# Patient Record
Sex: MALE | Race: WHITE | NOT HISPANIC OR LATINO | Employment: OTHER | URBAN - METROPOLITAN AREA
[De-identification: names, ages, dates, MRNs, and addresses within clinical notes are randomized per-mention and may not be internally consistent; named-entity substitution may affect disease eponyms.]

---

## 2017-03-14 ENCOUNTER — OFFICE VISIT (OUTPATIENT)
Dept: SLEEP MEDICINE | Facility: CLINIC | Age: 57
End: 2017-03-14
Payer: COMMERCIAL

## 2017-03-14 VITALS
WEIGHT: 207 LBS | HEIGHT: 69 IN | SYSTOLIC BLOOD PRESSURE: 139 MMHG | OXYGEN SATURATION: 96 % | HEART RATE: 83 BPM | BODY MASS INDEX: 30.66 KG/M2 | DIASTOLIC BLOOD PRESSURE: 67 MMHG

## 2017-03-14 DIAGNOSIS — G47.33 OSA (OBSTRUCTIVE SLEEP APNEA): Primary | ICD-10-CM

## 2017-03-14 PROCEDURE — 99214 OFFICE O/P EST MOD 30 MIN: CPT | Performed by: OTOLARYNGOLOGY

## 2017-03-14 NOTE — PATIENT INSTRUCTIONS
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
## 2017-03-14 NOTE — MR AVS SNAPSHOT
After Visit Summary   3/14/2017    Miguel A Vaughn    MRN: [id number]           Patient Information     Date Of Birth          1960        Visit Information        Provider Department      3/14/2017 3:30 PM Tomas Whitman MD [facility]        Today's Diagnoses     BENJAMIN (obstructive sleep apnea)    -  1      Care Instructions      Your BMI is Body mass index is 30.57 kg/(m^2).  Weight management is a personal decision.  If you are interested in exploring weight loss strategies, the following discussion covers the approaches that may be successful. Body mass index (BMI) is one way to tell whether you are at a healthy weight, overweight, or obese. It measures your weight in relation to your height.  A BMI of 18.5 to 24.9 is in the healthy range. A person with a BMI of 25 to 29.9 is considered overweight, and someone with a BMI of 30 or greater is considered obese. More than two-thirds of American adults are considered overweight or obese.  Being overweight or obese increases the risk for further weight gain. Excess weight may lead to heart disease and diabetes.  Creating and following plans for healthy eating and physical activity may help you improve your health.  Weight control is part of healthy lifestyle and includes exercise, emotional health, and healthy eating habits. Careful eating habits lifelong are the mainstay of weight control. Though there are significant health benefits from weight loss, long-term weight loss with diet alone may be very difficult to achieve- studies show long-term success with dietary management in less than 10% of people. Attaining a healthy weight may be especially difficult to achieve in those with severe obesity. In some cases, medications, devices and surgical management might be considered.  What can you do?  If you are overweight or obese and are interested in methods for weight loss, you should discuss this with your provider.      Consider reducing daily calorie intake by 500 calories.     Keep a food journal.     Avoiding skipping meals, consider cutting portions instead.    Diet combined with exercise helps maintain muscle while optimizing fat loss. Strength training is particularly important for building and maintaining muscle mass. Exercise helps reduce stress, increase energy, and improves fitness. Increasing exercise without diet control, however, may not burn enough calories to loose weight.       Start walking three days a week 10-20 minutes at a time    Work towards walking thirty minutes five days a week     Eventually, increase the speed of your walking for 1-2 minutes at time    In addition, we recommend that you review healthy lifestyles and methods for weight loss available through the National Institutes of Health patient information sites:  http://win.niddk.nih.gov/publications/index.htm    And look into health and wellness programs that may be available through your health insurance provider, employer, local community center, or robb club.    Weight management plan: Patient was referred to their PCP to discuss a diet and exercise plan.            Follow-ups after your visit        Your next 10 appointments already scheduled     Mar 20, 2017 10:00 AM CDT   PAP SETUP REPLACEMENT with  SLEEP CENTER DME   [facility] ([address] [address] [phone]            Apr 25, 2017  3:30 PM CDT   Return Sleep Patient with Tomas Whitman MD   [address] [address] [phone]              Who to contact     If you have questions or need follow up information about today's clinic visit or your schedule please contact [facility] directly at [phone].  Normal or non-critical lab and imaging results will be communicated to you by Robbie  "letter or phone within 4 business days after the clinic has received the results. If you do not hear from us within 7 days, please contact the clinic through [employer] or phone. If you have a critical or abnormal lab result, we will notify you by phone as soon as possible.  Submit refill requests through [employer] or call your pharmacy and they will forward the refill request to us. Please allow 3 business days for your refill to be completed.          Additional Information About Your Visit        [employer] Information     [employer] lets you send messages to your doctor, view your test results, renew your prescriptions, schedule appointments and more. To sign up, go to [email or web address] . Click on \"Log in\" on the left side of the screen, which will take you to the Welcome page. Then click on \"Sign up Now\" on the right side of the page.     You will be asked to enter the access code listed below, as well as some personal information. Please follow the directions to create your username and password.     Your access code is: [email or web address]  Expires: 2017  4:16 PM     Your access code will  in 90 days. If you need help or a new code, please call your [place] clinic or [phone].        Care EveryWhere ID     This is your Care EveryWhere ID. This could be used by other organizations to access your [place] medical records  [id number]        Your Vitals Were     Pulse Height Pulse Oximetry BMI (Body Mass Index)          83 1.753 m (5' 9\") 96% 30.57 kg/m2         Blood Pressure from Last 3 Encounters:   17 139/67   16 117/59   16 128/58    Weight from Last 3 Encounters:   17 93.9 kg (207 lb)   16 95.3 kg (210 lb)   08/10/16 97.5 kg (215 lb)              We Performed the Following     Comprehensive DME          Today's Medication Changes          These changes are accurate as of: 3/14/17  4:16 PM.  If you have any questions, ask your nurse or doctor.               Stop taking " these medicines if you haven't already. Please contact your care team if you have questions.     HYDROcodone-acetaminophen 5-325 MG per tablet   Commonly known as:  NORCO   Stopped by:  Tomas Whitman MD           oxyCODONE 5 MG IR tablet   Commonly known as:  ROXICODONE   Stopped by:  Tomas Whitman MD                    Primary Care Provider Office Phone # Fax #    Juanpablo Peter Abarca [phone]       [facility] [address] [place] [address]        Thank you!     Thank you for choosing [facility]  for your care. Our goal is always to provide you with excellent care. Hearing back from our patients is one way we can continue to improve our services. Please take a few minutes to complete the written survey that you may receive in the mail after your visit with us. Thank you!             Your Updated Medication List - Protect others around you: Learn how to safely use, store and throw away your medicines at www.disposemymeds.org.          This list is accurate as of: 3/14/17  4:16 PM.  Always use your most recent med list.                   Brand Name Dispense Instructions for use    fluticasone 50 MCG/ACT spray    FLONASE    16 g    INHALE 1-2 SPRAYS IN EACH NOSTRIL ONCE DAILY       hydrocortisone 2.5 % cream    ANUSOL-HC    30 g    Place rectally 2 times daily       rOPINIRole 0.25 MG tablet    REQUIP    60 tablet    Take 1-2 tablets (0.25-0.5 mg) by mouth nightly as needed

## 2017-03-14 NOTE — NURSING NOTE
"Chief Complaint   Patient presents with     Sleep Problem     cpap f/u       Initial /67  Pulse 83  Ht 1.753 m (5' 9\")  Wt 93.9 kg (207 lb)  SpO2 96%  BMI 30.57 kg/m2 Estimated body mass index is 30.57 kg/(m^2) as calculated from the following:    Height as of this encounter: 1.753 m (5' 9\").    Weight as of this encounter: 93.9 kg (207 lb).  Medication Reconciliation: complete    "

## 2017-03-20 ENCOUNTER — DOCUMENTATION ONLY (OUTPATIENT)
Dept: SLEEP MEDICINE | Facility: CLINIC | Age: 57
End: 2017-03-20

## 2017-03-20 NOTE — PROGRESS NOTES
Patient was offered choice of vendor and chose [facility].  Patient Miguel A Vaughn was set up at [facility] on March 20, 2017. Patient received a Resmed AirSense 10 Auto. Pressures were set at 10-15 cm H2O.   Patient s ramp is 5 cm H2O for Off and FLEX/EPR is EPR.  Patient received a Resmed Mask name: Airfit F20  Full Face mask Size Medium, heated tubing and heated humidifier.  Patient is not enrolled in the STM Program and does not need to meet compliance. Patient has a follow up on 04/25/17 with Dr. Whitman.    Rosa Maria Thompson

## 2017-04-03 DIAGNOSIS — G47.33 OSA (OBSTRUCTIVE SLEEP APNEA): Primary | ICD-10-CM

## 2017-04-03 NOTE — PROGRESS NOTES
PT CALLS WITH C/O AIR HUNGER WITH HIS NEW AUTOCPAP UNIT.  CURRENTLY HE IS SET 10-15 CMH20, I REVIEWED HIS DOWNLOAD FROM THE NEW AND OLD CPAP, HIS PREVIOUS CPAP WAS SET AT [id number].  HE FELT HE WOULD LIKE TO STAY ON AUTOPAP BUT HAVE A HIGHER STARTING PRESSURE.  HIS RESMED AIRSENSE AUTO CPAP WAS SET AT 13-18 CMH20.   I HAVE ATTACHED THE NEW RX CLARIFICATION.  LOTTIE SMITH  [facility]  [phone]

## 2017-04-25 ENCOUNTER — DOCUMENTATION ONLY (OUTPATIENT)
Dept: SLEEP MEDICINE | Facility: CLINIC | Age: 57
End: 2017-04-25

## 2017-04-25 ENCOUNTER — OFFICE VISIT (OUTPATIENT)
Dept: SLEEP MEDICINE | Facility: CLINIC | Age: 57
End: 2017-04-25
Payer: COMMERCIAL

## 2017-04-25 VITALS
DIASTOLIC BLOOD PRESSURE: 73 MMHG | SYSTOLIC BLOOD PRESSURE: 150 MMHG | WEIGHT: 215.6 LBS | HEART RATE: 87 BPM | HEIGHT: 69 IN | BODY MASS INDEX: 31.93 KG/M2 | OXYGEN SATURATION: 96 %

## 2017-04-25 DIAGNOSIS — G47.33 OSA (OBSTRUCTIVE SLEEP APNEA): Primary | ICD-10-CM

## 2017-04-25 PROCEDURE — 99213 OFFICE O/P EST LOW 20 MIN: CPT | Performed by: OTOLARYNGOLOGY

## 2017-04-25 NOTE — NURSING NOTE
"Chief Complaint   Patient presents with     Sleep Problem     Cpap follow up       Initial /73 (BP Location: Right arm, Patient Position: Chair, Cuff Size: Adult Regular)  Pulse 87  Ht 1.753 m (5' 9\")  Wt 97.8 kg (215 lb 9.6 oz)  SpO2 96%  BMI 31.84 kg/m2 Estimated body mass index is 31.84 kg/(m^2) as calculated from the following:    Height as of this encounter: 1.753 m (5' 9\").    Weight as of this encounter: 97.8 kg (215 lb 9.6 oz).  Medication Reconciliation: complete     [place]: 3    "

## 2017-04-25 NOTE — PROGRESS NOTES
PATIENT WAS STRUGGLING WITH HIS NEW MASK, [facility] DID A 30 DAY MASK EXCHANGE -AB
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
## 2017-04-25 NOTE — MR AVS SNAPSHOT
"              After Visit Summary   2017    Miguel A Vaughn    MRN: [id number]           Patient Information     Date Of Birth          1960        Visit Information        Provider Department      2017 3:30 PM Tomas Whitman MD [facility]        Today's Diagnoses     BENJAMIN (obstructive sleep apnea)    -  1       Follow-ups after your visit        Who to contact     If you have questions or need follow up information about today's clinic visit or your schedule please contact [facility] directly at [phone].  Normal or non-critical lab and imaging results will be communicated to you by [email or web address], letter or phone within 4 business days after the clinic has received the results. If you do not hear from us within 7 days, please contact the clinic through [employer] or phone. If you have a critical or abnormal lab result, we will notify you by phone as soon as possible.  Submit refill requests through [employer] or call your pharmacy and they will forward the refill request to us. Please allow 3 business days for your refill to be completed.          Additional Information About Your Visit        MyChart Information     [employer] lets you send messages to your doctor, view your test results, renew your prescriptions, schedule appointments and more. To sign up, go to [email or web address] . Click on \"Log in\" on the left side of the screen, which will take you to the Welcome page. Then click on \"Sign up Now\" on the right side of the page.     You will be asked to enter the access code listed below, as well as some personal information. Please follow the directions to create your username and password.     Your access code is: [email or web address]  Expires: 2017  4:16 PM     Your access code will  in 90 days. If you need help or a new code, please call your [place] clinic or [phone].        Care EveryWhere ID     This is your Care EveryWhere ID. This could be " "used by other organizations to access your [place] medical records  [id number]        Your Vitals Were     Pulse Height Pulse Oximetry BMI (Body Mass Index)          87 1.753 m (5' 9\") 96% 31.84 kg/m2         Blood Pressure from Last 3 Encounters:   04/25/17 150/73   03/14/17 139/67   08/03/16 117/59    Weight from Last 3 Encounters:   04/25/17 97.8 kg (215 lb 9.6 oz)   03/14/17 93.9 kg (207 lb)   08/18/16 95.3 kg (210 lb)              Today, you had the following     No orders found for display       Primary Care Provider Office Phone # Fax #    Juanpablo Abarca [phone]       [facility] [address] [place] [address]        Thank you!     Thank you for choosing [facility]  for your care. Our goal is always to provide you with excellent care. Hearing back from our patients is one way we can continue to improve our services. Please take a few minutes to complete the written survey that you may receive in the mail after your visit with us. Thank you!             Your Updated Medication List - Protect others around you: Learn how to safely use, store and throw away your medicines at www.disposemymeds.org.          This list is accurate as of: 4/25/17  4:10 PM.  Always use your most recent med list.                   Brand Name Dispense Instructions for use    fluticasone 50 MCG/ACT spray    FLONASE    16 g    INHALE 1-2 SPRAYS IN EACH NOSTRIL ONCE DAILY       hydrocortisone 2.5 % cream    ANUSOL-HC    30 g    Place rectally 2 times daily       order for DME      Equipment ordered: RESMED Auto PAP Mask type: Full face  Settings: 10-15 CM H2O       rOPINIRole 0.25 MG tablet    REQUIP    60 tablet    Take 1-2 tablets (0.25-0.5 mg) by mouth nightly as needed         "

## 2017-05-18 DIAGNOSIS — J31.0 CHRONIC RHINITIS: ICD-10-CM

## 2017-05-18 DIAGNOSIS — G25.81 RESTLESS LEGS SYNDROME (RLS): ICD-10-CM

## 2017-05-18 RX ORDER — FLUTICASONE PROPIONATE 50 MCG
SPRAY, SUSPENSION (ML) NASAL
Qty: 16 G | Refills: 11 | Status: CANCELLED | OUTPATIENT
Start: 2017-05-18

## 2017-05-18 NOTE — TELEPHONE ENCOUNTER
fluticasone (FLONASE) 50 MCG/ACT spray      Last Written Prescription Date: 12/16/16  Last Fill Quantity: 16g,  # refills: 11   Last Office Visit with [facility], [facility] or  Health prescribing provider: 8/2/16                                                 rOPINIRole (REQUIP) 0.25 MG tablet     Last Written Prescription Date: 12/6/16  Last Fill Quantity: 60, # refills: 5  Last Office Visit with [facility], [facility] or  Health prescribing provider: 8/2/16        BP Readings from Last 3 Encounters:   04/25/17 150/73   03/14/17 139/67   08/03/16 117/59

## 2017-05-19 RX ORDER — FLUTICASONE PROPIONATE 50 MCG
SPRAY, SUSPENSION (ML) NASAL
Qty: 16 G | Refills: 5 | Status: SHIPPED | OUTPATIENT
Start: 2017-05-19 | End: 2018-08-17

## 2017-05-19 NOTE — TELEPHONE ENCOUNTER
Flonase:  Prescription approved per [facility] Refill Protocol - Mail Order requesting. Please cancel remaining refills from [facility].   Requip:  Routing refill request to provider for review/approval because:  Labs out of range:  BP    Domonique Pendleton, JAMES, BSN

## 2017-05-22 DIAGNOSIS — G25.81 RESTLESS LEGS SYNDROME (RLS): ICD-10-CM

## 2017-05-22 RX ORDER — ROPINIROLE 0.25 MG/1
.25-.5 TABLET, FILM COATED ORAL
Qty: 60 TABLET | Refills: 5 | Status: SHIPPED | OUTPATIENT
Start: 2017-05-22 | End: 2017-11-07

## 2017-05-22 NOTE — TELEPHONE ENCOUNTER
90 day supply request    Requip 0.25 mg     Last Written Prescription Date: 5/22/2017  Last Fill Quantity: 60, # refills: 5  Last Office Visit with [facility], [facility] or [facility] prescribing provider: 8/5/2016        BP Readings from Last 3 Encounters:   04/25/17 150/73   03/14/17 139/67   08/03/16 117/59

## 2017-05-24 RX ORDER — ROPINIROLE 0.25 MG/1
.25-.5 TABLET, FILM COATED ORAL
Qty: 60 TABLET | Refills: 5 | OUTPATIENT
Start: 2017-05-24

## 2017-05-24 NOTE — TELEPHONE ENCOUNTER
Rx was sent 5/22/2017 for 60 tabs and 5 refills.   Pharmacy notified via E-prescribe refusal  Kendra Sow RN

## 2017-11-07 DIAGNOSIS — G25.81 RESTLESS LEGS SYNDROME (RLS): ICD-10-CM

## 2017-11-09 RX ORDER — ROPINIROLE 0.25 MG/1
.25-.5 TABLET, FILM COATED ORAL
Qty: 60 TABLET | Refills: 0 | Status: SHIPPED | OUTPATIENT
Start: 2017-11-09 | End: 2018-08-17

## 2017-11-09 NOTE — TELEPHONE ENCOUNTER
Routing refill request to provider for review/approval because:  rx written by a provider who no longer works at [facility].    Robyn Orellana RN

## 2017-12-07 DIAGNOSIS — G25.81 RESTLESS LEGS SYNDROME (RLS): ICD-10-CM

## 2017-12-07 RX ORDER — ROPINIROLE 0.25 MG/1
TABLET, FILM COATED ORAL
Qty: 60 TABLET | Refills: 0 | OUTPATIENT
Start: 2017-12-07

## 2017-12-07 NOTE — TELEPHONE ENCOUNTER
Requested Prescriptions   Pending Prescriptions Disp Refills     rOPINIRole (REQUIP) 0.25 MG tablet [Pharmacy Med Name: ROPINIROLE TAB 0.25MG] 60 tablet 0     Sig: TAKE 1 TO 2 TABLETS        (=0.25-0.5MG) NIGHTLY AS   NEEDED    Antiparkinson's Agents Protocol Failed    12/7/2017  4:12 AM       Failed - Blood pressure under 140/90    BP Readings from Last 3 Encounters:   04/25/17 150/73   03/14/17 139/67   08/03/16 117/59                Failed - Recent or future visit with authorizing provider's specialty    Patient had office visit in the last year or has a visit in the next 30 days with authorizing provider.  See chart review.              Passed - Patient is age 18 or older        rOPINIRole (REQUIP) 0.25 MG tablet  Routing refill request to provider for review/approval because:  Ángela given x1 and patient did not follow up, please advise  Patient needs to be seen because:  Due for OV  Patient needs to be seen because it has been more than 1 year since last office visit.    Astrid Rodgers, RN, BSN

## 2018-02-13 NOTE — TELEPHONE ENCOUNTER
LM with patient's wife- she will have him call back once he is up to schedule an appointment either in ER or [place].  Carsisa Lam CMA ([facility])

## 2018-07-09 DIAGNOSIS — G47.33 OSA (OBSTRUCTIVE SLEEP APNEA): Primary | ICD-10-CM
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
## 2018-08-17 ENCOUNTER — OFFICE VISIT (OUTPATIENT)
Dept: FAMILY MEDICINE | Facility: OTHER | Age: 58
End: 2018-08-17
Payer: COMMERCIAL

## 2018-08-17 ENCOUNTER — TELEPHONE (OUTPATIENT)
Dept: FAMILY MEDICINE | Facility: OTHER | Age: 58
End: 2018-08-17

## 2018-08-17 VITALS
TEMPERATURE: 97.8 F | RESPIRATION RATE: 16 BRPM | OXYGEN SATURATION: 96 % | WEIGHT: 204.1 LBS | DIASTOLIC BLOOD PRESSURE: 72 MMHG | HEART RATE: 76 BPM | HEIGHT: 69 IN | BODY MASS INDEX: 30.23 KG/M2 | SYSTOLIC BLOOD PRESSURE: 138 MMHG

## 2018-08-17 DIAGNOSIS — Z11.59 NEED FOR HEPATITIS C SCREENING TEST: ICD-10-CM

## 2018-08-17 DIAGNOSIS — E78.5 HYPERLIPIDEMIA LDL GOAL <130: ICD-10-CM

## 2018-08-17 DIAGNOSIS — J30.1 SEASONAL ALLERGIC RHINITIS DUE TO POLLEN, UNSPECIFIED CHRONICITY: ICD-10-CM

## 2018-08-17 DIAGNOSIS — Z11.4 SCREENING FOR HIV (HUMAN IMMUNODEFICIENCY VIRUS): ICD-10-CM

## 2018-08-17 DIAGNOSIS — R73.9 HYPERGLYCEMIA: Primary | ICD-10-CM

## 2018-08-17 DIAGNOSIS — E11.9 TYPE 2 DIABETES MELLITUS WITHOUT COMPLICATION, WITHOUT LONG-TERM CURRENT USE OF INSULIN (H): ICD-10-CM

## 2018-08-17 DIAGNOSIS — E78.5 HYPERLIPIDEMIA LDL GOAL <100: ICD-10-CM

## 2018-08-17 DIAGNOSIS — E11.9 TYPE 2 DIABETES MELLITUS WITHOUT COMPLICATION, WITHOUT LONG-TERM CURRENT USE OF INSULIN (H): Primary | ICD-10-CM

## 2018-08-17 DIAGNOSIS — G25.81 RESTLESS LEGS SYNDROME (RLS): ICD-10-CM

## 2018-08-17 LAB
ALBUMIN SERPL-MCNC: 3.7 G/DL (ref 3.4–5)
ALP SERPL-CCNC: 115 U/L (ref 40–150)
ALT SERPL W P-5'-P-CCNC: 32 U/L (ref 0–70)
ANION GAP SERPL CALCULATED.3IONS-SCNC: 8 MMOL/L (ref 3–14)
AST SERPL W P-5'-P-CCNC: 16 U/L (ref 0–45)
BILIRUB SERPL-MCNC: 0.5 MG/DL (ref 0.2–1.3)
BUN SERPL-MCNC: 11 MG/DL (ref 7–30)
CALCIUM SERPL-MCNC: 8.6 MG/DL (ref 8.5–10.1)
CHLORIDE SERPL-SCNC: 103 MMOL/L (ref 94–109)
CO2 SERPL-SCNC: 26 MMOL/L (ref 20–32)
CREAT SERPL-MCNC: 0.77 MG/DL (ref 0.66–1.25)
GFR SERPL CREATININE-BSD FRML MDRD: >90 ML/MIN/1.7M2
GLUCOSE BLD-MCNC: 212 MG/DL (ref 70–99)
GLUCOSE SERPL-MCNC: 218 MG/DL (ref 70–99)
HBA1C MFR BLD: 12.4 % (ref 0–5.6)
LDLC SERPL DIRECT ASSAY-MCNC: 154 MG/DL
POTASSIUM SERPL-SCNC: 3.6 MMOL/L (ref 3.4–5.3)
PROT SERPL-MCNC: 7.4 G/DL (ref 6.8–8.8)
SODIUM SERPL-SCNC: 137 MMOL/L (ref 133–144)
TSH SERPL DL<=0.005 MIU/L-ACNC: 2.25 MU/L (ref 0.4–4)

## 2018-08-17 PROCEDURE — 83721 ASSAY OF BLOOD LIPOPROTEIN: CPT | Performed by: PHYSICIAN ASSISTANT

## 2018-08-17 PROCEDURE — 83036 HEMOGLOBIN GLYCOSYLATED A1C: CPT | Performed by: PHYSICIAN ASSISTANT

## 2018-08-17 PROCEDURE — 82947 ASSAY GLUCOSE BLOOD QUANT: CPT | Performed by: PHYSICIAN ASSISTANT

## 2018-08-17 PROCEDURE — 84443 ASSAY THYROID STIM HORMONE: CPT | Performed by: PHYSICIAN ASSISTANT

## 2018-08-17 PROCEDURE — 99213 OFFICE O/P EST LOW 20 MIN: CPT | Performed by: PHYSICIAN ASSISTANT

## 2018-08-17 PROCEDURE — 80053 COMPREHEN METABOLIC PANEL: CPT | Performed by: PHYSICIAN ASSISTANT

## 2018-08-17 PROCEDURE — 36415 COLL VENOUS BLD VENIPUNCTURE: CPT | Performed by: PHYSICIAN ASSISTANT

## 2018-08-17 RX ORDER — FLUTICASONE PROPIONATE 50 MCG
SPRAY, SUSPENSION (ML) NASAL
Qty: 16 G | Refills: 5 | Status: SHIPPED | OUTPATIENT
Start: 2018-08-17 | End: 2018-11-19

## 2018-08-17 RX ORDER — ROPINIROLE 0.25 MG/1
.25-.5 TABLET, FILM COATED ORAL
Qty: 60 TABLET | Refills: 0 | Status: SHIPPED | OUTPATIENT
Start: 2018-08-17 | End: 2018-08-17

## 2018-08-17 RX ORDER — GLYBURIDE-METFORMIN HYDROCHLORIDE 1.25; 25 MG/1; MG/1
1 TABLET ORAL 2 TIMES DAILY WITH MEALS
Qty: 180 TABLET | Refills: 3 | Status: SHIPPED | OUTPATIENT
Start: 2018-08-17 | End: 2018-11-19

## 2018-08-17 RX ORDER — ROPINIROLE 0.25 MG/1
.25-.5 TABLET, FILM COATED ORAL
Qty: 180 TABLET | Refills: 1 | Status: SHIPPED | OUTPATIENT
Start: 2018-08-17 | End: 2018-11-19

## 2018-08-17 RX ORDER — FLUTICASONE PROPIONATE 50 MCG
SPRAY, SUSPENSION (ML) NASAL
Qty: 16 G | Refills: 5 | Status: SHIPPED | OUTPATIENT
Start: 2018-08-17 | End: 2018-08-17

## 2018-08-17 ASSESSMENT — PAIN SCALES - GENERAL: PAINLEVEL: NO PAIN (0)

## 2018-08-17 NOTE — MR AVS SNAPSHOT
After Visit Summary   8/17/2018    Miguel A Vaughn    MRN: [id number]           Patient Information     Date Of Birth          1960        Visit Information        Provider Department      8/17/2018 7:40 AM Ferny Huerta PA-C [facility]        Today's Diagnoses     Hyperglycemia    -  1    Need for hepatitis C screening test        Screening for HIV (human immunodeficiency virus)        Restless legs syndrome (RLS)        Seasonal allergic rhinitis due to pollen, unspecified chronicity        Hyperlipidemia LDL goal <100        Type 2 diabetes mellitus without complication, without long-term current use of insulin (H)           Follow-ups after your visit        Additional Services     DIABETES EDUCATOR REFERRAL       DIABETES SELF MANAGEMENT TRAINING (DSMT)      Your provider has referred you to Diabetes Education: [facility]: Diabetes Education - [facility] [phone]   [email or web address]     If an urgent visit is needed or A1C is above 12, Care Team to call the Diabetes  Education Team at [phone] or send an In Basket message to the Diabetes Education Pool (P DIAB ED-PATIENT CARE).    A  will call you to make your appointment. If it has been more than 3 business days since your referral was placed, please call the above phone number to schedule.    Type of training and number of hours: New Diagnosis: Initial group DSMT - 10 hours.      Diabetes Type: Type 2 - On Oral Medication   Medicare covers: 10 hours of initial DSMT in 12 month period from the time of first visit, plus 2 hours of follow-up DSMT annually, and additional hours as requested for insulin training.         Diabetes Co-Morbidities: dyslipidemia, hypertension and obesity               A1C Goal:  <8.0       A1C is: Lab Results       Component                Value               Date                       A1C                      12.4                 08/17/2018              MEDICAL NUTRITION THERAPY (MNT) for Diabetes    Medical Nutrition Therapy with a Registered Dietitian can be provided in coordination with Diabetes Self-Management Training to assist in achieving optimal diabetes management.    MNT Type and Hours: New diagnosis: Initial MNT - 3 hours                       Medicare will cover: 3 hours initial MNT in 12 month period after first visit, plus 2 hours of follow-up MNT annually        Diabetes Education Topics: Comprehensive Knowledge Assessment and Instruction    Special Educational Needs Requiring Individual DSMT: None      Please be aware that coverage of these services is subject to the terms and limitations of your health insurance plan.  Call member services at your health plan to determine Diabetes Self-Management Training (Codes  and ) and Medical Nutrition Therapy (Codes 77089 and 33294) benefits and ask which blood glucose monitor brands are covered by your plan.  Please bring the following with you to your appointment:    (1)  List of current medications   (2)  List of Blood Glucose Monitor brands that are covered by your insurance plan  (3)  Blood Glucose Monitor and log book  (4)   Food records for the 3 days prior to your visit    The Certified Diabetes Educator may make diabetes medication adjustments per the CDE Protocol and Collaborative Practice Agreement.                  Follow-up notes from your care team     Return in about 3 months (around 11/17/2018).      Who to contact     If you have questions or need follow up information about today's clinic visit or your schedule please contact [facility] directly at [phone].  Normal or non-critical lab and imaging results will be communicated to you by MyChart, letter or phone within 4 business days after the clinic has received the results. If you do not hear from us within 7 days, please contact the clinic through MyChart or phone. If you have a  "critical or abnormal lab result, we will notify you by phone as soon as possible.  Submit refill requests through [employer] or call your pharmacy and they will forward the refill request to us. Please allow 3 business days for your refill to be completed.          Additional Information About Your Visit        Care EveryWhere ID     This is your Care EveryWhere ID. This could be used by other organizations to access your [place] medical records  [id number]        Your Vitals Were     Pulse Temperature Respirations Height Pulse Oximetry BMI (Body Mass Index)    76 97.8  F (36.6  C) (Temporal) 16 5' 9\" (1.753 m) 96% 30.14 kg/m2       Blood Pressure from Last 3 Encounters:   08/17/18 138/72   04/25/17 150/73   03/14/17 139/67    Weight from Last 3 Encounters:   08/17/18 204 lb 1.6 oz (92.6 kg)   04/25/17 215 lb 9.6 oz (97.8 kg)   03/14/17 207 lb (93.9 kg)              We Performed the Following     Comprehensive metabolic panel (BMP + Alb, Alk Phos, ALT, AST, Total. Bili, TP)     DIABETES EDUCATOR REFERRAL     Glucose, whole blood     Hemoglobin A1c     LDL cholesterol direct     TSH with free T4 reflex          Today's Medication Changes          These changes are accurate as of 8/17/18  8:19 AM.  If you have any questions, ask your nurse or doctor.               Start taking these medicines.        Dose/Directions    fluticasone 50 MCG/ACT spray   Commonly known as:  FLONASE   Used for:  Seasonal allergic rhinitis due to pollen, unspecified chronicity   Started by:  Ferny Huerta PA-C        INHALE 1-2 SPRAYS IN EACH NOSTRIL ONCE DAILY   Quantity:  16 g   Refills:  5       glyBURIDE-metFORMIN 1. MG per tablet   Commonly known as:  GLUCOVANCE   Used for:  Type 2 diabetes mellitus without complication, without long-term current use of insulin (H)   Started by:  Ferny Huerta PA-C        Dose:  1 tablet   Take 1 tablet by mouth 2 times daily (with meals)   Quantity:  180 tablet   Refills:  3       rOPINIRole " 0.25 MG tablet   Commonly known as:  REQUIP   Used for:  Restless legs syndrome (RLS)   Started by:  Ferny Huerta PA-C        Dose:  0.25-0.5 mg   Take 1-2 tablets (0.25-0.5 mg) by mouth nightly as needed   Quantity:  180 tablet   Refills:  1            Where to get your medicines      These medications were sent to [facility] - [place], [place] - [address] AT [address] [address]     Phone:  [phone]     fluticasone 50 MCG/ACT spray    rOPINIRole 0.25 MG tablet         These medications were sent to [facility] [place] [facility] [address] [facility]  [address] [facility] [address]     Phone:  [phone]     glyBURIDE-metFORMIN 1. MG per tablet                Primary Care Provider Office Phone # Fax #    Juanpablo Abarca [phone] [address] [place] [address]        Equal Access to Services     [facility]: Hadii aad ku hadasho Soomaali, waaxda luqadaha, qaybta kaalmada adejulisa, lucila bynum . So [facility] [phone].    ATENCIÓN: Si habla español, tiene a bullard disposición servicios gratGallup Indian Medical Centeros de asistencia lingüística. [facility] [phone].    We comply with applicable federal civil rights laws and [place] laws. We do not discriminate on the basis of race, color, national origin, age, disability, sex, sexual orientation, or gender identity.            Thank you!     Thank you for choosing [facility]  for your care. Our goal is always to provide you with excellent care. Hearing back from our patients is one way we can continue to improve our services. Please take a few minutes to complete the written survey that you may receive in the mail after your visit with us. Thank you!             Your Updated Medication List - Protect others around you: Learn how to safely use, store and throw away your medicines at  www.disposemymeds.org.          This list is accurate as of 8/17/18  8:19 AM.  Always use your most recent med list.                   Brand Name Dispense Instructions for use Diagnosis    fluticasone 50 MCG/ACT spray    FLONASE    16 g    INHALE 1-2 SPRAYS IN EACH NOSTRIL ONCE DAILY    Seasonal allergic rhinitis due to pollen, unspecified chronicity       glyBURIDE-metFORMIN 1. MG per tablet    GLUCOVANCE    180 tablet    Take 1 tablet by mouth 2 times daily (with meals)    Type 2 diabetes mellitus without complication, without long-term current use of insulin (H)       hydrocortisone 2.5 % cream    ANUSOL-HC    30 g    Place rectally 2 times daily    Rectal pain       order for DME      Equipment ordered: RESMED Auto PAP Mask type: Full face  Settings: 10-15 CM H2O        rOPINIRole 0.25 MG tablet    REQUIP    180 tablet    Take 1-2 tablets (0.25-0.5 mg) by mouth nightly as needed    Restless legs syndrome (RLS)

## 2018-08-17 NOTE — NURSING NOTE
"Chief Complaint   Patient presents with     Lab concerns     Panel Management     PHQ2, Honoring Choices, HIV, Hep C, Lipid       Initial /72  Pulse 76  Temp 97.8  F (36.6  C) (Temporal)  Resp 16  Ht 5' 9\" (1.753 m)  Wt 204 lb 1.6 oz (92.6 kg)  SpO2 96%  BMI 30.14 kg/m2 Estimated body mass index is 30.14 kg/(m^2) as calculated from the following:    Height as of this encounter: 5' 9\" (1.753 m).    Weight as of this encounter: 204 lb 1.6 oz (92.6 kg).  Medication Reconciliation: complete    Alba Nicolas MA  "

## 2018-08-17 NOTE — TELEPHONE ENCOUNTER
Left message for pt to return our call    Notes Recorded by Ferny Huerta PA-C on 8/17/2018 at 4:39 PM  Miguel A:  Along with the development of diabetes we note that your LDL cholesterol is elevated.  This needs to be less than 100 in the presence of diabetes.  If you were to talk to her cardiologist they would most likely give you a goal of closer to 70.  I would advise that you begin Lipitor 20 mg each night and follow-up with cholesterol as well as diabetes check in 3 months.    Staff:  Please contact the patient discussed these recommendations and find out if he is willing to commence with treatment.  Feel free to place the order for the medication and follow-up labs (hemoglobin A1c and LDL) for the next 3 months.  Electronically signed:    Ferny Huerta PA-C

## 2018-08-17 NOTE — LETTER
[facility]  [address] [facility] [address]  [phone]        August 17, 2018  In regards to:  Miguel A Vaughn  [address]  [place] [place] [facility] [address]          Fabien Argueta./  As part of our office visit today in clinic here in [place] we have discovered that Mr. Yost has diabetes.  We have started him on a combination medication of metformin and glyburide at a relatively low dose twice a day to start control of diabetes.  We have referred him to a diabetic educator who will arrange for further follow-up.  He will need to follow-up with me in 3 months to assure compliance with medication regimen and control over his diabetes.  It may take some time for us to get his diabetes under control as it is quite significant at this point time.  Please review the attached labs that we josafat today that I send for your records and information.    Feel free to contact me with any questions you may have.  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    Sincerely,        ESTELLA Carlson PA-C

## 2018-08-20 RX ORDER — ATORVASTATIN CALCIUM 20 MG/1
20 TABLET, FILM COATED ORAL DAILY
Qty: 90 TABLET | Refills: 1 | Status: SHIPPED | OUTPATIENT
Start: 2018-08-20 | End: 2018-11-19

## 2018-08-20 NOTE — TELEPHONE ENCOUNTER
Spoke to patient who is willing to start medication. Will pend for provider signature.  Mandi Zamora CMA ([facility])

## 2018-09-21 ENCOUNTER — ALLIED HEALTH/NURSE VISIT (OUTPATIENT)
Dept: EDUCATION SERVICES | Facility: OTHER | Age: 58
End: 2018-09-21
Payer: COMMERCIAL

## 2018-09-21 VITALS — BODY MASS INDEX: 30.63 KG/M2 | WEIGHT: 207.4 LBS

## 2018-09-21 DIAGNOSIS — E11.9 TYPE 2 DIABETES MELLITUS WITHOUT COMPLICATION, WITHOUT LONG-TERM CURRENT USE OF INSULIN (H): Primary | ICD-10-CM

## 2018-09-21 PROCEDURE — 99207 ZZC DROP WITH A PROCEDURE: CPT

## 2018-09-21 PROCEDURE — G0108 DIAB MANAGE TRN  PER INDIV: HCPCS

## 2018-09-21 NOTE — PROGRESS NOTES
"Diabetes Self-Management Education & Support    Diabetes Education Self Management & Training    SUBJECTIVE/OBJECTIVE:  Presents for: Initial Assessment for new diagnosis  Accompanied by: Spouse  Diabetes education in the past 24 mo: No  Focus of Visit: Diabetes Pathophysiology, Healthy Coping, Monitoring, Taking Medication, New diagnosis.  Diabetes type: Type 2  Date of diagnosis: 08/17/18  Disease course: Stable  How confident are you filling out medical forms by yourself:: Not Assessed  Diabetes management related comments/concerns: I do not know how I got this.  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  Transportation concerns: No  Other concerns:: None  Cultural Influences/Ethnic Background:  American     Per pt:  I do not eat that much.  I cannot give up my Java Mocha.  Reports that he cannot attend group diabetes class due to his work hours.    Diabetes Symptoms & Complications  Polyuria: Yes  Weight loss: Yes  Patient reports that he feels well.     Patient Problem List and Family Medical History reviewed for relevant medical history, current medical status, and diabetes risk factors.    Vitals:  Wt 94.1 kg (207 lb 6.4 oz)  BMI 30.63 kg/m2  Estimated body mass index is 30.63 kg/(m^2) as calculated from the following:    Height as of 8/17/18: 1.753 m (5' 9\").    Weight as of this encounter: 94.1 kg (207 lb 6.4 oz).   Last 3 BP:   BP Readings from Last 3 Encounters:   08/17/18 138/72   04/25/17 150/73   03/14/17 139/67       History   Smoking Status     Current Every Day Smoker     Packs/day: 1.00     Years: 25.00     Types: Cigarettes     Last attempt to quit: 10/1/2007   Smokeless Tobacco     Never Used       Labs:  Lab Results   Component Value Date    A1C 12.4 08/17/2018     Lab Results   Component Value Date     08/17/2018     Lab Results   Component Value Date     08/17/2018     11/17/2014     HDL Cholesterol   Date Value Ref Range Status   11/17/2014 44 >40 mg/dL " Final   ]  GFR Estimate   Date Value Ref Range Status   08/17/2018 >90 >60 mL/min/1.7m2 Final     Comment:     Non  GFR Calc     GFR Estimate If Black   Date Value Ref Range Status   08/17/2018 >90 >60 mL/min/1.7m2 Final     Comment:      GFR Calc     Lab Results   Component Value Date    CR 0.77 08/17/2018     No results found for: MICROALBUMIN    Healthy Eating  Healthy Eating Assessed Today: Yes  Cultural/Scientology diet restrictions?: No  Patient on a regular basis: Has a high intake of carbohydrates (Pt works nights and eats more snacks than meals.  Likes/eats sweets.)  Meal planning: None  Meals include:  Lunch, Dinner, Snacks  Breakfast: Usually does not eat in the morning before he goes to bed.  Lunch: 5 pm- tuna noodle hot dish and 2 hot dogs with buns and glass of milk or chili and rolls or chix dumpling soup  Snacks: 11 pm- break time- carrots, pretzels, Little Yolanda's, drinks water or Gatorade G2.    2 am- break- Java Mocha and Little Yolanda oatmeal cookie(s)  Other:  5 am- eats an oatmeal cookie, or chips or Milky Way candy bar  Beverages: Water, Coffee Java Mocha, OJ/ damian Juice 2 large glasses, Soda with Gayathri, Sports drinks Gatorade G2  Has patient met with a dietitian in the past?: No    Being Active  Being Active Assessed Today: No    Monitoring  Monitoring Assessed Today: Yes  Did patient bring glucose meter to appointment? : No  Blood Glucose Meter:  (Instructed today - Contour Next)    Fasting blood glucose at 2 pm was 180 mg/dl.    Taking Medications  Diabetes Medication(s)     Antidiabetic Combinations Sig    glyBURIDE-metFORMIN (GLUCOVANCE) 1. MG per tablet Take 1 tablet by mouth 2 times daily (with meals)     Patient not taking:  Reported on 9/21/2018          Taking Medication Assessed Today: Yes (Pt has not started to take his Glucovance yet.)  Current Treatments: None    Problem Solving     Reducing Risks  Diabetes Risks: Age over 45 years, Family  History  CAD Risks:  (not assessed)    Healthy Coping  Emotional response to diabetes: Anxiety, Ready to learn,   Informal Support system:: Spouse  Stage of change: PREPARATION (Decided to change - considering how)  Patient Activation Measure Survey Score:  DEISY Score (Last Two) 7/6/2011   DEISY Raw Score 39   Activation Score 56.4   DEISY Level 3       ASSESSMENT:  Patient here with his spouse.  He is very anxious. He and his spouse have many questions which were answered.  He brought in food records for review.  Patient eats high CHO snacks often. Pt states that he is not willing to do insulin.  Willing to take oral medications for diabetes.  Patient works from 7 pm to 7 am.  Sleeps during the day.    Goals        General    Healthy Eating (pt-stated)     Notes - Note created  9/21/2018  1:45 PM by Anusha Price RN    Goal Statement: I will modify my food choices at work.  I will eat and oatmeal cookie and a G2 at 11 pm  And a 2 am eat a sandwich and drink a Mocha.    Measure of Success: I will keep records of the food choices that I make.    Strengths:  Motivated to lower blood glucose levels  Ideas to overcome barriers:  Carry food to work with him.    Date to Achieve By:  1 month              Patient's most recent   Lab Results   Component Value Date    A1C 12.4 08/17/2018    is not meeting goal of <7.0    INTERVENTION:   Diabetes knowledge and skills assessment:     Patient is knowledgeable in diabetes management concepts related to: none    Patient needs further education on the following diabetes management concepts: Healthy Eating, Being Active, Monitoring, Taking Medication, Problem Solving, Reducing Risks and Healthy Coping    Based on learning assessment above, most appropriate setting for further diabetes education would be: Group class or Individual setting.    Education provided today on:  AADE Self-Care Behaviors:  Healthy Eating: carbohydrate counting, consistency in amount, composition, and timing  of food intake, portion control, plate planning method and label reading.  Healthy Choices recommended. Healthy eating recommended.   Monitoring: purpose, proper technique, log and interpret results, individual blood glucose targets, frequency of monitoring and proper sharps disposal  Taking Medication: action of prescribed medication, side effects of prescribed medications and when to take medications  Problem Solving: high blood glucose - causes, signs/symptoms, treatment and prevention, low blood glucose - causes, signs/symptoms, treatment and prevention and carrying a carbohydrate source at all times    Opportunities for ongoing education and support in diabetes-self management were discussed.    Pt verbalized understanding of concepts discussed and recommendations provided today.       Education Materials Provided:  [place] Understanding Diabetes Booklet, Safe Disposal Options for Needles & Syringes, BG Log Sheet, My Plate Planner and sharps disposal. BG and food records. Additional sample box of glucose test strips.    PLAN:  See Patient Instructions for co-developed, patient-stated behavior change goals.  AVS printed and provided to patient today. See Follow-Up section for recommended follow-up.  Patient to call if questions.    Anusha Price RN  BSN CDE    Time Spent: 120 minutes  Encounter Type: Individual    Any diabetes medication dose changes were made via the CDE Protocol and Collaborative Practice Agreement with the patient's referring provider. A copy of this encounter was shared with the provider.

## 2018-09-21 NOTE — PATIENT INSTRUCTIONS
Recommend to eat 3 meals per day and eat 4-5 carb choices per meal and 1-2 carb choices for a snack.  Recommend to test your blood glucose 2 times per day and record values and food choices on the record sheet.  Bring meter and log sheet with you to all appointments.  At 5 am eat  4 carb choices.  Before you go to bed eat a snack of 1-2 carb choices.  Carry sugar at all times.  Call if concerns.

## 2018-09-21 NOTE — MR AVS SNAPSHOT
After Visit Summary   9/21/2018    Miguel A Vaughn    MRN: [id number]           Patient Information     Date Of Birth          1960        Visit Information        Provider Department      9/21/2018 12:30 PM ER DIABETIC ED RESOURCE [facility]        Today's Diagnoses     Type 2 diabetes mellitus without complication, without long-term current use of insulin (H)    -  1      Care Instructions    Recommend to eat 3 meals per day and eat 4-5 carb choices per meal and 1-2 carb choices for a snack.  Recommend to test your blood glucose 2 times per day and record values and food choices on the record sheet.  Bring meter and log sheet with you to all appointments.  At 5 am eat  4 carb choices.  Before you go to bed eat a snack of 1-2 carb choices.  Carry sugar at all times.  Call if concerns.          Follow-ups after your visit        Follow-up notes from your care team     Return in about 3 weeks (around 10/12/2018).      Your next 10 appointments already scheduled     Oct 12, 2018 10:30 AM CDT   Diabetes Education with ER DIABETIC ED RESOURCE   [facility] ([facility])    [address] [place] [id number] [phone]            Nov 19, 2018  9:00 AM CST   Office Visit with Ferny Huerta PA-C   [facility]    [address] [facility] [address] [phone]           Bring a current list of meds and any records pertaining to this visit. For Physicals, please bring immunization records and any forms needing to be filled out. Please arrive 10 minutes early to complete paperwork.              Who to contact     If you have questions or need follow up information about today's clinic visit or your schedule please contact [facility] directly at [phone].  Normal or non-critical lab and imaging results will be communicated to you by Robbie  letter or phone within 4 business days after the clinic has received the results. If you do not hear from us within 7 days, please contact the clinic through [facility] [employer] or phone. If you have a critical or abnormal lab result, we will notify you by phone as soon as possible.  Submit refill requests through [facility] or call your pharmacy and they will forward the refill request to us. Please allow 3 business days for your refill to be completed.          Additional Information About Your Visit        Care EveryWhere ID     This is your Care EveryWhere ID. This could be used by other organizations to access your [facility] medical records  [id number]        Your Vitals Were     BMI (Body Mass Index)                   30.63 kg/m2            Blood Pressure from Last 3 Encounters:   08/17/18 138/72   04/25/17 150/73   03/14/17 139/67    Weight from Last 3 Encounters:   09/21/18 94.1 kg (207 lb 6.4 oz)   08/17/18 92.6 kg (204 lb 1.6 oz)   04/25/17 97.8 kg (215 lb 9.6 oz)              We Performed the Following     DIABETES EDUCATION - Individual  []          Today's Medication Changes          These changes are accurate as of 9/21/18 11:59 PM.  If you have any questions, ask your nurse or doctor.               Start taking these medicines.        Dose/Directions    blood glucose monitoring lancets   Used for:  Type 2 diabetes mellitus without complication, without long-term current use of insulin (H)        Use to test blood sugar 2 times daily or as directed.   Quantity:  100 each   Refills:  2       blood glucose monitoring test strip   Commonly known as:  CONTOUR NEXT TEST   Used for:  Type 2 diabetes mellitus without complication, without long-term current use of insulin (H)        Use to test blood sugar 2 times daily or as directed.   Quantity:  200 each   Refills:  3            Where to get your medicines      [facility] [place] [place] - [address] AT  Portal to Registered [facility] Sites  [facility] [address]     Phone:  [phone]     blood glucose monitoring lancets    blood glucose monitoring test strip                Primary Care Provider Office Phone # Fax #    Juanpablo Peter Abarca [phone] [address] [place] [id number]        Goals        General    Healthy Eating (pt-stated)     Notes - Note created  9/21/2018  1:45 PM by Anusha Price RN    Goal Statement: I will modify my food choices at work.  I will eat and oatmeal cookie and a G2 at 11 pm  And a 2 am eat a sandwich and drink a Mocha.    Measure of Success: I will keep records of the food choices that I make.    Strengths:  Motivated to lower blood glucose levels  Ideas to overcome barriers:  Carry food to work with him.    Date to Achieve By:  1 month          Equal Access to Services     TOMMY SPENCE AH: Hadii licha weldon hadasho Soomaali, waaxda luqadaha, qaybta kaalmada roberthyada, lucila bynum . So [facility] [phone].    ATENCIÓN: Si habla español, tiene a bullard disposición servicios gratuitos de asistencia lingüística. Llame al [phone].    We comply with applicable federal civil rights laws and [place] laws. We do not discriminate on the basis of race, color, national origin, age, disability, sex, sexual orientation, or gender identity.            Thank you!     Thank you for choosing [facility]  for your care. Our goal is always to provide you with excellent care. Hearing back from our patients is one way we can continue to improve our services. Please take a few minutes to complete the written survey that you may receive in the mail after your visit with us. Thank you!             Your Updated Medication List - Protect others around you: Learn how to safely use, store and throw away your medicines at www.disposemymeds.org.          This list is accurate as of 9/21/18 11:59 PM.  Always use  your most recent med list.                   Brand Name Dispense Instructions for use Diagnosis    atorvastatin 20 MG tablet    LIPITOR    90 tablet    Take 1 tablet (20 mg) by mouth daily    Type 2 diabetes mellitus without complication, without long-term current use of insulin (H), Hyperlipidemia LDL goal <130       blood glucose monitoring lancets     100 each    Use to test blood sugar 2 times daily or as directed.    Type 2 diabetes mellitus without complication, without long-term current use of insulin (H)       blood glucose monitoring test strip    CONTOUR NEXT TEST    200 each    Use to test blood sugar 2 times daily or as directed.    Type 2 diabetes mellitus without complication, without long-term current use of insulin (H)       fluticasone 50 MCG/ACT spray    FLONASE    16 g    INHALE 1-2 SPRAYS IN EACH NOSTRIL ONCE DAILY    Seasonal allergic rhinitis due to pollen, unspecified chronicity       glyBURIDE-metFORMIN 1. MG per tablet    GLUCOVANCE    180 tablet    Take 1 tablet by mouth 2 times daily (with meals)    Type 2 diabetes mellitus without complication, without long-term current use of insulin (H)       hydrocortisone 2.5 % cream    ANUSOL-HC    30 g    Place rectally 2 times daily    Rectal pain       order for DME      Equipment ordered: RESMED Auto PAP Mask type: Full face  Settings: 10-15 CM H2O        rOPINIRole 0.25 MG tablet    REQUIP    180 tablet    Take 1-2 tablets (0.25-0.5 mg) by mouth nightly as needed    Restless legs syndrome (RLS)

## 2018-09-27 ENCOUNTER — PATIENT OUTREACH (OUTPATIENT)
Dept: EDUCATION SERVICES | Facility: OTHER | Age: 58
End: 2018-09-27

## 2018-09-27 ENCOUNTER — PATIENT OUTREACH (OUTPATIENT)
Dept: EDUCATION SERVICES | Facility: CLINIC | Age: 58
End: 2018-09-27

## 2018-09-27 DIAGNOSIS — E11.9 TYPE 2 DIABETES MELLITUS WITHOUT COMPLICATION, WITHOUT LONG-TERM CURRENT USE OF INSULIN (H): Primary | ICD-10-CM

## 2018-09-27 NOTE — PROGRESS NOTES
Patient changing to Glucocard meter and strips for cost savings.   Cancelled Rx for [facility] Contour supplies.      Yane Garcia RD, LD, CDE

## 2018-09-27 NOTE — PROGRESS NOTES
Per educator:  9:20 am-  Call out to patient to follow up with re above message.  There was no answer.  I left a message to call the triage line again to leave information re what time he would be able to be reached.    Anusha Price RN  BSN CDE
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
## 2018-11-19 ENCOUNTER — TELEPHONE (OUTPATIENT)
Dept: FAMILY MEDICINE | Facility: OTHER | Age: 58
End: 2018-11-19

## 2018-11-19 ENCOUNTER — OFFICE VISIT (OUTPATIENT)
Dept: FAMILY MEDICINE | Facility: OTHER | Age: 58
End: 2018-11-19
Payer: COMMERCIAL

## 2018-11-19 VITALS
BODY MASS INDEX: 31.12 KG/M2 | SYSTOLIC BLOOD PRESSURE: 130 MMHG | TEMPERATURE: 97.5 F | RESPIRATION RATE: 18 BRPM | HEART RATE: 80 BPM | WEIGHT: 210.1 LBS | DIASTOLIC BLOOD PRESSURE: 58 MMHG | HEIGHT: 69 IN

## 2018-11-19 DIAGNOSIS — G56.21 LESION OF RIGHT ULNAR NERVE: ICD-10-CM

## 2018-11-19 DIAGNOSIS — G56.31 LESION OF RADIAL NERVE, RIGHT: Primary | ICD-10-CM

## 2018-11-19 DIAGNOSIS — G25.81 RESTLESS LEGS SYNDROME (RLS): ICD-10-CM

## 2018-11-19 DIAGNOSIS — E11.9 TYPE 2 DIABETES MELLITUS WITHOUT COMPLICATION, WITHOUT LONG-TERM CURRENT USE OF INSULIN (H): ICD-10-CM

## 2018-11-19 DIAGNOSIS — J30.1 SEASONAL ALLERGIC RHINITIS DUE TO POLLEN: ICD-10-CM

## 2018-11-19 DIAGNOSIS — E78.5 HYPERLIPIDEMIA LDL GOAL <130: ICD-10-CM

## 2018-11-19 LAB
CREAT UR-MCNC: 168 MG/DL
HBA1C MFR BLD: 9.3 % (ref 0–5.6)
LDLC SERPL DIRECT ASSAY-MCNC: 115 MG/DL
MICROALBUMIN UR-MCNC: 11 MG/L
MICROALBUMIN/CREAT UR: 6.55 MG/G CR (ref 0–17)

## 2018-11-19 PROCEDURE — 99207 C FOOT EXAM  NO CHARGE: CPT | Performed by: PHYSICIAN ASSISTANT

## 2018-11-19 PROCEDURE — 83721 ASSAY OF BLOOD LIPOPROTEIN: CPT | Performed by: PHYSICIAN ASSISTANT

## 2018-11-19 PROCEDURE — 82043 UR ALBUMIN QUANTITATIVE: CPT | Performed by: PHYSICIAN ASSISTANT

## 2018-11-19 PROCEDURE — 99214 OFFICE O/P EST MOD 30 MIN: CPT | Performed by: PHYSICIAN ASSISTANT

## 2018-11-19 PROCEDURE — 83036 HEMOGLOBIN GLYCOSYLATED A1C: CPT | Performed by: PHYSICIAN ASSISTANT

## 2018-11-19 PROCEDURE — 36415 COLL VENOUS BLD VENIPUNCTURE: CPT | Performed by: PHYSICIAN ASSISTANT

## 2018-11-19 RX ORDER — GLYBURIDE-METFORMIN HYDROCHLORIDE 2.5; 5 MG/1; MG/1
1 TABLET ORAL 2 TIMES DAILY WITH MEALS
Qty: 180 TABLET | Refills: 1 | Status: SHIPPED | OUTPATIENT
Start: 2018-11-19 | End: 2019-03-29

## 2018-11-19 RX ORDER — FLUTICASONE PROPIONATE 50 MCG
SPRAY, SUSPENSION (ML) NASAL
Qty: 16 G | Refills: 5 | Status: SHIPPED | OUTPATIENT
Start: 2018-11-19 | End: 2024-05-21

## 2018-11-19 RX ORDER — GLYBURIDE-METFORMIN HYDROCHLORIDE 1.25; 25 MG/1; MG/1
1 TABLET ORAL 2 TIMES DAILY WITH MEALS
Qty: 180 TABLET | Refills: 1 | Status: SHIPPED | OUTPATIENT
Start: 2018-11-19 | End: 2018-11-19 | Stop reason: ALTCHOICE

## 2018-11-19 RX ORDER — ROPINIROLE 0.25 MG/1
.25-.5 TABLET, FILM COATED ORAL
Qty: 180 TABLET | Refills: 1 | Status: SHIPPED | OUTPATIENT
Start: 2018-11-19 | End: 2021-12-07

## 2018-11-19 RX ORDER — ATORVASTATIN CALCIUM 20 MG/1
20 TABLET, FILM COATED ORAL DAILY
Qty: 90 TABLET | Refills: 1 | Status: SHIPPED | OUTPATIENT
Start: 2018-11-19 | End: 2021-12-07

## 2018-11-19 ASSESSMENT — PAIN SCALES - GENERAL: PAINLEVEL: NO PAIN (0)

## 2018-11-19 NOTE — MR AVS SNAPSHOT
"              After Visit Summary   11/19/2018    Miguel A Vaughn    MRN: [id number]           Patient Information     Date Of Birth          1960        Visit Information        Provider Department      11/19/2018 9:00 AM Ferny Huerta PA-C [facility]        Today's Diagnoses     Lesion of radial nerve, right    -  1    Type 2 diabetes mellitus without complication, without long-term current use of insulin (H)        Hyperlipidemia LDL goal <130        Restless legs syndrome (RLS)        Lesion of right ulnar nerve        Seasonal allergic rhinitis due to pollen           Follow-ups after your visit        Follow-up notes from your care team     Return in about 3 months (around 2/19/2019) for Medication Recheck, recheck of current condition.      Future tests that were ordered for you today     Open Future Orders        Priority Expected Expires Ordered    Hemoglobin A1c Routine  2/19/2019 11/19/2018            Who to contact     If you have questions or need follow up information about today's clinic visit or your schedule please contact [facility] directly at [phone].  Normal or non-critical lab and imaging results will be communicated to you by MyChart, letter or phone within 4 business days after the clinic has received the results. If you do not hear from us within 7 days, please contact the clinic through [employer] or phone. If you have a critical or abnormal lab result, we will notify you by phone as soon as possible.  Submit refill requests through [employer] or call your pharmacy and they will forward the refill request to us. Please allow 3 business days for your refill to be completed.          Additional Information About Your Visit        MyChart Information     [employer] lets you send messages to your doctor, view your test results, renew your prescriptions, schedule appointments and more. To sign up, go to [email or web address] . Click on \"Log in\" on the left " "side of the screen, which will take you to the Welcome page. Then click on \"Sign up Now\" on the right side of the page.     You will be asked to enter the access code listed below, as well as some personal information. Please follow the directions to create your username and password.     Your access code is: [email or web address]  Expires: 2019  9:18 AM     Your access code will  in 90 days. If you need help or a new code, please call your [facility] clinic or [phone].        Care EveryWhere ID     This is your Care EveryWhere ID. This could be used by other organizations to access your [facility] medical records  [id number]        Your Vitals Were     Pulse Temperature Respirations Height BMI (Body Mass Index)       80 97.5  F (36.4  C) (Temporal) 18 5' 9\" (1.753 m) 31.03 kg/m2        Blood Pressure from Last 3 Encounters:   18 130/58   18 138/72   17 150/73    Weight from Last 3 Encounters:   18 210 lb 1.6 oz (95.3 kg)   18 207 lb 6.4 oz (94.1 kg)   18 204 lb 1.6 oz (92.6 kg)              We Performed the Following     Albumin Random Urine Quantitative with Creat Ratio     Hemoglobin A1c     LDL cholesterol direct          Today's Medication Changes          These changes are accurate as of 18  9:19 AM.  If you have any questions, ask your nurse or doctor.               Start taking these medicines.        Dose/Directions    glyBURIDE-metFORMIN 2.5-500 MG per tablet   Commonly known as:  GLUCOVANCE   Used for:  Type 2 diabetes mellitus without complication, without long-term current use of insulin (H)   Replaces:  glyBURIDE-metFORMIN 1. MG per tablet   Started by:  Ferny Huerta PA-C        Dose:  1 tablet   Take 1 tablet by mouth 2 times daily (with meals)   Quantity:  180 tablet   Refills:  1         These medicines have changed or have updated prescriptions.        Dose/Directions    rOPINIRole 0.25 MG tablet   Commonly known as:  REQUIP   This may have " changed:  reasons to take this   Used for:  Restless legs syndrome (RLS)   Changed by:  Ferny Huerta PA-C        Dose:  0.25-0.5 mg   Take 1-2 tablets (0.25-0.5 mg) by mouth nightly as needed (restless legs)   Quantity:  180 tablet   Refills:  1         Stop taking these medicines if you haven't already. Please contact your care team if you have questions.     glyBURIDE-metFORMIN 1. MG per tablet   Commonly known as:  GLUCOVANCE   Replaced by:  glyBURIDE-metFORMIN 2.5-500 MG per tablet   Stopped by:  Ferny Huerta PA-C                Where to get your medicines      These medications were sent to [facility] - [place], [place] - [address] [place] [id number] [facility], [facility] [address]     Phone:  [phone]     atorvastatin 20 MG tablet    fluticasone 50 MCG/ACT spray    glyBURIDE-metFORMIN 2.5-500 MG per tablet    rOPINIRole 0.25 MG tablet                Primary Care Provider Office Phone # Fax #    Juanpablo Peter Abarca [phone] [address] [place] [address]        Goals        General    Healthy Eating (pt-stated)     Notes - Note created  9/21/2018  1:45 PM by Anusha Price, RN    Goal Statement: I will modify my food choices at work.  I will eat and oatmeal cookie and a G2 at 11 pm  And a 2 am eat a sandwich and drink a Mocha.    Measure of Success: I will keep records of the food choices that I make.    Strengths:  Motivated to lower blood glucose levels  Ideas to overcome barriers:  Carry food to work with him.    Date to Achieve By:  1 month          Equal Access to Services     TOMMY SPENCE AH: Hadhilda John, luisitoda myranda, qaybta juallucila tate. So [facility] [phone].    ATENCIÓN: Si habla español, tiene a bullard disposición servicios gratuitos de asistencia lingüística. Llame al [phone].    We comply with applicable federal civil rights  laws and [place] laws. We do not discriminate on the basis of race, color, national origin, age, disability, sex, sexual orientation, or gender identity.            Thank you!     Thank you for choosing [facility]  for your care. Our goal is always to provide you with excellent care. Hearing back from our patients is one way we can continue to improve our services. Please take a few minutes to complete the written survey that you may receive in the mail after your visit with us. Thank you!             Your Updated Medication List - Protect others around you: Learn how to safely use, store and throw away your medicines at www.disposemymeds.org.          This list is accurate as of 11/19/18  9:19 AM.  Always use your most recent med list.                   Brand Name Dispense Instructions for use Diagnosis    atorvastatin 20 MG tablet    LIPITOR    90 tablet    Take 1 tablet (20 mg) by mouth daily    Type 2 diabetes mellitus without complication, without long-term current use of insulin (H), Hyperlipidemia LDL goal <130       fluticasone 50 MCG/ACT spray    FLONASE    16 g    INHALE 1-2 SPRAYS IN EACH NOSTRIL ONCE DAILY    Seasonal allergic rhinitis due to pollen       glyBURIDE-metFORMIN 2.5-500 MG per tablet    GLUCOVANCE    180 tablet    Take 1 tablet by mouth 2 times daily (with meals)    Type 2 diabetes mellitus without complication, without long-term current use of insulin (H)       hydrocortisone 2.5 % cream    ANUSOL-HC    30 g    Place rectally 2 times daily    Rectal pain       order for DME      Equipment ordered: RESMED Auto PAP Mask type: Full face  Settings: 10-15 CM H2O        rOPINIRole 0.25 MG tablet    REQUIP    180 tablet    Take 1-2 tablets (0.25-0.5 mg) by mouth nightly as needed (restless legs)    Restless legs syndrome (RLS)

## 2018-11-19 NOTE — TELEPHONE ENCOUNTER
Per provider letter written.  Mandi Zamora CMA ([facility])      Notes Recorded by Ferny Huerta PA-C on 11/19/2018 at 4:30 PM  We have made adjustments to his diabetic medications hopefully we will get even better control in another 3 months.  I would advise that he get his cholesterol rechecked in 3 months as well.  In any case he needs to make a follow-up appointment with his primary care provider of choice in 3 months for recheck of labs.  Please send a letter with this advice.  Electronically signed:    Ferny Huerta PA-C

## 2018-11-19 NOTE — LETTER
November 19, 2018      Miguel A Vaughn  [address]  [place] [address]        Dear ,    We are writing to inform you of your test results.    We have made adjustments to yor diabetic medications hopefully we will get even better control in another 3 months.  I would advise that you get your cholesterol rechecked in 3 months as well.  In any case you need to make a follow-up appointment with his primary care provider of choice in 3 months for recheck of labs.      Resulted Orders   Hemoglobin A1c   Result Value Ref Range    Hemoglobin A1C 9.3 (H) 0 - 5.6 %      Comment:      Normal <5.7% Prediabetes 5.7-6.4%  Diabetes 6.5% or higher - adopted from ADA   consensus guidelines.     LDL cholesterol direct   Result Value Ref Range    LDL Cholesterol Direct 115 (H) <100 mg/dL      Comment:      Above desirable:  100-129 mg/dl  Borderline High:  130-159 mg/dL  High:             160-189 mg/dL  Very high:       >189 mg/dl     Albumin Random Urine Quantitative with Creat Ratio   Result Value Ref Range    Creatinine Urine 168 mg/dL    Albumin Urine mg/L 11 mg/L    Albumin Urine mg/g Cr 6.55 0 - 17 mg/g Cr       If you have any questions or concerns, please call the clinic at the number listed above.       Sincerely,        Ferny Gomez PA-C

## 2018-11-21 ENCOUNTER — TELEPHONE (OUTPATIENT)
Dept: FAMILY MEDICINE | Facility: OTHER | Age: 58
End: 2018-11-21

## 2018-12-14 ENCOUNTER — TELEPHONE (OUTPATIENT)
Dept: FAMILY MEDICINE | Facility: OTHER | Age: 58
End: 2018-12-14

## 2018-12-14 NOTE — TELEPHONE ENCOUNTER
Reason for Call:  Form, our goal is to have forms completed with 72 hours, however, some forms may require a visit or additional information.    Type of letter, form or note:  hunting    Who is the form from?: DNR (if other please explain)    Where did the form come from: Patient or family brought in       What clinic location was the form placed at?: [facility] - [phone]    Where the form was placed: 's Box    What number is listed as a contact on the form?: [phone]       Additional comments: please complete and mail in with envelope provided    Call taken on 12/14/2018 at 4:03 PM by Bonita Adrian

## 2018-12-17 NOTE — TELEPHONE ENCOUNTER
In my MA box (overhead file over my desk) for completion and scan of the document into the medical record.  Electronically signed:    Ferny Gomez PA-C

## 2018-12-17 NOTE — TELEPHONE ENCOUNTER
Forms have been completed, signed, faxed/mailed, and sent to scanning.  Mandi Zamora CMA ([facility])

## 2019-02-18 ENCOUNTER — TELEPHONE (OUTPATIENT)
Dept: FAMILY MEDICINE | Facility: OTHER | Age: 59
End: 2019-02-18

## 2019-02-18 NOTE — LETTER
[facility]  [address] [place] [address]  Phone: [phone]        February 20, 2019      Miguel A Vaughn                                                                                                                   [address]  [facility] [address]            Dear Mr. Vaughn,    We are concerned about your health care. We are sending you this letter to remind you that you are due for a diabetic follow up as well as an annual preventative visit. Please call [phone] to schedule an appointment or to let us know that you are no longer a [facility] patient. Please disregard this letter if you have already made these appointments.    Thank you,      Your [facility] Team

## 2019-02-18 NOTE — TELEPHONE ENCOUNTER
Panel Management Review      Patient has the following on his problem list:     Diabetes    ASA: Passed    Last A1C  Lab Results   Component Value Date    A1C 9.3 11/19/2018    A1C 12.4 08/17/2018     A1C tested: Passed    Last LDL:    Lab Results   Component Value Date    CHOL 214 11/17/2014     Lab Results   Component Value Date    HDL 44 11/17/2014     Lab Results   Component Value Date     11/19/2018     11/17/2014     Lab Results   Component Value Date    TRIG 148 11/17/2014     Lab Results   Component Value Date    CHOLHDLRATIO 4.9 11/17/2014     No results found for: NHDL    Is the patient on a Statin? YES             Is the patient on Aspirin? YES    Medications     HMG CoA Reductase Inhibitors    atorvastatin (LIPITOR) 20 MG tablet    Salicylates    aspirin 81 MG EC tablet          Last three blood pressure readings:  BP Readings from Last 3 Encounters:   11/19/18 130/58   08/17/18 138/72   04/25/17 150/73       Date of last diabetes office visit: 11/19/18     Tobacco History:     History   Smoking Status     Current Every Day Smoker     Packs/day: 1.00     Years: 25.00     Types: Cigarettes     Last attempt to quit: 10/1/2007   Smokeless Tobacco     Never Used           Composite cancer screening  Chart review shows that this patient is due/due soon for the following None  Summary:    Patient is due/failing the following:   Diabetic OV    Action needed:   Patient needs office visit for Diabetic follow up..    Type of outreach:    Phone, left message for patient to call back.     Questions for provider review:    None                                                                                                                                    Mandi Zamora CMA ([facility])       Chart routed to Care Team .

## 2019-02-19 NOTE — TELEPHONE ENCOUNTER
Left message for patient to return call to clinic. When call is returned please inform patient he is due for a diabetic follow up in clinic. Please assist in scheduling.     Ross Pizarro,

## 2019-02-25 ENCOUNTER — TELEPHONE (OUTPATIENT)
Dept: FAMILY MEDICINE | Facility: OTHER | Age: 59
End: 2019-02-25

## 2019-02-25 NOTE — TELEPHONE ENCOUNTER
Panel Management Review      Patient has the following on his problem list:     Diabetes    ASA: PassedPassed    Last A1C  Lab Results   Component Value Date    A1C 9.3 11/19/2018    A1C 12.4 08/17/2018     A1C tested: Passed    Last LDL:    Lab Results   Component Value Date    CHOL 214 11/17/2014     Lab Results   Component Value Date    HDL 44 11/17/2014     Lab Results   Component Value Date     11/19/2018     11/17/2014     Lab Results   Component Value Date    TRIG 148 11/17/2014     Lab Results   Component Value Date    CHOLHDLRATIO 4.9 11/17/2014     No results found for: NHDL    Is the patient on a Statin? YES             Is the patient on Aspirin? YES    Medications     HMG CoA Reductase Inhibitors     atorvastatin (LIPITOR) 20 MG tablet       Salicylates     aspirin 81 MG EC tablet             Last three blood pressure readings:  BP Readings from Last 3 Encounters:   11/19/18 130/58   08/17/18 138/72   04/25/17 150/73       Date of last diabetes office visit: 11/19/18     Tobacco History:     History   Smoking Status     Current Every Day Smoker     Packs/day: 1.00     Years: 25.00     Types: Cigarettes     Last attempt to quit: 10/1/2007   Smokeless Tobacco     Never Used           Composite cancer screening  Chart review shows that this patient is due/due soon for the following NoneNone  Summary:    Patient is due/failing the following:   Diabetic follow up    Action needed:   Patient needs office visit for Diabetic follow up/Preventative care.    Type of outreach:    Phone, left message for patient to call back.     Questions for provider review:    None                                                                                                                                    Mandi Zamora CMA (BRIAN)       Chart routed to Care Team .

## 2019-02-25 NOTE — LETTER
[facility]  [address] [place] [address]  Phone: [phone]  February 26, 2019      Miguel A Vaughn  [address]  [place] [address]      Dear Miguel A,    We care about your health and have reviewed your health plan including your medical conditions, medications, and lab results.  Based on this review, it is recommended that you follow up regarding the following health topic(s):  -Cholesterol  -Diabetes  -Wellness (Physical) Visit     We recommend you take the following action(s):  -schedule a FOLLOWUP APPOINTMENT.  -schedule a WELLNESS (Physical) APPOINTMENT.  We will perform the following labs: A1c, Lipids (fasting cholesterol - nothing to eat except water and/or meds for 8-10 hours), CMP(complete metabolic panel), Microablumin and TSH (thyroid test).     Please call us at the [facility] - [phone] (or use Therative) to address the above recommendations.     Thank you for trusting [facility] and we appreciate the opportunity to serve you.  We look forward to supporting your healthcare needs in the future.    Healthy Regards,    Your Health Care Team  [facility]

## 2019-02-26 NOTE — TELEPHONE ENCOUNTER
2nd attempt for patient to return call, letter sent.   If call is returned, please assist with scheduling a diabetic follow up/preventivie care visit.  Sara Martinez MA

## 2019-03-13 ENCOUNTER — APPOINTMENT (OUTPATIENT)
Dept: MRI IMAGING | Facility: CLINIC | Age: 59
End: 2019-03-13
Attending: EMERGENCY MEDICINE
Payer: COMMERCIAL

## 2019-03-13 ENCOUNTER — HOSPITAL ENCOUNTER (EMERGENCY)
Facility: CLINIC | Age: 59
Discharge: HOME | End: 2019-03-13
Attending: EMERGENCY MEDICINE | Admitting: EMERGENCY MEDICINE
Payer: COMMERCIAL

## 2019-03-13 ENCOUNTER — APPOINTMENT (OUTPATIENT)
Dept: CT IMAGING | Facility: CLINIC | Age: 59
End: 2019-03-13
Attending: EMERGENCY MEDICINE
Payer: COMMERCIAL

## 2019-03-13 ENCOUNTER — TELEPHONE (OUTPATIENT)
Dept: FAMILY MEDICINE | Facility: OTHER | Age: 59
End: 2019-03-13

## 2019-03-13 ENCOUNTER — APPOINTMENT (OUTPATIENT)
Dept: GENERAL RADIOLOGY | Facility: CLINIC | Age: 59
End: 2019-03-13
Attending: EMERGENCY MEDICINE
Payer: COMMERCIAL

## 2019-03-13 VITALS
OXYGEN SATURATION: 95 % | TEMPERATURE: 97.4 F | RESPIRATION RATE: 5 BRPM | WEIGHT: 213 LBS | DIASTOLIC BLOOD PRESSURE: 99 MMHG | SYSTOLIC BLOOD PRESSURE: 143 MMHG | HEART RATE: 81 BPM | BODY MASS INDEX: 31.45 KG/M2

## 2019-03-13 DIAGNOSIS — M50.10 CERVICAL DISC PROLAPSE WITH RADICULOPATHY: ICD-10-CM

## 2019-03-13 LAB
ALBUMIN SERPL-MCNC: 4 G/DL (ref 3.4–5)
ALP SERPL-CCNC: 143 U/L (ref 40–150)
ALT SERPL W P-5'-P-CCNC: 37 U/L (ref 0–70)
ANION GAP SERPL CALCULATED.3IONS-SCNC: 7 MMOL/L (ref 3–14)
AST SERPL W P-5'-P-CCNC: 19 U/L (ref 0–45)
BASOPHILS # BLD AUTO: 0.1 10E9/L (ref 0–0.2)
BASOPHILS NFR BLD AUTO: 0.6 %
BILIRUB SERPL-MCNC: 0.5 MG/DL (ref 0.2–1.3)
BUN SERPL-MCNC: 10 MG/DL (ref 7–30)
CALCIUM SERPL-MCNC: 9 MG/DL (ref 8.5–10.1)
CHLORIDE SERPL-SCNC: 100 MMOL/L (ref 94–109)
CO2 SERPL-SCNC: 28 MMOL/L (ref 20–32)
CREAT SERPL-MCNC: 0.86 MG/DL (ref 0.66–1.25)
DIFFERENTIAL METHOD BLD: NORMAL
EOSINOPHIL NFR BLD AUTO: 10.9 %
ERYTHROCYTE [DISTWIDTH] IN BLOOD BY AUTOMATED COUNT: 12.2 % (ref 10–15)
GFR SERPL CREATININE-BSD FRML MDRD: >90 ML/MIN/{1.73_M2}
GLUCOSE SERPL-MCNC: 237 MG/DL (ref 70–99)
HCT VFR BLD AUTO: 49.8 % (ref 40–53)
HGB BLD-MCNC: 16.8 G/DL (ref 13.3–17.7)
IMM GRANULOCYTES # BLD: 0 10E9/L (ref 0–0.4)
IMM GRANULOCYTES NFR BLD: 0.5 %
LYMPHOCYTES # BLD AUTO: 1.8 10E9/L (ref 0.8–5.3)
LYMPHOCYTES NFR BLD AUTO: 23.2 %
MCH RBC QN AUTO: 31.4 PG (ref 26.5–33)
MCHC RBC AUTO-ENTMCNC: 33.7 G/DL (ref 31.5–36.5)
MCV RBC AUTO: 93 FL (ref 78–100)
MONOCYTES # BLD AUTO: 1 10E9/L (ref 0–1.3)
MONOCYTES NFR BLD AUTO: 12.8 %
NEUTROPHILS # BLD AUTO: 4.1 10E9/L (ref 1.6–8.3)
NEUTROPHILS NFR BLD AUTO: 52 %
NRBC # BLD AUTO: 0 10*3/UL
NRBC BLD AUTO-RTO: 0 /100
PLATELET # BLD AUTO: 201 10E9/L (ref 150–450)
POTASSIUM SERPL-SCNC: 3.7 MMOL/L (ref 3.4–5.3)
PROT SERPL-MCNC: 8.2 G/DL (ref 6.8–8.8)
RBC # BLD AUTO: 5.35 10E12/L (ref 4.4–5.9)
SODIUM SERPL-SCNC: 135 MMOL/L (ref 133–144)
TROPONIN I SERPL-MCNC: <0.015 UG/L (ref 0–0.04)
WBC # BLD AUTO: 7.8 10E9/L (ref 4–11)

## 2019-03-13 PROCEDURE — 70450 CT HEAD/BRAIN W/O DYE: CPT

## 2019-03-13 PROCEDURE — 85025 COMPLETE CBC W/AUTO DIFF WBC: CPT | Performed by: EMERGENCY MEDICINE

## 2019-03-13 PROCEDURE — 99285 EMERGENCY DEPT VISIT HI MDM: CPT | Mod: 25 | Performed by: EMERGENCY MEDICINE

## 2019-03-13 PROCEDURE — 25000125 ZZHC RX 250: Performed by: RADIOLOGY

## 2019-03-13 PROCEDURE — 25500064 ZZH RX 255 OP 636: Performed by: RADIOLOGY

## 2019-03-13 PROCEDURE — 84484 ASSAY OF TROPONIN QUANT: CPT | Performed by: EMERGENCY MEDICINE

## 2019-03-13 PROCEDURE — 93010 ELECTROCARDIOGRAM REPORT: CPT | Mod: Z6 | Performed by: EMERGENCY MEDICINE

## 2019-03-13 PROCEDURE — 70544 MR ANGIOGRAPHY HEAD W/O DYE: CPT

## 2019-03-13 PROCEDURE — 25000128 H RX IP 250 OP 636: Performed by: EMERGENCY MEDICINE

## 2019-03-13 PROCEDURE — 93005 ELECTROCARDIOGRAM TRACING: CPT | Performed by: EMERGENCY MEDICINE

## 2019-03-13 PROCEDURE — 80053 COMPREHEN METABOLIC PANEL: CPT | Performed by: EMERGENCY MEDICINE

## 2019-03-13 PROCEDURE — 70549 MR ANGIOGRAPH NECK W/O&W/DYE: CPT

## 2019-03-13 PROCEDURE — 72141 MRI NECK SPINE W/O DYE: CPT

## 2019-03-13 PROCEDURE — 96375 TX/PRO/DX INJ NEW DRUG ADDON: CPT | Performed by: EMERGENCY MEDICINE

## 2019-03-13 PROCEDURE — A9585 GADOBUTROL INJECTION: HCPCS | Performed by: RADIOLOGY

## 2019-03-13 PROCEDURE — 96374 THER/PROPH/DIAG INJ IV PUSH: CPT | Mod: 59 | Performed by: EMERGENCY MEDICINE

## 2019-03-13 PROCEDURE — 71046 X-RAY EXAM CHEST 2 VIEWS: CPT | Mod: TC

## 2019-03-13 PROCEDURE — 70553 MRI BRAIN STEM W/O & W/DYE: CPT

## 2019-03-13 RX ORDER — METHYLPREDNISOLONE 4 MG
TABLET, DOSE PACK ORAL
Qty: 21 TABLET | Refills: 0 | Status: SHIPPED | OUTPATIENT
Start: 2019-03-13 | End: 2019-03-28

## 2019-03-13 RX ORDER — GADOBUTROL 604.72 MG/ML
10 INJECTION INTRAVENOUS ONCE
Status: COMPLETED | OUTPATIENT
Start: 2019-03-13 | End: 2019-03-13

## 2019-03-13 RX ORDER — DEXAMETHASONE SODIUM PHOSPHATE 10 MG/ML
10 INJECTION, SOLUTION INTRAMUSCULAR; INTRAVENOUS ONCE
Status: COMPLETED | OUTPATIENT
Start: 2019-03-13 | End: 2019-03-13

## 2019-03-13 RX ORDER — HYDROMORPHONE HYDROCHLORIDE 1 MG/ML
0.5 INJECTION, SOLUTION INTRAMUSCULAR; INTRAVENOUS; SUBCUTANEOUS
Status: DISCONTINUED | OUTPATIENT
Start: 2019-03-13 | End: 2019-03-13 | Stop reason: HOSPADM

## 2019-03-13 RX ORDER — KETOROLAC TROMETHAMINE 30 MG/ML
30 INJECTION, SOLUTION INTRAMUSCULAR; INTRAVENOUS ONCE
Status: COMPLETED | OUTPATIENT
Start: 2019-03-13 | End: 2019-03-13

## 2019-03-13 RX ORDER — HYDROCODONE BITARTRATE AND ACETAMINOPHEN 5; 325 MG/1; MG/1
1 TABLET ORAL EVERY 6 HOURS PRN
Qty: 18 TABLET | Refills: 0 | Status: ON HOLD | OUTPATIENT
Start: 2019-03-13 | End: 2019-04-02

## 2019-03-13 RX ORDER — LORAZEPAM 2 MG/ML
1 INJECTION INTRAMUSCULAR ONCE
Status: COMPLETED | OUTPATIENT
Start: 2019-03-13 | End: 2019-03-13

## 2019-03-13 RX ADMIN — HYDROMORPHONE HYDROCHLORIDE 0.5 MG: 1 INJECTION, SOLUTION INTRAMUSCULAR; INTRAVENOUS; SUBCUTANEOUS at 13:15

## 2019-03-13 RX ADMIN — GADOBUTROL 10 ML: 604.72 INJECTION INTRAVENOUS at 11:49

## 2019-03-13 RX ADMIN — KETOROLAC TROMETHAMINE 30 MG: 30 INJECTION, SOLUTION INTRAMUSCULAR at 09:06

## 2019-03-13 RX ADMIN — SODIUM CHLORIDE 50 ML: 9 INJECTION, SOLUTION INTRAVENOUS at 11:50

## 2019-03-13 RX ADMIN — LORAZEPAM 1 MG: 2 INJECTION INTRAMUSCULAR; INTRAVENOUS at 09:06

## 2019-03-13 RX ADMIN — DEXAMETHASONE SODIUM PHOSPHATE 10 MG: 10 INJECTION, SOLUTION INTRAMUSCULAR; INTRAVENOUS at 13:15
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
## 2019-03-13 NOTE — LETTER
March 13, 2019      To Whom It May Concern:      Miguel A GUILLE Gilliamsumannd was seen in our Emergency Department today, 03/13/19.  He will be out of work through 3/16/2019.  Further work restrictions per primary provider.    Sincerely,        Kita Parrish MD

## 2019-03-13 NOTE — ED NOTES
Pt back from MRI. Pt still complains of L arm pain and restless legs. Placed on continuous VS and cardiac monitor.

## 2019-03-13 NOTE — TELEPHONE ENCOUNTER
Miguel A Vaughn is a 58 year old male who calls with confusion.    NURSING ASSESSMENT:  Description:  I spoke with the pt who states he works overnights. Tingling Left arm from neck down. Was really confused last night. States he could not walk right. Sweating all night. Went to nurse at work and was told go to ED. Waited to call us wanted to save $100 and be seen in clinic  Onset/duration:  Last night    Allergies:   Allergies   Allergen Reactions     Contrast Dye Nausea     8-3-2016  Patient was given IV contrast without premedication and had no adverse reaction.  Patient does not ever remember having any issue with IV contrast.  He is unsure of who documented a contrast allergy in his chart. No reaction today following administration of 100mL, Optiray 370.  Angeli Parson RTRCT     No Known Drug Allergies      Tape [Adhesive Tape]      RECOMMENDED DISPOSITION:  To ED, another person to drive  Will comply with recommendation: Yes  If further questions/concerns or if symptoms do not improve, worsen or new symptoms develop, call your PCP or [facility] as soon as possible.      Guideline used:  Telephone Triage Protocols for Nurses, Fifth Edition, Elba Pena RN

## 2019-03-13 NOTE — DISCHARGE INSTRUCTIONS
Rest, ice or heat to the painful area.    Ibuprofen or Aleve for pain and inflammation.    Vicodin for severe pain.  This can cause drowsiness or dizziness so no driving while on this medication.  Also, it can cause constipation so take stool softeners if needed.    Medrol Dosepak to decrease inflammation.  First dose tomorrow.  Take with food or milk.    Call today for follow-up with spine specialist.  You can see somebody at [facility] spine, [facility] or your preferred provider.    See Ferny Koch for further work restrictions or refills.    I hope that this gets resolved quickly!!

## 2019-03-13 NOTE — TELEPHONE ENCOUNTER
Reason for call:  Patient reporting a symptom    Symptom or request: left arm pins and needles feeling starts from the neck down, confusion     Duration (how long have symptoms been present): 1 day     Have you been treated for this before? No    Additional comments: pt states works overnights and at work during the night had an onset of neck down to the arms feeling like pins and needles , symptoms still present. Pt also had some confusion     Phone Number patient can be reached at:  Cell number on file:    Telephone Information:   Mobile [phone]       Best Time:  ANY    Can we leave a detailed message on this number:  YES    Call taken on 3/13/2019 at 7:56 AM by Herminia Fajardo

## 2019-03-13 NOTE — ED AVS SNAPSHOT
[facility] Emergency Department  [address] [place] [address]  Phone:  [phone]  Fax:  [phone]                                    Miguel A Vaughn   MRN: [id number]    Department:  [facility] Emergency Department   Date of Visit:  3/13/2019           After Visit Summary Signature Page    I have received my discharge instructions, and my questions have been answered. I have discussed any challenges I see with this plan with the nurse or doctor.    ..........................................................................................................................................  Patient/Patient Representative Signature      ..........................................................................................................................................  Patient Representative Print Name and Relationship to Patient    ..................................................               ................................................  Date                                   Time    ..........................................................................................................................................  Reviewed by Signature/Title    ...................................................              ..............................................  Date                                               Time          22EPIC Rev 08/18

## 2019-03-14 ENCOUNTER — TELEPHONE (OUTPATIENT)
Dept: FAMILY MEDICINE | Facility: OTHER | Age: 59
End: 2019-03-14

## 2019-03-14 NOTE — TELEPHONE ENCOUNTER
Patient will need to be seen in clinic for completion of what appears to be FMLA paperwork.  Please help him make an appointment.  Electronically signed:    Ferny Gomez PA-C

## 2019-03-14 NOTE — TELEPHONE ENCOUNTER
Reason for Call:  Form, our goal is to have forms completed with 72 hours, however, some forms may require a visit or additional information.    Type of letter, form or note:  DIO    Who is the form from?: [employer] (if other please explain)    Where did the form come from: form was faxed in    What clinic location was the form placed at?: [facility] - [phone]    Where the form was placed: 's Box    What number is listed as a contact on the form?: [phone]       Additional comments: n/a    Call taken on 3/14/2019 at 11:04 AM by Tonya Lundberg
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
## 2019-03-15 ENCOUNTER — OFFICE VISIT (OUTPATIENT)
Dept: FAMILY MEDICINE | Facility: OTHER | Age: 59
End: 2019-03-15
Payer: COMMERCIAL

## 2019-03-15 VITALS
HEIGHT: 69 IN | WEIGHT: 209.8 LBS | HEART RATE: 70 BPM | BODY MASS INDEX: 31.07 KG/M2 | DIASTOLIC BLOOD PRESSURE: 64 MMHG | RESPIRATION RATE: 16 BRPM | TEMPERATURE: 97.6 F | SYSTOLIC BLOOD PRESSURE: 126 MMHG | OXYGEN SATURATION: 96 %

## 2019-03-15 DIAGNOSIS — M54.2 CERVICALGIA: Primary | ICD-10-CM

## 2019-03-15 DIAGNOSIS — Z11.4 SCREENING FOR HIV (HUMAN IMMUNODEFICIENCY VIRUS): ICD-10-CM

## 2019-03-15 DIAGNOSIS — F17.200 TOBACCO USE DISORDER: ICD-10-CM

## 2019-03-15 DIAGNOSIS — M54.12 CERVICAL RADICULOPATHY: ICD-10-CM

## 2019-03-15 DIAGNOSIS — Z11.59 NEED FOR HEPATITIS C SCREENING TEST: ICD-10-CM

## 2019-03-15 DIAGNOSIS — Z23 NEED FOR PROPHYLACTIC VACCINATION AND INOCULATION AGAINST INFLUENZA: ICD-10-CM

## 2019-03-15 DIAGNOSIS — M50.30 DDD (DEGENERATIVE DISC DISEASE), CERVICAL: ICD-10-CM

## 2019-03-15 DIAGNOSIS — M48.02 CERVICAL STENOSIS OF SPINAL CANAL: ICD-10-CM

## 2019-03-15 PROCEDURE — 99214 OFFICE O/P EST MOD 30 MIN: CPT | Performed by: PHYSICIAN ASSISTANT

## 2019-03-15 ASSESSMENT — PAIN SCALES - GENERAL: PAINLEVEL: MODERATE PAIN (5)

## 2019-03-15 ASSESSMENT — MIFFLIN-ST. JEOR: SCORE: 1757.03

## 2019-03-28 ENCOUNTER — OFFICE VISIT (OUTPATIENT)
Dept: NEUROSURGERY | Facility: CLINIC | Age: 59
End: 2019-03-28
Payer: COMMERCIAL

## 2019-03-28 VITALS
HEIGHT: 69 IN | TEMPERATURE: 97.5 F | SYSTOLIC BLOOD PRESSURE: 110 MMHG | BODY MASS INDEX: 30.51 KG/M2 | WEIGHT: 206 LBS | DIASTOLIC BLOOD PRESSURE: 60 MMHG

## 2019-03-28 DIAGNOSIS — M47.12 CERVICAL SPONDYLOSIS WITH MYELOPATHY: Primary | ICD-10-CM

## 2019-03-28 PROCEDURE — 99204 OFFICE O/P NEW MOD 45 MIN: CPT | Performed by: NEUROLOGICAL SURGERY

## 2019-03-28 ASSESSMENT — MIFFLIN-ST. JEOR: SCORE: 1739.79

## 2019-03-28 NOTE — PROGRESS NOTES
After visit writer called and spoke to patient and provider phone number to call and set up CT; [phone] . Pt stated he will call right away to get appointment set.     Lilliana Jarquin RN on 3/28/2019 at 11:43 AM

## 2019-03-28 NOTE — LETTER
3/28/2019         RE: Miguel A Vaughn  [address]  [facility] [address]        Dear Colleague,    Thank you for referring your patient, Miguel A Vaughn, to the [facility]. Please see a copy of my visit note below.    After visit writer called and spoke to patient and provider phone number to call and set up CT; [phone] . Pt stated he will call right away to get appointment set.     Lilliana Jarquin RN on 3/28/2019 at 11:43 AM      I was asked by Dr. Koch to see this patient in consultation    58M w/ hx C4-6 ACDF, severe progressive cervical myelopathy, C3-4 severe stenosis.  3 months of progressive neck pain, worse with extension, and marked weakness, numbness, and paresthesias to the bilateral arms and occasionally legs.  Marked loss of fine motor control making work more difficult.  Chiropractic care without improvement.  Imaging with C4-6 prior fusion, C3-4 severe stenosis.       Past Medical History:   Diagnosis Date     Benign neoplasm of brain (H)      Cervicalgia      Depressive disorder, not elsewhere classified 7/16/2008     Deviated nasal septum      Family history of colonic polyps      Headache(784.0)      Hemorrhoids, internal      Hyperlipidemia LDL goal <130 7/12/2011     Hypersomnia with sleep apnea, unspecified      BENJAMIN (obstructive sleep apnea) 10/12/2011     Other encephalopathy      Sprain of right ankle 7/7/2011     Unspecified disorder of adrenal glands      Past Surgical History:   Procedure Laterality Date     C WINTER W/O FACETEC FORAMOT/[facility] 1/2 VRT SEG, CERVICAL  1989     C OPEN RX ANKLE DISLOCATN+FIXATN  1982     COLONOSCOPY  05/12/08    Internal hemorrhoids.  Otherwise normal.     COLONOSCOPY  4/17/2013    Procedure: COLONOSCOPY;  colonoscopy;  Surgeon: Kody Reynolds MD;  Location: [facility]     EXAM UNDER ANESTHESIA RECTUM N/A 8/3/2016    Procedure: EXAM UNDER ANESTHESIA RECTUM;  Surgeon: Sami Zamora MD;  Location:  OR      FLEX  SIGMOIDOSCOPY W/WO BRAD SPEC BY BRUSH/WASH  06/10/08    bx intra-anal lesions & electrocoagulation of perianal lesions.     HC REPAIR OF NASAL SEPTUM  2005    Revision septoplasty, submucosal resection of the inferior turbinates.     INCISION AND DRAINAGE PERINEAL, COMBINED N/A 8/3/2016    Procedure: COMBINED INCISION AND DRAINAGE PERINEAL;  Surgeon: Sami Zamora MD;  Location: [facility]     SURGICAL HISTORY OF -   2006    Left occiput C1, C1-C2 facet injection.  -[facility]     Social History     Socioeconomic History     Marital status:      Spouse name: Not on file     Number of children: Not on file     Years of education: Not on file     Highest education level: Not on file   Occupational History     Not on file   Social Needs     Financial resource strain: Not on file     Food insecurity:     Worry: Not on file     Inability: Not on file     Transportation needs:     Medical: Not on file     Non-medical: Not on file   Tobacco Use     Smoking status: Current Every Day Smoker     Packs/day: 1.00     Years: 25.00     Pack years: 25.00     Types: Cigarettes     Last attempt to quit: 10/1/2007     Years since quittin.4     Smokeless tobacco: Never Used   Substance and Sexual Activity     Alcohol use: Yes     Alcohol/week: 0.0 oz     Comment: weekends     Drug use: No     Sexual activity: Yes     Partners: Female   Lifestyle     Physical activity:     Days per week: Not on file     Minutes per session: Not on file     Stress: Not on file   Relationships     Social connections:     Talks on phone: Not on file     Gets together: Not on file     Attends Judaism service: Not on file     Active member of club or organization: Not on file     Attends meetings of clubs or organizations: Not on file     Relationship status: Not on file     Intimate partner violence:     Fear of current or ex partner: Not on file     Emotionally abused: Not on file     Physically abused: Not on file     Forced  "sexual activity: Not on file   Other Topics Concern      Service No     Blood Transfusions No     Caffeine Concern No     Occupational Exposure No     Hobby Hazards No     Sleep Concern Yes     Comment: C-Pap      Stress Concern No     Weight Concern Yes     Special Diet No     Back Care No     Exercise No     Bike Helmet Not Asked     Comment: NA     Seat Belt Yes     Self-Exams No     Parent/sibling w/ CABG, MI or angioplasty before 65F 55M? Yes   Social History Narrative     Not on file     Family History   Problem Relation Age of Onset     Cancer Father         skin cancer     Cancer Sister         skin, ovarian     Breast Cancer Sister      Cancer Brother         skin     Diabetes No family hx of         ROS: 10 point ROS neg other than the symptoms noted above in the HPI.    Physical Exam  /60   Temp 97.5  F (36.4  C) (Temporal)   Ht 1.753 m (5' 9\")   Wt 93.4 kg (206 lb)   BMI 30.42 kg/m     HEENT:  Normocephalic, atraumatic.  PERRLA.  EOM s intact.  Visual fields full to gross exam  Neck:  Supple, non-tender, without lymphadenopathy.  Heart:  No peripheral edema  Lungs:  No SOB  Abdomen:  Non-distended.   Skin:  Warm and dry.  Extremities:  No edema, cyanosis or clubbing.  Psychiatric:  No apparent distress  Musculoskeletal:  Normal bulk and tone    NEUROLOGICAL EXAMINATION:     Mental status:  Alert and Oriented x 3, speech is fluent.  Cranial nerves:  II-XII intact.   Motor:    Shoulder Abduction:  Right:  5/5   Left:  5/5  Biceps:                      Right:  5/5   Left:  5/5  Triceps:                     Right:  5/5   Left:  5/5  Wrist Extensors:       Right:  5/5   Left:  5/5  Wrist Flexors:           Right:  5/5   Left:  5/5  interosseus :            Right:  4/5   Left:  4/5  Hip Flexion:                Right: 5/5  Left:  5/5  Quadriceps:             Right:  5/5  Left:  5/5  Hamstrings:             Right:  5/5  Left:  5/5  Gastroc Soleus:        Right:  5/5  Left:  5/5  Tib/Ant:        "               Right:  5/5  Left:  5/5  EHL:                     Right:  5/5  Left:  5/5  Sensation:  Intact  Reflexes:  Negative Babinski.  Negative Clonus.  Bilateral positive Navarro's.  Bilateral hyper-reflexia  Coordination:  Smooth finger to nose testing.   Negative pronator drift.  Smooth tandem walking.    A/P:  58M w/ hx C4-6 ACDF, severe progressive cervical myelopathy, C3-4 severe stenosis    I had a discussion with the patient, reviewing the history, symptoms, and imaging  Will plan for C3-4 ACDF  Risks and benefits discussed         Again, thank you for allowing me to participate in the care of your patient.        Sincerely,        Memo Wooten MD

## 2019-03-28 NOTE — PATIENT INSTRUCTIONS
Need CT pre-op     Surgery scheduled at [facility] for C3-4ACDF (anterior cervical discectomy and fusion) and C4-5 hardware removal       Pre-Operative:  -Surgical risks: blood clots in the leg or lung, problems urinating, nerve damage, drainage from the incision, infection, stiffness.  - Pre-operative physical with primary care physician within 30 days of surgical date.   -Stop all solid foods and liquids 8 hours before surgery.    -Shower procedure: Please shower with antibacterial soap the night before surgery and the morning of surgery. Refer to information sheet in folder.   - Discontinue Aspirin, NSAIDs (Advil, Ibuprofen, Naproxen, Nuprin, Diclofenac, Meloxicam, Aleve, Celebrex) x 7 days prior to surgical date. After surgery, do not begin taking these medications until given clearance. May cause bleeding and interfere with bone healing.  - Discontinue Plavix x 7-10 days prior to surgical date, stay off Plavix 3-4 days post operatively. Discontinue Xarelto 5-7 days prior to surgery. Discontinue coumadin/warfarin 5-7 days prior to surgery. INR needs to be <1.4  - May take Tylenol for pain.  Smoking Cessation: You are advised to quit smoking immediately through recovery to help with healing and reduce risk of complications.      Post-Operative:  -1 night hospitalization.   - Post operative pain may require pain medications and muscle relaxants. You will receive medications upon discharge.  -Do NOT drive while taking narcotic pain medication.  -Post operative incision care- Watch for signs of infection: redness, swelling, warmth, drainage, and fever of 101 degrees or higher. Notify clinic [phone].  -Keep incision clean and dry. You may shower. No submerging incision in water such as pools, hot tubs, baths for at least 8 weeks or until incision is healed.   - Post operative activity limitations for 6 weeks after surgery: no lifting > 10 pounds, limited bending, twisting, or overhead reaching.  You will be re-evaluated at your follow up appointments.   -If a brace is required per Dr. Wooten, [facility] will fit you for the brace in the hospital.  -If you are currently employed, you will need to be off work for recovery and healing. Please fax any FMLA/short term disability paperwork to [phone]. You may call our clinic when you'd like to return to work and we can provide a work letter.   - Follow up appointments: 6 week post op, 3 months post op, 6 months post op, 1 year post op. You will need to an xray before each appointment. Please call to schedule these appointments at [phone].  -Surgery folder provided to patient.    Tracey FANG, RN ([facility] Nurse)  [facility]  [address] [address]  T:  [phone]  F:  [phone]

## 2019-03-28 NOTE — NURSING NOTE
"Miguel A Vaughn is a 58 year old male who presents for:  Chief Complaint   Patient presents with     Consult     Neck pain, numbness and tingling into bilateral arms         Initial Vitals:  /60   Temp 97.5  F (36.4  C) (Temporal)   Ht 5' 9\" (1.753 m)   Wt 206 lb (93.4 kg)   BMI 30.42 kg/m   Estimated body mass index is 30.42 kg/m  as calculated from the following:    Height as of this encounter: 5' 9\" (1.753 m).    Weight as of this encounter: 206 lb (93.4 kg).. Body surface area is 2.13 meters squared. BP completed using cuff size: regular  Data Unavailable        Nursing Comments:         Tavares Desai CMA    "

## 2019-03-28 NOTE — NURSING NOTE
Patient Education    Education included but not limited to:  - Surgical risks: blood clots, urinating difficulties, nerve damage, infection.  - Pre-operative physical with primary care physician within 30 days of surgical date.   - Pre-operative clearance from other pertaining specialties.   - Discontinue NSAIDS x 7 days prior to surgical date.   NSAIDs (Advil, Motrin, Ibuprofen, Nuprin, Diclofenac, Meloxicam, Aleve, Celebrex, Aspirin, etc.) until 6 weeks after surgery if you had a fusion. May cause bleeding and interfere with bone healing.    -May try Tylenol for pain.  - Smoking cessation: if you smoke, we advise you to stop immediately, through recovery to improve healing. Smoking Cessation handout provided.  -Discussed being off work after surgery, short term disability, FMLA, etc.   -Forms to be completed    -Pre-op timeline: NPO, shower, medications    -Hospital stay: Checking in, surgery, recovery room, hospital room.    - Post operative pain management: narcotics, muscle relaxants, ice, etc.   -No driving while taking narcotics     -Post operative incision care:   Keep your incision clean and dry.   Okay to shower. No submerging in water until incision healed.   Watch for signs of infection and notify clinic if drainage or fever develops.   - Post operative activity limitations recommended until follow up appointment: no lifting > 10 pounds; limited bending, twisting, overhead reaching.  -If a brace is required per Dr. Wooten, Orth[facility] will fit you for the brace in the hospital.  - Follow up appointments: 6 week post op, 3 months post op, 6 months post op, 1 year post op. You will need to an xray before each appointment. Please call to schedule follow up appointment at [phone].   - Education book was also given to the patient for further review.   -patient was provided a work release note as well.  -Onofre paperwork faxed to [facility] staff.     Patient verbalized understanding of above  instructions. All questions were answered to the best of my ability and the patient's satisfaction. Patient advised to call with any additional questions or concerns.    Tracey Castañeda RN ([facility] Nurse)  [facility]  [place] [address]  [place] [address]  T:  [phone]  F:  [phone]

## 2019-03-28 NOTE — LETTER
March 28, 2019      Miguel A Vaughn  [address] [facility] [address]  [facility] [address]        To Whom It May Concern:    Miguel A Vaughn  was seen on 3/28/19.  Please excuse him through after surgery recover due to spine issues/pain. Surgery will be scheduled with in a few days.        Sincerely,              Memo Wooten MD

## 2019-03-28 NOTE — PROGRESS NOTES
I was asked by Dr. Koch to see this patient in consultation    58M w/ hx C4-6 ACDF, severe progressive cervical myelopathy, C3-4 severe stenosis.  3 months of progressive neck pain, worse with extension, and marked weakness, numbness, and paresthesias to the bilateral arms and occasionally legs.  Marked loss of fine motor control making work more difficult.  Chiropractic care without improvement.  Imaging with C4-6 prior fusion, C3-4 severe stenosis.       Past Medical History:   Diagnosis Date     Benign neoplasm of brain (H)      Cervicalgia      Depressive disorder, not elsewhere classified 7/16/2008     Deviated nasal septum      Family history of colonic polyps      Headache(784.0)      Hemorrhoids, internal      Hyperlipidemia LDL goal <130 7/12/2011     Hypersomnia with sleep apnea, unspecified      BENJAMIN (obstructive sleep apnea) 10/12/2011     Other encephalopathy      Sprain of right ankle 7/7/2011     Unspecified disorder of adrenal glands      Past Surgical History:   Procedure Laterality Date     C WINTER W/O FACETEC FORAMOT/[facility] 1/2 VRT SEG, CERVICAL  1989     C OPEN RX ANKLE DISLOCATN+FIXATN  1982     COLONOSCOPY  05/12/08    Internal hemorrhoids.  Otherwise normal.     COLONOSCOPY  4/17/2013    Procedure: COLONOSCOPY;  colonoscopy;  Surgeon: Kody Reynolds MD;  Location: [facility]     EXAM UNDER ANESTHESIA RECTUM N/A 8/3/2016    Procedure: EXAM UNDER ANESTHESIA RECTUM;  Surgeon: Sami Zamora MD;  Location: [facility]     HC FLEX SIGMOIDOSCOPY W/WO BRAD SPEC BY BRUSH/WASH  06/10/08    bx intra-anal lesions & electrocoagulation of perianal lesions.     HC REPAIR OF NASAL SEPTUM  12/16/2005    Revision septoplasty, submucosal resection of the inferior turbinates.     INCISION AND DRAINAGE PERINEAL, COMBINED N/A 8/3/2016    Procedure: COMBINED INCISION AND DRAINAGE PERINEAL;  Surgeon: Sami Zamora MD;  Location: [facility]     SURGICAL HISTORY OF -   08/30/2006    Left occiput C1, C1-C2 facet  injection.  [facility]     Social History     Socioeconomic History     Marital status:      Spouse name: Not on file     Number of children: Not on file     Years of education: Not on file     Highest education level: Not on file   Occupational History     Not on file   Social Needs     Financial resource strain: Not on file     Food insecurity:     Worry: Not on file     Inability: Not on file     Transportation needs:     Medical: Not on file     Non-medical: Not on file   Tobacco Use     Smoking status: Current Every Day Smoker     Packs/day: 1.00     Years: 25.00     Pack years: 25.00     Types: Cigarettes     Last attempt to quit: 10/1/2007     Years since quittin.4     Smokeless tobacco: Never Used   Substance and Sexual Activity     Alcohol use: Yes     Alcohol/week: 0.0 oz     Comment: weekends     Drug use: No     Sexual activity: Yes     Partners: Female   Lifestyle     Physical activity:     Days per week: Not on file     Minutes per session: Not on file     Stress: Not on file   Relationships     Social connections:     Talks on phone: Not on file     Gets together: Not on file     Attends Tenriism service: Not on file     Active member of club or organization: Not on file     Attends meetings of clubs or organizations: Not on file     Relationship status: Not on file     Intimate partner violence:     Fear of current or ex partner: Not on file     Emotionally abused: Not on file     Physically abused: Not on file     Forced sexual activity: Not on file   Other Topics Concern      Service No     Blood Transfusions No     Caffeine Concern No     Occupational Exposure No     Hobby Hazards No     Sleep Concern Yes     Comment: C-Pap      Stress Concern No     Weight Concern Yes     Special Diet No     Back Care No     Exercise No     Bike Helmet Not Asked     Comment: NA     Seat Belt Yes     Self-Exams No     Parent/sibling w/ CABG, MI or angioplasty before 65F 55M? Yes   Social History  "Narrative     Not on file     Family History   Problem Relation Age of Onset     Cancer Father         skin cancer     Cancer Sister         skin, ovarian     Breast Cancer Sister      Cancer Brother         skin     Diabetes No family hx of         ROS: 10 point ROS neg other than the symptoms noted above in the HPI.    Physical Exam  /60   Temp 97.5  F (36.4  C) (Temporal)   Ht 1.753 m (5' 9\")   Wt 93.4 kg (206 lb)   BMI 30.42 kg/m    HEENT:  Normocephalic, atraumatic.  PERRLA.  EOM s intact.  Visual fields full to gross exam  Neck:  Supple, non-tender, without lymphadenopathy.  Heart:  No peripheral edema  Lungs:  No SOB  Abdomen:  Non-distended.   Skin:  Warm and dry.  Extremities:  No edema, cyanosis or clubbing.  Psychiatric:  No apparent distress  Musculoskeletal:  Normal bulk and tone    NEUROLOGICAL EXAMINATION:     Mental status:  Alert and Oriented x 3, speech is fluent.  Cranial nerves:  II-XII intact.   Motor:    Shoulder Abduction:  Right:  5/5   Left:  5/5  Biceps:                      Right:  5/5   Left:  5/5  Triceps:                     Right:  5/5   Left:  5/5  Wrist Extensors:       Right:  5/5   Left:  5/5  Wrist Flexors:           Right:  5/5   Left:  5/5  interosseus :            Right:  4/5   Left:  4/5  Hip Flexion:                Right: 5/5  Left:  5/5  Quadriceps:             Right:  5/5  Left:  5/5  Hamstrings:             Right:  5/5  Left:  5/5  Gastroc Soleus:        Right:  5/5  Left:  5/5  Tib/Ant:                      Right:  5/5  Left:  5/5  EHL:                     Right:  5/5  Left:  5/5  Sensation:  Intact  Reflexes:  Negative Babinski.  Negative Clonus.  Bilateral positive Navarro's.  Bilateral hyper-reflexia  Coordination:  Smooth finger to nose testing.   Negative pronator drift.  Smooth tandem walking.    A/P:  58M w/ hx C4-6 ACDF, severe progressive cervical myelopathy, C3-4 severe stenosis    I had a discussion with the patient, reviewing the history, symptoms, " and imaging  Will plan for C3-4 ACDF  Risks and benefits discussed
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
## 2019-03-29 ENCOUNTER — DOCUMENTATION ONLY (OUTPATIENT)
Dept: NEUROSURGERY | Facility: OTHER | Age: 59
End: 2019-03-29

## 2019-03-29 ENCOUNTER — HOSPITAL ENCOUNTER (OUTPATIENT)
Dept: CT IMAGING | Facility: CLINIC | Age: 59
Discharge: HOME | End: 2019-03-29
Attending: NEUROLOGICAL SURGERY | Admitting: NEUROLOGICAL SURGERY
Payer: COMMERCIAL

## 2019-03-29 ENCOUNTER — OFFICE VISIT (OUTPATIENT)
Dept: FAMILY MEDICINE | Facility: OTHER | Age: 59
End: 2019-03-29
Payer: COMMERCIAL

## 2019-03-29 VITALS
DIASTOLIC BLOOD PRESSURE: 80 MMHG | BODY MASS INDEX: 30.66 KG/M2 | OXYGEN SATURATION: 98 % | HEART RATE: 79 BPM | TEMPERATURE: 97.2 F | SYSTOLIC BLOOD PRESSURE: 128 MMHG | WEIGHT: 207 LBS | RESPIRATION RATE: 14 BRPM | HEIGHT: 69 IN

## 2019-03-29 DIAGNOSIS — Z11.59 NEED FOR HEPATITIS C SCREENING TEST: ICD-10-CM

## 2019-03-29 DIAGNOSIS — Z23 NEED FOR PROPHYLACTIC VACCINATION AND INOCULATION AGAINST INFLUENZA: ICD-10-CM

## 2019-03-29 DIAGNOSIS — Z11.4 SCREENING FOR HIV (HUMAN IMMUNODEFICIENCY VIRUS): ICD-10-CM

## 2019-03-29 DIAGNOSIS — E11.9 TYPE 2 DIABETES MELLITUS WITHOUT COMPLICATION, WITHOUT LONG-TERM CURRENT USE OF INSULIN (H): ICD-10-CM

## 2019-03-29 DIAGNOSIS — M47.12 CERVICAL SPONDYLOSIS WITH MYELOPATHY: ICD-10-CM

## 2019-03-29 DIAGNOSIS — F17.200 TOBACCO USE DISORDER: ICD-10-CM

## 2019-03-29 DIAGNOSIS — Z01.818 PREOP GENERAL PHYSICAL EXAM: Primary | ICD-10-CM

## 2019-03-29 LAB
ALBUMIN SERPL-MCNC: 3.4 G/DL (ref 3.4–5)
ALP SERPL-CCNC: 117 U/L (ref 40–150)
ALT SERPL W P-5'-P-CCNC: 28 U/L (ref 0–70)
ANION GAP SERPL CALCULATED.3IONS-SCNC: 7 MMOL/L (ref 3–14)
AST SERPL W P-5'-P-CCNC: 10 U/L (ref 0–45)
BILIRUB SERPL-MCNC: 0.4 MG/DL (ref 0.2–1.3)
BUN SERPL-MCNC: 16 MG/DL (ref 7–30)
CALCIUM SERPL-MCNC: 8.9 MG/DL (ref 8.5–10.1)
CHLORIDE SERPL-SCNC: 105 MMOL/L (ref 94–109)
CO2 SERPL-SCNC: 26 MMOL/L (ref 20–32)
CREAT SERPL-MCNC: 0.76 MG/DL (ref 0.66–1.25)
ERYTHROCYTE [DISTWIDTH] IN BLOOD BY AUTOMATED COUNT: 12.9 % (ref 10–15)
GFR SERPL CREATININE-BSD FRML MDRD: >90 ML/MIN/{1.73_M2}
GLUCOSE SERPL-MCNC: 223 MG/DL (ref 70–99)
HBA1C MFR BLD: 11.9 % (ref 0–5.6)
HCT VFR BLD AUTO: 49.3 % (ref 40–53)
HGB BLD-MCNC: 16.7 G/DL (ref 13.3–17.7)
MCH RBC QN AUTO: 31.9 PG (ref 26.5–33)
MCHC RBC AUTO-ENTMCNC: 33.9 G/DL (ref 31.5–36.5)
MCV RBC AUTO: 94 FL (ref 78–100)
PLATELET # BLD AUTO: 222 10E9/L (ref 150–450)
POTASSIUM SERPL-SCNC: 4.1 MMOL/L (ref 3.4–5.3)
PROT SERPL-MCNC: 7.5 G/DL (ref 6.8–8.8)
RBC # BLD AUTO: 5.23 10E12/L (ref 4.4–5.9)
SODIUM SERPL-SCNC: 138 MMOL/L (ref 133–144)
WBC # BLD AUTO: 10.4 10E9/L (ref 4–11)

## 2019-03-29 PROCEDURE — 83036 HEMOGLOBIN GLYCOSYLATED A1C: CPT | Performed by: PHYSICIAN ASSISTANT

## 2019-03-29 PROCEDURE — 72125 CT NECK SPINE W/O DYE: CPT

## 2019-03-29 PROCEDURE — 99214 OFFICE O/P EST MOD 30 MIN: CPT | Performed by: PHYSICIAN ASSISTANT

## 2019-03-29 PROCEDURE — 36415 COLL VENOUS BLD VENIPUNCTURE: CPT | Performed by: PHYSICIAN ASSISTANT

## 2019-03-29 PROCEDURE — 80053 COMPREHEN METABOLIC PANEL: CPT | Performed by: PHYSICIAN ASSISTANT

## 2019-03-29 PROCEDURE — 85027 COMPLETE CBC AUTOMATED: CPT | Performed by: PHYSICIAN ASSISTANT

## 2019-03-29 RX ORDER — GLYBURIDE-METFORMIN HYDROCHLORIDE 2.5; 5 MG/1; MG/1
2 TABLET ORAL 2 TIMES DAILY WITH MEALS
Qty: 360 TABLET | Refills: 1 | Status: SHIPPED | OUTPATIENT
Start: 2019-03-29 | End: 2019-10-09

## 2019-03-29 ASSESSMENT — MIFFLIN-ST. JEOR: SCORE: 1744.58

## 2019-03-29 NOTE — PROGRESS NOTES
Dr. Wooten reviewed pre op CT. Okay to proceed with surgery as planned. Patient notified.     Patient also requesting to be off work for 6 weeks and would like this stated in the [employer] paperwork. He states [employer] will fax paperwork to our [facility]. Will complete once it's received.

## 2019-03-29 NOTE — PROGRESS NOTES
We will have him double up on his diabetes medication that is the Glucovance to 2 tablets twice daily and follow-up in 3 months.  Indeed forward the hemoglobin A1c results to his surgeon for review.  And will have to go from there.  Electronically signed:    Ferny Gomez PA-C

## 2019-04-01 ENCOUNTER — TELEPHONE (OUTPATIENT)
Dept: NEUROSURGERY | Facility: CLINIC | Age: 59
End: 2019-04-01

## 2019-04-01 NOTE — TELEPHONE ENCOUNTER
Type of surgery:  C3-4 ACDF, C4-5 hardware removal  Location of surgery: [facility]  Date and time of surgery: 04/02/2019 at 4:00pm  Surgeon: ANKUSH Wooten  Pre-Op Appt Date: Discussed  Post-Op Appt Date: 05/16/2019   Packet sent out: Yes  Pre-cert/Authorization completed:  Yes  Date: 03/29/2019 CHOCO

## 2019-04-01 NOTE — OR NURSING
Pt states has been cleared of MRSA  Pt would like wife to stay with him(she doesn't drive in cities, or after dark

## 2019-04-02 ENCOUNTER — ANESTHESIA (OUTPATIENT)
Dept: SURGERY | Facility: CLINIC | Age: 59
End: 2019-04-02
Payer: COMMERCIAL

## 2019-04-02 ENCOUNTER — ANESTHESIA EVENT (OUTPATIENT)
Dept: SURGERY | Facility: CLINIC | Age: 59
End: 2019-04-02
Payer: COMMERCIAL

## 2019-04-02 ENCOUNTER — HOSPITAL ENCOUNTER (OUTPATIENT)
Facility: CLINIC | Age: 59
Discharge: HOME | End: 2019-04-03
Attending: NEUROLOGICAL SURGERY | Admitting: NEUROLOGICAL SURGERY
Payer: COMMERCIAL

## 2019-04-02 ENCOUNTER — APPOINTMENT (OUTPATIENT)
Dept: GENERAL RADIOLOGY | Facility: CLINIC | Age: 59
End: 2019-04-02
Attending: NEUROLOGICAL SURGERY
Payer: COMMERCIAL

## 2019-04-02 DIAGNOSIS — E11.9 TYPE 2 DIABETES MELLITUS WITHOUT COMPLICATION, WITHOUT LONG-TERM CURRENT USE OF INSULIN (H): ICD-10-CM

## 2019-04-02 DIAGNOSIS — Z98.1 S/P CERVICAL SPINAL FUSION: Primary | ICD-10-CM

## 2019-04-02 LAB
GLUCOSE BLDC GLUCOMTR-MCNC: 106 MG/DL (ref 70–99)
GLUCOSE BLDC GLUCOMTR-MCNC: 113 MG/DL (ref 70–99)

## 2019-04-02 PROCEDURE — 82962 GLUCOSE BLOOD TEST: CPT

## 2019-04-02 PROCEDURE — C1713 ANCHOR/SCREW BN/BN,TIS/BN: HCPCS | Performed by: NEUROLOGICAL SURGERY

## 2019-04-02 PROCEDURE — 36000069 ZZH SURGERY LEVEL 5 EA 15 ADDTL MIN: Performed by: NEUROLOGICAL SURGERY

## 2019-04-02 PROCEDURE — 25000128 H RX IP 250 OP 636: Performed by: ANESTHESIOLOGY

## 2019-04-02 PROCEDURE — C1762 CONN TISS, HUMAN(INC FASCIA): HCPCS | Performed by: NEUROLOGICAL SURGERY

## 2019-04-02 PROCEDURE — 37000009 ZZH ANESTHESIA TECHNICAL FEE, EACH ADDTL 15 MIN: Performed by: NEUROLOGICAL SURGERY

## 2019-04-02 PROCEDURE — 25000132 ZZH RX MED GY IP 250 OP 250 PS 637: Performed by: ANESTHESIOLOGY

## 2019-04-02 PROCEDURE — 36000071 ZZH SURGERY LEVEL 5 W FLUORO 1ST 30 MIN: Performed by: NEUROLOGICAL SURGERY

## 2019-04-02 PROCEDURE — 22551 ARTHRD ANT NTRBDY CERVICAL: CPT | Performed by: NEUROLOGICAL SURGERY

## 2019-04-02 PROCEDURE — 25000125 ZZHC RX 250: Performed by: NURSE ANESTHETIST, CERTIFIED REGISTERED

## 2019-04-02 PROCEDURE — 40000170 ZZH STATISTIC PRE-PROCEDURE ASSESSMENT II: Performed by: NEUROLOGICAL SURGERY

## 2019-04-02 PROCEDURE — 22551 ARTHRD ANT NTRBDY CERVICAL: CPT | Mod: AS | Performed by: PHYSICIAN ASSISTANT

## 2019-04-02 PROCEDURE — 40000278 XR SURGERY CARM FLUORO LESS THAN 5 MIN

## 2019-04-02 PROCEDURE — 37000008 ZZH ANESTHESIA TECHNICAL FEE, 1ST 30 MIN: Performed by: NEUROLOGICAL SURGERY

## 2019-04-02 PROCEDURE — 25000132 ZZH RX MED GY IP 250 OP 250 PS 637: Performed by: PHYSICIAN ASSISTANT

## 2019-04-02 PROCEDURE — 25000128 H RX IP 250 OP 636: Performed by: NEUROLOGICAL SURGERY

## 2019-04-02 PROCEDURE — 22853 INSJ BIOMECHANICAL DEVICE: CPT | Performed by: NEUROLOGICAL SURGERY

## 2019-04-02 PROCEDURE — 22853 INSJ BIOMECHANICAL DEVICE: CPT | Mod: AS | Performed by: PHYSICIAN ASSISTANT

## 2019-04-02 PROCEDURE — 25000128 H RX IP 250 OP 636: Performed by: NURSE ANESTHETIST, CERTIFIED REGISTERED

## 2019-04-02 PROCEDURE — 25000125 ZZHC RX 250: Performed by: NEUROLOGICAL SURGERY

## 2019-04-02 PROCEDURE — 25000566 ZZH SEVOFLURANE, EA 15 MIN: Performed by: NEUROLOGICAL SURGERY

## 2019-04-02 PROCEDURE — 71000013 ZZH RECOVERY PHASE 1 LEVEL 1 EA ADDTL HR: Performed by: NEUROLOGICAL SURGERY

## 2019-04-02 PROCEDURE — 25000301 ZZH OR RX SURGIFLO W/THROMBIN KIT 2ML 1991 OPNP: Performed by: NEUROLOGICAL SURGERY

## 2019-04-02 PROCEDURE — 25800030 ZZH RX IP 258 OP 636: Performed by: NURSE ANESTHETIST, CERTIFIED REGISTERED

## 2019-04-02 PROCEDURE — 27210794 ZZH OR GENERAL SUPPLY STERILE: Performed by: NEUROLOGICAL SURGERY

## 2019-04-02 PROCEDURE — 71000012 ZZH RECOVERY PHASE 1 LEVEL 1 FIRST HR: Performed by: NEUROLOGICAL SURGERY

## 2019-04-02 PROCEDURE — 25000128 H RX IP 250 OP 636: Performed by: PHYSICIAN ASSISTANT

## 2019-04-02 DEVICE — GRAFT BONE PUTTY DBX 01ML 038010: Type: IMPLANTABLE DEVICE | Site: SPINE CERVICAL | Status: FUNCTIONAL

## 2019-04-02 DEVICE — IMP SCR MEDT ZEVO 3.5X13MM ST VA 7723513: Type: IMPLANTABLE DEVICE | Site: SPINE CERVICAL | Status: FUNCTIONAL

## 2019-04-02 DEVICE — IMP PLATE CERV MEDT ZEVO 19MM 1 LVL 3001019: Type: IMPLANTABLE DEVICE | Site: SPINE CERVICAL | Status: FUNCTIONAL

## 2019-04-02 RX ORDER — DEXTROSE MONOHYDRATE 25 G/50ML
25-50 INJECTION, SOLUTION INTRAVENOUS
Status: DISCONTINUED | OUTPATIENT
Start: 2019-04-02 | End: 2019-04-03 | Stop reason: HOSPADM

## 2019-04-02 RX ORDER — LIDOCAINE 40 MG/G
CREAM TOPICAL
Status: DISCONTINUED | OUTPATIENT
Start: 2019-04-02 | End: 2019-04-03 | Stop reason: HOSPADM

## 2019-04-02 RX ORDER — HYDROMORPHONE HYDROCHLORIDE 1 MG/ML
.3-.5 INJECTION, SOLUTION INTRAMUSCULAR; INTRAVENOUS; SUBCUTANEOUS
Status: DISCONTINUED | OUTPATIENT
Start: 2019-04-02 | End: 2019-04-03 | Stop reason: HOSPADM

## 2019-04-02 RX ORDER — NICOTINE POLACRILEX 4 MG
15-30 LOZENGE BUCCAL
Status: DISCONTINUED | OUTPATIENT
Start: 2019-04-02 | End: 2019-04-03 | Stop reason: HOSPADM

## 2019-04-02 RX ORDER — ONDANSETRON 2 MG/ML
INJECTION INTRAMUSCULAR; INTRAVENOUS PRN
Status: DISCONTINUED | OUTPATIENT
Start: 2019-04-02 | End: 2019-04-02

## 2019-04-02 RX ORDER — MEPERIDINE HYDROCHLORIDE 25 MG/ML
12.5 INJECTION INTRAMUSCULAR; INTRAVENOUS; SUBCUTANEOUS
Status: DISCONTINUED | OUTPATIENT
Start: 2019-04-02 | End: 2019-04-02 | Stop reason: HOSPADM

## 2019-04-02 RX ORDER — AMOXICILLIN 250 MG
1-2 CAPSULE ORAL DAILY PRN
Qty: 30 TABLET | Refills: 1 | Status: SHIPPED | OUTPATIENT
Start: 2019-04-02 | End: 2021-12-07

## 2019-04-02 RX ORDER — ACETAMINOPHEN 325 MG/1
975 TABLET ORAL ONCE
Status: COMPLETED | OUTPATIENT
Start: 2019-04-02 | End: 2019-04-02

## 2019-04-02 RX ORDER — PROPOFOL 10 MG/ML
INJECTION, EMULSION INTRAVENOUS PRN
Status: DISCONTINUED | OUTPATIENT
Start: 2019-04-02 | End: 2019-04-02

## 2019-04-02 RX ORDER — LIDOCAINE HYDROCHLORIDE 20 MG/ML
INJECTION, SOLUTION INFILTRATION; PERINEURAL PRN
Status: DISCONTINUED | OUTPATIENT
Start: 2019-04-02 | End: 2019-04-02

## 2019-04-02 RX ORDER — ONDANSETRON 2 MG/ML
4 INJECTION INTRAMUSCULAR; INTRAVENOUS EVERY 6 HOURS PRN
Status: DISCONTINUED | OUTPATIENT
Start: 2019-04-02 | End: 2019-04-03 | Stop reason: HOSPADM

## 2019-04-02 RX ORDER — DIAZEPAM 10 MG/2ML
2.5 INJECTION, SOLUTION INTRAMUSCULAR; INTRAVENOUS
Status: DISCONTINUED | OUTPATIENT
Start: 2019-04-02 | End: 2019-04-02 | Stop reason: HOSPADM

## 2019-04-02 RX ORDER — ONDANSETRON 2 MG/ML
4 INJECTION INTRAMUSCULAR; INTRAVENOUS EVERY 30 MIN PRN
Status: DISCONTINUED | OUTPATIENT
Start: 2019-04-02 | End: 2019-04-02 | Stop reason: HOSPADM

## 2019-04-02 RX ORDER — NALOXONE HYDROCHLORIDE 0.4 MG/ML
.1-.4 INJECTION, SOLUTION INTRAMUSCULAR; INTRAVENOUS; SUBCUTANEOUS
Status: DISCONTINUED | OUTPATIENT
Start: 2019-04-02 | End: 2019-04-03 | Stop reason: HOSPADM

## 2019-04-02 RX ORDER — GLYBURIDE 2.5 MG/1
5 TABLET ORAL 2 TIMES DAILY WITH MEALS
Status: DISCONTINUED | OUTPATIENT
Start: 2019-04-03 | End: 2019-04-03 | Stop reason: HOSPADM

## 2019-04-02 RX ORDER — CEFAZOLIN SODIUM 1 G/3ML
1 INJECTION, POWDER, FOR SOLUTION INTRAMUSCULAR; INTRAVENOUS SEE ADMIN INSTRUCTIONS
Status: DISCONTINUED | OUTPATIENT
Start: 2019-04-02 | End: 2019-04-02 | Stop reason: HOSPADM

## 2019-04-02 RX ORDER — OXYCODONE HCL 10 MG/1
10 TABLET, FILM COATED, EXTENDED RELEASE ORAL ONCE
Status: COMPLETED | OUTPATIENT
Start: 2019-04-02 | End: 2019-04-02

## 2019-04-02 RX ORDER — SODIUM CHLORIDE, SODIUM LACTATE, POTASSIUM CHLORIDE, CALCIUM CHLORIDE 600; 310; 30; 20 MG/100ML; MG/100ML; MG/100ML; MG/100ML
INJECTION, SOLUTION INTRAVENOUS CONTINUOUS
Status: DISCONTINUED | OUTPATIENT
Start: 2019-04-02 | End: 2019-04-02 | Stop reason: HOSPADM

## 2019-04-02 RX ORDER — GLYBURIDE-METFORMIN HYDROCHLORIDE 2.5; 5 MG/1; MG/1
2 TABLET ORAL 2 TIMES DAILY WITH MEALS
Status: DISCONTINUED | OUTPATIENT
Start: 2019-04-02 | End: 2019-04-02

## 2019-04-02 RX ORDER — FENTANYL CITRATE 50 UG/ML
25-50 INJECTION, SOLUTION INTRAMUSCULAR; INTRAVENOUS
Status: DISCONTINUED | OUTPATIENT
Start: 2019-04-02 | End: 2019-04-02 | Stop reason: HOSPADM

## 2019-04-02 RX ORDER — ROPINIROLE 0.25 MG/1
.25-.5 TABLET, FILM COATED ORAL
Status: DISCONTINUED | OUTPATIENT
Start: 2019-04-02 | End: 2019-04-03 | Stop reason: HOSPADM

## 2019-04-02 RX ORDER — HYDROMORPHONE HYDROCHLORIDE 1 MG/ML
.3-.5 INJECTION, SOLUTION INTRAMUSCULAR; INTRAVENOUS; SUBCUTANEOUS EVERY 10 MIN PRN
Status: DISCONTINUED | OUTPATIENT
Start: 2019-04-02 | End: 2019-04-02 | Stop reason: HOSPADM

## 2019-04-02 RX ORDER — FLUTICASONE PROPIONATE 50 MCG
1 SPRAY, SUSPENSION (ML) NASAL AT BEDTIME
Status: DISCONTINUED | OUTPATIENT
Start: 2019-04-02 | End: 2019-04-03 | Stop reason: HOSPADM

## 2019-04-02 RX ORDER — LIDOCAINE 40 MG/G
CREAM TOPICAL
Status: DISCONTINUED | OUTPATIENT
Start: 2019-04-02 | End: 2019-04-02 | Stop reason: HOSPADM

## 2019-04-02 RX ORDER — GABAPENTIN 300 MG/1
300 CAPSULE ORAL
Status: COMPLETED | OUTPATIENT
Start: 2019-04-02 | End: 2019-04-02

## 2019-04-02 RX ORDER — NALOXONE HYDROCHLORIDE 0.4 MG/ML
.1-.4 INJECTION, SOLUTION INTRAMUSCULAR; INTRAVENOUS; SUBCUTANEOUS
Status: DISCONTINUED | OUTPATIENT
Start: 2019-04-02 | End: 2019-04-02 | Stop reason: HOSPADM

## 2019-04-02 RX ORDER — ACETAMINOPHEN 650 MG/1
650 SUPPOSITORY RECTAL EVERY 4 HOURS PRN
Status: DISCONTINUED | OUTPATIENT
Start: 2019-04-02 | End: 2019-04-02 | Stop reason: HOSPADM

## 2019-04-02 RX ORDER — OXYCODONE HYDROCHLORIDE 5 MG/1
5-10 TABLET ORAL
Status: DISCONTINUED | OUTPATIENT
Start: 2019-04-02 | End: 2019-04-03 | Stop reason: HOSPADM

## 2019-04-02 RX ORDER — ATORVASTATIN CALCIUM 20 MG/1
20 TABLET, FILM COATED ORAL DAILY
Status: DISCONTINUED | OUTPATIENT
Start: 2019-04-03 | End: 2019-04-03 | Stop reason: HOSPADM

## 2019-04-02 RX ORDER — KETOROLAC TROMETHAMINE 30 MG/ML
30 INJECTION, SOLUTION INTRAMUSCULAR; INTRAVENOUS EVERY 6 HOURS PRN
Status: DISCONTINUED | OUTPATIENT
Start: 2019-04-02 | End: 2019-04-02 | Stop reason: HOSPADM

## 2019-04-02 RX ORDER — FENTANYL CITRATE 50 UG/ML
25-100 INJECTION, SOLUTION INTRAMUSCULAR; INTRAVENOUS
Status: DISCONTINUED | OUTPATIENT
Start: 2019-04-02 | End: 2019-04-02 | Stop reason: HOSPADM

## 2019-04-02 RX ORDER — FENTANYL CITRATE 50 UG/ML
INJECTION, SOLUTION INTRAMUSCULAR; INTRAVENOUS PRN
Status: DISCONTINUED | OUTPATIENT
Start: 2019-04-02 | End: 2019-04-02

## 2019-04-02 RX ORDER — METOCLOPRAMIDE 5 MG/1
10 TABLET ORAL EVERY 6 HOURS PRN
Status: DISCONTINUED | OUTPATIENT
Start: 2019-04-02 | End: 2019-04-03 | Stop reason: HOSPADM

## 2019-04-02 RX ORDER — KETAMINE HYDROCHLORIDE 10 MG/ML
INJECTION, SOLUTION INTRAMUSCULAR; INTRAVENOUS PRN
Status: DISCONTINUED | OUTPATIENT
Start: 2019-04-02 | End: 2019-04-02

## 2019-04-02 RX ORDER — CALCIUM CARBONATE 500 MG/1
1000 TABLET, CHEWABLE ORAL 4 TIMES DAILY PRN
Status: DISCONTINUED | OUTPATIENT
Start: 2019-04-02 | End: 2019-04-03 | Stop reason: HOSPADM

## 2019-04-02 RX ORDER — OXYCODONE HYDROCHLORIDE 5 MG/1
5-10 TABLET ORAL
Qty: 30 TABLET | Refills: 0 | Status: SHIPPED | OUTPATIENT
Start: 2019-04-02 | End: 2019-05-16

## 2019-04-02 RX ORDER — METHOCARBAMOL 750 MG/1
750-1500 TABLET, FILM COATED ORAL EVERY 6 HOURS PRN
Qty: 60 TABLET | Refills: 1 | Status: SHIPPED | OUTPATIENT
Start: 2019-04-02 | End: 2019-05-16

## 2019-04-02 RX ORDER — SODIUM CHLORIDE 9 MG/ML
INJECTION, SOLUTION INTRAVENOUS CONTINUOUS
Status: DISCONTINUED | OUTPATIENT
Start: 2019-04-02 | End: 2019-04-03 | Stop reason: HOSPADM

## 2019-04-02 RX ORDER — PROCHLORPERAZINE MALEATE 5 MG
10 TABLET ORAL EVERY 6 HOURS PRN
Status: DISCONTINUED | OUTPATIENT
Start: 2019-04-02 | End: 2019-04-03 | Stop reason: HOSPADM

## 2019-04-02 RX ORDER — ONDANSETRON 4 MG/1
4 TABLET, ORALLY DISINTEGRATING ORAL EVERY 6 HOURS PRN
Status: DISCONTINUED | OUTPATIENT
Start: 2019-04-02 | End: 2019-04-03 | Stop reason: HOSPADM

## 2019-04-02 RX ORDER — HYDROXYZINE HYDROCHLORIDE 25 MG/1
25 TABLET, FILM COATED ORAL EVERY 6 HOURS PRN
Status: DISCONTINUED | OUTPATIENT
Start: 2019-04-02 | End: 2019-04-03 | Stop reason: HOSPADM

## 2019-04-02 RX ORDER — METOCLOPRAMIDE HYDROCHLORIDE 5 MG/ML
10 INJECTION INTRAMUSCULAR; INTRAVENOUS EVERY 6 HOURS PRN
Status: DISCONTINUED | OUTPATIENT
Start: 2019-04-02 | End: 2019-04-03 | Stop reason: HOSPADM

## 2019-04-02 RX ORDER — ONDANSETRON 4 MG/1
4 TABLET, ORALLY DISINTEGRATING ORAL EVERY 30 MIN PRN
Status: DISCONTINUED | OUTPATIENT
Start: 2019-04-02 | End: 2019-04-02 | Stop reason: HOSPADM

## 2019-04-02 RX ORDER — HYDROXYZINE HYDROCHLORIDE 25 MG/1
25 TABLET, FILM COATED ORAL ONCE
Status: COMPLETED | OUTPATIENT
Start: 2019-04-02 | End: 2019-04-02

## 2019-04-02 RX ORDER — EPHEDRINE SULFATE 50 MG/ML
INJECTION, SOLUTION INTRAMUSCULAR; INTRAVENOUS; SUBCUTANEOUS PRN
Status: DISCONTINUED | OUTPATIENT
Start: 2019-04-02 | End: 2019-04-02

## 2019-04-02 RX ORDER — CEFAZOLIN SODIUM 2 G/100ML
2 INJECTION, SOLUTION INTRAVENOUS
Status: COMPLETED | OUTPATIENT
Start: 2019-04-02 | End: 2019-04-02

## 2019-04-02 RX ORDER — SODIUM CHLORIDE, SODIUM LACTATE, POTASSIUM CHLORIDE, CALCIUM CHLORIDE 600; 310; 30; 20 MG/100ML; MG/100ML; MG/100ML; MG/100ML
INJECTION, SOLUTION INTRAVENOUS CONTINUOUS PRN
Status: DISCONTINUED | OUTPATIENT
Start: 2019-04-02 | End: 2019-04-02

## 2019-04-02 RX ADMIN — Medication 10 MG: at 17:41

## 2019-04-02 RX ADMIN — DEXMEDETOMIDINE HYDROCHLORIDE 0.5 MCG/KG/HR: 100 INJECTION, SOLUTION INTRAVENOUS at 16:29

## 2019-04-02 RX ADMIN — Medication 0.5 MG: at 18:36

## 2019-04-02 RX ADMIN — MIDAZOLAM 2 MG: 1 INJECTION INTRAMUSCULAR; INTRAVENOUS at 16:28

## 2019-04-02 RX ADMIN — OXYCODONE HYDROCHLORIDE 10 MG: 5 TABLET ORAL at 23:35

## 2019-04-02 RX ADMIN — Medication 0.5 MG: at 19:50

## 2019-04-02 RX ADMIN — HYDROXYZINE HYDROCHLORIDE 25 MG: 25 TABLET ORAL at 15:29

## 2019-04-02 RX ADMIN — FENTANYL CITRATE 100 MCG: 50 INJECTION, SOLUTION INTRAMUSCULAR; INTRAVENOUS at 16:29

## 2019-04-02 RX ADMIN — ROCURONIUM BROMIDE 90 MG: 10 INJECTION INTRAVENOUS at 16:29

## 2019-04-02 RX ADMIN — Medication 5 MG: at 17:36

## 2019-04-02 RX ADMIN — PHENYLEPHRINE HYDROCHLORIDE 100 MCG: 10 INJECTION, SOLUTION INTRAMUSCULAR; INTRAVENOUS; SUBCUTANEOUS at 16:41

## 2019-04-02 RX ADMIN — HYDROXYZINE HYDROCHLORIDE 25 MG: 25 TABLET, FILM COATED ORAL at 23:35

## 2019-04-02 RX ADMIN — SODIUM CHLORIDE, POTASSIUM CHLORIDE, SODIUM LACTATE AND CALCIUM CHLORIDE: 600; 310; 30; 20 INJECTION, SOLUTION INTRAVENOUS at 17:36

## 2019-04-02 RX ADMIN — CEFAZOLIN SODIUM 2 G: 2 INJECTION, SOLUTION INTRAVENOUS at 16:52

## 2019-04-02 RX ADMIN — PHENYLEPHRINE HYDROCHLORIDE 100 MCG: 10 INJECTION, SOLUTION INTRAMUSCULAR; INTRAVENOUS; SUBCUTANEOUS at 16:36

## 2019-04-02 RX ADMIN — OXYCODONE HYDROCHLORIDE 10 MG: 10 TABLET, FILM COATED, EXTENDED RELEASE ORAL at 15:29

## 2019-04-02 RX ADMIN — PHENYLEPHRINE HYDROCHLORIDE 100 MCG: 10 INJECTION, SOLUTION INTRAMUSCULAR; INTRAVENOUS; SUBCUTANEOUS at 17:26

## 2019-04-02 RX ADMIN — HYDROMORPHONE HYDROCHLORIDE 0.5 MG: 1 INJECTION, SOLUTION INTRAMUSCULAR; INTRAVENOUS; SUBCUTANEOUS at 16:58

## 2019-04-02 RX ADMIN — PHENYLEPHRINE HYDROCHLORIDE 100 MCG: 10 INJECTION, SOLUTION INTRAMUSCULAR; INTRAVENOUS; SUBCUTANEOUS at 16:35

## 2019-04-02 RX ADMIN — Medication 10 MG: at 17:14

## 2019-04-02 RX ADMIN — Medication 10 MG: at 17:55

## 2019-04-02 RX ADMIN — PROPOFOL 160 MG: 10 INJECTION, EMULSION INTRAVENOUS at 16:29

## 2019-04-02 RX ADMIN — FLUTICASONE PROPIONATE 1 SPRAY: 50 SPRAY, METERED NASAL at 21:44

## 2019-04-02 RX ADMIN — ONDANSETRON 4 MG: 2 INJECTION INTRAMUSCULAR; INTRAVENOUS at 17:45

## 2019-04-02 RX ADMIN — ROCURONIUM BROMIDE 10 MG: 10 INJECTION INTRAVENOUS at 17:23

## 2019-04-02 RX ADMIN — PHENYLEPHRINE HYDROCHLORIDE 200 MCG: 10 INJECTION, SOLUTION INTRAMUSCULAR; INTRAVENOUS; SUBCUTANEOUS at 17:05

## 2019-04-02 RX ADMIN — FENTANYL CITRATE 25 MCG: 50 INJECTION, SOLUTION INTRAMUSCULAR; INTRAVENOUS at 18:56

## 2019-04-02 RX ADMIN — FENTANYL CITRATE 25 MCG: 50 INJECTION, SOLUTION INTRAMUSCULAR; INTRAVENOUS at 18:35

## 2019-04-02 RX ADMIN — Medication 0.5 MG: at 22:07

## 2019-04-02 RX ADMIN — PHENYLEPHRINE HYDROCHLORIDE 200 MCG: 10 INJECTION, SOLUTION INTRAMUSCULAR; INTRAVENOUS; SUBCUTANEOUS at 16:48

## 2019-04-02 RX ADMIN — SUGAMMADEX 200 MG: 100 INJECTION, SOLUTION INTRAVENOUS at 17:47

## 2019-04-02 RX ADMIN — RANITIDINE 150 MG: 150 TABLET ORAL at 21:35

## 2019-04-02 RX ADMIN — SUGAMMADEX 100 MG: 100 INJECTION, SOLUTION INTRAVENOUS at 18:08

## 2019-04-02 RX ADMIN — Medication 20 MG: at 16:28

## 2019-04-02 RX ADMIN — Medication 0.5 MG: at 21:04

## 2019-04-02 RX ADMIN — LIDOCAINE HYDROCHLORIDE 80 MG: 20 INJECTION, SOLUTION INFILTRATION; PERINEURAL at 16:29

## 2019-04-02 RX ADMIN — Medication 0.5 MG: at 19:29

## 2019-04-02 RX ADMIN — Medication 5 MG: at 17:40

## 2019-04-02 RX ADMIN — ACETAMINOPHEN 975 MG: 325 TABLET, FILM COATED ORAL at 14:05

## 2019-04-02 RX ADMIN — Medication 10 MG: at 17:23

## 2019-04-02 RX ADMIN — Medication 0.5 MG: at 18:46

## 2019-04-02 RX ADMIN — GABAPENTIN 300 MG: 300 CAPSULE ORAL at 14:05

## 2019-04-02 RX ADMIN — SODIUM CHLORIDE, POTASSIUM CHLORIDE, SODIUM LACTATE AND CALCIUM CHLORIDE: 600; 310; 30; 20 INJECTION, SOLUTION INTRAVENOUS at 16:28

## 2019-04-02 RX ADMIN — Medication 5 MG: at 16:50

## 2019-04-02 ASSESSMENT — ACTIVITIES OF DAILY LIVING (ADL)
TRANSFERRING: 0-->INDEPENDENT
RETIRED_EATING: 0-->INDEPENDENT
SWALLOWING: 0-->SWALLOWS FOODS/LIQUIDS WITHOUT DIFFICULTY
TOILETING: 0-->INDEPENDENT
COGNITION: 0 - NO COGNITION ISSUES REPORTED
DRESS: 0-->INDEPENDENT
FALL_HISTORY_WITHIN_LAST_SIX_MONTHS: NO
AMBULATION: 0-->INDEPENDENT
RETIRED_COMMUNICATION: 0-->UNDERSTANDS/COMMUNICATES WITHOUT DIFFICULTY
BATHING: 0-->INDEPENDENT

## 2019-04-02 ASSESSMENT — LIFESTYLE VARIABLES: TOBACCO_USE: 1

## 2019-04-02 ASSESSMENT — MIFFLIN-ST. JEOR: SCORE: 1750.22

## 2019-04-02 NOTE — ANESTHESIA PREPROCEDURE EVALUATION
Anesthesia Pre-Procedure Evaluation    Patient: Miguel A Vaughn   MRN: [id number] : 1960          Preoperative Diagnosis: CERVICAL DYSFUNCTION WITH CERVIAL MYELOPATHY    Procedure(s):  C 3-4 ANTERIOR CERVICAL DISECTOMY AND FUSION DISCECTOMY (ELECTRIC BED, MIDAS KRZYSZTOF, LEICA MICROSCOPE  DAVID )  C 4-5 HARDWARE REMOVAL ( MEDTRONIC ZEVO )    Past Medical History:   Diagnosis Date     Benign neoplasm of brain (H)      Cervicalgia      Depressive disorder, not elsewhere classified 2008     Deviated nasal septum      Diabetes (H)      Family history of colonic polyps      Headache(784.0)      Hemorrhoids, internal      Hyperlipidemia LDL goal <130 2011     Hypersomnia with sleep apnea, unspecified      BENJAMIN (obstructive sleep apnea) 10/12/2011     Other encephalopathy      Sprain of right ankle 2011     Unspecified disorder of adrenal glands      Past Surgical History:   Procedure Laterality Date     C WINTER W/O FACETEC FORAMOT/DSKC  VRT SEG, CERVICAL       C OPEN RX ANKLE DISLOCATN+FIXATN       COLONOSCOPY  08    Internal hemorrhoids.  Otherwise normal.     COLONOSCOPY  2013    Procedure: COLONOSCOPY;  colonoscopy;  Surgeon: Kody Reynolds MD;  [facility]     EXAM UNDER ANESTHESIA RECTUM N/A 8/3/2016    Procedure: EXAM UNDER ANESTHESIA RECTUM;  Surgeon: Sami Zamora MD;  [facility]      FLEX SIGMOIDOSCOPY W/WO BRAD SPEC BY BRUSH/WASH  06/10/08    bx intra-anal lesions & electrocoagulation of perianal lesions.     HC REPAIR OF NASAL SEPTUM  2005    Revision septoplasty, submucosal resection of the inferior turbinates.     INCISION AND DRAINAGE PERINEAL, COMBINED N/A 8/3/2016    Procedure: COMBINED INCISION AND DRAINAGE PERINEAL;  Surgeon: Sami Zamora MD;  Location: [facility]     SURGICAL HISTORY OF -   2006    Left occiput C1, C1-C2 facet injection.  [facility]       Anesthesia Evaluation     . Pt has had prior anesthetic.     No history of  "anesthetic complications          ROS/MED HX    ENT/Pulmonary:     (+)sleep apnea, tobacco use, uses CPAP , . .    Neurologic:       Cardiovascular:     (+) Dyslipidemia, ----. : . . . :. .       METS/Exercise Tolerance:     Hematologic:         Musculoskeletal:   (+) , , other musculoskeletal- cervical radiculopathy      GI/Hepatic:        (-) GERD   Renal/Genitourinary:         Endo:     (+) type II DM Not using insulin Obesity (BMI 31), .      Psychiatric:         Infectious Disease:         Malignancy:         Other:                          Physical Exam  Normal systems: cardiovascular and pulmonary    Airway   Mallampati: III  TM distance: >3 FB  Neck ROM: limited    Dental   (+) missing    Cardiovascular       Pulmonary             Lab Results   Component Value Date    WBC 10.4 03/29/2019    HGB 16.7 03/29/2019    HCT 49.3 03/29/2019     03/29/2019    CRP 71.9 (H) 08/03/2016    SED 14 08/03/2016     03/29/2019    POTASSIUM 4.1 03/29/2019    CHLORIDE 105 03/29/2019    CO2 26 03/29/2019    BUN 16 03/29/2019    CR 0.76 03/29/2019     (H) 03/29/2019    FRANCIA 8.9 03/29/2019    ALBUMIN 3.4 03/29/2019    PROTTOTAL 7.5 03/29/2019    ALT 28 03/29/2019    AST 10 03/29/2019    ALKPHOS 117 03/29/2019    BILITOTAL 0.4 03/29/2019    LIPASE 51 07/27/2011    AMYLASE 75 07/27/2011    INR 0.95 06/06/2008    TSH 2.25 08/17/2018       Preop Vitals  BP Readings from Last 3 Encounters:   04/02/19 137/69   03/29/19 128/80   03/28/19 110/60    Pulse Readings from Last 3 Encounters:   04/02/19 70   03/29/19 79   03/15/19 70      Resp Readings from Last 3 Encounters:   04/02/19 18   03/29/19 14   03/15/19 16    SpO2 Readings from Last 3 Encounters:   04/02/19 96%   03/29/19 98%   03/15/19 96%      Temp Readings from Last 1 Encounters:   04/02/19 35.4  C (95.7  F) (Temporal)    Ht Readings from Last 1 Encounters:   04/02/19 1.753 m (5' 9\")      Wt Readings from Last 1 Encounters:   04/02/19 94.5 kg (208 lb 4.8 oz)    " "Estimated body mass index is 30.76 kg/m  as calculated from the following:    Height as of this encounter: 1.753 m (5' 9\").    Weight as of this encounter: 94.5 kg (208 lb 4.8 oz).       Anesthesia Plan      History & Physical Review  History and physical reviewed and following examination; no interval change.    ASA Status:  3 .    NPO Status:  > 8 hours    Plan for General and ETT with Intravenous induction. Maintenance will be Balanced.    PONV prophylaxis:  Ondansetron (or other 5HT-3)  Additional equipment: Videolaryngoscope Check BS prior to OR    Preop Oxycontin 10mg, Vistaril 25mg PO  Intraop: Precedex gtt, ketamine 60mg (20mg prior to incision, 20mg prior to closure, and two 10mg boluses during the case  Zofran    Avoid decadron given DM      Postoperative Care  Postoperative pain management:  IV analgesics.      Consents  Anesthetic plan, risks, benefits and alternatives discussed with:  Patient..                 Stone Beasley MD  "

## 2019-04-02 NOTE — OP NOTE
Date of surgery: 4/2/2019  Surgeon: Memo Wooten MD  Assistant: COLTON Perez  Note: Rocky Rice was present for and assisted with the entire surgery, and his/her role as an assistant was crucial for aid in positioning, exposure, suctioning, retraction, and closure     Preoperative diagnosis: Cervical myelopathy, History of prior ACDF  Postoperative diagnosis: Cervical myelopathy, History of prior ACDF     Procedure:  1.  C4-5 removal or prior plate and screws  2.  C4-5 exploration of prior cervical fusion  3.  C3-4 anterior discectomy and interbody arthrodesis  2.  C3-4 insertion of Medtronic PTC intervertebral graft  3.  C3-4 insertion of Medtronic Zevo plate and vertebral body screws into C3 and C4  4.  Use of intraoperative microscope and fluoroscopy     EBL: 25 mL     Indications: 58-year-old male with history of prior ACDF x 2 with C4-5 plate, presented with progressive weakness and overt clinical myelopathy.  MRI demonstrated adjacent segment degeneration large disc herniation and severe stenosis at C3-4, and CT showed the top of the plate to be overlying the disk space, and good fusion.  Risks, benefits, indications, and alternatives were discussed with the patient and family in detail.  All their questions were answered, and they wished to proceed with surgery.     Description of surgery: The patient was positioned supine.  Sterile prepping and draping procedures were performed.  Antibiotics were administered and timeout was performed.  A right horizontal neck incision was performed.  The monopolar was used to divide the platysma, and the Metzenbaum scissors were used to create a plane in the fascia medial to the sternocleidomastoid muscle.  Blunt dissection was used to come down upon the anterior cervical spine.  The monopolar was used to expose the prior plate, and the prior plate and screws were removed.  The C4-5 fusion was explored, and appeared solid.  The Trimline retractor was inserted.  The 15  blade was used to perform an annulotomy in the C3-4 disc space.  A complete discectomy was performed with combination of the high-speed drill, curettes, and pituitary rongeurs.  Interbody arthrodesis was performed with a high-speed drill.  The posterior osteophytes were removed with the drill, and the posterior longitudinal ligament was removed with the Kerrison Rongeurs, with decompression of the bilateral neural foramina.  A Medtronic PTC intervertebral graft was delivered in the disc space at C3-4.  A Medtronic Zevo plate was positioned and bilateral vertebral body screws were inserted at C3 and C4.  Hemostasis was achieved.  Antibiotic irrigation was performed.  The platysma and dermal layers were closed with 3-0 Vicryl sutures, and the skin was closed with a running subcuticular stitch.  There were no intraprocedural complications.

## 2019-04-02 NOTE — ANESTHESIA CARE TRANSFER NOTE
Patient: Miguel A Vaughn    Procedure(s):  C 3-4 ANTERIOR CERVICAL DISECTOMY AND FUSION  C 4-5 HARDWARE REMOVAL    Diagnosis: CERVICAL DYSFUNCTION WITH CERVIAL MYELOPATHY  Diagnosis Additional Information: No value filed.    Anesthesia Type:   General, ETT     Note:  Airway :Face Mask  Patient transferred to:PACU  Comments: Neuromuscular blockade reversed with sugammadex after TOF 0/4, spontaneous respirations, adequate tidal volumes, followed commands to voice, oropharynx suctioned with soft flexible catheter, extubated atraumatically, extubated with suction, airway patent after extubation.  Oxygen via facemask at 6 liters per minute to PACU. Oxygen tubing connected to wall O2 in PACU, SpO2, NiBP, and EKG monitors and alarms on and functioning, Ben Hugger warmer connected to patient gown, report on patient's clinical status given to PACU RN, RN questions answered. Handoff Report: Identifed the Patient, Identified the Reponsible Provider, Reviewed the pertinent medical history, Discussed the surgical course, Reviewed Intra-OP anesthesia mangement and issues during anesthesia, Set expectations for post-procedure period and Allowed opportunity for questions and acknowledgement of understanding      Vitals: (Last set prior to Anesthesia Care Transfer)    CRNA VITALS  4/2/2019 1740 - 4/2/2019 1821      4/2/2019             Resp Rate (set):  10                Electronically Signed By: SEBASTIÁN England CRNA  April 2, 2019  6:21 PM

## 2019-04-02 NOTE — BRIEF OP NOTE
[facility] Brief Operative Note    Pre-operative diagnosis: CERVICAL DYSFUNCTION WITH CERVICAL MYELOPATHY   Post-operative diagnosis   SAME   Procedure: Procedure(s):  C 3-4 ANTERIOR CERVICAL DISECTOMY AND FUSION  C 4-5 HARDWARE REMOVAL   Surgeon(s): Surgeon(s) and Role:     * Memo Wooten MD - Primary     * Rocky Rice PA-C - Assisting   Estimated blood loss: 25 mL    Specimens: * No specimens in log *   Findings: Cervical Stenosis       Rocky KIM. Dwayne HERRERA  [facility]

## 2019-04-02 NOTE — OR NURSING
Reports pain, numbness, and tingling from back of neck down into both hands L> R. Hand strength equal.

## 2019-04-03 VITALS
OXYGEN SATURATION: 94 % | DIASTOLIC BLOOD PRESSURE: 67 MMHG | HEART RATE: 79 BPM | SYSTOLIC BLOOD PRESSURE: 115 MMHG | WEIGHT: 208.3 LBS | TEMPERATURE: 98.6 F | HEIGHT: 69 IN | BODY MASS INDEX: 30.85 KG/M2 | RESPIRATION RATE: 16 BRPM

## 2019-04-03 LAB
GLUCOSE BLDC GLUCOMTR-MCNC: 124 MG/DL (ref 70–99)
GLUCOSE BLDC GLUCOMTR-MCNC: 127 MG/DL (ref 70–99)
GLUCOSE BLDC GLUCOMTR-MCNC: 172 MG/DL (ref 70–99)
MRSA DNA SPEC QL NAA+PROBE: NEGATIVE
SPECIMEN SOURCE: NORMAL

## 2019-04-03 PROCEDURE — 87641 MR-STAPH DNA AMP PROBE: CPT | Performed by: NURSE PRACTITIONER

## 2019-04-03 PROCEDURE — 82962 GLUCOSE BLOOD TEST: CPT | Mod: 91

## 2019-04-03 PROCEDURE — 25000132 ZZH RX MED GY IP 250 OP 250 PS 637: Performed by: PHYSICIAN ASSISTANT

## 2019-04-03 PROCEDURE — 87640 STAPH A DNA AMP PROBE: CPT | Mod: XU | Performed by: NURSE PRACTITIONER

## 2019-04-03 PROCEDURE — 25000132 ZZH RX MED GY IP 250 OP 250 PS 637: Performed by: NEUROLOGICAL SURGERY

## 2019-04-03 PROCEDURE — 25000128 H RX IP 250 OP 636: Performed by: PHYSICIAN ASSISTANT

## 2019-04-03 RX ADMIN — ATORVASTATIN CALCIUM 20 MG: 20 TABLET, FILM COATED ORAL at 08:40

## 2019-04-03 RX ADMIN — HYDROXYZINE HYDROCHLORIDE 25 MG: 25 TABLET, FILM COATED ORAL at 13:07

## 2019-04-03 RX ADMIN — OXYCODONE HYDROCHLORIDE 10 MG: 5 TABLET ORAL at 13:49

## 2019-04-03 RX ADMIN — OXYCODONE HYDROCHLORIDE 10 MG: 5 TABLET ORAL at 10:59

## 2019-04-03 RX ADMIN — OXYCODONE HYDROCHLORIDE 5 MG: 5 TABLET ORAL at 03:12

## 2019-04-03 RX ADMIN — Medication 0.5 MG: at 09:33

## 2019-04-03 RX ADMIN — GLYBURIDE 5 MG: 2.5 TABLET ORAL at 11:07

## 2019-04-03 RX ADMIN — RANITIDINE 150 MG: 150 TABLET ORAL at 08:40

## 2019-04-03 RX ADMIN — HYDROXYZINE HYDROCHLORIDE 25 MG: 25 TABLET, FILM COATED ORAL at 06:59

## 2019-04-03 RX ADMIN — OXYCODONE HYDROCHLORIDE 5 MG: 5 TABLET ORAL at 06:59

## 2019-04-03 RX ADMIN — METFORMIN HYDROCHLORIDE 1000 MG: 500 TABLET, FILM COATED ORAL at 11:07

## 2019-04-03 NOTE — OR NURSING
"Patient continues to rate his pain a \"9\" but appears to be resting comfortably. OK to transfer to [facility]. 55 per Dr. Morris.  "

## 2019-04-03 NOTE — PLAN OF CARE
A&O.  VSS,RA.  CMS intact, ex baseline UE tingling.  Dressing changed.  Pain managed with oxy and atarax.  Up with SBA.  discharge instruction went over with pt and wife, verbalized understanding.  discharge prescription given to pt.  Will discharge pt with transport aide to door 2.

## 2019-04-03 NOTE — ANESTHESIA POSTPROCEDURE EVALUATION
Patient: Miguel A Vaughn    Procedure(s):  C 3-4 ANTERIOR CERVICAL DISECTOMY AND FUSION  C 4-5 HARDWARE REMOVAL    Diagnosis:CERVICAL DYSFUNCTION WITH CERVIAL MYELOPATHY  Diagnosis Additional Information: No value filed.    Anesthesia Type:  General, ETT    Note:  Anesthesia Post Evaluation    Patient location during evaluation: PACU  Patient participation: Able to fully participate in evaluation  Level of consciousness: awake  Pain management: adequate  Airway patency: patent  Cardiovascular status: acceptable  Respiratory status: acceptable  Hydration status: acceptable  PONV: controlled     Anesthetic complications: None          Last vitals:  Vitals:    04/02/19 2205 04/02/19 2300 04/03/19 0322   BP: (!) 123/91 144/83 114/72   Pulse:      Resp:  15 13   Temp:  36.9  C (98.4  F) 36.8  C (98.2  F)   SpO2: 90% 94% 93%         Electronically Signed By: Stone Beasley MD  April 3, 2019  7:15 AM

## 2019-04-03 NOTE — PLAN OF CARE
Pt A/Ox4, VSS on 2L and CPAP, oxycodone and dilaudid for pain, reports tingling in hands that was there before surgery, wife staying over, POD 1, voiding using urinal, will continue to monitor

## 2019-04-03 NOTE — PROGRESS NOTES
[facility]    Neurosurgery Progress Note    Date of Service (when I saw the patient): 04/03/2019     Assessment & Plan   Miguel A Vaughn is a 58 year old male with a history of prior ACDF x2 with C4-5 plate who was admitted on 4/2/2019. He presented with progressive weakness and overt clinical myelopathy. MRI demonstrated adjacent segment degeneration, large disc herniation, and severe stenosis at C3-4, and CT showed the top of the plate to be overlying the disc space, and good fusion. On 4/2/2019 he underwent a C3-4 ACDF with insertion of interbody and C4-5 removal of hardware with Dr. Wooten. Today he was seen sitting up in bed. He reports decreased pain and paresthesias in his neck and BUE. He states he has been ambulating the halls and tolerating a general diet. He has been voiding. He states he is ready for discharge. He was given one dose of IV dilaudid this morning. Ok to discharge later today if pain tolerated without IV pain medications.    Active Problems:    Status post cervical spinal arthrodesis    Assessment: s/p C3-4 ACDF with removal of hardware at C4-5    Plan:   -Activity as tolerated  -Continue oral pain medication  -Ok to discharge if pain controlled on oral pain medications      I have discussed the following assessment and plan Dr. Wooten who is in agreement with initial plan and will follow up with further consultation recommendations.    Rcohelle Rausch, CNP  [facility]  [place] [address]    Tel [phone]  Pager [phone]      Interval History   Stable. POD 1. Required a dose of IV dilaudid this morning.    Physical Exam   Temp: 98.4  F (36.9  C) Temp src: Axillary BP: 105/67 Pulse: 79 Heart Rate: 79 Resp: 16 SpO2: 94 % O2 Device: None (Room air) Oxygen Delivery: 2 LPM  Vitals:    04/02/19 1339   Weight: 208 lb 4.8 oz (94.5 kg)     Vital Signs with Ranges  Temp:  [95.7  F (35.4  C)-98.4  F  "(36.9  C)] 98.4  F (36.9  C)  Pulse:  [66-91] 79  Heart Rate:  [59-93] 79  Resp:  [11-20] 16  BP: (101-145)/(49-91) 105/67  SpO2:  [90 %-98 %] 94 %  I/O last 3 completed shifts:  In: 1500 [I.V.:1500]  Out: 25 [Blood:25]    Heart Rate: 79, Blood pressure 105/67, pulse 79, temperature 98.4  F (36.9  C), temperature source Axillary, resp. rate 16, height 5' 9\" (1.753 m), weight 208 lb 4.8 oz (94.5 kg), SpO2 94 %.  208 lbs 4.8 oz  HEENT:  Normocephalic.  PERRLA.    Heart:  No peripheral edema  Lungs:  No SOB   Skin:  Warm and dry, good capillary refill. Incision with steristrips, CDI. No erythema, swelling, or drainage.  Extremities:  Good radial and dorsalis pedis pulses bilaterally, no edema, cyanosis or clubbing.    NEUROLOGICAL EXAMINATION:   Mental status:  Alert and Oriented x 3, speech is fluent.  Cranial nerves:  II-XII intact.   Motor:  Strength is 5/5 throughout the upper and lower extremities  Shoulder Abduction:  Right:  5/5   Left:  5/5  Biceps:                      Right:  5/5   Left:  5/5  Triceps:                     Right:  5/5   Left:  5/5  Wrist Extensors:       Right:  5/5   Left:  5/5  Wrist Flexors:           Right:  5/5   Left:  5/5  interosseus :            Right:  5/5   Left:  5/5  Sensation:  BUE numbness/tingling  Gait:  Stable, SBA per RN    Medications     sodium chloride 100 mL/hr at 04/02/19 2105       atorvastatin  20 mg Oral Daily     ranitidine  150 mg Oral Q12H    Or     famotidine  20 mg Intravenous Q12H     fluticasone  1 spray Both Nostrils At Bedtime     glyBURIDE  5 mg Oral BID w/meals    And     metFORMIN  1,000 mg Oral BID w/meals     sodium chloride (PF)  3 mL Intracatheter Q8H       Data     CBC RESULTS:   Recent Labs   Lab Test 03/29/19  0905   WBC 10.4   RBC 5.23   HGB 16.7   HCT 49.3   MCV 94   MCH 31.9   MCHC 33.9   RDW 12.9        Basic Metabolic Panel:  Lab Results   Component Value Date     03/29/2019      Lab Results   Component Value Date    POTASSIUM 4.1 " 03/29/2019     Lab Results   Component Value Date    CHLORIDE 105 03/29/2019     Lab Results   Component Value Date    FRANCIA 8.9 03/29/2019     Lab Results   Component Value Date    CO2 26 03/29/2019     Lab Results   Component Value Date    BUN 16 03/29/2019     Lab Results   Component Value Date    CR 0.76 03/29/2019     Lab Results   Component Value Date     03/29/2019     INR:  Lab Results   Component Value Date    INR 0.95 06/06/2008    INR 0.96 10/03/2007    INR 0.97 02/28/2007    INR 0.95 01/04/2007    INR 0.97 12/13/2005

## 2019-04-05 ENCOUNTER — DOCUMENTATION ONLY (OUTPATIENT)
Dept: NEUROSURGERY | Facility: OTHER | Age: 59
End: 2019-04-05

## 2019-04-05 NOTE — PROGRESS NOTES
4/5/2019    [employer] Forms: yes    Faxed [phone]     Type of form Concurrent Disability and leave statement of incapacity/attending physician statement.     Placed a copy in the bin and sent the original to medical records

## 2019-04-16 NOTE — PROGRESS NOTES
Miguel A called:   Miguel A Vaughn  Male, 57 yrs, 1960  MRN:   [id number]      Pt LM on after hours voicemial    Pt saw Maru last week and [facility] called stating strips are $150 for 3 months and they have a program to get a new machine for free and the strips would be 100 every 3 months. States he would like to go with the [facility] program. Pt is requesting a call from Mrau or triage to discuss getting strips until the supplies ordered by [facility] comes in next week as he is running low. Please call pt to advise at [phone].    Rosa Maria WATT reply:  Called and spoke to his wife, consent to communicate.  She says that the cheapest deal from the pharmacy is $100/3 months of strips, quite expensive.    Described to her that we can give him a Glucocard meter for free and he can buy 50 strips for $11 at [facility].   They would like to go with this plan. She will  the meter and purchase strips today.   Reviewed he can use any lancing device.    Alerted CDE at [facility], she will leave meter at the counter for Miguel A's wife.     Yane Garcia RD, LD, CDE     Quality 226: Preventive Care And Screening: Tobacco Use: Screening And Cessation Intervention: Patient screened for tobacco use and is an ex/non-smoker Quality 111:Pneumonia Vaccination Status For Older Adults: Pneumococcal Vaccination Previously Received Detail Level: Detailed

## 2019-05-13 DIAGNOSIS — Z98.1 S/P CERVICAL SPINAL FUSION: Primary | ICD-10-CM

## 2019-05-16 ENCOUNTER — TELEPHONE (OUTPATIENT)
Dept: NEUROSURGERY | Facility: CLINIC | Age: 59
End: 2019-05-16

## 2019-05-16 ENCOUNTER — OFFICE VISIT (OUTPATIENT)
Dept: NEUROSURGERY | Facility: OTHER | Age: 59
End: 2019-05-16
Payer: COMMERCIAL

## 2019-05-16 ENCOUNTER — ANCILLARY PROCEDURE (OUTPATIENT)
Dept: GENERAL RADIOLOGY | Facility: OTHER | Age: 59
End: 2019-05-16
Attending: NEUROLOGICAL SURGERY
Payer: COMMERCIAL

## 2019-05-16 VITALS — TEMPERATURE: 97.6 F | WEIGHT: 208 LBS | BODY MASS INDEX: 30.81 KG/M2 | HEIGHT: 69 IN

## 2019-05-16 DIAGNOSIS — Z98.1 S/P CERVICAL SPINAL FUSION: ICD-10-CM

## 2019-05-16 PROCEDURE — 72040 X-RAY EXAM NECK SPINE 2-3 VW: CPT

## 2019-05-16 PROCEDURE — 99024 POSTOP FOLLOW-UP VISIT: CPT | Performed by: NURSE PRACTITIONER

## 2019-05-16 RX ORDER — OXYCODONE HYDROCHLORIDE 5 MG/1
5-10 TABLET ORAL EVERY 4 HOURS PRN
Qty: 30 TABLET | Refills: 0 | Status: SHIPPED | OUTPATIENT
Start: 2019-05-16 | End: 2019-07-01

## 2019-05-16 RX ORDER — METHYLPREDNISOLONE 4 MG
TABLET, DOSE PACK ORAL
Qty: 21 TABLET | Refills: 0 | Status: SHIPPED | OUTPATIENT
Start: 2019-05-16 | End: 2019-09-12

## 2019-05-16 RX ORDER — METHOCARBAMOL 750 MG/1
750-1500 TABLET, FILM COATED ORAL EVERY 6 HOURS PRN
Qty: 60 TABLET | Refills: 1 | Status: SHIPPED | OUTPATIENT
Start: 2019-05-16 | End: 2019-07-01

## 2019-05-16 ASSESSMENT — MIFFLIN-ST. JEOR: SCORE: 1748.86

## 2019-05-16 NOTE — PROGRESS NOTES
[facility]  Neurosurgery followup:    HPI: 6 weeks s/p C3-4 ACDF and C4-5 hardware removal. Today he reports ongoing neck and left arm pain with paresthesias. He states the pain is the same as prior to surgery. He states he feels his neck muscles are cramped up. He has been taking ibuprofen for pain and has run out of his oxycodone and robaxin. No weakness.      Exam:  Constitutional:  Alert, well nourished, NAD.  HEENT: Normocephalic, atraumatic.   Pulm:  Without shortness of breath   CV:  No pitting edema of BLE.     Neurological:  Awake  Alert  Oriented x 3  Motor exam:     Shoulder Abduction:  Right:  5/5    Left:  5/5  Biceps:                      Right:  5/5    Left:  5/5  Triceps:                     Right:  5/5    Left:  5/5  Wrist Extensors:       Right:  5/5    Left:  5/5  Wrist Flexors:           Right:  5/5    Left:  5/5  Intrinsics:                  Right:  5/5    Left:  5/5     Able to spontaneously move U/E bilaterally  Sensation intact throughout all U/E dermatomes    Incisions:  Healing nicely    Imaging:  AP and lateral films reveal intact hardware    A/P: 6 weeks s/p C3-4 ACDF and C4-5 hardware removal. Today he reports ongoing neck and left arm pain with paresthesias. He states the pain is the same as prior to surgery. He states he feels his neck muscles are cramped up. He has been taking ibuprofen for pain and has run out of his oxycodone and robaxin. No weakness.  Discussed cervical XR results. Refilled oxycodone and robaxin. Prescribed medrol dose pack and physical therapy. Instructed for him to contact us if refills are needed. He should follow up in 6 weeks with XR prior. He verbalized understanding and agreement.    Patient Instructions   - Physical therapy ordered. They will contact you to schedule.    - Medrol dosepack ordered and sent to your pharmacy.    -Refills for oxycodone and robaxin. Contact the clinic if refills needed.    - May increase lifting restriction to 20  dari as tolerated    - Follow up in 6 weeks  with xray prior     - Call the clinic at [phone] for increased pain or any other questions and concerns.      Rochelle Rausch [facility]  [facility]  [address] [address]    Tel [phone]  Pager [phone]

## 2019-05-16 NOTE — LETTER
[address], [address]  [facility] [address]  Phone: [phone]  Fax: [phone]    05/16/19    Miguel A Vaughn  [address]  [facility] [address]      To whom it may concern:       Miguel A Vaughn  was seen in clinic today.  Please excuse him from work until his 3 month follow up visit scheduled on 7/1/2019.     Sincerely,      Rochelle Rausch, CNP

## 2019-05-16 NOTE — PATIENT INSTRUCTIONS
- Physical therapy ordered. They will contact you to schedule.    - Medrol dosepack ordered and sent to your pharmacy.    -Refills for oxycodone and robaxin. Contact the clinic if refills needed.    - May increase lifting restriction to 20 pounds as tolerated    - Follow up in 6 weeks  with xray prior     - Call the clinic at [phone] for increased pain or any other questions and concerns.

## 2019-05-16 NOTE — TELEPHONE ENCOUNTER
Patient called to request that office note from today be sent to his disability company.     Office note faxed to [phone]

## 2019-05-16 NOTE — LETTER
5/16/2019         RE: Miguel A Vaughn  [address]  [place] [place] [facility] [address]        Dear Colleague,    Thank you for referring your patient, Miguel A Vaughn, to the [facility]. Please see a copy of my visit note below.    [facility]  Neurosurgery followup:    HPI: 6 weeks s/p C3-4 ACDF and C4-5 hardware removal. Today he reports ongoing neck and left arm pain with paresthesias. He states the pain is the same as prior to surgery. He states he feels his neck muscles are cramped up. He has been taking ibuprofen for pain and has run out of his oxycodone and robaxin. No weakness.      Exam:  Constitutional:  Alert, well nourished, NAD.  HEENT: Normocephalic, atraumatic.   Pulm:  Without shortness of breath   CV:  No pitting edema of BLE.     Neurological:  Awake  Alert  Oriented x 3  Motor exam:     Shoulder Abduction:  Right:  5/5    Left:  5/5  Biceps:                      Right:  5/5    Left:  5/5  Triceps:                     Right:  5/5    Left:  5/5  Wrist Extensors:       Right:  5/5    Left:  5/5  Wrist Flexors:           Right:  5/5    Left:  5/5  Intrinsics:                  Right:  5/5    Left:  5/5     Able to spontaneously move U/E bilaterally  Sensation intact throughout all U/E dermatomes    Incisions:  Healing nicely    Imaging:  AP and lateral films reveal intact hardware    A/P: 6 weeks s/p C3-4 ACDF and C4-5 hardware removal. Today he reports ongoing neck and left arm pain with paresthesias. He states the pain is the same as prior to surgery. He states he feels his neck muscles are cramped up. He has been taking ibuprofen for pain and has run out of his oxycodone and robaxin. No weakness.  Discussed cervical XR results. Refilled oxycodone and robaxin. Prescribed medrol dose pack and physical therapy. Instructed for him to contact us if refills are needed. He should follow up in 6 weeks with XR prior. He verbalized understanding and agreement.    Patient Instructions   -  Physical therapy ordered. They will contact you to schedule.    - Medrol dosepack ordered and sent to your pharmacy.    -Refills for oxycodone and robaxin. Contact the clinic if refills needed.    - May increase lifting restriction to 20 pounds as tolerated    - Follow up in 6 weeks  with xray prior     - Call the clinic at [phone] for increased pain or any other questions and concerns.      Rochelle Rausch CNP  [facility]  [address] [address]    Tel [phone]  Pager [phone]      Again, thank you for allowing me to participate in the care of your patient.        Sincerely,        Rochelle Rausch NP

## 2019-06-03 ENCOUNTER — HOSPITAL ENCOUNTER (OUTPATIENT)
Dept: PHYSICAL THERAPY | Facility: OTHER | Age: 59
Setting detail: THERAPIES SERIES
End: 2019-06-03
Attending: NURSE PRACTITIONER
Payer: COMMERCIAL

## 2019-06-03 DIAGNOSIS — Z98.1 S/P CERVICAL SPINAL FUSION: ICD-10-CM

## 2019-06-03 PROCEDURE — 97530 THERAPEUTIC ACTIVITIES: CPT | Mod: GP | Performed by: PHYSICAL THERAPIST

## 2019-06-03 PROCEDURE — 97112 NEUROMUSCULAR REEDUCATION: CPT | Mod: GP | Performed by: PHYSICAL THERAPIST

## 2019-06-03 PROCEDURE — 97162 PT EVAL MOD COMPLEX 30 MIN: CPT | Mod: GP | Performed by: PHYSICAL THERAPIST

## 2019-06-03 NOTE — PROGRESS NOTES
06/03/19 0900   General Information   Type of Visit Initial OP Ortho PT Evaluation   Start of Care Date 06/03/19   Referring Physician Rochelle Rausch NP   Patient/Family Goals Statement decrease pain, fix problem   Orders Evaluate and Treat   Date of Order 05/16/19   Certification Required? No   Medical Diagnosis s/p cervical fusion   Surgical/Medical history reviewed Yes   Precautions/Limitations no known precautions/limitations   Body Part(s)   Body Part(s) Cervical Spine   Presentation and Etiology   Pertinent history of current problem (include personal factors and/or comorbidities that impact the POC) The 1st thing pt noted is he feels worse now than before surgery. Pt with recent C2-3 fusion and had plates removed from previous fusion. Pt notes cervical fusions 8 and 12 years ago due to DDD. Pt to ED 1 to 1.5 months ago to severe pain. Pt had L>R UE sx before surgery but worse since surgery. Pt taking pain meds, steroids had no effect on sx. Pt had neck x rays a few weeks ago and was told everything looked fine. Pt also takes muscle relaxers. Hx or R UE surgery due to nerve pain. In March of 2019 pt had some difficulty walking at work and eventually led to recent fusion. Pt is diabetic.    Impairments A. Pain;D. Decreased ROM;F. Decreased strength and endurance;K. Numbness;L. Tingling;N. Headaches;O. Blurred vision;J. Burning;Q. Dizziness   Functional Limitations perform activities of daily living;perform required work activities;perform desired leisure / sports activities   Symptom Location neck pain, L>R upper trap, thoracic pain that wraps around laterally and to chest, L>R UE sx to hand, head sx to occipital, L>R ears, temples, parietal, frontal, eyes blurred, upper and lower back teeth pain. Also gets LBP and sx into L LE   How/Where did it occur From Degenerative Joint Disease   Onset date of current episode/exacerbation 04/02/19   Chronicity Chronic   Pain rating (0-10 point scale) Best  (/10);Worst (/10)   Best (/10) average neck pain 7/10,  L UE 7-8/10, head 6-7/10   Worst (/10) 10/10, has been to ED   Pain quality A. Sharp;B. Dull;C. Aching;D. Burning;E. Shooting;F. Stabbing;G. Cramping   Frequency of pain/symptoms A. Constant   Pain/symptoms are: Worse during the day   Pain/symptoms exacerbated by A. Sitting;C. Lifting;D. Carrying;G. Certain positions;I. Bending;J. ADL;K. Home tasks;L. Work tasks;M. Other   Pain exacerbation comment standing, see NDI, seems worse after heat/ice   Pain/symptoms eased by E. Changing positions   Progression of symptoms since onset: Worsened   Prior Level of Function   Functional Level Prior Comment pt was able to work at [employer] [employer] prior   Current Level of Function   Patient role/employment history A. Employed   Employment Comments off work from [employer] since 3/14/19   Fall Risk Screen   Fall screen completed by PT   Have you fallen 2 or more times in the past year? No   Have you fallen and had an injury in the past year? No   Is patient a fall risk? No   Functional Scales   Functional Scales Other   Other Scales  NDI 68%, at reports he is at 20% normal functional level, wakes 5+ times per night due to pain.    Cervical Spine   Observation pain behaviors demonstrated, pt breathing heavy and shallow. Severe pain with coughing.    Posture guarded sitting and standing posture, very little neck movement   Cervical Flexion ROM 75% decrease and increases L UE sx   Cervical Extension ROM 90% decrease and dizzy   Cervical Right Side Bending ROM 75% decrease   Cervical Left Side Bending ROM 75% decrease   Cervical Right Rotation ROM 25% decrease   Cervical Left Rotation ROM 90% decrease   Shoulder/Wrist/Hand Strength Comments  on setting 2 R 25#, L 32#, setting 3 R 40#, L 62#   Cervical/Shoulder Special Tests Comments did mechanical neck exam using static/dynamic force analysis testing to see if directional preference. In sitting prior to testing neck pain 7/10  to B upper traps, thoracic spine and laterally to L upper chest. L UE pain to L hand 6-7/10, R UE to hand 4/10. Head sx to occipital, B ears, jaw, temples, parietal, frontal, eyes cheeks, upper and lower back teeth 3/10. Cervical protrusion x 1 increased teeth sx. Retraction x 1 increase B hand/finger sx. Extension 25% increased B hand/finger sx. Pt had difficult time with transfer to supine and did unloading on 2 pillows, 1 pillow and no pillow and best was 1 pillow and neck pain and sx to UE's and hands/fingers L 6-7/10, R hand less, teeth 3-4/10   Planned Therapy Interventions   Planned Therapy Interventions ROM;strengthening;neuromuscular re-education;manual therapy   Planned Modality Interventions   Planned Modality Interventions Electrical stimulation;Ultrasound;Traction   Planned Modality Interventions Comments if needed   Clinical Impression   Criteria for Skilled Therapeutic Interventions Met yes, treatment indicated   PT Diagnosis severe neck pain with L>R upper extremity pain/referral, headaches, decreased ROM and decreased functional level   Influenced by the following impairments pain, ROM, weakness   Functional limitations due to impairments bending, turning, lifting, carrying, adl's, not able to work, poor sleep   Clinical Presentation Evolving/Changing   Clinical Presentation Rationale severe pain, worsening sx s/p cervical fusion, 68% NDI, neck pain, L and R UE's, headaches, 20% reported functional level, wakes 5+ times per night due to pain   Clinical Decision Making (Complexity) Moderate complexity   Therapy Frequency 2 times/Week   Predicted Duration of Therapy Intervention (days/wks) 3 months, decrease as able   Risk & Benefits of therapy have been explained Yes   Patient, Family & other staff in agreement with plan of care Yes   Education Assessment   Preferred Learning Style Listening   Barriers to Learning No barriers   ORTHO GOALS   PT Ortho Eval Goals 1;2   Ortho Goal 1   Goal Identifier  pain   Goal Description pt will note a decrease in average pain from 7-8/10 to 4-5/10 or less so he will no longer wake 5+ times per night due to pain but 50% less    Target Date 06/24/19   Ortho Goal 2   Goal Identifier function   Goal Description pt will decrease neck pain and improve ROM and carry over to improved functional level as measured by NDI score decrease from 68% to 40% or less   Target Date 07/03/19   Total Evaluation Time   PT Cris Moderate Complexity Minutes (77290) 40

## 2019-06-12 ENCOUNTER — HOSPITAL ENCOUNTER (OUTPATIENT)
Dept: PHYSICAL THERAPY | Facility: OTHER | Age: 59
Setting detail: THERAPIES SERIES
End: 2019-06-12
Attending: NURSE PRACTITIONER
Payer: COMMERCIAL

## 2019-06-12 PROCEDURE — 97112 NEUROMUSCULAR REEDUCATION: CPT | Mod: GP | Performed by: PHYSICAL THERAPIST

## 2019-06-12 PROCEDURE — 97530 THERAPEUTIC ACTIVITIES: CPT | Mod: GP | Performed by: PHYSICAL THERAPIST

## 2019-06-18 ENCOUNTER — HOSPITAL ENCOUNTER (OUTPATIENT)
Dept: PHYSICAL THERAPY | Facility: OTHER | Age: 59
Setting detail: THERAPIES SERIES
End: 2019-06-18
Attending: NURSE PRACTITIONER
Payer: COMMERCIAL

## 2019-06-18 PROCEDURE — 97530 THERAPEUTIC ACTIVITIES: CPT | Mod: GP | Performed by: PHYSICAL THERAPIST

## 2019-06-18 PROCEDURE — 97112 NEUROMUSCULAR REEDUCATION: CPT | Mod: GP | Performed by: PHYSICAL THERAPIST

## 2019-06-28 ENCOUNTER — HOSPITAL ENCOUNTER (OUTPATIENT)
Dept: PHYSICAL THERAPY | Facility: OTHER | Age: 59
Setting detail: THERAPIES SERIES
End: 2019-06-28
Attending: NURSE PRACTITIONER
Payer: COMMERCIAL

## 2019-06-28 PROCEDURE — 97035 APP MDLTY 1+ULTRASOUND EA 15: CPT | Mod: GP | Performed by: PHYSICAL THERAPIST

## 2019-06-28 PROCEDURE — 97530 THERAPEUTIC ACTIVITIES: CPT | Mod: GP | Performed by: PHYSICAL THERAPIST

## 2019-06-28 NOTE — PROGRESS NOTES
Outpatient Physical Therapy Progress Note     Patient: Miguel A Vaughn  : 1960    Beginning/End Dates of Reporting Period:  6/3/19 to 2019 (pt seen for 4 PT visits)    Referring Provider: Rochelle Rausch NP    Therapy Diagnosis: severe neck pain with L>R upper extremity pain/referral, headaches, decreased ROM and decreased functional level     Client Self Report: Pt sees  on Monday. Neck collar still seems to help but pt had 2 bad episodes: one time was when bird hit his window he quickly turned his head/neck to L and had an unresponsive episode and wife almost called ED. His neck also seized another time when he had to turn to L watching TV. Overall R arm is a little better but still severe L UE to fingers and neck is severe and also into his head even to his teeth. Something feels wrong and notes he is much worse now than before neck surgery.    Objective Measurements:19  Objective Measure: NDI (68% at [facility] on 6/3/19)  Details: 64%  Objective Measure: number of times waking at night due to pain (5+x per night due to pain at eval)  Details: only sleeps 1 to 1.5 hours at a time for the longest  Objective Measure: average pain level (at eval neck 7/10, UE 7-8/10, head 6-7/10)  Details: L UE avg 9.5/10, R UE better, neck 8/10, head 6-7/10  Objective Measure: frequency of pain (constant at eval)  Details: still constant  Objective Measure:  with hand dynomometer (at eval setting 2 R 25, L 32#, setting 3 R 40, L 62#  Details: setting 2 R 52, L 49#, setting 3 R 40, L 50#     Goals:  Goal Identifier pain   Goal Description pt will note a decrease in average pain from 7-8/10 to 4-5/10 or less so he will no longer wake 5+ times per night due to pain but 50% less    Target Date 19   Date Met      Progress:     Goal Identifier function   Goal Description pt will decrease neck pain and improve ROM and carry over to improved functional level as measured by NDI score decrease from 68% to 40%  or less   Target Date 07/03/19   Date Met      Progress:     Goal Identifier     Goal Description     Target Date     Date Met      Progress:       Progress Toward Goals:   Progress limited due to continued severe pain especially into left upper extremity. All neck movements increase neck, head and UE symptoms. His R UE symptoms are less overall and he has less pain when wearing neck collar. PT goals not met at this time. We are limited with what we can do in PT due to the severity of his pain. Minimal change in NDI.     Plan:  Will keep chart open for 1 month or so after follow up with provider in case more PT ordered.    Discharge:  No  If no more PT is scheduled in the next month or so then this note will become the discharge summary.

## 2019-07-01 ENCOUNTER — HOSPITAL ENCOUNTER (OUTPATIENT)
Dept: MRI IMAGING | Facility: CLINIC | Age: 59
Discharge: HOME | End: 2019-07-01
Attending: NEUROLOGICAL SURGERY | Admitting: NEUROLOGICAL SURGERY
Payer: COMMERCIAL

## 2019-07-01 ENCOUNTER — ANCILLARY PROCEDURE (OUTPATIENT)
Dept: GENERAL RADIOLOGY | Facility: CLINIC | Age: 59
End: 2019-07-01
Attending: NEUROLOGICAL SURGERY
Payer: COMMERCIAL

## 2019-07-01 ENCOUNTER — TELEPHONE (OUTPATIENT)
Dept: NEUROSURGERY | Facility: CLINIC | Age: 59
End: 2019-07-01

## 2019-07-01 ENCOUNTER — OFFICE VISIT (OUTPATIENT)
Dept: NEUROSURGERY | Facility: CLINIC | Age: 59
End: 2019-07-01
Attending: NEUROLOGICAL SURGERY
Payer: COMMERCIAL

## 2019-07-01 VITALS
BODY MASS INDEX: 32.1 KG/M2 | WEIGHT: 211.8 LBS | DIASTOLIC BLOOD PRESSURE: 76 MMHG | SYSTOLIC BLOOD PRESSURE: 139 MMHG | HEIGHT: 68 IN | HEART RATE: 89 BPM | TEMPERATURE: 97.6 F | OXYGEN SATURATION: 96 % | RESPIRATION RATE: 18 BRPM

## 2019-07-01 DIAGNOSIS — Z98.1 S/P CERVICAL SPINAL FUSION: ICD-10-CM

## 2019-07-01 DIAGNOSIS — Z98.1 S/P CERVICAL SPINAL FUSION: Primary | ICD-10-CM

## 2019-07-01 PROCEDURE — 72141 MRI NECK SPINE W/O DYE: CPT

## 2019-07-01 PROCEDURE — 99024 POSTOP FOLLOW-UP VISIT: CPT | Performed by: NEUROLOGICAL SURGERY

## 2019-07-01 PROCEDURE — G0463 HOSPITAL OUTPT CLINIC VISIT: HCPCS

## 2019-07-01 PROCEDURE — 72040 X-RAY EXAM NECK SPINE 2-3 VW: CPT

## 2019-07-01 RX ORDER — METHOCARBAMOL 750 MG/1
750-1500 TABLET, FILM COATED ORAL 2 TIMES DAILY PRN
Qty: 60 TABLET | Refills: 1 | Status: ON HOLD | OUTPATIENT
Start: 2019-07-01 | End: 2019-11-12

## 2019-07-01 RX ORDER — OXYCODONE HYDROCHLORIDE 5 MG/1
5-10 TABLET ORAL EVERY 8 HOURS PRN
Qty: 50 TABLET | Refills: 0 | Status: SHIPPED | OUTPATIENT
Start: 2019-07-01 | End: 2019-09-12

## 2019-07-01 ASSESSMENT — PAIN SCALES - GENERAL: PAINLEVEL: EXTREME PAIN (8)

## 2019-07-01 ASSESSMENT — MIFFLIN-ST. JEOR: SCORE: 1750.22

## 2019-07-01 NOTE — PATIENT INSTRUCTIONS
1. Please get xray completed today.  can help you schedule this. We will call you with results.   2. MRI ordered to be done and completed as well. We will call you with results.   3. Robaxin refill sent to pharmacy; Oxycodone refilled today.   4. Work letter written to excuse from work while you obtain imaging. We can write a more detailed letter for your employer (stating more specific time off) once we review the imaging results.   5. Referral to speech therapy for swallow evaluation. They will call you to schedule.      Please call our clinic with any questions or concerns: [phone]

## 2019-07-01 NOTE — LETTER
"    7/1/2019         RE: Miguel A Vaughn  [address]  [place] [address]        Dear Colleague,    Thank you for referring your patient, Miguel A Vaughn, to the [facility] NEUROS[facility]ERY [facility]IC. Please see a copy of my visit note below.    Miguel A Vaughn is a 58 year old male who presents for:  Chief Complaint   Patient presents with     Neurologic Problem     90 f/u 4/2/19        Initial Vitals:  /76 (BP Location: Right arm, Patient Position: Sitting, Cuff Size: Adult Regular)   Pulse 89   Temp 97.6  F (36.4  C) (Oral)   Resp 18   Ht 5' 8\" (1.727 m)   Wt 211 lb 12.8 oz (96.1 kg)   SpO2 96%   BMI 32.20 kg/m    Estimated body mass index is 32.2 kg/m  as calculated from the following:    Height as of this encounter: 5' 8\" (1.727 m).    Weight as of this encounter: 211 lb 12.8 oz (96.1 kg).. Body surface area is 2.15 meters squared. BP completed using cuff size: large  Extreme Pain (8)    Refill medication: METHOCARBAMOL, OXYCODONE    Nursing Comments: no        Yahaira Gonzalez MA      3 mos post-op.  Notes 1 month of worsening neck and left arm pain, worse with neck rotation to the left.  Tried a couple sessions of PT, but was unable to continue due to pain levels.  Stable/improving myelopathy.  Also notes sensation of food catching in throat.       Past Medical History:   Diagnosis Date     Benign neoplasm of brain (H)      Cervicalgia      Depressive disorder, not elsewhere classified 7/16/2008     Deviated nasal septum      Diabetes (H)      Family history of colonic polyps      Headache(784.0)      Hemorrhoids, internal      Hyperlipidemia LDL goal <130 7/12/2011     Hypersomnia with sleep apnea, unspecified      BENJAMIN (obstructive sleep apnea) 10/12/2011     Other encephalopathy      Sprain of right ankle 7/7/2011     Unspecified disorder of adrenal glands      Past Surgical History:   Procedure Laterality Date     C WINTER W/O FACETEC FORAMOT/DSKC 1/2 VRT SEG, CERVICAL  1989     C OPEN RX " ANKLE DISLOCATN+FIXATN       COLONOSCOPY  08    Internal hemorrhoids.  Otherwise normal.     COLONOSCOPY  2013    Procedure: COLONOSCOPY;  colonoscopy;  Surgeon: Kody Reynolds MD;  Location:  GI     DISCECTOMY, FUSION CERVICAL ANTERIOR ONE LEVEL, COMBINED N/A 2019    Procedure: C 3-4 ANTERIOR CERVICAL DISECTOMY AND FUSION;  Surgeon: Memo Wooten MD;  Location:  OR     EXAM UNDER ANESTHESIA RECTUM N/A 8/3/2016    Procedure: EXAM UNDER ANESTHESIA RECTUM;  Surgeon: Sami Zamora MD;  Location:  OR     EXPLORE SPINE, REMOVE HARDWARE, COMBINED N/A 2019    Procedure: C 4-5 HARDWARE REMOVAL;  Surgeon: Memo Wooten MD;  Location:  OR     HC FLEX SIGMOIDOSCOPY W/WO BRAD SPEC BY BRUSH/WASH  06/10/08    bx intra-anal lesions & electrocoagulation of perianal lesions.      REPAIR OF NASAL SEPTUM  2005    Revision septoplasty, submucosal resection of the inferior turbinates.     INCISION AND DRAINAGE PERINEAL, COMBINED N/A 8/3/2016    Procedure: COMBINED INCISION AND DRAINAGE PERINEAL;  Surgeon: Sami Zamora MD;  Location:  OR     SURGICAL HISTORY OF -   2006    Left occiput C1, C1-C2 facet injection.  -[facility]     Social History     Socioeconomic History     Marital status:      Spouse name: Not on file     Number of children: Not on file     Years of education: Not on file     Highest education level: Not on file   Occupational History     Not on file   Social Needs     Financial resource strain: Not on file     Food insecurity:     Worry: Not on file     Inability: Not on file     Transportation needs:     Medical: Not on file     Non-medical: Not on file   Tobacco Use     Smoking status: Current Every Day Smoker     Packs/day: 1.00     Years: 25.00     Pack years: 25.00     Types: Cigarettes     Last attempt to quit: 10/1/2007     Years since quittin.7     Smokeless tobacco: Never Used   Substance and Sexual Activity     Alcohol  "use: Yes     Alcohol/week: 0.0 oz     Comment: weekends     Drug use: No     Sexual activity: Yes     Partners: Female   Lifestyle     Physical activity:     Days per week: Not on file     Minutes per session: Not on file     Stress: Not on file   Relationships     Social connections:     Talks on phone: Not on file     Gets together: Not on file     Attends Christian service: Not on file     Active member of club or organization: Not on file     Attends meetings of clubs or organizations: Not on file     Relationship status: Not on file     Intimate partner violence:     Fear of current or ex partner: Not on file     Emotionally abused: Not on file     Physically abused: Not on file     Forced sexual activity: Not on file   Other Topics Concern      Service No     Blood Transfusions No     Caffeine Concern No     Occupational Exposure No     Hobby Hazards No     Sleep Concern Yes     Comment: C-Pap      Stress Concern No     Weight Concern Yes     Special Diet No     Back Care No     Exercise No     Bike Helmet Not Asked     Comment: NA     Seat Belt Yes     Self-Exams No     Parent/sibling w/ CABG, MI or angioplasty before 65F 55M? Yes   Social History Narrative     Not on file     Family History   Problem Relation Age of Onset     Cancer Father         skin cancer     Cancer Sister         skin, ovarian     Breast Cancer Sister      Cancer Brother         skin     Diabetes No family hx of         ROS: 10 point ROS neg other than the symptoms noted above in the HPI.    Physical Exam  /76 (BP Location: Right arm, Patient Position: Sitting, Cuff Size: Adult Regular)   Pulse 89   Temp 97.6  F (36.4  C) (Oral)   Resp 18   Ht 1.727 m (5' 8\")   Wt 96.1 kg (211 lb 12.8 oz)   SpO2 96%   BMI 32.20 kg/m     HEENT:  Normocephalic, atraumatic.  PERRLA.  EOM s intact.  Visual fields full to gross exam  Neck:  Supple, non-tender, without lymphadenopathy.  Heart:  No peripheral edema  Lungs:  No " SOB  Abdomen:  Non-distended.   Skin:  Warm and dry.  Extremities:  No edema, cyanosis or clubbing.  Psychiatric:  No apparent distress  Musculoskeletal:  Normal bulk and tone    NEUROLOGICAL EXAMINATION:     Mental status:  Alert and Oriented x 3, speech is fluent.  Cranial nerves:  II-XII intact.   Motor:    Shoulder Abduction:  Right:  5/5   Left:  5/5  Biceps:                      Right:  5/5   Left:  5/5  Triceps:                     Right:  5/5   Left:  5/5  Wrist Extensors:       Right:  5/5   Left:  5/5  Wrist Flexors:           Right:  5/5   Left:  5/5  interosseus :            Right:  5/5   Left:  5/5  Hip Flexor:                Right: 5/5  Left:  5/5  Quadriceps:             Right:  5/5  Left:  5/5  Hamstrings:             Right:  5/5  Left:  5/5  Gastroc Soleus:        Right:  5/5  Left:  5/5  Tib/Ant:                      Right:  5/5  Left:  5/5  EHL:                     Right:  5/5  Left:  5/5  Sensation:  Intact  Reflexes:  Negative Babinski.  Negative Clonus.  Negative Navarro's.  Coordination:  Smooth finger to nose testing.   Negative pronator drift.  Smooth tandem walking.  Incision well healed    A/P:  Will obtain post-op XRs  Will obtain MR Cervical  Speech/Swallow therapy  If consistent with C6-7 degeneration/stenosis, will try to coordinate SUZY and finding a therapy option      Again, thank you for allowing me to participate in the care of your patient.        Sincerely,        Memo Wooten MD

## 2019-07-01 NOTE — LETTER
[facility]   Spine and Brain Clinic  [address]  [address]        7/1/19    To Whom it May Concern,      Miguel A Vaughn was seen at our clinic today for post-operative follow up care. He is scheduled for additional imaging to be done this week. Due to necessary post-operative healing and recovery, please excuse Miguel A from work while he obtains the recommended imaging. A more detailed letter can be written once we review the imaging results and plan of care.       Please call our clinic with questions or concerns: [phone]    Sincerely,            Memo Wooten MD

## 2019-07-01 NOTE — PROGRESS NOTES
3 mos post-op.  Notes 1 month of worsening neck and left arm pain, worse with neck rotation to the left.  Tried a couple sessions of PT, but was unable to continue due to pain levels.  Stable/improving myelopathy.  Also notes sensation of food catching in throat.       Past Medical History:   Diagnosis Date     Benign neoplasm of brain (H)      Cervicalgia      Depressive disorder, not elsewhere classified 7/16/2008     Deviated nasal septum      Diabetes (H)      Family history of colonic polyps      Headache(784.0)      Hemorrhoids, internal      Hyperlipidemia LDL goal <130 7/12/2011     Hypersomnia with sleep apnea, unspecified      BENJAMIN (obstructive sleep apnea) 10/12/2011     Other encephalopathy      Sprain of right ankle 7/7/2011     Unspecified disorder of adrenal glands      Past Surgical History:   Procedure Laterality Date     C WINTER W/O FACETEC FORAMOT/[facility]KC 1/2 VRT SEG, CERVICAL  1989     C OPEN RX ANKLE DISLOCATN+FIXATN  1982     COLONOSCOPY  05/12/08    Internal hemorrhoids.  Otherwise normal.     COLONOSCOPY  4/17/2013    Procedure: COLONOSCOPY;  colonoscopy;  Surgeon: Kody Reynolds MD;  Location: [facility] GI     DISCECTOMY, FUSION CERVICAL ANTERIOR ONE LEVEL, COMBINED N/A 4/2/2019    Procedure: C 3-4 ANTERIOR CERVICAL DISECTOMY AND FUSION;  Surgeon: Memo Wooten MD;  Location:  OR     EXAM UNDER ANESTHESIA RECTUM N/A 8/3/2016    Procedure: EXAM UNDER ANESTHESIA RECTUM;  Surgeon: Sami Zamora MD;  Location:  OR     EXPLORE SPINE, REMOVE HARDWARE, COMBINED N/A 4/2/2019    Procedure: C 4-5 HARDWARE REMOVAL;  Surgeon: Memo Wooten MD;  Location:  OR      FLEX SIGMOIDOSCOPY W/WO BRAD SPEC BY BRUSH/WASH  06/10/08    bx intra-anal lesions & electrocoagulation of perianal lesions.     HC REPAIR OF NASAL SEPTUM  12/16/2005    Revision septoplasty, submucosal resection of the inferior turbinates.     INCISION AND DRAINAGE PERINEAL, COMBINED N/A 8/3/2016    Procedure: COMBINED  INCISION AND DRAINAGE PERINEAL;  Surgeon: Sami Zamora MD;  Location: [facility] OR     SURGICAL HISTORY OF -   2006    Left occiput C1, C1-C2 facet injection.  -[facility]     Social History     Socioeconomic History     Marital status:      Spouse name: Not on file     Number of children: Not on file     Years of education: Not on file     Highest education level: Not on file   Occupational History     Not on file   Social Needs     Financial resource strain: Not on file     Food insecurity:     Worry: Not on file     Inability: Not on file     Transportation needs:     Medical: Not on file     Non-medical: Not on file   Tobacco Use     Smoking status: Current Every Day Smoker     Packs/day: 1.00     Years: 25.00     Pack years: 25.00     Types: Cigarettes     Last attempt to quit: 10/1/2007     Years since quittin.7     Smokeless tobacco: Never Used   Substance and Sexual Activity     Alcohol use: Yes     Alcohol/week: 0.0 oz     Comment: weekends     Drug use: No     Sexual activity: Yes     Partners: Female   Lifestyle     Physical activity:     Days per week: Not on file     Minutes per session: Not on file     Stress: Not on file   Relationships     Social connections:     Talks on phone: Not on file     Gets together: Not on file     Attends Anabaptism service: Not on file     Active member of club or organization: Not on file     Attends meetings of clubs or organizations: Not on file     Relationship status: Not on file     Intimate partner violence:     Fear of current or ex partner: Not on file     Emotionally abused: Not on file     Physically abused: Not on file     Forced sexual activity: Not on file   Other Topics Concern      Service No     Blood Transfusions No     Caffeine Concern No     Occupational Exposure No     Hobby Hazards No     Sleep Concern Yes     Comment: C-Pap      Stress Concern No     Weight Concern Yes     Special Diet No     Back Care No     Exercise No      "Bike Helmet Not Asked     Comment: NA     Seat Belt Yes     Self-Exams No     Parent/sibling w/ CABG, MI or angioplasty before 65F 55M? Yes   Social History Narrative     Not on file     Family History   Problem Relation Age of Onset     Cancer Father         skin cancer     Cancer Sister         skin, ovarian     Breast Cancer Sister      Cancer Brother         skin     Diabetes No family hx of         ROS: 10 point ROS neg other than the symptoms noted above in the HPI.    Physical Exam  /76 (BP Location: Right arm, Patient Position: Sitting, Cuff Size: Adult Regular)   Pulse 89   Temp 97.6  F (36.4  C) (Oral)   Resp 18   Ht 1.727 m (5' 8\")   Wt 96.1 kg (211 lb 12.8 oz)   SpO2 96%   BMI 32.20 kg/m    HEENT:  Normocephalic, atraumatic.  PERRLA.  EOM s intact.  Visual fields full to gross exam  Neck:  Supple, non-tender, without lymphadenopathy.  Heart:  No peripheral edema  Lungs:  No SOB  Abdomen:  Non-distended.   Skin:  Warm and dry.  Extremities:  No edema, cyanosis or clubbing.  Psychiatric:  No apparent distress  Musculoskeletal:  Normal bulk and tone    NEUROLOGICAL EXAMINATION:     Mental status:  Alert and Oriented x 3, speech is fluent.  Cranial nerves:  II-XII intact.   Motor:    Shoulder Abduction:  Right:  5/5   Left:  5/5  Biceps:                      Right:  5/5   Left:  5/5  Triceps:                     Right:  5/5   Left:  5/5  Wrist Extensors:       Right:  5/5   Left:  5/5  Wrist Flexors:           Right:  5/5   Left:  5/5  interosseus :            Right:  5/5   Left:  5/5  Hip Flexor:                Right: 5/5  Left:  5/5  Quadriceps:             Right:  5/5  Left:  5/5  Hamstrings:             Right:  5/5  Left:  5/5  Gastroc Soleus:        Right:  5/5  Left:  5/5  Tib/Ant:                      Right:  5/5  Left:  5/5  EHL:                     Right:  5/5  Left:  5/5  Sensation:  Intact  Reflexes:  Negative Babinski.  Negative Clonus.  Negative Navarro's.  Coordination:  Smooth " finger to nose testing.   Negative pronator drift.  Smooth tandem walking.  Incision well healed    A/P:  Will obtain post-op XRs  Will obtain MR Cervical  Speech/Swallow therapy  If consistent with C6-7 degeneration/stenosis, will try to coordinate SUZY and finding a therapy option

## 2019-07-01 NOTE — PROGRESS NOTES
"Miguel A Vaughn is a 58 year old male who presents for:  Chief Complaint   Patient presents with     Neurologic Problem     90 f/u 4/2/19        Initial Vitals:  /76 (BP Location: Right arm, Patient Position: Sitting, Cuff Size: Adult Regular)   Pulse 89   Temp 97.6  F (36.4  C) (Oral)   Resp 18   Ht 5' 8\" (1.727 m)   Wt 211 lb 12.8 oz (96.1 kg)   SpO2 96%   BMI 32.20 kg/m   Estimated body mass index is 32.2 kg/m  as calculated from the following:    Height as of this encounter: 5' 8\" (1.727 m).    Weight as of this encounter: 211 lb 12.8 oz (96.1 kg).. Body surface area is 2.15 meters squared. BP completed using cuff size: large  Extreme Pain (8)    Refill medication: METHOCARBAMOL, OXYCODONE    Nursing Comments: jaxon Gonzalez MA    "

## 2019-07-01 NOTE — TELEPHONE ENCOUNTER
Left detailed message informing patient XR is stable, awaiting MR. Asked patient to return call with any questions or concerns.

## 2019-07-02 NOTE — TELEPHONE ENCOUNTER
Spoke to patient. Reviewed results and recommendations given per Dr Wooten in previous note. Patient is requesting injection and PT both be in [place]. Order placed. Patient will receive calls to schedule both. Advised to contact clinic if pain persists. Patient verbalized understanding.

## 2019-07-09 ENCOUNTER — TELEPHONE (OUTPATIENT)
Dept: SURGERY | Facility: CLINIC | Age: 59
End: 2019-07-09

## 2019-07-09 NOTE — TELEPHONE ENCOUNTER
Contacted patient to schedule SUZY  Date: 8/16/19  Time: 0900  Dr. Anguiano    Instructed pt to have H&P and  for procedure.

## 2019-07-12 ENCOUNTER — HOSPITAL ENCOUNTER (OUTPATIENT)
Dept: SPEECH THERAPY | Facility: CLINIC | Age: 59
Setting detail: THERAPIES SERIES
End: 2019-07-12
Attending: NEUROLOGICAL SURGERY
Payer: COMMERCIAL

## 2019-07-12 DIAGNOSIS — Z98.1 S/P CERVICAL SPINAL FUSION: ICD-10-CM

## 2019-07-12 PROCEDURE — 92610 EVALUATE SWALLOWING FUNCTION: CPT | Mod: GN | Performed by: SPEECH-LANGUAGE PATHOLOGIST

## 2019-07-12 NOTE — PROGRESS NOTES
"   07/12/19 1700       Present No   General Information   Type Of Visit Initial   Start Of Care Date 07/12/19   Referring Physician Dr. Memo Wooten   Orders Evaluate And Treat   Orders Comment patient reports sensation of food catching in throat   Medical Diagnosis S/P cervical spinal fusion   Onset Of Illness/injury Or Date Of Surgery 04/03/19   Pertinent History of Current Problem/OT: Additional Occupational Profile Info Patient is a 58-year-old male who presents today for a clinical swallow evaluation d/t concerns with difficulty swallowing. Pt reports that he has had difficulties swallowing since most recent cervical spinal fusion on 4/03/2109. He reports a globus sensation that improves with liquid wash. He also reports that he sometimes coughs when drinking thin liquid. He currently wears a neck brace all day, but took it off during evaluation. Today was his first clinical swallow evaluation. He denies participating in a VFSS.   Respiratory Status Room air   Prior Level Of Function Swallowing   Prior Level Of Function Comment No difficulties prior to most recent surgery on 4/3/2019.   Living Environment House[facility]   Patient/family Goals \"I want to swallow without pain or choking.\"   Pain Assessment   Pain Reported Yes   Pain Location troat   Pain Scale 6/10   Fall Risk Screen   Fall screen completed by SLP   Have you fallen 2 or more times in the past year? No   Have you fallen and had an injury in the past year? No   Is patient a fall risk? No   Clinical Swallow Evaluation   Oral Musculature generally intact   Dentition present and adequate   Mucosal Quality good   Oral Labial Strength and Mobility WFL   Lingual Strength and Mobility WFL   Velar Elevation intact   Buccal Strength and Mobility intact   Laryngeal Function Cough;Throat clear;Voicing initiated;Swallow;Dry swallow palpated   Additional Documentation Yes   Additional evaluation(s) completed today Recommended   Rationale for " completing additional evaluation MD to consider VFSS to rule out pharyngeal aspiration given complex surgical history.   Swallow Eval   Feeding Assistance no assistance needed   Clinical Swallow Eval: Thin Liquid Texture Trial   Mode of Presentation, Thin Liquids cup;self-fed   Volume of Liquid or Food Presented 4 ounces   Oral Phase of Swallow WFL   Pharyngeal Phase of Swallow intact   Diagnostic Statement Functional oral skills and no overt s/s of aspiration given single and consecutive sips of thin liquid.   Clinical Swallow Eval: Puree Solid Texture Trial   Mode of Presentation, Puree spoon;fed by clinician   Volume of Puree Presented 3 ounces   Oral Phase, Puree WFL   Pharyngeal Phase, Puree feeling of something stuck in throat   Diagnostic Statement Functional oral skills and timely initiation of swallow. No overt s/s of aspiration observed. Concerns of pharyngeal residue as patient reports globus sensation following each bite. Sensation cleared with liquid wash.   Clinical Swallow Eval: Solid Food Texture Trial   Mode of Presentation, Solid self-fed   Volume of Solid Food Presented 1 cracker   Oral Phase, Solid WFL   Pharyngeal Phase, Solid feeling of something stuck in throat;repeated swallows   Successful Strategies Trialed During Procedure, Solid other (see comments)  (liquid wash)   Diagnostic Statement Functional oral skills and timely initiation of swallow. No overt s/s of aspiration observed. Concerns of pharyngeal residue as patient reports globus sensation following each bite. Patient was observed to take additional dry swallows, with visible facial grimace. Sensation cleared with liquid wash.   Educational Assessment   Barriers to Learning No barriers   Preferred Learning Style Listening;Reading;Demonstration;Pictures/video   General Therapy Interventions   Planned Therapy Interventions Dysphagia Treatment   Dysphagia treatment Compensatory strategies for swallowing   Swallow Eval: Clinical  Impressions   Skilled Criteria for Therapy Intervention Skilled criteria met.  Treatment indicated.   Functional Assessment Scale (FAS) 5   Dysphagia Outcome Severity Scale (SRIRAM) Level 5 - SRIRAM   Treatment Diagnosis oropharyngeal dysphagia   Diet texture recommendations Dysphagia diet level 3;Thin liquids   Recommended Feeding/Eating Techniques alternate between small bites and sips of food/liquid;small sips/bites   Rehab Potential good, to achieve stated therapy goals   Predicted Duration of Therapy Intervention (days/wks) 3 weeks   Anticipated Discharge Disposition home   Risks and Benefits of Treatment have been explained. Yes   Patient, family and/or staff in agreement with Plan of Care Yes   Clinical Impression Comments Patient presents with functional oral skills and no overt s/sx of aspiration. He demonstrates feeding difficulties characterized by report of globus sensation across a variety of textures. Recommend mechanical soft diet for comfort and thin liquids with compensatory strategies listed above. Recommend VFSS to objectively assess anatomy and physiology of swallow and rule out pharyngeal aspiaration given complex surgical history.    Swallow Goals   SLP Swallow Goals 1   Swallow Goal 1   Goal Identifier Swallow Study   Goal Description Patient will complete Modified Barium Swallow Study to fully assess anatomy and physiology of the swallow and to determine appropriate  and compensatory strategies.   Target Date 19   Total Session Time   SLP Eval: oral/pharyngeal swallow function, clinical minutes (70866) 50   Total Evaluation Time 50   Therapy Certification   Medical Diagnosis S/P cervical spinal fusion   Certification I certify the need for these services furnished under this plan of treatment and while under my care.  (Physician co-signature of this document indicates review and certification of the therapy plan).     Thank you for this referral!    Kendra Gil MA,  CF-SLP  [facility]  [phone]

## 2019-07-15 ENCOUNTER — TELEPHONE (OUTPATIENT)
Dept: NEUROSURGERY | Facility: OTHER | Age: 59
End: 2019-07-15

## 2019-07-15 ENCOUNTER — TELEPHONE (OUTPATIENT)
Dept: FAMILY MEDICINE | Facility: OTHER | Age: 59
End: 2019-07-15

## 2019-07-15 DIAGNOSIS — E11.9 TYPE 2 DIABETES MELLITUS WITHOUT COMPLICATION, WITHOUT LONG-TERM CURRENT USE OF INSULIN (H): Primary | ICD-10-CM

## 2019-07-15 DIAGNOSIS — Z98.1 S/P CERVICAL SPINAL FUSION: Primary | ICD-10-CM

## 2019-07-15 NOTE — TELEPHONE ENCOUNTER
Summary:    Patient is due/failing the following:   Diabetic follow up, eye exam, A1C and LDL    Action needed:   Patient needs office visit for Diabetic follow up. and Patient needs fasting lab only appointment    Type of outreach:    Phone, left message for patient to call back.     Questions for provider review:    None                                                                                                                                    Delia Chrissieabelardo       Chart routed to Provider .          Panel Management Review      Patient has the following on his problem list:     Diabetes    ASA: Passed    Last A1C  Lab Results   Component Value Date    A1C 11.9 03/29/2019    A1C 9.3 11/19/2018    A1C 12.4 08/17/2018     A1C tested: FAILED    Last LDL:    Lab Results   Component Value Date    CHOL 214 11/17/2014     Lab Results   Component Value Date    HDL 44 11/17/2014     Lab Results   Component Value Date     11/19/2018     11/17/2014     Lab Results   Component Value Date    TRIG 148 11/17/2014     Lab Results   Component Value Date    CHOLHDLRATIO 4.9 11/17/2014     No results found for: NHDL    Is the patient on a Statin? YES             Is the patient on Aspirin? YES    Medications     HMG CoA Reductase Inhibitors     atorvastatin (LIPITOR) 20 MG tablet       Salicylates     aspirin (ASA) 81 MG EC tablet             Last three blood pressure readings:  BP Readings from Last 3 Encounters:   07/01/19 139/76   04/03/19 115/67   03/29/19 128/80            Tobacco History:     History   Smoking Status     Current Every Day Smoker     Packs/day: 1.00     Years: 25.00     Types: Cigarettes     Last attempt to quit: 10/1/2007   Smokeless Tobacco     Never Used           Composite cancer screening  Chart review shows that this patient is due/due soon for the following None

## 2019-07-15 NOTE — PROGRESS NOTES
Ok per Dr Wooten to order VFSS to objectively assess anatomy and physiology of swallow and rule out pharyngeal aspiaration given complex surgical history  for patient as recommended by Kendra Medrano, SLP.

## 2019-07-15 NOTE — TELEPHONE ENCOUNTER
Patient LM on RN line that [facility] did not receive the clinic notes, imaging reports and needs to be faxed to Ricco Gonzalez at [facility] to [phone].    Records printed and faxed. Spoke to patient's wife Nehal to inform her.

## 2019-07-16 ENCOUNTER — HOSPITAL ENCOUNTER (OUTPATIENT)
Dept: PHYSICAL THERAPY | Facility: CLINIC | Age: 59
Setting detail: THERAPIES SERIES
End: 2019-07-16
Attending: NEUROLOGICAL SURGERY
Payer: COMMERCIAL

## 2019-07-16 DIAGNOSIS — Z98.1 S/P CERVICAL SPINAL FUSION: ICD-10-CM

## 2019-07-16 PROCEDURE — 97110 THERAPEUTIC EXERCISES: CPT | Mod: GP

## 2019-07-16 PROCEDURE — 97162 PT EVAL MOD COMPLEX 30 MIN: CPT | Mod: GP

## 2019-07-16 NOTE — PROGRESS NOTES
07/16/19 1100   General Information   Type of Visit Initial OP Ortho PT Evaluation   Start of Care Date 07/16/19   Referring Physician Memo Wooten MD   Patient/Family Goals Statement Pt wants to decreas pain and improve QOL   Orders Evaluate and Treat   Date of Order 07/02/19   Certification Required? No   Medical Diagnosis s/p cervical spinal fusion   Surgical/Medical history reviewed Yes   Precautions/Limitations no known precautions/limitations   Weight-Bearing Status - LUE full weight-bearing   Weight-Bearing Status - RUE full weight-bearing   Weight-Bearing Status - LLE full weight-bearing   Weight-Bearing Status - RLE full weight-bearing   General Information Comments Given c-collar for comfort, does not have to wear   Body Part(s)   Body Part(s) Cervical Spine   Presentation and Etiology   Pertinent history of current problem (include personal factors and/or comorbidities that impact the POC) s/p cervical fusion in April, about a month ago he started getting more pain and headaches pain into L arm mostly getting pins and needles in L hand.  Feels he has lost a lot of strength in his hands.  Pt has been wearing c-collar for comfort.  Has passed out several times from too much cervical exetnsion.     Impairments A. Pain;E. Decreased flexibility;D. Decreased ROM;F. Decreased strength and endurance   Functional Limitations perform desired leisure / sports activities;perform activities of daily living   Symptom Location B neck, L trap and down arm into first three fingers including thumb   How/Where did it occur   (From surgery)   Onset date of current episode/exacerbation 04/01/19   Chronicity New   Pain rating (0-10 point scale) Best (/10);Worst (/10)   Best (/10) 2/10   Worst (/10) 8/10   Pain quality C. Aching;F. Stabbing;E. Shooting;A. Sharp   Frequency of pain/symptoms A. Constant   Pain/symptoms are: Worse during the day   Pain/symptoms exacerbated by C. Lifting;D. Carrying;G. Certain positions;H.  Overhead reach   Pain/symptoms eased by E. Changing positions;C. 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    Current Level of Function   Current Community Support Family/friend caregiver   Patient role/employment history G. Disabled   Employment Comments Off work since surgery on disability   Living environment House/townhome   Home/community accessibility Pt lives at home with wife, stairs at home going ok   Current equipment-Gait/Locomotion None   Current equipment-ADL None   Fall Risk Screen   Have you fallen 2 or more times in the past year? No   Have you fallen and had an injury in the past year? No   Is patient a fall risk? No   Functional Scales   Other Scales  NDI   Cervical Spine   Cervical Left Side Bending ROM Very limited   Cervical Right Rotation ROM 50 degrees   Cervical Left Rotation ROM 45 degrees   Cervical Flexion ROM 45 degrees   Cervical Extension ROM Not tested due to pt reporting passing out with extension   Cervical Right Side Bending ROM Very limted   Shoulder/Wrist/Hand Strength Comments Increased weakness in C6-7 dermatomes, increased L hand weakness   Posture Forawrd head   Integumentary  Incisions look great, no brusing   Shoulder AROM Screen WFL, limited by pain   Shoulder Shrug (C2-C4) Strength 5/5   Shoulder Abd (C5) Strength R 5/5 L 4+/5   Shoulder Add (C7) Strength 5/5   Shoulder ER (C5, C6) Strength 5/5   Shoulder IR (C5, C6) Strength B 3+/5   Elbow Flexion  (C5, C6) Strength R 5/5 L 4/5   Elbow Extension (C7) Strength B 3+/5   Wrist Extension (C6) Strength B 4/5   Wrist Flexion (C7) Strength B 4/5 (slight worse on L)   Pectoralis Minor Flexibility WFL   Upper Trapezius Flexibility Tight   Levator Scapula Flexibility Tight   Scalene Flexibility Tight   ULTT III (Radial) Positive   ULTT IV (Ulnar) Positive   Thoracic Right Rotation WFL   Thoracic Left Rotation WFL   Thoracic Flexion ROM WFL   Thoracic Extension ROM WFL   Cervical/Thoracic/Shoulder ROM Comments Very guarded in cervical spine and UT area   Vertebral Artery Test Did not complete as pt has reported feeling like he could pas out with cervical extension   Cervical Distraction Test Increased tingling in L hand with head in neutral,improved when completed in slight neck flexion   Segmental Mobility-Cervical Slight tenderness on C6-7, not assessed on C1-5 due to s/p fusion   Segmental Mobility-Thoracic Slightly limited   Dermatome/Sensory Testing Numbness/tingling in L arm, slight increase in sensation   UE Neural Tension UE Neural Tension Tests   Planned Therapy Interventions   Planned Therapy Interventions manual therapy;joint mobilization;neuromuscular re-education;ROM;strengthening;stretching   Planned Modality Interventions   Planned Modality Interventions Hydrotherapy;Cryotherapy;Traction   Clinical Impression   Criteria for Skilled Therapeutic Interventions Met yes, treatment indicated   PT Diagnosis Neck pain with L arm pain/weakness   Influenced by the following impairments Decreased cervical ROM, decreased B UE strength in c6-7 dermatomes, increased pain and muscle tension   Functional limitations due to impairments Inability to complete functional mobility/work/tasks at baseline level of functioning   Clinical Presentation Evolving/Changing   Clinical Presentation Rationale Severe pain, medical complexity, post-op in April   Clinical Decision Making (Complexity) Moderate complexity   Therapy Frequency  1 time/week   Predicted Duration of Therapy Intervention (days/wks) 12 weeks   Risk & Benefits of therapy have been explained Yes   Patient, Family & other staff in agreement with plan of care Yes   Clinical Impression Comments Pt would benefit from OP PT to progress strength and ROM as well as reduce pain to improve functional mobility and return to work as well as completing every day tasks without pain.    Education Assessment   Preferred Learning Style Listening;Demonstration;Pictures/video   Barriers to Learning No barriers   ORTHO GOALS   PT Ortho Eval Goals 1;2;3;4   Ortho Goal 1   Goal Identifier Pain   Goal Description Pt will be able to complete all daily tasks with reported <3/10 pain and no pain radiating down L UE.    Target Date 10/13/19   Ortho Goal 2   Goal Identifier ROM   Goal Description Pt will demonstrate full, symmetrical cervical ROM in order to demonstrate improved functional use of neck and scanning environment.    Target Date 10/13/19   Ortho Goal 3   Goal Identifier Sleep   Goal Description Pt will be able to sleep through the night without wakig due to pain in order to improve QOL.    Target Date 10/13/19   Ortho Goal 4   Goal Identifier Work   Goal Description Pt will be able to return to 4-6 hours of work with minimal increase in symptoms in order to return to PLOF.    Target Date 10/13/19   Total Evaluation Time   PT Eval, Moderate Complexity Minutes (19188) 30
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
## 2019-07-30 ENCOUNTER — HOSPITAL ENCOUNTER (OUTPATIENT)
Dept: PHYSICAL THERAPY | Facility: CLINIC | Age: 59
Setting detail: THERAPIES SERIES
End: 2019-07-30
Attending: NEUROLOGICAL SURGERY
Payer: COMMERCIAL

## 2019-07-30 ENCOUNTER — OFFICE VISIT (OUTPATIENT)
Dept: FAMILY MEDICINE | Facility: OTHER | Age: 59
End: 2019-07-30
Payer: COMMERCIAL

## 2019-07-30 ENCOUNTER — TELEPHONE (OUTPATIENT)
Dept: FAMILY MEDICINE | Facility: OTHER | Age: 59
End: 2019-07-30

## 2019-07-30 VITALS
HEART RATE: 80 BPM | RESPIRATION RATE: 18 BRPM | TEMPERATURE: 97.9 F | OXYGEN SATURATION: 98 % | WEIGHT: 208.6 LBS | BODY MASS INDEX: 31.72 KG/M2 | SYSTOLIC BLOOD PRESSURE: 134 MMHG | DIASTOLIC BLOOD PRESSURE: 74 MMHG

## 2019-07-30 DIAGNOSIS — E78.5 HYPERLIPIDEMIA LDL GOAL <130: ICD-10-CM

## 2019-07-30 DIAGNOSIS — E11.9 TYPE 2 DIABETES MELLITUS WITHOUT COMPLICATION, WITHOUT LONG-TERM CURRENT USE OF INSULIN (H): ICD-10-CM

## 2019-07-30 DIAGNOSIS — Z22.322 MRSA (METHICILLIN RESISTANT STAPH AUREUS) CULTURE POSITIVE: ICD-10-CM

## 2019-07-30 DIAGNOSIS — G47.33 OSA (OBSTRUCTIVE SLEEP APNEA): ICD-10-CM

## 2019-07-30 DIAGNOSIS — M51.369 DDD (DEGENERATIVE DISC DISEASE), LUMBAR: ICD-10-CM

## 2019-07-30 DIAGNOSIS — Z01.818 PREOP GENERAL PHYSICAL EXAM: Primary | ICD-10-CM

## 2019-07-30 DIAGNOSIS — L82.1 SEBORRHEIC KERATOSIS: ICD-10-CM

## 2019-07-30 LAB
ALBUMIN SERPL-MCNC: 3.7 G/DL (ref 3.4–5)
ALP SERPL-CCNC: 108 U/L (ref 40–150)
ALT SERPL W P-5'-P-CCNC: 26 U/L (ref 0–70)
ANION GAP SERPL CALCULATED.3IONS-SCNC: 4 MMOL/L (ref 3–14)
AST SERPL W P-5'-P-CCNC: 15 U/L (ref 0–45)
BILIRUB SERPL-MCNC: 0.5 MG/DL (ref 0.2–1.3)
BUN SERPL-MCNC: 15 MG/DL (ref 7–30)
CALCIUM SERPL-MCNC: 9 MG/DL (ref 8.5–10.1)
CHLORIDE SERPL-SCNC: 105 MMOL/L (ref 94–109)
CHOLEST SERPL-MCNC: 147 MG/DL
CO2 SERPL-SCNC: 29 MMOL/L (ref 20–32)
CREAT SERPL-MCNC: 0.82 MG/DL (ref 0.66–1.25)
GFR SERPL CREATININE-BSD FRML MDRD: >90 ML/MIN/{1.73_M2}
GLUCOSE SERPL-MCNC: 109 MG/DL (ref 70–99)
HBA1C MFR BLD: 8 % (ref 0–5.6)
HDLC SERPL-MCNC: 40 MG/DL
HGB BLD-MCNC: 15.2 G/DL (ref 13.3–17.7)
LDLC SERPL CALC-MCNC: 87 MG/DL
NONHDLC SERPL-MCNC: 107 MG/DL
POTASSIUM SERPL-SCNC: 4.3 MMOL/L (ref 3.4–5.3)
PROT SERPL-MCNC: 7.7 G/DL (ref 6.8–8.8)
SODIUM SERPL-SCNC: 138 MMOL/L (ref 133–144)
TRIGL SERPL-MCNC: 98 MG/DL

## 2019-07-30 PROCEDURE — 97140 MANUAL THERAPY 1/> REGIONS: CPT | Mod: GP

## 2019-07-30 PROCEDURE — 83036 HEMOGLOBIN GLYCOSYLATED A1C: CPT | Performed by: PHYSICIAN ASSISTANT

## 2019-07-30 PROCEDURE — 36415 COLL VENOUS BLD VENIPUNCTURE: CPT | Performed by: PHYSICIAN ASSISTANT

## 2019-07-30 PROCEDURE — 80053 COMPREHEN METABOLIC PANEL: CPT | Performed by: PHYSICIAN ASSISTANT

## 2019-07-30 PROCEDURE — 85018 HEMOGLOBIN: CPT | Performed by: PHYSICIAN ASSISTANT

## 2019-07-30 PROCEDURE — 80061 LIPID PANEL: CPT | Performed by: PHYSICIAN ASSISTANT

## 2019-07-30 PROCEDURE — 93000 ELECTROCARDIOGRAM COMPLETE: CPT | Performed by: PHYSICIAN ASSISTANT

## 2019-07-30 PROCEDURE — 97110 THERAPEUTIC EXERCISES: CPT | Mod: GP

## 2019-07-30 PROCEDURE — 99214 OFFICE O/P EST MOD 30 MIN: CPT | Performed by: PHYSICIAN ASSISTANT

## 2019-07-30 ASSESSMENT — PAIN SCALES - GENERAL: PAINLEVEL: SEVERE PAIN (7)

## 2019-07-30 NOTE — TELEPHONE ENCOUNTER
Results given to patient  Closing encounter  Graciela Roberson RT (R)          Marked improvement with respect to diabetes control.  No changes in medication at this point in time.  Would recommend follow-up in 3 months.  Please contact and let him know that his diabetes is in good control.  Proceed with injection at this point time.  Electronically signed:    Ferny Gomez PA-C

## 2019-08-01 NOTE — ADDENDUM NOTE
Encounter addended by: Juanpablo Emanuel, PT on: 8/1/2019 10:21 AM   Actions taken: Episode resolved

## 2019-08-13 ENCOUNTER — HOSPITAL ENCOUNTER (OUTPATIENT)
Dept: PHYSICAL THERAPY | Facility: CLINIC | Age: 59
Setting detail: THERAPIES SERIES
End: 2019-08-13
Attending: NEUROLOGICAL SURGERY
Payer: COMMERCIAL

## 2019-08-13 ENCOUNTER — OFFICE VISIT (OUTPATIENT)
Dept: SLEEP MEDICINE | Facility: CLINIC | Age: 59
End: 2019-08-13
Payer: COMMERCIAL

## 2019-08-13 VITALS
DIASTOLIC BLOOD PRESSURE: 72 MMHG | HEIGHT: 69 IN | OXYGEN SATURATION: 96 % | BODY MASS INDEX: 30.81 KG/M2 | SYSTOLIC BLOOD PRESSURE: 136 MMHG | HEART RATE: 87 BPM | WEIGHT: 208 LBS

## 2019-08-13 DIAGNOSIS — G47.33 OSA (OBSTRUCTIVE SLEEP APNEA): Primary | ICD-10-CM

## 2019-08-13 PROCEDURE — 97140 MANUAL THERAPY 1/> REGIONS: CPT | Mod: GP

## 2019-08-13 PROCEDURE — 97110 THERAPEUTIC EXERCISES: CPT | Mod: GP

## 2019-08-13 PROCEDURE — 99213 OFFICE O/P EST LOW 20 MIN: CPT | Performed by: OTOLARYNGOLOGY

## 2019-08-13 ASSESSMENT — MIFFLIN-ST. JEOR: SCORE: 1748.86

## 2019-08-13 NOTE — NURSING NOTE
Weight management plan: Patient was referred to their PCP to discuss a diet and exercise plan.     Does Miguel A have a CPAP/Bipap?  Yes             Type of mask: full     FMG: [facility] [phone]    [email or web address]

## 2019-08-13 NOTE — PROGRESS NOTES
The patient with h/o severe BENJAMIN AHI 57.6 currently on CPAP.  Overall, the patient rates their experience with PAP as 10 (0 poor, 10 great). The mask is comfortable. The mask is not leaking, 0 nights per week. They are not snoring with the mask on. They are not having gasp arousals.  They are not having significant oral/nasal dryness. The pressure settings are comfortable.     Patient uses full-face mask.     The patient is usually getting 8 hours of sleep per night.    Patient does feel rested in the morning.    Total score - Ellsworth: 8 (8/13/2019  3:00 PM)    [employer]     Auto-PAP 13 to 18 cmH2O download:  21 total days of use. 9 nonuse days. 21 days with >4 hours use.  Average use 8 hrs 48 min  per day. Median Leak 2.7 L/min. 95%ile Leak 14.9 L/min. CPAP 95% pressure 15.3cm. AHI 0.2    The patient is doing well with current CPAP with adequate pressures. His Ellsworth is 8.  He is suffering with neck issues after his last cervical fusion. Will work with his neurosurgeon to improve symptoms since this is affecting his sleep.  Thegap in usage is only during surgery.  The patient will return in 1 year. 15 min spent in discussion and counseling today.    Tomas Whitman MD

## 2019-08-15 ENCOUNTER — HOSPITAL ENCOUNTER (OUTPATIENT)
Dept: GENERAL RADIOLOGY | Facility: CLINIC | Age: 59
Discharge: HOME | End: 2019-08-15
Attending: NEUROLOGICAL SURGERY | Admitting: NEUROLOGICAL SURGERY
Payer: COMMERCIAL

## 2019-08-15 ENCOUNTER — HOSPITAL ENCOUNTER (OUTPATIENT)
Dept: SPEECH THERAPY | Facility: CLINIC | Age: 59
Setting detail: THERAPIES SERIES
End: 2019-08-15
Attending: NEUROLOGICAL SURGERY
Payer: COMMERCIAL

## 2019-08-15 ENCOUNTER — ANESTHESIA EVENT (OUTPATIENT)
Dept: SURGERY | Facility: CLINIC | Age: 59
End: 2019-08-15
Payer: COMMERCIAL

## 2019-08-15 DIAGNOSIS — Z98.1 S/P CERVICAL SPINAL FUSION: ICD-10-CM

## 2019-08-15 PROCEDURE — 92526 ORAL FUNCTION THERAPY: CPT | Mod: GN | Performed by: SPEECH-LANGUAGE PATHOLOGIST

## 2019-08-15 PROCEDURE — 74230 X-RAY XM SWLNG FUNCJ C+: CPT | Mod: TC

## 2019-08-15 PROCEDURE — 92611 MOTION FLUOROSCOPY/SWALLOW: CPT | Mod: GN | Performed by: SPEECH-LANGUAGE PATHOLOGIST

## 2019-08-15 ASSESSMENT — LIFESTYLE VARIABLES: TOBACCO_USE: 1

## 2019-08-15 NOTE — PROGRESS NOTES
"   08/15/19 1100       Present No   General Information   Type Of Visit Initial   Start Of Care Date 08/15/19   Referring Physician Dr. Memo Wooten   Orders Evaluate And Treat   Orders Comment Video Floroscopy Swallow Study   Medical Diagnosis S/P cervical spinal fusion   Onset Of Illness/injury Or Date Of Surgery 04/03/19   Precautions/limitations No Known Precautions/limitations   Pertinent History of Current Problem/OT: Additional Occupational Profile Info Patient is a 58-year-old male who presents today for a VFSS clinical swallow evaluation d/t concerns with occasional difficulty swallowing food and drink.  He was referred by a SLP following a clinical bedside swallow evaluation 07/12/2019.  Medical background includes 3 cervical spine surgeries with the most recent surgery on 04/03/2019.  Additional diagnosis include type 2 diabetes, severe OSD, diverticulitis and chronic neck pain.  Patient is reporting significant pain in the neck, shoulder, and arm on a scale of 8/10.  He reports no pain with swallowing and no weight loss.  No history of pneumonia and no difficulties taking pills.  He is also reporting difficulties with his voice saying \"I feel like I have laryngitis all the time and my wife says I now have a lisp when I talk.\"   A VFSS is being completed today to assess the function of the swallowing mechanism, rule out aspiration and identify any swallowing difficulties and effective swallowing strategies.      Respiratory Status Room air   Prior Level Of Function Swallowing   Prior Level Of Function Comment avoiding dry meats and breads   Living Environment [facility]   General Observations Patient relaxed for evaluation and participated well in discussion prior to and following VFSS.  Asking for OP therapy apppointment.   Pain Assessment   Pain Reported Yes   Pain Location neck, shoulder, left arm   Pain Scale 8/10   Fall Risk Screen   Fall screen completed by SLP   Have you " fallen 2 or more times in the past year? No   Have you fallen and had an injury in the past year? No   Is patient a fall risk? No   Fall screen comments no concerns   Clinical Swallow Evaluation   Oral Musculature generally intact   Dentition present and adequate  (missing 4 teeth throughout oral cavity)   Mucosal Quality good   Mandibular Strength and Mobility intact   Oral Labial Strength and Mobility WFL   Lingual Strength and Mobility WFL   Velar Elevation intact   Buccal Strength and Mobility intact   Laryngeal Function Cough;Throat clear;Swallow;Voicing initiated;Dry swallow palpated   Additional Documentation No   Additional evaluation(s) completed today Yes;Recommended   Rationale for completing additional evaluation to assess function of the swallowing mechanism and rule out aspiration    Swallow Eval   Feeding Assistance no assistance needed   VFSS Evaluation   VFSS Additional Documentation Yes   VFSS Eval: Radiology   Views Taken left lateral   Physical Location of Procedure hospital   VFSS Eval: Thin Liquid Texture Trial   Mode of Presentation, Thin Liquid cup;straw;self-fed   Order of Presentation 1 (1b), 2, 3, 4, 8   Preparatory Phase WFL   Oral Phase, Thin Liquid Premature pharyngeal entry  (with consecutive sips)   Pharyngeal Phase, Thin Liquid Delayed swallow reflex;Residue in valleculae   Rosenbek's Penetration Aspiration Scale: Thin Liquid Trial Results 3 - contrast remains above the vocal cords, visible residue remains (penetration)   Diagnostic Statement penetration of thin liquids with consecutive sips   VFSS Eval: Pudding Thick Liquid Texture Trial   Mode of Presentation, Pudding spoon;self-fed   Order of Presentation 5 (5b)   Preparatory Phase WFL   Oral Phase, Pudding WFL   Pharyngeal Phase, Pudding Residue in valleculae   Rosenbek's Penetration Aspiration Scale: Pudding-Thick Liquid Trial Results 1 - no aspiration, contrast does not enter airway   Diagnostic Statement mild pharyngeal  residual ; cleared with second swallow   VFSS Eval: Semisolid Texture Trial   Mode of Presentation, Semisolid spoon;self-fed   Order of Presentation 6   Preparatory Phase WFL   Oral Phase, Semisolid WFL   Pharyngeal Phase, Semisolid WFL   Rosenbek's Penetration Aspiration Scale: Semisolid Food Trial Results 1 - no aspiration, contrast does not enter airway   Diagnostic Statement mild pharyngeal residual; cleared with second swallow   VFSS Eval: Solid Food Texture Trial   Mode of Presentation, Solid spoon;self-fed   Order of Presentation 7 (7b)   Preparatory Phase WFL   Oral Phase, Solid WFL   Pharyngeal Phase, Solid Residue in valleculae   Rosenbek's Penetration Aspiration Scale: Solid Food Trial Results 1 - no aspiration, contrast does not enter airway   Diagnostic Statement mild pharyngeal residual; cleared with second swallow   FEES Evaluation   Additional Documentation No   Swallow Compensations   Swallow Compensations Reduce amounts;Multiple swallow   Results No difficulties noted   Educational Assessment   Barriers to Learning No barriers   Preferred Learning Style Pictures/video   Esophageal Phase of Swallow   Esophageal sweep performed during today s vidofluoroscopic exam  Please refer to radiologist's report for details   General Therapy Interventions   Planned Therapy Interventions Dysphagia Treatment   Dysphagia treatment Modified diet education;Oropharyngeal exercise training;Instruction of safe swallow strategies;Compensatory strategies for swallowing   Intervention Comments Patient would benefit from skilled intervention  to increase overall safety of oral intake and reduce risk of choking and aspiration   Swallow Eval: Clinical Impressions   Skilled Criteria for Therapy Intervention Skilled criteria met.  Treatment indicated.   Functional Assessment Scale (FAS) 5   Dysphagia Outcome Severity Scale (SRIRAM) Level 5 - SRIRAM   Treatment Diagnosis mild oropharyngeal dysphagia   Diet texture recommendations  Regular diet;Thin liquids  (pills whole with applesauce or pudding )   Recommended Feeding/Eating Techniques maintain upright posture during/after eating for 30 mins;small sips/bites  (multiple swallows as needed)   Rehab Potential good, to achieve stated therapy goals   Therapy Frequency other (see comments)  (weekly)   Predicted Duration of Therapy Intervention (days/wks) 4 weeks   Anticipated Discharge Disposition home   Risks and Benefits of Treatment have been explained. Yes   Patient, family and/or staff in agreement with Plan of Care Yes   Clinical Impression Comments VFSS completed per MD order.  Lateral views completed with a variety of textures and thin liquid and barium tablet.  Patient fed himself using spoon, cup and straw in single, small bites and advancing to larger consecutive bites and sips.  Patient did present with penetration of thin liquids with consecutive sip trials. Observed premature spillage of thin liquid bolus over base of tongue with consecutive sips, resulting in uncoordinated swallow and small amount of bolus penetrated which did not spontaneously clear.  No other penetration or aspiration noted.  Patient further presents with mild oropharyngeal dysphagia characterized by mild amounts of oral and pharyngeal residual of all trials in vallecular space, which patient cleared with independent second swallow.    Residual in vallecular space cleared with independent second swallow.  Decreased epiglottal tilt noted.   Recommend diet of thin liquids taken in single, smaller sips, regular foods with extra moisture added as needed.  Recommend that pills be taken whole and with pureed food carrier such as pudding or applesauce.  Recommend dysphagia intervention to maximize safety of oral intake and reduce risk of choking, aspiration and pneumonia.  Reviewed results and recommendations with patient and patient indicated understanding via discussion and question/answers.  Patient requesting and in  agreement with up to 4 outpatient sessions to maximize safety of oral intake.   Swallow Goals   SLP Swallow Goals 1;2;3   Swallow Goal 1   Goal Identifier Safe Food Choices   Goal Description Patient will demonstrate undersstanding of foods that cause high choking risk by choosing foods that minimize pharyngeal residual by patient report to 90% accuracy.   Target Date 11/11/19   Swallow Goal 2   Goal Identifier Single Sips   Goal Description Patient will take single sips of thin liquids to 90% with no cues.   Target Date 11/11/19   Swallow Goal 3   Goal Identifier Self Monitoring   Goal Description The patient will demonstrate the ability to adequately self-monitor swallowing skills and perform appropriate compensatory techniques to reduce globus sensation and to safely consume least restrictive diet with minimal cues to 90% accuracy.   Target Date 11/11/19   Total Session Time   SLP Eval: VideoFluoroscopic Swallow function Minutes ([id number]) 25   Total Evaluation Time 25

## 2019-08-16 ENCOUNTER — HOSPITAL ENCOUNTER (OUTPATIENT)
Dept: GENERAL RADIOLOGY | Facility: CLINIC | Age: 59
End: 2019-08-16
Attending: ANESTHESIOLOGY | Admitting: ANESTHESIOLOGY
Payer: COMMERCIAL

## 2019-08-16 ENCOUNTER — ANESTHESIA (OUTPATIENT)
Dept: SURGERY | Facility: CLINIC | Age: 59
End: 2019-08-16
Payer: COMMERCIAL

## 2019-08-16 ENCOUNTER — HOSPITAL ENCOUNTER (OUTPATIENT)
Facility: CLINIC | Age: 59
Discharge: HOME | End: 2019-08-16
Attending: ANESTHESIOLOGY | Admitting: ANESTHESIOLOGY
Payer: COMMERCIAL

## 2019-08-16 VITALS
RESPIRATION RATE: 16 BRPM | DIASTOLIC BLOOD PRESSURE: 71 MMHG | SYSTOLIC BLOOD PRESSURE: 126 MMHG | TEMPERATURE: 97.8 F | OXYGEN SATURATION: 98 % | HEART RATE: 77 BPM

## 2019-08-16 DIAGNOSIS — Z98.1 S/P CERVICAL SPINAL FUSION: ICD-10-CM

## 2019-08-16 LAB — GLUCOSE BLDC GLUCOMTR-MCNC: 125 MG/DL (ref 70–99)

## 2019-08-16 PROCEDURE — 25000128 H RX IP 250 OP 636: Performed by: ANESTHESIOLOGY

## 2019-08-16 PROCEDURE — 25000128 H RX IP 250 OP 636: Performed by: NURSE ANESTHETIST, CERTIFIED REGISTERED

## 2019-08-16 PROCEDURE — 37000008 ZZH ANESTHESIA TECHNICAL FEE, 1ST 30 MIN: Performed by: ANESTHESIOLOGY

## 2019-08-16 PROCEDURE — 62321 NJX INTERLAMINAR CRV/THRC: CPT | Performed by: ANESTHESIOLOGY

## 2019-08-16 PROCEDURE — 82962 GLUCOSE BLOOD TEST: CPT

## 2019-08-16 PROCEDURE — 25000125 ZZHC RX 250: Performed by: ANESTHESIOLOGY

## 2019-08-16 PROCEDURE — 40000277 XR SURGERY CARM FLUORO LESS THAN 5 MIN W STILLS: Mod: TC

## 2019-08-16 RX ORDER — NALOXONE HYDROCHLORIDE 0.4 MG/ML
.1-.4 INJECTION, SOLUTION INTRAMUSCULAR; INTRAVENOUS; SUBCUTANEOUS
Status: CANCELLED | OUTPATIENT
Start: 2019-08-16 | End: 2019-08-17

## 2019-08-16 RX ORDER — ONDANSETRON 4 MG/1
4 TABLET, ORALLY DISINTEGRATING ORAL EVERY 30 MIN PRN
Status: CANCELLED | OUTPATIENT
Start: 2019-08-16

## 2019-08-16 RX ORDER — PROPOFOL 10 MG/ML
INJECTION, EMULSION INTRAVENOUS PRN
Status: DISCONTINUED | OUTPATIENT
Start: 2019-08-16 | End: 2019-08-16

## 2019-08-16 RX ORDER — FENTANYL CITRATE 50 UG/ML
50 INJECTION, SOLUTION INTRAMUSCULAR; INTRAVENOUS ONCE
Status: COMPLETED | OUTPATIENT
Start: 2019-08-16 | End: 2019-08-16

## 2019-08-16 RX ORDER — LIDOCAINE 40 MG/G
CREAM TOPICAL
Status: DISCONTINUED | OUTPATIENT
Start: 2019-08-16 | End: 2019-08-16 | Stop reason: HOSPADM

## 2019-08-16 RX ORDER — MEPERIDINE HYDROCHLORIDE 25 MG/ML
12.5 INJECTION INTRAMUSCULAR; INTRAVENOUS; SUBCUTANEOUS
Status: CANCELLED | OUTPATIENT
Start: 2019-08-16

## 2019-08-16 RX ORDER — TRIAMCINOLONE ACETONIDE 40 MG/ML
INJECTION, SUSPENSION INTRA-ARTICULAR; INTRAMUSCULAR PRN
Status: DISCONTINUED | OUTPATIENT
Start: 2019-08-16 | End: 2019-08-16 | Stop reason: HOSPADM

## 2019-08-16 RX ORDER — ONDANSETRON 2 MG/ML
4 INJECTION INTRAMUSCULAR; INTRAVENOUS EVERY 30 MIN PRN
Status: CANCELLED | OUTPATIENT
Start: 2019-08-16

## 2019-08-16 RX ORDER — IOPAMIDOL 612 MG/ML
INJECTION, SOLUTION INTRATHECAL PRN
Status: DISCONTINUED | OUTPATIENT
Start: 2019-08-16 | End: 2019-08-16 | Stop reason: HOSPADM

## 2019-08-16 RX ORDER — SODIUM CHLORIDE, SODIUM LACTATE, POTASSIUM CHLORIDE, CALCIUM CHLORIDE 600; 310; 30; 20 MG/100ML; MG/100ML; MG/100ML; MG/100ML
INJECTION, SOLUTION INTRAVENOUS CONTINUOUS
Status: CANCELLED | OUTPATIENT
Start: 2019-08-16

## 2019-08-16 RX ORDER — KETOROLAC TROMETHAMINE 30 MG/ML
15 INJECTION, SOLUTION INTRAMUSCULAR; INTRAVENOUS ONCE
Status: COMPLETED | OUTPATIENT
Start: 2019-08-16 | End: 2019-08-16

## 2019-08-16 RX ADMIN — LIDOCAINE HYDROCHLORIDE 0.1 ML: 10 INJECTION, SOLUTION EPIDURAL; INFILTRATION; INTRACAUDAL; PERINEURAL at 08:54

## 2019-08-16 RX ADMIN — PROPOFOL 20 MG: 10 INJECTION, EMULSION INTRAVENOUS at 09:00

## 2019-08-16 RX ADMIN — PROPOFOL 50 MG: 10 INJECTION, EMULSION INTRAVENOUS at 08:59

## 2019-08-16 RX ADMIN — KETOROLAC TROMETHAMINE 15 MG: 30 INJECTION, SOLUTION INTRAMUSCULAR at 09:51

## 2019-08-16 RX ADMIN — FENTANYL CITRATE 50 MCG: 50 INJECTION INTRAMUSCULAR; INTRAVENOUS at 09:23

## 2019-08-16 NOTE — DISCHARGE INSTRUCTIONS
Home Care Instructions                Procedure:  Epidural Steroid Injection or Joint injection    Activity:    Rest today    Do not work today    Resume normal activity tomorrow    Pain:    You may experience soreness at the injection site for one or two days    You may use an ice pack for 20 minutes every 2 hours for the first 24 hours    You may use a heating pad after the first 24 hours    You may use Tylenol  (acetaminophen) every 4 hours or other pain medicines as directed by your physician    Safety  Sedation medicine, if given may remain active for many hours.    It is important for the next 24 hours that you do not:    Drive a car    Operate machines or power tools    Consume alcohol, including beer    Sign any important papers or legal documents    You may experience numbness radiating into your legs or arms, (depending on the procedure location)  This numbness may last several hours.  Until the numb sensation returns to normal please use caution in walking, climbing stairs, stepping out of your vehicle, etc.    Common side effects of steroids:  Not everyone will experience corticosteroid side effects. If side effects are experienced they will gradually subside in the 7-10 day period following an injection.    Most common side effects include:    Flushed face and/or chest    Feeling of warmth, particularly in face but could be overall feeling of warmth    Increased blood sugar in diabetic patients    Menstrual irregularities may occur.  If taking hormone based birth control an alternate method of birth control is recommended    Sleep disturbances and/or mood swings are possible    Leg cramps    Please contact us if you have:  Severe pain   Fever more than 101.5 degrees Fahrenheit  Signs of infection (redness, swelling or drainage)      If you have questions during normal business hours (8am-5pm Monday-Friday) contact the [facility] at [phone]. If you need help after hours, we recommend that  you go to a hospital emergency room or dial 911.

## 2019-08-16 NOTE — ANESTHESIA POSTPROCEDURE EVALUATION
Patient: Miguel A Vaughn    Procedure(s):  INJECTION, SPINE, CERVICAL 6-7, EPIDURAL    Diagnosis:S/P cervical spinal fusion  Diagnosis Additional Information: No value filed.    Anesthesia Type:  MAC    Note:  Anesthesia Post Evaluation    Patient location during evaluation: Phase 2 and Bedside  Patient participation: Able to fully participate in evaluation  Level of consciousness: awake and alert  Pain management: adequate  Airway patency: patent  Cardiovascular status: acceptable  Respiratory status: acceptable  Hydration status: acceptable  PONV: none     Anesthetic complications: None    Comments: Patient was pleased with his care today. There were no anesthesia related complications noted. Will follow as needed.        Last vitals:  Vitals:    08/16/19 0830 08/16/19 0911 08/16/19 0924   BP: 134/81 126/77 137/84   Pulse: 77     Resp: 16 16 16   Temp: 97.8  F (36.6  C)     SpO2: 96% 95% 98%         Electronically Signed By: SEBASTIÁN Cole CRNA  August 16, 2019  9:32 AM

## 2019-08-16 NOTE — OP NOTE
CHIEF COMPLAINT:   1.Neck pain secondary to cervical spondylosis and cervical disc degeneration  2.  Existing fusion from C3 down to C6  3.  Passing out with resultant seizures after neck extension with a history of negative MRI and MRI of the brain and spinal cord and carotids  PROCEDURE: C6-7 Interlaminar epidural steroid injection using fluoroscopic guidance with contrast dye.   PROCEDURE DETAILS: After written informed consent was obtained from the patient, the patient was escorted to the procedure room.  The patient was placed in the prone position.  A  time out  was conducted to verify patient identity, procedure to be performed, side, site, allergies and any special requirements.  The skin over the neck and upper back region were prepped and draped in normal sterile fashion. Fluoroscopy was used to identify the C6-7 interspace in an AP view and the skin was anesthetized with 2 mL of 1% lidocaine with bicarbonate buffer.  A 20-gauge 3-1/2 inch Tuohy needle was advanced using the loss of resistance technique with preservative free normal saline with fluoroscopic guidance. After negative aspiration for CSF and blood, 1.5 cc of Isovue contrast dye was injected revealing the appropriate cervical epidurogram without evidence of intrathecal or intravascular spread. Following this, a 3-mL solution of 40 mg of triamcinolone with 2 cc preservative-free normal saline was slowly injected.  After injection of the medication, as the needle tip was withdrawn, it was flushed with local anesthetic.  The patient was monitored with blood pressure and pulse oximetry machines with the assistance of an RN throughout the procedure.  The patient was alert and responsive to questions throughout the procedure.   The patient tolerated the procedure well and was observed in the post-procedural area.  The patient was dismissed without apparent complications.     DIAGNOSIS:  1. Neck pain secondary to cervical spondylosis and cervical disc  degeneration  2.  Existing fusion from C3 down to C6  3.  Passing out with resultant seizures after neck extension with a history of negative MRI and MRI of the brain and spinal cord and carotids  PLAN:  1. Performed a C6-7 interlaminar epidural steroid injection.   2. The patient was instructed to call the [place] [facility] if today's procedure is not helpful.  Additionally, I looked at his findings from his work-up for [facility] with passing out after repeated neck extension.  It sounds like this is a fairly consistent finding.  I did not see any flexion-extension views of his upper cervical region.  Perhaps that could reveal some instability in this region.  The other option would be to do dynamic fluoroscopy images to look for instability at the C0, C1, C2 junction.  I personally have seen several cases of instability in this location causing similar symptoms.  I did let him know that he has had a fairly thorough work-up of this area and these are only of the few remaining thoughts I had regarding trying to figure out this particular problem.  Ultimately, I will leave that up to Dr. Wooten since he is the neurosurgeon overseeing his spinal care.    The other thought I had was to see a an ear nose and throat surgeon for evaluation for inner ear problems.  However, that usually does not result in passing but more of a vertigo type complaint.  Nonetheless, I would be interested to see what they would have to say.    Brent Anguiano MD  Diplomate of the American Board of Anesthesiology, Pain Medicine

## 2019-08-16 NOTE — ANESTHESIA CARE TRANSFER NOTE
Patient: Miguel A Vaughn    Procedure(s):  INJECTION, SPINE, CERVICAL 6-7, EPIDURAL    Diagnosis: S/P cervical spinal fusion  Diagnosis Additional Information: No value filed.    Anesthesia Type:   MAC     Note:  Airway :Nasal Cannula  Patient transferred to:Phase II  Handoff Report: Identifed the Patient, Identified the Reponsible Provider, Reviewed the pertinent medical history, Discussed the surgical course, Reviewed Intra-OP anesthesia mangement and issues during anesthesia, Set expectations for post-procedure period and Allowed opportunity for questions and acknowledgement of understanding      Vitals: (Last set prior to Anesthesia Care Transfer)    CRNA VITALS  8/16/2019 0837 - 8/16/2019 0931      8/16/2019             Resp Rate (observed):  22                Electronically Signed By: SEBASTIÁN Cole CRNA  August 16, 2019  9:31 AM

## 2019-08-19 ENCOUNTER — TELEPHONE (OUTPATIENT)
Dept: NEUROSURGERY | Facility: CLINIC | Age: 59
End: 2019-08-19

## 2019-08-19 ENCOUNTER — HOSPITAL ENCOUNTER (OUTPATIENT)
Dept: SPEECH THERAPY | Facility: CLINIC | Age: 59
Setting detail: THERAPIES SERIES
End: 2019-08-19
Attending: NEUROLOGICAL SURGERY
Payer: COMMERCIAL

## 2019-08-19 PROCEDURE — 92526 ORAL FUNCTION THERAPY: CPT | Mod: GN | Performed by: SPEECH-LANGUAGE PATHOLOGIST

## 2019-08-19 NOTE — TELEPHONE ENCOUNTER
Reason for Call:  Form, our goal is to have forms completed with 72 hours, however, some forms may require a visit or additional information.    Type of letter, form or note:  disability    Who is the form from?: Patient    Where did the form come from: Patient or family brought in       What clinic location was the form placed at?: [facility]    Where the form was placed: DR MELTON Box/Folder    What number is listed as a contact on the form?: [phone] is patients. Spouse phone number is [phone] if unable to reach [facility].       Additional comments: Please call patient regarding this per patient.     Call taken on 8/19/2019 at 3:41 PM by Hannah Mclean

## 2019-08-20 ENCOUNTER — HOSPITAL ENCOUNTER (OUTPATIENT)
Dept: PHYSICAL THERAPY | Facility: CLINIC | Age: 59
Setting detail: THERAPIES SERIES
End: 2019-08-20
Attending: NEUROLOGICAL SURGERY
Payer: COMMERCIAL

## 2019-08-20 PROCEDURE — 97140 MANUAL THERAPY 1/> REGIONS: CPT | Mod: GP

## 2019-08-20 NOTE — PROGRESS NOTES
Outpatient Physical Therapy Progress Note     Patient: Miguel A Vaughn  : 1960    Beginning/End Dates of Reporting Period:  2019 to 2019    Referring Provider: Memo Wooten MD    Therapy Diagnosis: Neck with L arm pain/weakness pain      Client Self Report: Patient reports spinal injection did not help. He is very frustrated because he is still having severe neck pain & B UE burning as well as dizziness and passing out feeling when tilting head back. He describes passing out feeling as full body numbness and almost blacking out.     Objective Measurements:  Objective Measure: NDI  Details: 39/50 (78%)    Objective Measure: Pain  Details: 7-8/10 neck, 10/10 burning in B UE (L>R)       Outcome Measures (most recent score):  NDI:   78% (19)  64% (19)        Goals:  Goal Identifier     Goal Description Pt will be able to complete all daily tasks with reported <3/10 pain and no pain radiating down L UE.    Target Date 10/13/19   Date Met      Progress: Pain ranging from 6-10/10 with all ADLs.      Goal Identifier ROM   Goal Description Pt will demonstrate full, symmetrical cervical ROM in order to demonstrate improved functional use of neck and scanning environment.    Target Date 10/13/19   Date Met      Progress: Cervical AROM limited in all planes by 50-7% d/t pain.     Goal Identifier Sleep   Goal Description Pt will be able to sleep through the night without waking due to pain in order to improve QOL.    Target Date 10/13/19   Date Met      Progress: Wakes every 1-2 hours d/t increased pain.     Goal Identifier Work   Goal Description Pt will be able to return to 4-6 hours of work with minimal increase in symptoms in order to return to PLOF.    Target Date 10/13/19   Date Met      Progress: Unable to return to work yet d/t pain and functional limitations.          Progress Toward Goals:   Progress limited due to severe pain levels and limited tolerance to ROM & strengthening. Patient with  worsening Neck Disability Index (NDI) score since initial PT evaluation. Patient exhibits mild relief in pain and numbness when performing manual cervical distraction. I do think it would benefit to perform further follow up with imaging to rule out possible vertebral artery occlusion with cervical extension AROM (increased dizziness and numbness with extension). Will progress with gentle manual therapy and ROM/strengthening as tolerated.          Plan:  Continue therapy per current plan of care. Follow up with neurosurgeon for further imaging.     Discharge:  No            Thank you for your referral.     Alba Sánchez, PT, DPT    [facility]    O: [phone]  E: [email or web address]

## 2019-08-21 ENCOUNTER — DOCUMENTATION ONLY (OUTPATIENT)
Dept: NEUROSURGERY | Facility: CLINIC | Age: 59
End: 2019-08-21

## 2019-08-21 ENCOUNTER — TELEPHONE (OUTPATIENT)
Dept: NEUROSURGERY | Facility: CLINIC | Age: 59
End: 2019-08-21

## 2019-08-21 NOTE — PROGRESS NOTES
8/21/2019    [employer] Forms: yes    Faxed: [phone]     Type of form: Completed Concurrent Disability and Leave statement of incapacity/attending physician statement and faxed to [employer] at the above number.     Placed a copy in the bin and sent the original to medical records

## 2019-08-21 NOTE — TELEPHONE ENCOUNTER
Spoke to Rocky Rice PA-C and were advised to discuss with patient whether or not he is back to work. Encouraged patient to give SUZY 2 weeks to reach full benefit and continue with course of PT and if pain or symptoms persist recommended patient to follow up in clinic with Dr. Wooten to discuss next steps. Per patient he is not currently able to return to work. The triage call was made by Avelina CLAY RN who advised patient that we would update [facility] paperwork and return to [employer] keeping him off work until possible re-evaluation by Dr. Wooten if symptoms persist. Patient voiced understanding. Workability will be re-evaluated once he is seen in clinic by Dr. Wooten.

## 2019-09-05 ENCOUNTER — HOSPITAL ENCOUNTER (OUTPATIENT)
Dept: PHYSICAL THERAPY | Facility: CLINIC | Age: 59
Setting detail: THERAPIES SERIES
End: 2019-09-05
Attending: NEUROLOGICAL SURGERY
Payer: COMMERCIAL

## 2019-09-05 PROCEDURE — 97140 MANUAL THERAPY 1/> REGIONS: CPT | Mod: GP

## 2019-09-12 ENCOUNTER — ANCILLARY PROCEDURE (OUTPATIENT)
Dept: GENERAL RADIOLOGY | Facility: CLINIC | Age: 59
End: 2019-09-12
Attending: NURSE PRACTITIONER
Payer: COMMERCIAL

## 2019-09-12 ENCOUNTER — OFFICE VISIT (OUTPATIENT)
Dept: NEUROSURGERY | Facility: CLINIC | Age: 59
End: 2019-09-12
Payer: COMMERCIAL

## 2019-09-12 VITALS
DIASTOLIC BLOOD PRESSURE: 84 MMHG | SYSTOLIC BLOOD PRESSURE: 140 MMHG | TEMPERATURE: 97.3 F | HEIGHT: 68 IN | BODY MASS INDEX: 32.22 KG/M2 | RESPIRATION RATE: 18 BRPM | OXYGEN SATURATION: 98 % | WEIGHT: 212.6 LBS | HEART RATE: 94 BPM

## 2019-09-12 DIAGNOSIS — Z98.1 S/P CERVICAL SPINAL FUSION: ICD-10-CM

## 2019-09-12 DIAGNOSIS — Z98.1 S/P CERVICAL SPINAL FUSION: Primary | ICD-10-CM

## 2019-09-12 PROCEDURE — 99213 OFFICE O/P EST LOW 20 MIN: CPT | Performed by: NEUROLOGICAL SURGERY

## 2019-09-12 PROCEDURE — 72040 X-RAY EXAM NECK SPINE 2-3 VW: CPT | Mod: TC

## 2019-09-12 RX ORDER — OXYCODONE HYDROCHLORIDE 5 MG/1
5-10 TABLET ORAL EVERY 8 HOURS PRN
Qty: 40 TABLET | Refills: 0 | Status: ON HOLD | OUTPATIENT
Start: 2019-09-12 | End: 2019-11-12

## 2019-09-12 ASSESSMENT — MIFFLIN-ST. JEOR: SCORE: 1753.85

## 2019-09-12 NOTE — PROGRESS NOTES
Returns for follow up.  Continued aching arm pain radiating to to bilateral hands.  MRI showed good decompression at C3-4, mild central and moderate foraminal stenosis at C6-7.  SUZY at C6-7 without any relief.  PT without relief.  Video Esophogram without significant aspiration.       Past Medical History:   Diagnosis Date     Benign neoplasm of brain (H)      Cervicalgia      Depressive disorder, not elsewhere classified 7/16/2008     Deviated nasal septum      Diabetes (H)      Family history of colonic polyps      Headache(784.0)      Hemorrhoids, internal      Hyperlipidemia LDL goal <130 7/12/2011     Hypersomnia with sleep apnea, unspecified      BENJAMIN (obstructive sleep apnea) 10/12/2011     Other encephalopathy      Sprain of right ankle 7/7/2011     Unspecified disorder of adrenal glands      Past Surgical History:   Procedure Laterality Date     C WINTER W/O FACETEC FORAMOT/DSKC 1/2 VRT SEG, CERVICAL  1989     C OPEN RX ANKLE DISLOCATN+FIXATN  1982     COLONOSCOPY  05/12/08    Internal hemorrhoids.  Otherwise normal.     COLONOSCOPY  4/17/2013    Procedure: COLONOSCOPY;  colonoscopy;  Surgeon: Kody Reynolds MD;  Location:  GI     DISCECTOMY, FUSION CERVICAL ANTERIOR ONE LEVEL, COMBINED N/A 4/2/2019    Procedure: C 3-4 ANTERIOR CERVICAL DISECTOMY AND FUSION;  Surgeon: Memo Wooten MD;  Location:  OR     EXAM UNDER ANESTHESIA RECTUM N/A 8/3/2016    Procedure: EXAM UNDER ANESTHESIA RECTUM;  Surgeon: Sami Zamora MD;  Location:  OR     EXPLORE SPINE, REMOVE HARDWARE, COMBINED N/A 4/2/2019    Procedure: C 4-5 HARDWARE REMOVAL;  Surgeon: Memo Wooten MD;  Location:  OR      FLEX SIGMOIDOSCOPY W/WO BRAD SPEC BY BRUSH/WASH  06/10/08    bx intra-anal lesions & electrocoagulation of perianal lesions.      REPAIR OF NASAL SEPTUM  12/16/2005    Revision septoplasty, submucosal resection of the inferior turbinates.     INCISION AND DRAINAGE PERINEAL, COMBINED N/A 8/3/2016     Procedure: COMBINED INCISION AND DRAINAGE PERINEAL;  Surgeon: Sami Zamora MD;  Location: [facility]     INJECT EPIDURAL CERVICAL N/A 2019    Procedure: INJECTION, SPINE, CERVICAL 6-7, EPIDURAL;  Surgeon: Brent Anguiano MD;  Location: [facility]     SURGICAL HISTORY OF -   2006    Left occiput C1, C1-C2 facet injection.  [facility]     Social History     Socioeconomic History     Marital status:      Spouse name: Not on file     Number of children: Not on file     Years of education: Not on file     Highest education level: Not on file   Occupational History     Not on file   Social Needs     Financial resource strain: Not on file     Food insecurity:     Worry: Not on file     Inability: Not on file     Transportation needs:     Medical: Not on file     Non-medical: Not on file   Tobacco Use     Smoking status: Current Every Day Smoker     Packs/day: 1.00     Years: 25.00     Pack years: 25.00     Types: Cigarettes     Last attempt to quit: 10/1/2007     Years since quittin.9     Smokeless tobacco: Never Used   Substance and Sexual Activity     Alcohol use: Yes     Alcohol/week: 0.0 oz     Comment: weekends     Drug use: No     Sexual activity: Yes     Partners: Female   Lifestyle     Physical activity:     Days per week: Not on file     Minutes per session: Not on file     Stress: Not on file   Relationships     Social connections:     Talks on phone: Not on file     Gets together: Not on file     Attends [employer] service: Not on file     Active member of club or organization: Not on file     Attends meetings of clubs or organizations: Not on file     Relationship status: Not on file     Intimate partner violence:     Fear of current or ex partner: Not on file     Emotionally abused: Not on file     Physically abused: Not on file     Forced sexual activity: Not on file   Other Topics Concern      Service No     Blood Transfusions No     Caffeine Concern No     Occupational Exposure No  "    Hobby Hazards No     Sleep Concern Yes     Comment: C-Pap      Stress Concern No     Weight Concern Yes     Special Diet No     Back Care No     Exercise No     Bike Helmet Not Asked     Comment: NA     Seat Belt Yes     Self-Exams No     Parent/sibling w/ CABG, MI or angioplasty before 65F 55M? Yes   Social History Narrative     Not on file     Family History   Problem Relation Age of Onset     Cancer Father         skin cancer     Cancer Sister         skin, ovarian     Breast Cancer Sister      Cancer Brother         skin     Diabetes No family hx of         ROS: 10 point ROS neg other than the symptoms noted above in the HPI.    Physical Exam  BP (!) 140/84   Pulse 94   Temp 97.3  F (36.3  C) (Temporal)   Resp 18   Ht 1.727 m (5' 8\")   Wt 96.4 kg (212 lb 9.6 oz)   SpO2 98%   BMI 32.33 kg/m    HEENT:  Normocephalic, atraumatic.  PERRLA.  EOM s intact.  Visual fields full to gross exam  Neck:  Supple, non-tender, without lymphadenopathy.  Heart:  No peripheral edema  Lungs:  No SOB  Abdomen:  Non-distended.   Skin:  Warm and dry.  Extremities:  No edema, cyanosis or clubbing.  Psychiatric:  No apparent distress  Musculoskeletal:  Normal bulk and tone    NEUROLOGICAL EXAMINATION:     Mental status:  Alert and Oriented x 3, speech is fluent.  Cranial nerves:  II-XII intact.   Motor:    Shoulder Abduction:  Right:  5/5   Left:  5/5  Biceps:                      Right:  5/5   Left:  5/5  Triceps:                     Right:  5/5   Left:  5/5  Wrist Extensors:       Right:  5/5   Left:  5/5  Wrist Flexors:           Right:  5/5   Left:  5/5  interosseus :            Right:  5/5   Left:  5/5  Hip Flexor:                Right: 5/5  Left:  5/5  Quadriceps:             Right:  5/5  Left:  5/5  Hamstrings:             Right:  5/5  Left:  5/5  Gastroc Soleus:        Right:  5/5  Left:  5/5  Tib/Ant:                      Right:  5/5  Left:  5/5  EHL:                     Right:  5/5  Left:  5/5  Sensation:  " Intact  Reflexes:  Negative Babinski.  Negative Clonus.  Negative Navarro's.  Coordination:  Smooth finger to nose testing.   Negative pronator drift.  Smooth tandem walking.  Incision well healed    A/P:  XRs stable  Will obtain bilateral UE EMG

## 2019-09-12 NOTE — PATIENT INSTRUCTIONS
Today's Visit    1. Follow up in 6 months (for your 12 month post-op appointment). Arrive 30 minutes prior for x-rays.   2. Ordered a bilateral arm EMG. Call Main Office Phone: [phone] to schedule. We do them here in the [facility] through [facility].  3. Dr. Wooten with talk with your speech therapist tomorrow and give you a call if needed.  4. Prescription for oxycodone  5.  Dr. Wooten recommends continuing PT.      Please call our clinic with any questions or concerns    Tracey FANG RN ([facility] Nurse)  [facility] -- [address] [address]  Phone:  [phone]   Fax:  [phone]

## 2019-09-12 NOTE — NURSING NOTE
Patient calling at this time asking about physical therapy orders. At patient's appointment this AM, Dr. Wooten said that patient could continue physical therapy. Right now, patient calling stating that PT couldn't schedule him until we place a new order. Order placed.    Tracey Castañeda RN on 9/12/2019 at 1:47 PM

## 2019-09-12 NOTE — PROGRESS NOTES
"Miguel A Vaughn is a 58 year old male who presents for:  Chief Complaint   Patient presents with     RECHECK     Failed PT      RECHECK     Failed cervical inj        Initial Vitals:  BP (!) 140/84   Pulse 94   Temp 97.3  F (36.3  C) (Temporal)   Resp 18   Ht 5' 8\" (1.727 m)   Wt 212 lb 9.6 oz (96.4 kg)   SpO2 98%   BMI 32.33 kg/m   Estimated body mass index is 32.33 kg/m  as calculated from the following:    Height as of this encounter: 5' 8\" (1.727 m).    Weight as of this encounter: 212 lb 9.6 oz (96.4 kg).. Body surface area is 2.15 meters squared. BP completed using cuff size: regular  Data Unavailable        Nursing Comments:         Mary Johnson CMA AAMA  "

## 2019-09-12 NOTE — LETTER
"    9/12/2019         RE: Miguel A Vaughn  [address]  [facility] [address]        Dear Colleague,    Thank you for referring your patient, Miguel A Vaughn, to the [facility]. Please see a copy of my visit note below.    Miguel A Vaughn is a 58 year old male who presents for:  Chief Complaint   Patient presents with     RECHECK     Failed PT      RECHECK     Failed cervical inj        Initial Vitals:  BP (!) 140/84   Pulse 94   Temp 97.3  F (36.3  C) (Temporal)   Resp 18   Ht 5' 8\" (1.727 m)   Wt 212 lb 9.6 oz (96.4 kg)   SpO2 98%   BMI 32.33 kg/m    Estimated body mass index is 32.33 kg/m  as calculated from the following:    Height as of this encounter: 5' 8\" (1.727 m).    Weight as of this encounter: 212 lb 9.6 oz (96.4 kg).. Body surface area is 2.15 meters squared. BP completed using cuff size: regular  Data Unavailable        Nursing Comments:         Mary Johnson [facility] AAMA    Returns for follow up.  Continued aching arm pain radiating to to bilateral hands.  MRI showed good decompression at C3-4, mild central and moderate foraminal stenosis at C6-7.  SUZY at C6-7 without any relief.  PT without relief.  Video Esophogram without significant aspiration.       Past Medical History:   Diagnosis Date     Benign neoplasm of brain (H)      Cervicalgia      Depressive disorder, not elsewhere classified 7/16/2008     Deviated nasal septum      Diabetes (H)      Family history of colonic polyps      Headache(784.0)      Hemorrhoids, internal      Hyperlipidemia LDL goal <130 7/12/2011     Hypersomnia with sleep apnea, unspecified      BENJAMIN (obstructive sleep apnea) 10/12/2011     Other encephalopathy      Sprain of right ankle 7/7/2011     Unspecified disorder of adrenal glands      Past Surgical History:   Procedure Laterality Date     C WINTER W/O FACETEC FORAMOT/DSKC 1/2 VRT SEG, CERVICAL  1989     C OPEN RX ANKLE DISLOCATN+FIXATN  1982     COLONOSCOPY  05/12/08    Internal hemorrhoids.  " Otherwise normal.     COLONOSCOPY  2013    Procedure: COLONOSCOPY;  colonoscopy;  Surgeon: Kody Reynolds MD;  Location: [facility]     DISCECTOMY, FUSION CERVICAL ANTERIOR ONE LEVEL, COMBINED N/A 2019    Procedure: C 3-4 ANTERIOR CERVICAL DISECTOMY AND FUSION;  Surgeon: Memo Wooten MD;  Location:  OR     EXAM UNDER ANESTHESIA RECTUM N/A 8/3/2016    Procedure: EXAM UNDER ANESTHESIA RECTUM;  Surgeon: Sami Zamora MD;  Location:  OR     EXPLORE SPINE, REMOVE HARDWARE, COMBINED N/A 2019    Procedure: C 4-5 HARDWARE REMOVAL;  Surgeon: Memo Wooten MD;  Location:  OR     HC FLEX SIGMOIDOSCOPY W/WO BRAD SPEC BY BRUSH/WASH  06/10/08    bx intra-anal lesions & electrocoagulation of perianal lesions.      REPAIR OF NASAL SEPTUM  2005    Revision septoplasty, submucosal resection of the inferior turbinates.     INCISION AND DRAINAGE PERINEAL, COMBINED N/A 8/3/2016    Procedure: COMBINED INCISION AND DRAINAGE PERINEAL;  Surgeon: Sami Zamora MD;  Location: [facility]     INJECT EPIDURAL CERVICAL N/A 2019    Procedure: INJECTION, SPINE, CERVICAL 6-7, EPIDURAL;  Surgeon: Brent Anguiano MD;  Location: [facility]     SURGICAL HISTORY OF -   2006    Left occiput C1, C1-C2 facet injection.  [facility]     Social History     Socioeconomic History     Marital status:      Spouse name: Not on file     Number of children: Not on file     Years of education: Not on file     Highest education level: Not on file   Occupational History     Not on file   Social Needs     Financial resource strain: Not on file     Food insecurity:     Worry: Not on file     Inability: Not on file     Transportation needs:     Medical: Not on file     Non-medical: Not on file   Tobacco Use     Smoking status: Current Every Day Smoker     Packs/day: 1.00     Years: 25.00     Pack years: 25.00     Types: Cigarettes     Last attempt to quit: 10/1/2007     Years since quittin.9     Smokeless  "tobacco: Never Used   Substance and Sexual Activity     Alcohol use: Yes     Alcohol/week: 0.0 oz     Comment: weekends     Drug use: No     Sexual activity: Yes     Partners: Female   Lifestyle     Physical activity:     Days per week: Not on file     Minutes per session: Not on file     Stress: Not on file   Relationships     Social connections:     Talks on phone: Not on file     Gets together: Not on file     Attends Christianity service: Not on file     Active member of club or organization: Not on file     Attends meetings of clubs or organizations: Not on file     Relationship status: Not on file     Intimate partner violence:     Fear of current or ex partner: Not on file     Emotionally abused: Not on file     Physically abused: Not on file     Forced sexual activity: Not on file   Other Topics Concern      Service No     Blood Transfusions No     Caffeine Concern No     Occupational Exposure No     Hobby Hazards No     Sleep Concern Yes     Comment: C-Pap      Stress Concern No     Weight Concern Yes     Special Diet No     Back Care No     Exercise No     Bike Helmet Not Asked     Comment: NA     Seat Belt Yes     Self-Exams No     Parent/sibling w/ CABG, MI or angioplasty before 65F 55M? Yes   Social History Narrative     Not on file     Family History   Problem Relation Age of Onset     Cancer Father         skin cancer     Cancer Sister         skin, ovarian     Breast Cancer Sister      Cancer Brother         skin     Diabetes No family hx of         ROS: 10 point ROS neg other than the symptoms noted above in the HPI.    Physical Exam  BP (!) 140/84   Pulse 94   Temp 97.3  F (36.3  C) (Temporal)   Resp 18   Ht 1.727 m (5' 8\")   Wt 96.4 kg (212 lb 9.6 oz)   SpO2 98%   BMI 32.33 kg/m     HEENT:  Normocephalic, atraumatic.  PERRLA.  EOM s intact.  Visual fields full to gross exam  Neck:  Supple, non-tender, without lymphadenopathy.  Heart:  No peripheral edema  Lungs:  No SOB  Abdomen:  " Non-distended.   Skin:  Warm and dry.  Extremities:  No edema, cyanosis or clubbing.  Psychiatric:  No apparent distress  Musculoskeletal:  Normal bulk and tone    NEUROLOGICAL EXAMINATION:     Mental status:  Alert and Oriented x 3, speech is fluent.  Cranial nerves:  II-XII intact.   Motor:    Shoulder Abduction:  Right:  5/5   Left:  5/5  Biceps:                      Right:  5/5   Left:  5/5  Triceps:                     Right:  5/5   Left:  5/5  Wrist Extensors:       Right:  5/5   Left:  5/5  Wrist Flexors:           Right:  5/5   Left:  5/5  interosseus :            Right:  5/5   Left:  5/5  Hip Flexor:                Right: 5/5  Left:  5/5  Quadriceps:             Right:  5/5  Left:  5/5  Hamstrings:             Right:  5/5  Left:  5/5  Gastroc Soleus:        Right:  5/5  Left:  5/5  Tib/Ant:                      Right:  5/5  Left:  5/5  EHL:                     Right:  5/5  Left:  5/5  Sensation:  Intact  Reflexes:  Negative Babinski.  Negative Clonus.  Negative Navarro's.  Coordination:  Smooth finger to nose testing.   Negative pronator drift.  Smooth tandem walking.  Incision well healed    A/P:  XRs stable  Will obtain bilateral UE EMG    Again, thank you for allowing me to participate in the care of your patient.        Sincerely,        Memo Wooten MD

## 2019-09-12 NOTE — NURSING NOTE
Patient to continue physical therapy per Dr. Wooten.    Patient provided writer disability paperwork. Patient would like this filled out, faxed to number on form, and a copy mail to his home upon completion. Writer faxing this to [facility] for completion.    Dr. Wooten requesting Tiana Ayala call his clinic tomorrow to talk with him. I called the Therapy Department here at [phone] requesting that she call Dr. Wooten at the [facility] tomorrow @ [phone]. She will place the note on Tiana's desk. I also sent Tiana staff message.    Tracey Castañeda RN on 9/12/2019 at 11:31 AM

## 2019-09-17 ENCOUNTER — DOCUMENTATION ONLY (OUTPATIENT)
Dept: NEUROSURGERY | Facility: CLINIC | Age: 59
End: 2019-09-17

## 2019-09-17 ENCOUNTER — TELEPHONE (OUTPATIENT)
Dept: NEUROSURGERY | Facility: CLINIC | Age: 59
End: 2019-09-17

## 2019-09-17 ENCOUNTER — TELEPHONE (OUTPATIENT)
Dept: NEUROSURGERY | Facility: OTHER | Age: 59
End: 2019-09-17

## 2019-09-17 NOTE — PROGRESS NOTES
9/17/2019    [employer] Forms: yes    Faxed: [phone]     Type of form: Completed [place] disability paperwork work and faxed to Rizwana Rodgers at the number listed above.     Placed a copy in the bin and sent the original to medical records

## 2019-09-17 NOTE — TELEPHONE ENCOUNTER
Called patient and left a voicemail in regards to completing his disability paperwork for the [employer]. I asked patient to return my call to discuss his paperwork.

## 2019-09-19 ENCOUNTER — TRANSFERRED RECORDS (OUTPATIENT)
Dept: HEALTH INFORMATION MANAGEMENT | Facility: CLINIC | Age: 59
End: 2019-09-19

## 2019-09-20 ENCOUNTER — HOSPITAL ENCOUNTER (OUTPATIENT)
Dept: PHYSICAL THERAPY | Facility: CLINIC | Age: 59
Setting detail: THERAPIES SERIES
End: 2019-09-20
Attending: NEUROLOGICAL SURGERY
Payer: COMMERCIAL

## 2019-09-20 PROCEDURE — 97110 THERAPEUTIC EXERCISES: CPT | Mod: GP

## 2019-09-20 PROCEDURE — 97140 MANUAL THERAPY 1/> REGIONS: CPT | Mod: GP

## 2019-09-24 ENCOUNTER — HOSPITAL ENCOUNTER (OUTPATIENT)
Dept: PHYSICAL THERAPY | Facility: CLINIC | Age: 59
Setting detail: THERAPIES SERIES
End: 2019-09-24
Attending: NEUROLOGICAL SURGERY
Payer: COMMERCIAL

## 2019-09-24 ENCOUNTER — TELEPHONE (OUTPATIENT)
Dept: NEUROSURGERY | Facility: CLINIC | Age: 59
End: 2019-09-24

## 2019-09-24 PROCEDURE — 97110 THERAPEUTIC EXERCISES: CPT | Mod: GP

## 2019-09-24 NOTE — TELEPHONE ENCOUNTER
Called and informed patient that Dr. Wooten reviewed his EMG which does show inflammation of C6-7 nerves, recommends return to clinic to discuss further steps. Appointment scheduled.

## 2019-09-25 ENCOUNTER — TELEPHONE (OUTPATIENT)
Dept: NEUROSURGERY | Facility: CLINIC | Age: 59
End: 2019-09-25

## 2019-09-25 DIAGNOSIS — R42 DIZZINESS AFTER EXTENSION OF NECK: Primary | ICD-10-CM

## 2019-09-25 NOTE — TELEPHONE ENCOUNTER
PT Alba called to report some concerning symptoms. She states that patient is unable to extend his neck without feeling as if he is going to pass out. Patient does report having a syncopal episode while doing this is the past. PT is concerned for vertebral artery occlusion or insufficiency and is asking to have Dr. Wooten review. Will forward message to provider to review.

## 2019-09-25 NOTE — TELEPHONE ENCOUNTER
Called and informed patient that Dr. Wooten reviewed message from PT(Alba) who was concerned about symptoms with extension. Dr. Wooten doesn't think his symptoms are a likely presentation for vertebral artery stenosis. Dr. Wooten would recommended CTA neck if patient is concerned. Patient is concerned and would like to proceed with CTA neck, will place order and patient will try to complete prior to upcoming appointment.

## 2019-09-25 NOTE — PROGRESS NOTES
"Outpatient Physical Therapy Progress Note     Patient: Miguel A Vaughn  : 1960    Beginning/End Dates of Reporting Period:  2019 to 2019     Referring Provider: Memo Wooten MD     Therapy Diagnosis: Neck pain with L arm pain/weakness pain      Client Self Report: Patient reports today with continued increased neck pain and R UE symptoms, intense pain and tingling of digits 1-3 of R hand. The only position that helps relieve arm symptoms is L lateral cervical flexion.  He reports he was looking up to reach for something on the shelf and \"passed out\" and fell on the ground. Does not recall details of fall. Additionally, this morning in the shower, he momentarily forgot where he was. He has \"passed out\" twice since neurology follow up on 19.     Objective Measurements:        Objective Measure: Pain  Details: 6-7/10 neck, up to 10/10 shooting pain in R fingers (1st-3rd)      Outcome Measures (most recent score):  NDI:   78% (19)  64% (19)                            Goals:  Goal Identifier     Goal Description Pt will be able to complete all daily tasks with reported <3/10 pain and no pain radiating down L UE.    Target Date 10/13/19   Date Met      Progress: worsening pain and R UE symptoms     Goal Identifier ROM   Goal Description Pt will demonstrate full, symmetrical cervical ROM in order to demonstrate improved functional use of neck and scanning environment.    Target Date 10/13/19   Date Met      Progress: no increased AROM noted at this time     Goal Identifier Sleep   Goal Description Pt will be able to sleep through the night without wakig due to pain in order to improve QOL.    Target Date 10/13/19   Date Met      Progress: continues to frequently wake up every 1-2 hours through the night.     Goal Identifier Work   Goal Description Pt will be able to return to 4-6 hours of work with minimal increase in symptoms in order to return to PLOF.    Target Date 10/13/19   Date Met    " "  Progress: has not returned to work.         Progress Toward Goals:   Progress limited due to worsening of neck pain and R UE symptoms. UE symptoms seem to follow path of C6-7. At this time, patient reports he has lost consciousness from cervical extension twice since previous neurosurgery appointment on 9/12/2019 with Dr. Wooten. He has also had multiple memory lapses in past two weeks. He has had minimum relief with conservative PT treatments at this time. Unable to properly examine vertebral artery insufficiency test d/t lack of cervical extension and I have not been able to provoke \"passing out\" symptoms during PT sessions.       Plan:  Other: Plan to hold further PT treatment until vertebral artery is cleared from insufficiency and loss of consciousness episodes are resolved.    Discharge:  No        Thank you for your referral.     Alba Sánchez, PT, DPT    [facility]    O: [phone]  E: [email or web address]    "

## 2019-09-26 ENCOUNTER — HOSPITAL ENCOUNTER (OUTPATIENT)
Dept: CT IMAGING | Facility: CLINIC | Age: 59
Discharge: HOME | End: 2019-09-26
Attending: NEUROLOGICAL SURGERY | Admitting: NEUROLOGICAL SURGERY
Payer: COMMERCIAL

## 2019-09-26 DIAGNOSIS — R42 DIZZINESS AFTER EXTENSION OF NECK: ICD-10-CM

## 2019-09-26 LAB
CREAT BLD-MCNC: 0.8 MG/DL (ref 0.66–1.25)
GFR SERPL CREATININE-BSD FRML MDRD: >90 ML/MIN/{1.73_M2}

## 2019-09-26 PROCEDURE — 25000128 H RX IP 250 OP 636: Performed by: RADIOLOGY

## 2019-09-26 PROCEDURE — 70498 CT ANGIOGRAPHY NECK: CPT

## 2019-09-26 PROCEDURE — 82565 ASSAY OF CREATININE: CPT

## 2019-09-26 PROCEDURE — 25000125 ZZHC RX 250: Performed by: RADIOLOGY

## 2019-09-26 RX ORDER — IOPAMIDOL 755 MG/ML
500 INJECTION, SOLUTION INTRAVASCULAR ONCE
Status: COMPLETED | OUTPATIENT
Start: 2019-09-26 | End: 2019-09-26

## 2019-09-26 RX ADMIN — IOPAMIDOL 70 ML: 755 INJECTION, SOLUTION INTRAVENOUS at 09:44

## 2019-09-26 RX ADMIN — SODIUM CHLORIDE 100 ML: 9 INJECTION, SOLUTION INTRAVENOUS at 09:44

## 2019-09-27 ENCOUNTER — TELEPHONE (OUTPATIENT)
Dept: NEUROSURGERY | Facility: CLINIC | Age: 59
End: 2019-09-27

## 2019-09-27 NOTE — TELEPHONE ENCOUNTER
Per Dr. Wooten recent CTA neck results show no stenosis. Discussed with patient and he plans to follow-up with PT Alba for further evaluation of his symptoms (unable to extend neck without feeling as if he will pass out).     Still plan for scheduled follow-up with Dr. Wooten on 10/3 to discuss EMG and next steps in plan of care.     Patient voiced agreement with plan and will call back with further question/concerns.

## 2019-10-03 ENCOUNTER — OFFICE VISIT (OUTPATIENT)
Dept: NEUROSURGERY | Facility: OTHER | Age: 59
End: 2019-10-03
Payer: COMMERCIAL

## 2019-10-03 VITALS
TEMPERATURE: 97.7 F | DIASTOLIC BLOOD PRESSURE: 70 MMHG | WEIGHT: 212 LBS | HEIGHT: 68 IN | SYSTOLIC BLOOD PRESSURE: 166 MMHG | BODY MASS INDEX: 32.13 KG/M2

## 2019-10-03 DIAGNOSIS — M54.12 CERVICAL RADICULOPATHY: Primary | ICD-10-CM

## 2019-10-03 PROCEDURE — 99213 OFFICE O/P EST LOW 20 MIN: CPT | Performed by: NEUROLOGICAL SURGERY

## 2019-10-03 ASSESSMENT — MIFFLIN-ST. JEOR: SCORE: 1751.13

## 2019-10-03 ASSESSMENT — PAIN SCALES - GENERAL: PAINLEVEL: EXTREME PAIN (8)

## 2019-10-03 NOTE — NURSING NOTE
"Miguel A Vaughn is a 58 year old male who presents for:  Chief Complaint   Patient presents with     Neurologic Problem     EMG results 09/19/19        Initial Vitals:  BP (!) 166/70   Temp 97.7  F (36.5  C) (Temporal)   Ht 5' 8\" (1.727 m)   Wt 212 lb (96.2 kg)   BMI 32.23 kg/m   Estimated body mass index is 32.23 kg/m  as calculated from the following:    Height as of this encounter: 5' 8\" (1.727 m).    Weight as of this encounter: 212 lb (96.2 kg).. Body surface area is 2.15 meters squared. BP completed using cuff size: regular  Extreme Pain (8)        Nursing Comments:         Tavares Desai CMA    "

## 2019-10-03 NOTE — PROGRESS NOTES
Returns for follow up.  Continued aching arm pain radiating to to bilateral hands.  MRI showed good decompression at C3-4, mild central and moderate foraminal stenosis at C6-7.  SUZY at C6-7 without any relief.  PT without relief.  Video Esophogram without significant aspiration.    Returns for follow up.  Worsening, severe, daily neck pain with radiation to the bilateral arms and hands, worse with neck extension.  EMG showed C6-7 radiculopathy.  PT and SUZY without relief.  Swallowing function much improved in the last few weeks.       Past Medical History:   Diagnosis Date     Benign neoplasm of brain (H)      Cervicalgia      Depressive disorder, not elsewhere classified 7/16/2008     Deviated nasal septum      Diabetes (H)      Family history of colonic polyps      Headache(784.0)      Hemorrhoids, internal      Hyperlipidemia LDL goal <130 7/12/2011     Hypersomnia with sleep apnea, unspecified      BENJAMIN (obstructive sleep apnea) 10/12/2011     Other encephalopathy      Sprain of right ankle 7/7/2011     Unspecified disorder of adrenal glands      Past Surgical History:   Procedure Laterality Date     C WINTER W/O FACETEC FORAMOT/[facility]KC 1/2 VRT SEG, CERVICAL  1989     C OPEN RX ANKLE DISLOCATN+FIXATN  1982     COLONOSCOPY  05/12/08    Internal hemorrhoids.  Otherwise normal.     COLONOSCOPY  4/17/2013    Procedure: COLONOSCOPY;  colonoscopy;  Surgeon: Kody Reynolds MD;  Location:  GI     DISCECTOMY, FUSION CERVICAL ANTERIOR ONE LEVEL, COMBINED N/A 4/2/2019    Procedure: C 3-4 ANTERIOR CERVICAL DISECTOMY AND FUSION;  Surgeon: Memo Wooten MD;  Location:  OR     EXAM UNDER ANESTHESIA RECTUM N/A 8/3/2016    Procedure: EXAM UNDER ANESTHESIA RECTUM;  Surgeon: Sami Zamora MD;  Location:  OR     EXPLORE SPINE, REMOVE HARDWARE, COMBINED N/A 4/2/2019    Procedure: C 4-5 HARDWARE REMOVAL;  Surgeon: Memo Wooten MD;  Location:  OR      FLEX SIGMOIDOSCOPY W/WO BRAD SPEC BY BRUSH/WASH   06/10/08    bx intra-anal lesions & electrocoagulation of perianal lesions.     HC REPAIR OF NASAL SEPTUM  2005    Revision septoplasty, submucosal resection of the inferior turbinates.     INCISION AND DRAINAGE PERINEAL, COMBINED N/A 8/3/2016    Procedure: COMBINED INCISION AND DRAINAGE PERINEAL;  Surgeon: Sami Zamora MD;  Location: [facility] OR     INJECT EPIDURAL CERVICAL N/A 2019    Procedure: INJECTION, SPINE, CERVICAL 6-7, EPIDURAL;  Surgeon: Brent Anguiano MD;  Location: [facility] OR     SURGICAL HISTORY OF -   2006    Left occiput C1, C1-C2 facet injection.  [facility]     Social History     Socioeconomic History     Marital status:      Spouse name: Not on file     Number of children: Not on file     Years of education: Not on file     Highest education level: Not on file   Occupational History     Not on file   Social Needs     Financial resource strain: Not on file     Food insecurity:     Worry: Not on file     Inability: Not on file     Transportation needs:     Medical: Not on file     Non-medical: Not on file   Tobacco Use     Smoking status: Current Every Day Smoker     Packs/day: 1.00     Years: 25.00     Pack years: 25.00     Types: Cigarettes     Last attempt to quit: 10/1/2007     Years since quittin.0     Smokeless tobacco: Never Used   Substance and Sexual Activity     Alcohol use: Yes     Alcohol/week: 0.0 standard drinks     Comment: weekends     Drug use: No     Sexual activity: Yes     Partners: Female   Lifestyle     Physical activity:     Days per week: Not on file     Minutes per session: Not on file     Stress: Not on file   Relationships     Social connections:     Talks on phone: Not on file     Gets together: Not on file     Attends Sabianist service: Not on file     Active member of club or organization: Not on file     Attends meetings of clubs or organizations: Not on file     Relationship status: Not on file     Intimate partner violence:     Fear of  current or ex partner: Not on file     Emotionally abused: Not on file     Physically abused: Not on file     Forced sexual activity: Not on file   Other Topics Concern      Service No     Blood Transfusions No     Caffeine Concern No     Occupational Exposure No     Hobby Hazards No     Sleep Concern Yes     Comment: C-Pap      Stress Concern No     Weight Concern Yes     Special Diet No     Back Care No     Exercise No     Bike Helmet Not Asked     Comment: NA     Seat Belt Yes     Self-Exams No     Parent/sibling w/ CABG, MI or angioplasty before 65F 55M? Yes   Social History Narrative     Not on file     Family History   Problem Relation Age of Onset     Cancer Father         skin cancer     Cancer Sister         skin, ovarian     Breast Cancer Sister      Cancer Brother         skin     Diabetes No family hx of         ROS: 10 point ROS neg other than the symptoms noted above in the HPI.    Physical Exam  There were no vitals taken for this visit.  HEENT:  Normocephalic, atraumatic.  PERRLA.  EOM s intact.  Visual fields full to gross exam  Neck:  Supple, non-tender, without lymphadenopathy.  Heart:  No peripheral edema  Lungs:  No SOB  Abdomen:  Non-distended.   Skin:  Warm and dry.  Extremities:  No edema, cyanosis or clubbing.  Psychiatric:  No apparent distress  Musculoskeletal:  Normal bulk and tone    NEUROLOGICAL EXAMINATION:     Mental status:  Alert and Oriented x 3, speech is fluent.  Cranial nerves:  II-XII intact.   Motor:    Shoulder Abduction:  Right:  5/5   Left:  5/5  Biceps:                      Right:  5/5   Left:  5/5  Triceps:                     Right:  5/5   Left:  5/5  Wrist Extensors:       Right:  5/5   Left:  5/5  Wrist Flexors:           Right:  5/5   Left:  5/5  interosseus :            Right:  5/5   Left:  5/5  Hip Flexor:                Right: 5/5  Left:  5/5  Quadriceps:             Right:  5/5  Left:  5/5  Hamstrings:             Right:  5/5  Left:  5/5  Gastroc Soleus:         Right:  5/5  Left:  5/5  Tib/Ant:                      Right:  5/5  Left:  5/5  EHL:                     Right:  5/5  Left:  5/5  Sensation:  Intact  Reflexes:  Negative Babinski.  Negative Clonus.  Negative Navarro's.  Coordination:  Smooth finger to nose testing.   Negative pronator drift.  Smooth tandem walking.  Incision well healed    A/P:  Worsening pain in spite of PT and SUZY  EMG with C6-7 radiculopathy, and MR with progressive C6-7 foraminal stenosis  Will plan for C6-7 ACDF  Risks and benefits discussed, including worsening or swallowing function

## 2019-10-03 NOTE — NURSING NOTE
Office visit note printed for patient's employer. Given to patient.  Tracey Castañeda RN on 10/3/2019 at 11:59 AM

## 2019-10-03 NOTE — LETTER
10/3/2019         RE: Miguel A Vaughn  [address]  [place] [address]        Dear Colleague,    Thank you for referring your patient, Miguel A Vaughn, to the [facility]. Please see a copy of my visit note below.    Returns for follow up.  Continued aching arm pain radiating to to bilateral hands.  MRI showed good decompression at C3-4, mild central and moderate foraminal stenosis at C6-7.  SUZY at C6-7 without any relief.  PT without relief.  Video Esophogram without significant aspiration.    Returns for follow up.  Worsening, severe, daily neck pain with radiation to the bilateral arms and hands, worse with neck extension.  EMG showed C6-7 radiculopathy.  PT and SUZY without relief.  Swallowing function much improved in the last few weeks.       Past Medical History:   Diagnosis Date     Benign neoplasm of brain (H)      Cervicalgia      Depressive disorder, not elsewhere classified 7/16/2008     Deviated nasal septum      Diabetes (H)      Family history of colonic polyps      Headache(784.0)      Hemorrhoids, internal      Hyperlipidemia LDL goal <130 7/12/2011     Hypersomnia with sleep apnea, unspecified      BENJAMIN (obstructive sleep apnea) 10/12/2011     Other encephalopathy      Sprain of right ankle 7/7/2011     Unspecified disorder of adrenal glands      Past Surgical History:   Procedure Laterality Date     C WINTER W/O FACETEC FORAMOT/DSKC 1/2 VRT SEG, CERVICAL  1989     C OPEN RX ANKLE DISLOCATN+FIXATN  1982     COLONOSCOPY  05/12/08    Internal hemorrhoids.  Otherwise normal.     COLONOSCOPY  4/17/2013    Procedure: COLONOSCOPY;  colonoscopy;  Surgeon: Kody Reynolds MD;  Location:  GI     DISCECTOMY, FUSION CERVICAL ANTERIOR ONE LEVEL, COMBINED N/A 4/2/2019    Procedure: C 3-4 ANTERIOR CERVICAL DISECTOMY AND FUSION;  Surgeon: Memo Wooten MD;  Location:  OR     EXAM UNDER ANESTHESIA RECTUM N/A 8/3/2016    Procedure: EXAM UNDER ANESTHESIA RECTUM;  Surgeon: Noah  Sami Dangelo MD;  Location: [facility]     EXPLORE SPINE, REMOVE HARDWARE, COMBINED N/A 2019    Procedure: C 4-5 HARDWARE REMOVAL;  Surgeon: Memo Wooten MD;  Location: [facility] OR     HC FLEX SIGMOIDOSCOPY W/WO BRAD SPEC BY BRUSH/WASH  06/10/08    bx intra-anal lesions & electrocoagulation of perianal lesions.     HC REPAIR OF NASAL SEPTUM  2005    Revision septoplasty, submucosal resection of the inferior turbinates.     INCISION AND DRAINAGE PERINEAL, COMBINED N/A 8/3/2016    Procedure: COMBINED INCISION AND DRAINAGE PERINEAL;  Surgeon: Sami Zamora MD;  Location: [facility]     INJECT EPIDURAL CERVICAL N/A 2019    Procedure: INJECTION, SPINE, CERVICAL 6-7, EPIDURAL;  Surgeon: Brent Anguiano MD;  Location: [facility]     SURGICAL HISTORY OF -   2006    Left occiput C1, C1-C2 facet injection.  [facility]     Social History     Socioeconomic History     Marital status:      Spouse name: Not on file     Number of children: Not on file     Years of education: Not on file     Highest education level: Not on file   Occupational History     Not on file   Social Needs     Financial resource strain: Not on file     Food insecurity:     Worry: Not on file     Inability: Not on file     Transportation needs:     Medical: Not on file     Non-medical: Not on file   Tobacco Use     Smoking status: Current Every Day Smoker     Packs/day: 1.00     Years: 25.00     Pack years: 25.00     Types: Cigarettes     Last attempt to quit: 10/1/2007     Years since quittin.0     Smokeless tobacco: Never Used   Substance and Sexual Activity     Alcohol use: Yes     Alcohol/week: 0.0 standard drinks     Comment: weekends     Drug use: No     Sexual activity: Yes     Partners: Female   Lifestyle     Physical activity:     Days per week: Not on file     Minutes per session: Not on file     Stress: Not on file   Relationships     Social connections:     Talks on phone: Not on file     Gets together: Not on file      Attends Moravian service: Not on file     Active member of club or organization: Not on file     Attends meetings of clubs or organizations: Not on file     Relationship status: Not on file     Intimate partner violence:     Fear of current or ex partner: Not on file     Emotionally abused: Not on file     Physically abused: Not on file     Forced sexual activity: Not on file   Other Topics Concern      Service No     Blood Transfusions No     Caffeine Concern No     Occupational Exposure No     Hobby Hazards No     Sleep Concern Yes     Comment: C-Pap      Stress Concern No     Weight Concern Yes     Special Diet No     Back Care No     Exercise No     Bike Helmet Not Asked     Comment: NA     Seat Belt Yes     Self-Exams No     Parent/sibling w/ CABG, MI or angioplasty before 65F 55M? Yes   Social History Narrative     Not on file     Family History   Problem Relation Age of Onset     Cancer Father         skin cancer     Cancer Sister         skin, ovarian     Breast Cancer Sister      Cancer Brother         skin     Diabetes No family hx of         ROS: 10 point ROS neg other than the symptoms noted above in the HPI.    Physical Exam  There were no vitals taken for this visit.  HEENT:  Normocephalic, atraumatic.  PERRLA.  EOM s intact.  Visual fields full to gross exam  Neck:  Supple, non-tender, without lymphadenopathy.  Heart:  No peripheral edema  Lungs:  No SOB  Abdomen:  Non-distended.   Skin:  Warm and dry.  Extremities:  No edema, cyanosis or clubbing.  Psychiatric:  No apparent distress  Musculoskeletal:  Normal bulk and tone    NEUROLOGICAL EXAMINATION:     Mental status:  Alert and Oriented x 3, speech is fluent.  Cranial nerves:  II-XII intact.   Motor:    Shoulder Abduction:  Right:  5/5   Left:  5/5  Biceps:                      Right:  5/5   Left:  5/5  Triceps:                     Right:  5/5   Left:  5/5  Wrist Extensors:       Right:  5/5   Left:  5/5  Wrist Flexors:           Right:   5/5   Left:  5/5  interosseus :            Right:  5/5   Left:  5/5  Hip Flexor:                Right: 5/5  Left:  5/5  Quadriceps:             Right:  5/5  Left:  5/5  Hamstrings:             Right:  5/5  Left:  5/5  Gastroc Soleus:        Right:  5/5  Left:  5/5  Tib/Ant:                      Right:  5/5  Left:  5/5  EHL:                     Right:  5/5  Left:  5/5  Sensation:  Intact  Reflexes:  Negative Babinski.  Negative Clonus.  Negative Navarro's.  Coordination:  Smooth finger to nose testing.   Negative pronator drift.  Smooth tandem walking.  Incision well healed    A/P:  Worsening pain in spite of PT and SUZY  EMG with C6-7 radiculopathy, and MR with progressive C6-7 foraminal stenosis  Will plan for C6-7 ACDF  Risks and benefits discussed, including worsening or swallowing function    Again, thank you for allowing me to participate in the care of your patient.        Sincerely,        Memo Wooten MD

## 2019-10-03 NOTE — PATIENT INSTRUCTIONS
Surgery scheduled at [facility] for C6-7 ACDF (anterior cervical discectomy and fusion)     Pre-Operative:  -Surgical risks: blood clots in the leg or lung, problems urinating, nerve damage, drainage from the incision, infection, stiffness.  Smoking Cessation: You are advised to quit smoking immediately through recovery to help with healing and reduce risk of complications. Printout given.  - Pre-operative physical with primary care physician within 30 days of surgical date.    Spine Fusion Education: [email or web address]  - Discontinue Aspirin, NSAIDs (Advil, Ibuprofen, Naproxen, Nuprin, Diclofenac, Meloxicam, Aleve, Celebrex) x 7 days prior to surgical date. After surgery, do not begin taking these medications until given clearance. May cause bleeding and interfere with bone healing.  - Discontinue Plavix x 7-10 days prior to surgical date, stay off Plavix 3-4 days post operatively. Discontinue Xarelto 5-7 days prior to surgery. Discontinue coumadin/warfarin 5-7 days prior to surgery. INR needs to be <1.4  - May take Tylenol for pain.  -Shower procedure: Please shower with antibacterial soap the night before surgery and the morning of surgery. Refer to information sheet in folder.   -Stop all solid foods and liquids 8 hours before surgery.     Post-Operative:  -Same day procedure  - Post operative pain may require pain medications and muscle relaxants. You will receive medications upon discharge. For refills, please call our clinic. A nurse will call you back to obtain a pain assessment. Please call 3-4 business days before you run out.  -Do NOT drive while taking narcotic pain medication.  -Post operative incision care- Watch for signs of infection: redness, swelling, warmth, drainage, and fever of 101 degrees or higher. Notify clinic [phone].  -Keep incision clean and dry. You may shower. No submerging incision in water such as pools, hot tubs, baths for at least 8 weeks or until  incision is healed.   - Post operative activity limitations for 6 weeks after surgery: no lifting > 10 pounds, limited bending, twisting, or overhead reaching. You will be re-evaluated at your follow up appointments.   -If a brace is required per Dr. Wooten, Orth[facility] will fit you for the brace in the hospital.  -If you are currently employed, you will need to be off work for recovery and healing. Please fax any FMLA/short term disability paperwork to [phone]. You may call our clinic when you'd like to return to work and we can provide a work letter.   - Follow up appointments: 6 week post op, 3 months post op, 6 months post op, 1 year post op. You will need to an xray before each appointment. Please call to schedule these appointments at [phone].  -Surgery folder provided to patient.    Tracey FANG RN ([facility] Nurse)  [facility]   [address] [place] [address]  Telephone:  [phone]   Fax:  [phone]    Patient Education     How to Quit Smoking  Smoking is one of the hardest habits to break. About half of all people who have ever smoked have been able to quit. Most people who still smoke want to quit. Here are some of the best ways to stop smoking.    Keep trying  Most smokers make many attempts at quitting before they are successful. It s important not to give up.  Go cold turkey  Most former smokers quit cold turkey (all at once). Trying to cut back gradually doesn't seem to work as well, perhaps because it continues the smoking habit. Also, it is possible to inhale more while smoking fewer cigarettes. This results in the same amount of nicotine in your body.  Get support  Support programs can be a big help, especially for heavy smokers. These groups offer lectures, ways to change behavior, and peer support. Here are some ways to find a support program:    Free national quitline: 800-QUIT-NOW (408-566-8759).    [facility] quit-smoking  "programs.    American Lung Association: (603.600.6266).    American Cancer Society (498-434-6400).  Support at home is important too. Nonsmokers can offer praise and encouragement. If the smoker in your life finds it hard to quit, encourage them to keep trying.  Over-the-counter medicines  Nicotine replacement therapy may make quitting easier. Certain aids, such as the nicotine patch, gum, and lozenges, are available without a prescription. It is best to use these under a doctor s care, though. The skin patch provides a steady supply of nicotine. Nicotine gum and lozenges give temporary bursts of low levels of nicotine. Both methods reduce the craving for cigarettes. Warning: If you have nausea, vomiting, dizziness, weakness, or a fast heartbeat, stop using these products and see your doctor.  Prescription medicines  After reviewing your smoking patterns and past attempts to quit, your doctor may offer a prescription medicine such as bupropion, varenicline, a nicotine inhaler, or nasal spray. Each has advantages and side effects. Your doctor can review these with you.  Health benefits of quitting  The benefits of quitting start right away and keep improving the longer you go without smoking. These benefits occur at any age.  So whether you are 17 or 70, quitting is a good decision. Some of the benefits include:    20 minutes: Blood pressure and pulse return to normal.    8 hours: Oxygen levels return to normal.    2 days: Ability to smell and taste begin to improve as damaged nerves regrow.    2 to 3 weeks: Circulation and lung function improve.    1 to 9 months: Coughing, congestion, and shortness of breath decrease; tiredness decreases.    1 year: Risk of heart attack decreases by half.    5 years: Risk of lung cancer decreases by half; risk of stroke becomes the same as a nonsmoker s.  For more on how to quit smoking, try these online resources:     Smokefree.gov    \"Clearing the Air\" booklet from the National " Cancer [place]: smokefree.gov/sites/default/files/pdf/clearing-the-air-accessible.pdf  Date Last Reviewed: 3/1/2017    5922-9568 [employer]. [address] [place] [address], [place] [address]. All rights reserved. This information is not intended as a substitute for professional medical care. Always follow your healthcare professional's instructions.

## 2019-10-03 NOTE — NURSING NOTE
Patient Education    Education included but not limited to:  - Surgical risks: blood clots, urinating difficulties, nerve damage, infection.  - Pre-operative physical with primary care physician within 30 days of surgical date.   - Pre-operative clearance from other pertaining specialties.   - Discontinue NSAIDS x 7 days prior to surgical date.   -Do not begin taking NSAIDs (Advil, Motrin, Ibuprofen, Nuprin, Diclofenac, Meloxicam, Aleve, Celebrex, Aspirin, etc.) until 6 weeks after surgery if you had a fusion. May cause bleeding and interfere with bone healing.    -May try Tylenol for pain.  - Smoking cessation: if you smoke, we advise you to stop immediately, through recovery to improve healing. Smoking Cessation handout provided.  -Discussed being off work after surgery, short term disability, FMLA, etc.   -Forms to be completed    -Pre-op timeline: NPO, shower, medications    -Hospital stay: Checking in, surgery, recovery room, hospital room.    - Post operative pain management: narcotics, muscle relaxants, ice, etc.   -No driving while taking narcotics     -Post operative incision care:   Keep your incision clean and dry.   Okay to shower. No submerging in water until incision healed.   Watch for signs of infection and notify clinic if drainage or fever develops.   - Post operative activity limitations recommended until follow up appointment: no lifting > 10 pounds; limited bending, twisting, overhead reaching.  -If a brace is required per Dr. Wooten, Orthotics will fit you for the brace in the hospital.  - Follow up appointments:  6 week post op, 3 months post op, 6 months post op, 1 year post op. You will need to an xray before each appointment. Please call to schedule follow up appointment at [phone].   - Education book was also given to the patient for further review.   [facility] folder provided to patient.     Patient verbalized understanding of above instructions. All questions were answered to the  best of my ability and the patient's satisfaction. Patient advised to call with any additional questions or concerns.    Tracey Castañeda RN ([facility] Nurse)  [facility]  [address] [address]  T:  [phone]  F:  [phone]

## 2019-10-08 NOTE — PROGRESS NOTES
"Outpatient Physical Therapy Discharge Note     Patient: Miguel A Vaughn  : 1960    Beginning/End Dates of Reporting Period:  2019 to 10/8/2019    Referring Provider: Memo Wooten MD     Therapy Diagnosis: Neck pain with L arm pain/weakness pain      Client Self Report: Previous subjective report from PT treatment on 2019: Patient reports today with continued increased neck pain and R UE symptoms, intense pain and tingling of digits 1-3 of R hand. He report he was looking up to reach for something on the shelf and \"passed out\" and fell on the ground. Does not recall details fall. Additionally, this morning in the shower, he momentarily forgot where he was. He has \"passed out\" twice since neurology follow up on 19. The only position that helps relieve arm symptoms is L lateral cervical flexion.     Objective Measurements:        Objective Measure: Pain  Details: 6-7/10 neck, up to 10/10 shooting pain in R fingers (1st-3rd)         Outcome Measures (most recent score):  NDI:  78% (19)  64% (19)    Goals:  Goal Identifier     Goal Description Pt will be able to complete all daily tasks with reported <3/10 pain and no pain radiating down L UE.    Target Date 10/13/19   Date Met      Progress: worsening pain and R UE symptoms     Goal Identifier ROM   Goal Description Pt will demonstrate full, symmetrical cervical ROM in order to demonstrate improved functional use of neck and scanning environment.    Target Date 10/13/19   Date Met      Progress: no increased AROM noted at this time     Goal Identifier Sleep   Goal Description Pt will be able to sleep through the night without wakig due to pain in order to improve QOL.    Target Date 10/13/19   Date Met      Progress: continues to frequently wake up every 1-2 hours through the night.     Goal Identifier Work   Goal Description Pt will be able to return to 4-6 hours of work with minimal increase in symptoms in order to return to PLOF.  "   Target Date 10/13/19   Date Met      Progress: has not returned to work.         Progress Toward Goals:   Progress limited due to increase in neck pain & B UE symptoms as well as increased dizziness with cervical extension. Patient's CTA showed negative stenosis. Upon follow up with Dr. Wooten, new plan will be to perform C6-7 ACDF in November. Encouraged patient to continue home exercises to help relieve pain prior to surgery. See previous progress note for further details. Discharging patient with home program at this time.         Plan:  Discharge from therapy.    Discharge:    Reason for Discharge: cervical surgery scheduled. see above.    Equipment Issued: N/A    Discharge Plan: Patient to continue home program.        Thank you for your referral.     Alba Sánchez PT, DPT    [facility]    O: [phone]  E: [email or web address]

## 2019-10-09 DIAGNOSIS — E11.9 TYPE 2 DIABETES MELLITUS WITHOUT COMPLICATION, WITHOUT LONG-TERM CURRENT USE OF INSULIN (H): ICD-10-CM

## 2019-10-10 RX ORDER — GLYBURIDE-METFORMIN HYDROCHLORIDE 2.5; 5 MG/1; MG/1
TABLET ORAL
Qty: 360 TABLET | Refills: 0 | Status: SHIPPED | OUTPATIENT
Start: 2019-10-10 | End: 2021-12-07

## 2019-10-10 NOTE — TELEPHONE ENCOUNTER
Glucovance  Routing refill request to provider for review/approval because:  Labs out of range:  BP    Next 5 appointments (look out 90 days)    Oct 28, 2019  9:20 AM CDT  [facility] Fern[facility]-C  [facility] [address] [facility] [id number]  [phone]   Dec 26, 2019 10:00 AM CST  Return Visit with Orly Moe PA-C  [facility] [address] [place] [id number]  [phone]        Domonique Pendleton, RN, BSN

## 2019-10-17 ENCOUNTER — APPOINTMENT (OUTPATIENT)
Dept: ULTRASOUND IMAGING | Facility: CLINIC | Age: 59
End: 2019-10-17
Attending: PHYSICIAN ASSISTANT
Payer: COMMERCIAL

## 2019-10-17 ENCOUNTER — ANESTHESIA (OUTPATIENT)
Dept: SURGERY | Facility: CLINIC | Age: 59
End: 2019-10-17
Payer: COMMERCIAL

## 2019-10-17 ENCOUNTER — ANESTHESIA EVENT (OUTPATIENT)
Dept: SURGERY | Facility: CLINIC | Age: 59
End: 2019-10-17
Payer: COMMERCIAL

## 2019-10-17 ENCOUNTER — HOSPITAL ENCOUNTER (OUTPATIENT)
Facility: CLINIC | Age: 59
Discharge: HOME | End: 2019-10-18
Attending: PHYSICIAN ASSISTANT | Admitting: SURGERY
Payer: COMMERCIAL

## 2019-10-17 DIAGNOSIS — K81.0 ACUTE CHOLECYSTITIS: ICD-10-CM

## 2019-10-17 DIAGNOSIS — R10.11 ABDOMINAL PAIN, RIGHT UPPER QUADRANT: ICD-10-CM

## 2019-10-17 DIAGNOSIS — K80.50 RECURRENT BILIARY COLIC: ICD-10-CM

## 2019-10-17 DIAGNOSIS — Z90.49 S/P LAPAROSCOPIC CHOLECYSTECTOMY: Primary | ICD-10-CM

## 2019-10-17 LAB
ALBUMIN SERPL-MCNC: 3.9 G/DL (ref 3.4–5)
ALBUMIN UR-MCNC: NEGATIVE MG/DL
ALP SERPL-CCNC: 105 U/L (ref 40–150)
ALT SERPL W P-5'-P-CCNC: 29 U/L (ref 0–70)
ANION GAP SERPL CALCULATED.3IONS-SCNC: 4 MMOL/L (ref 3–14)
APPEARANCE UR: CLEAR
AST SERPL W P-5'-P-CCNC: 14 U/L (ref 0–45)
BASOPHILS # BLD AUTO: 0 10E9/L (ref 0–0.2)
BASOPHILS NFR BLD AUTO: 0.4 %
BILIRUB SERPL-MCNC: 0.5 MG/DL (ref 0.2–1.3)
BILIRUB UR QL STRIP: NEGATIVE
BUN SERPL-MCNC: 9 MG/DL (ref 7–30)
CALCIUM SERPL-MCNC: 9.4 MG/DL (ref 8.5–10.1)
CHLORIDE SERPL-SCNC: 107 MMOL/L (ref 94–109)
CO2 SERPL-SCNC: 29 MMOL/L (ref 20–32)
COLOR UR AUTO: YELLOW
CREAT SERPL-MCNC: 0.89 MG/DL (ref 0.66–1.25)
CRP SERPL-MCNC: <2.9 MG/L (ref 0–8)
DIFFERENTIAL METHOD BLD: NORMAL
EOSINOPHIL NFR BLD AUTO: 2.8 %
ERYTHROCYTE [DISTWIDTH] IN BLOOD BY AUTOMATED COUNT: 13 % (ref 10–15)
GFR SERPL CREATININE-BSD FRML MDRD: >90 ML/MIN/{1.73_M2}
GLUCOSE BLDC GLUCOMTR-MCNC: 116 MG/DL (ref 70–99)
GLUCOSE SERPL-MCNC: 114 MG/DL (ref 70–99)
GLUCOSE UR STRIP-MCNC: NEGATIVE MG/DL
HCT VFR BLD AUTO: 45.1 % (ref 40–53)
HGB BLD-MCNC: 15.2 G/DL (ref 13.3–17.7)
HGB UR QL STRIP: NEGATIVE
IMM GRANULOCYTES # BLD: 0.1 10E9/L (ref 0–0.4)
IMM GRANULOCYTES NFR BLD: 0.6 %
KETONES UR STRIP-MCNC: NEGATIVE MG/DL
LEUKOCYTE ESTERASE UR QL STRIP: NEGATIVE
LYMPHOCYTES # BLD AUTO: 2.9 10E9/L (ref 0.8–5.3)
LYMPHOCYTES NFR BLD AUTO: 27.5 %
MCH RBC QN AUTO: 31.9 PG (ref 26.5–33)
MCHC RBC AUTO-ENTMCNC: 33.7 G/DL (ref 31.5–36.5)
MCV RBC AUTO: 95 FL (ref 78–100)
MONOCYTES # BLD AUTO: 0.9 10E9/L (ref 0–1.3)
MONOCYTES NFR BLD AUTO: 8.6 %
MUCOUS THREADS #/AREA URNS LPF: PRESENT /LPF
NEUTROPHILS # BLD AUTO: 6.4 10E9/L (ref 1.6–8.3)
NEUTROPHILS NFR BLD AUTO: 60.1 %
NITRATE UR QL: NEGATIVE
NRBC # BLD AUTO: 0 10*3/UL
NRBC BLD AUTO-RTO: 0 /100
PH UR STRIP: 6 PH (ref 5–7)
PLATELET # BLD AUTO: 216 10E9/L (ref 150–450)
POTASSIUM SERPL-SCNC: 4.5 MMOL/L (ref 3.4–5.3)
PROT SERPL-MCNC: 7.4 G/DL (ref 6.8–8.8)
RBC # BLD AUTO: 4.76 10E12/L (ref 4.4–5.9)
RBC #/AREA URNS AUTO: <1 /HPF (ref 0–2)
SODIUM SERPL-SCNC: 140 MMOL/L (ref 133–144)
SOURCE: ABNORMAL
SP GR UR STRIP: 1.01 (ref 1–1.03)
SQUAMOUS #/AREA URNS AUTO: <1 /HPF (ref 0–1)
UROBILINOGEN UR STRIP-MCNC: 2 MG/DL (ref 0–2)
WBC # BLD AUTO: 10.7 10E9/L (ref 4–11)
WBC #/AREA URNS AUTO: <1 /HPF (ref 0–5)

## 2019-10-17 PROCEDURE — 88304 TISSUE EXAM BY PATHOLOGIST: CPT | Mod: 26 | Performed by: SURGERY

## 2019-10-17 PROCEDURE — 85025 COMPLETE CBC W/AUTO DIFF WBC: CPT | Performed by: PHYSICIAN ASSISTANT

## 2019-10-17 PROCEDURE — 36000056 ZZH SURGERY LEVEL 3 1ST 30 MIN: Performed by: SURGERY

## 2019-10-17 PROCEDURE — 86140 C-REACTIVE PROTEIN: CPT | Performed by: PHYSICIAN ASSISTANT

## 2019-10-17 PROCEDURE — 93010 ELECTROCARDIOGRAM REPORT: CPT | Mod: Z6 | Performed by: FAMILY MEDICINE

## 2019-10-17 PROCEDURE — 25000128 H RX IP 250 OP 636: Performed by: NURSE ANESTHETIST, CERTIFIED REGISTERED

## 2019-10-17 PROCEDURE — 37000009 ZZH ANESTHESIA TECHNICAL FEE, EACH ADDTL 15 MIN: Performed by: SURGERY

## 2019-10-17 PROCEDURE — 00000146 ZZHCL STATISTIC GLUCOSE BY METER IP

## 2019-10-17 PROCEDURE — 96376 TX/PRO/DX INJ SAME DRUG ADON: CPT | Mod: 59 | Performed by: FAMILY MEDICINE

## 2019-10-17 PROCEDURE — 27210794 ZZH OR GENERAL SUPPLY STERILE: Performed by: SURGERY

## 2019-10-17 PROCEDURE — 25000132 ZZH RX MED GY IP 250 OP 250 PS 637: Performed by: NURSE ANESTHETIST, CERTIFIED REGISTERED

## 2019-10-17 PROCEDURE — 96374 THER/PROPH/DIAG INJ IV PUSH: CPT | Mod: 59 | Performed by: FAMILY MEDICINE

## 2019-10-17 PROCEDURE — 25000566 ZZH SEVOFLURANE, EA 15 MIN: Performed by: SURGERY

## 2019-10-17 PROCEDURE — 99285 EMERGENCY DEPT VISIT HI MDM: CPT | Mod: 25 | Performed by: FAMILY MEDICINE

## 2019-10-17 PROCEDURE — 71000027 ZZH RECOVERY PHASE 2 EACH 15 MINS: Performed by: SURGERY

## 2019-10-17 PROCEDURE — 25000132 ZZH RX MED GY IP 250 OP 250 PS 637: Performed by: SURGERY

## 2019-10-17 PROCEDURE — 25800030 ZZH RX IP 258 OP 636: Performed by: NURSE ANESTHETIST, CERTIFIED REGISTERED

## 2019-10-17 PROCEDURE — 25000128 H RX IP 250 OP 636: Performed by: SURGERY

## 2019-10-17 PROCEDURE — 81001 URINALYSIS AUTO W/SCOPE: CPT | Performed by: PHYSICIAN ASSISTANT

## 2019-10-17 PROCEDURE — 47562 LAPAROSCOPIC CHOLECYSTECTOMY: CPT | Performed by: SURGERY

## 2019-10-17 PROCEDURE — 93005 ELECTROCARDIOGRAM TRACING: CPT | Performed by: FAMILY MEDICINE

## 2019-10-17 PROCEDURE — 36000058 ZZH SURGERY LEVEL 3 EA 15 ADDTL MIN: Performed by: SURGERY

## 2019-10-17 PROCEDURE — 88304 TISSUE EXAM BY PATHOLOGIST: CPT | Performed by: SURGERY

## 2019-10-17 PROCEDURE — 40000065 ZZH STATISTIC EKG NON-CHARGEABLE: Performed by: FAMILY MEDICINE

## 2019-10-17 PROCEDURE — 96375 TX/PRO/DX INJ NEW DRUG ADDON: CPT | Performed by: FAMILY MEDICINE

## 2019-10-17 PROCEDURE — 96361 HYDRATE IV INFUSION ADD-ON: CPT | Performed by: FAMILY MEDICINE

## 2019-10-17 PROCEDURE — 25000128 H RX IP 250 OP 636: Performed by: PHYSICIAN ASSISTANT

## 2019-10-17 PROCEDURE — 76705 ECHO EXAM OF ABDOMEN: CPT

## 2019-10-17 PROCEDURE — 71000014 ZZH RECOVERY PHASE 1 LEVEL 2 FIRST HR: Performed by: SURGERY

## 2019-10-17 PROCEDURE — 37000008 ZZH ANESTHESIA TECHNICAL FEE, 1ST 30 MIN: Performed by: SURGERY

## 2019-10-17 PROCEDURE — 80053 COMPREHEN METABOLIC PANEL: CPT | Performed by: PHYSICIAN ASSISTANT

## 2019-10-17 PROCEDURE — 25000125 ZZHC RX 250: Performed by: SURGERY

## 2019-10-17 PROCEDURE — 25000125 ZZHC RX 250: Performed by: NURSE ANESTHETIST, CERTIFIED REGISTERED

## 2019-10-17 RX ORDER — CEFAZOLIN SODIUM 1 G/3ML
1 INJECTION, POWDER, FOR SOLUTION INTRAMUSCULAR; INTRAVENOUS SEE ADMIN INSTRUCTIONS
Status: DISCONTINUED | OUTPATIENT
Start: 2019-10-17 | End: 2019-10-17 | Stop reason: HOSPADM

## 2019-10-17 RX ORDER — DIMENHYDRINATE 50 MG/ML
12.5 INJECTION, SOLUTION INTRAMUSCULAR; INTRAVENOUS EVERY 10 MIN PRN
Status: DISCONTINUED | OUTPATIENT
Start: 2019-10-17 | End: 2019-10-18 | Stop reason: HOSPADM

## 2019-10-17 RX ORDER — ONDANSETRON 2 MG/ML
4 INJECTION INTRAMUSCULAR; INTRAVENOUS EVERY 30 MIN PRN
Status: DISCONTINUED | OUTPATIENT
Start: 2019-10-17 | End: 2019-10-18 | Stop reason: HOSPADM

## 2019-10-17 RX ORDER — SODIUM CHLORIDE, SODIUM LACTATE, POTASSIUM CHLORIDE, CALCIUM CHLORIDE 600; 310; 30; 20 MG/100ML; MG/100ML; MG/100ML; MG/100ML
INJECTION, SOLUTION INTRAVENOUS CONTINUOUS
Status: DISCONTINUED | OUTPATIENT
Start: 2019-10-17 | End: 2019-10-18 | Stop reason: HOSPADM

## 2019-10-17 RX ORDER — MEPERIDINE HYDROCHLORIDE 25 MG/ML
12.5 INJECTION INTRAMUSCULAR; INTRAVENOUS; SUBCUTANEOUS
Status: DISCONTINUED | OUTPATIENT
Start: 2019-10-17 | End: 2019-10-18 | Stop reason: HOSPADM

## 2019-10-17 RX ORDER — FENTANYL CITRATE 50 UG/ML
INJECTION, SOLUTION INTRAMUSCULAR; INTRAVENOUS PRN
Status: DISCONTINUED | OUTPATIENT
Start: 2019-10-17 | End: 2019-10-17

## 2019-10-17 RX ORDER — PROPOFOL 10 MG/ML
INJECTION, EMULSION INTRAVENOUS PRN
Status: DISCONTINUED | OUTPATIENT
Start: 2019-10-17 | End: 2019-10-17

## 2019-10-17 RX ORDER — FENTANYL CITRATE 50 UG/ML
25-50 INJECTION, SOLUTION INTRAMUSCULAR; INTRAVENOUS
Status: DISCONTINUED | OUTPATIENT
Start: 2019-10-17 | End: 2019-10-18 | Stop reason: HOSPADM

## 2019-10-17 RX ORDER — OXYCODONE HYDROCHLORIDE 5 MG/1
10 TABLET ORAL EVERY 4 HOURS PRN
Status: DISCONTINUED | OUTPATIENT
Start: 2019-10-17 | End: 2019-10-18 | Stop reason: HOSPADM

## 2019-10-17 RX ORDER — HEPARIN SODIUM 5000 [USP'U]/.5ML
5000 INJECTION, SOLUTION INTRAVENOUS; SUBCUTANEOUS
Status: COMPLETED | OUTPATIENT
Start: 2019-10-17 | End: 2019-10-17

## 2019-10-17 RX ORDER — ALBUTEROL SULFATE 0.83 MG/ML
2.5 SOLUTION RESPIRATORY (INHALATION) EVERY 4 HOURS PRN
Status: DISCONTINUED | OUTPATIENT
Start: 2019-10-17 | End: 2019-10-18 | Stop reason: HOSPADM

## 2019-10-17 RX ORDER — ONDANSETRON 2 MG/ML
INJECTION INTRAMUSCULAR; INTRAVENOUS PRN
Status: DISCONTINUED | OUTPATIENT
Start: 2019-10-17 | End: 2019-10-17

## 2019-10-17 RX ORDER — SODIUM CHLORIDE, SODIUM LACTATE, POTASSIUM CHLORIDE, CALCIUM CHLORIDE 600; 310; 30; 20 MG/100ML; MG/100ML; MG/100ML; MG/100ML
INJECTION, SOLUTION INTRAVENOUS CONTINUOUS PRN
Status: DISCONTINUED | OUTPATIENT
Start: 2019-10-17 | End: 2019-10-17

## 2019-10-17 RX ORDER — CEFAZOLIN SODIUM 2 G/100ML
2 INJECTION, SOLUTION INTRAVENOUS
Status: COMPLETED | OUTPATIENT
Start: 2019-10-17 | End: 2019-10-17

## 2019-10-17 RX ORDER — BUPIVACAINE HYDROCHLORIDE AND EPINEPHRINE 2.5; 5 MG/ML; UG/ML
INJECTION, SOLUTION INFILTRATION; PERINEURAL PRN
Status: DISCONTINUED | OUTPATIENT
Start: 2019-10-17 | End: 2019-10-18 | Stop reason: HOSPADM

## 2019-10-17 RX ORDER — ONDANSETRON 4 MG/1
4 TABLET, ORALLY DISINTEGRATING ORAL EVERY 30 MIN PRN
Status: DISCONTINUED | OUTPATIENT
Start: 2019-10-17 | End: 2019-10-18 | Stop reason: HOSPADM

## 2019-10-17 RX ORDER — DEXAMETHASONE SODIUM PHOSPHATE 4 MG/ML
INJECTION, SOLUTION INTRA-ARTICULAR; INTRALESIONAL; INTRAMUSCULAR; INTRAVENOUS; SOFT TISSUE PRN
Status: DISCONTINUED | OUTPATIENT
Start: 2019-10-17 | End: 2019-10-17

## 2019-10-17 RX ORDER — NALOXONE HYDROCHLORIDE 0.4 MG/ML
.1-.4 INJECTION, SOLUTION INTRAMUSCULAR; INTRAVENOUS; SUBCUTANEOUS
Status: DISCONTINUED | OUTPATIENT
Start: 2019-10-17 | End: 2019-10-18 | Stop reason: HOSPADM

## 2019-10-17 RX ORDER — IBUPROFEN 200 MG
200 TABLET ORAL EVERY 4 HOURS PRN
Status: ON HOLD | COMMUNITY
End: 2019-11-12

## 2019-10-17 RX ORDER — OXYCODONE HYDROCHLORIDE 5 MG/1
5 TABLET ORAL
Status: DISCONTINUED | OUTPATIENT
Start: 2019-10-17 | End: 2019-10-18 | Stop reason: HOSPADM

## 2019-10-17 RX ORDER — ACETAMINOPHEN 325 MG/1
975 TABLET ORAL ONCE
Status: COMPLETED | OUTPATIENT
Start: 2019-10-17 | End: 2019-10-17

## 2019-10-17 RX ORDER — HYDROMORPHONE HYDROCHLORIDE 1 MG/ML
.3-.5 INJECTION, SOLUTION INTRAMUSCULAR; INTRAVENOUS; SUBCUTANEOUS EVERY 10 MIN PRN
Status: DISCONTINUED | OUTPATIENT
Start: 2019-10-17 | End: 2019-10-18 | Stop reason: HOSPADM

## 2019-10-17 RX ORDER — OXYCODONE AND ACETAMINOPHEN 5; 325 MG/1; MG/1
1 TABLET ORAL EVERY 6 HOURS PRN
Qty: 12 TABLET | Refills: 0 | Status: ON HOLD | OUTPATIENT
Start: 2019-10-17 | End: 2019-11-12

## 2019-10-17 RX ORDER — LIDOCAINE HYDROCHLORIDE 20 MG/ML
INJECTION, SOLUTION INFILTRATION; PERINEURAL PRN
Status: DISCONTINUED | OUTPATIENT
Start: 2019-10-17 | End: 2019-10-17

## 2019-10-17 RX ADMIN — ACETAMINOPHEN 975 MG: 325 TABLET, FILM COATED ORAL at 21:17

## 2019-10-17 RX ADMIN — MIDAZOLAM 1 MG: 1 INJECTION INTRAMUSCULAR; INTRAVENOUS at 21:18

## 2019-10-17 RX ADMIN — ONDANSETRON 4 MG: 2 INJECTION INTRAMUSCULAR; INTRAVENOUS at 21:37

## 2019-10-17 RX ADMIN — PROPOFOL 200 MG: 10 INJECTION, EMULSION INTRAVENOUS at 21:31

## 2019-10-17 RX ADMIN — PHENYLEPHRINE HYDROCHLORIDE 100 MCG: 10 INJECTION INTRAVENOUS at 21:54

## 2019-10-17 RX ADMIN — MIDAZOLAM 1 MG: 1 INJECTION INTRAMUSCULAR; INTRAVENOUS at 23:33

## 2019-10-17 RX ADMIN — SODIUM CHLORIDE 1000 ML: 9 INJECTION, SOLUTION INTRAVENOUS at 18:00

## 2019-10-17 RX ADMIN — OXYCODONE HYDROCHLORIDE 10 MG: 5 TABLET ORAL at 23:48

## 2019-10-17 RX ADMIN — SUGAMMADEX 200 MG: 100 INJECTION, SOLUTION INTRAVENOUS at 22:39

## 2019-10-17 RX ADMIN — FENTANYL CITRATE 50 MCG: 50 INJECTION, SOLUTION INTRAMUSCULAR; INTRAVENOUS at 21:24

## 2019-10-17 RX ADMIN — FENTANYL CITRATE 50 MCG: 50 INJECTION INTRAMUSCULAR; INTRAVENOUS at 23:16

## 2019-10-17 RX ADMIN — CEFAZOLIN SODIUM 2 G: 2 INJECTION, SOLUTION INTRAVENOUS at 21:17

## 2019-10-17 RX ADMIN — PHENYLEPHRINE HYDROCHLORIDE 100 MCG: 10 INJECTION INTRAVENOUS at 22:08

## 2019-10-17 RX ADMIN — LIDOCAINE HYDROCHLORIDE 100 MG: 20 INJECTION, SOLUTION INFILTRATION; PERINEURAL at 21:31

## 2019-10-17 RX ADMIN — MEPERIDINE HYDROCHLORIDE 12.5 MG: 25 INJECTION INTRAMUSCULAR; INTRAVENOUS; SUBCUTANEOUS at 23:10

## 2019-10-17 RX ADMIN — HEPARIN SODIUM 5000 UNITS: 10000 INJECTION, SOLUTION INTRAVENOUS; SUBCUTANEOUS at 21:34

## 2019-10-17 RX ADMIN — MIDAZOLAM 1 MG: 1 INJECTION INTRAMUSCULAR; INTRAVENOUS at 21:30

## 2019-10-17 RX ADMIN — DEXAMETHASONE SODIUM PHOSPHATE 10 MG: 4 INJECTION, SOLUTION INTRAMUSCULAR; INTRAVENOUS at 21:37

## 2019-10-17 RX ADMIN — ROCURONIUM BROMIDE 10 MG: 10 INJECTION INTRAVENOUS at 21:30

## 2019-10-17 RX ADMIN — ONDANSETRON 4 MG: 2 INJECTION INTRAMUSCULAR; INTRAVENOUS at 18:01

## 2019-10-17 RX ADMIN — FENTANYL CITRATE 50 MCG: 50 INJECTION INTRAMUSCULAR; INTRAVENOUS at 23:25

## 2019-10-17 RX ADMIN — FENTANYL CITRATE 50 MCG: 50 INJECTION, SOLUTION INTRAMUSCULAR; INTRAVENOUS at 22:53

## 2019-10-17 RX ADMIN — HYDROMORPHONE HYDROCHLORIDE 1 MG: 1 INJECTION, SOLUTION INTRAMUSCULAR; INTRAVENOUS; SUBCUTANEOUS at 18:01

## 2019-10-17 RX ADMIN — ROCURONIUM BROMIDE 40 MG: 10 INJECTION INTRAVENOUS at 21:39

## 2019-10-17 RX ADMIN — PHENYLEPHRINE HYDROCHLORIDE 100 MCG: 10 INJECTION INTRAVENOUS at 21:33

## 2019-10-17 RX ADMIN — SODIUM CHLORIDE, POTASSIUM CHLORIDE, SODIUM LACTATE AND CALCIUM CHLORIDE: 600; 310; 30; 20 INJECTION, SOLUTION INTRAVENOUS at 21:18

## 2019-10-17 RX ADMIN — PHENYLEPHRINE HYDROCHLORIDE 200 MCG: 10 INJECTION INTRAVENOUS at 21:44

## 2019-10-17 RX ADMIN — Medication 100 MG: at 21:31

## 2019-10-17 RX ADMIN — HYDROMORPHONE HYDROCHLORIDE 1 MG: 1 INJECTION, SOLUTION INTRAMUSCULAR; INTRAVENOUS; SUBCUTANEOUS at 20:19
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
## 2019-10-18 VITALS
HEART RATE: 78 BPM | BODY MASS INDEX: 31.17 KG/M2 | SYSTOLIC BLOOD PRESSURE: 116 MMHG | WEIGHT: 205 LBS | RESPIRATION RATE: 16 BRPM | OXYGEN SATURATION: 90 % | DIASTOLIC BLOOD PRESSURE: 61 MMHG | TEMPERATURE: 97.8 F

## 2019-10-18 NOTE — ANESTHESIA POSTPROCEDURE EVALUATION
Patient: Miguel A Vaughn    Procedure(s):  CHOLECYSTECTOMY, LAPAROSCOPIC    Diagnosis:* No pre-op diagnosis entered *  Diagnosis Additional Information: No value filed.    Anesthesia Type:  General, ETT, RSI    Note:  Anesthesia Post Evaluation    Patient location: call.  Patient participation: Able to fully participate in evaluation  Level of consciousness: awake  Pain management: adequate  Airway patency: patent  Cardiovascular status: acceptable and hemodynamically stable  Respiratory status: acceptable, room air and nonlabored ventilation  Hydration status: stable  PONV: none     Anesthetic complications: None    Comments: Patient was happy with the anesthesia care received and no anesthesia related complications were noted.  I will follow up with the patient again if it is needed.        Last vitals:  Vitals:    10/17/19 2350 10/18/19 0000 10/18/19 0020   BP: 114/64 116/61    Pulse: 81 78    Resp:      Temp:      SpO2: 99% 92% 90%         Electronically Signed By: SEBASTIÁN Serna CRNA  October 18, 2019  7:59 AM

## 2019-10-18 NOTE — ED NOTES
Report given to OR nurse.  CRNA given Heparin, will administer in the OR.  Antibiotics started per surgeon request.  Wife brought to the surgery waiting area.  Pt to OR at this time.

## 2019-10-18 NOTE — ED NOTES
Pt starting iZoca prep at this time.  Pt states that he had two small mounds bars at 1630 otherwise last ate last evening.  Small sips of tea last at 1630.

## 2019-10-18 NOTE — DISCHARGE INSTRUCTIONS
[facility]    Home Care Following Gallbladder Surgery    [facility]    Pain meds:     600mg ibuprofen every 6hrs prn     650mg of acetaminophen every 6hrs prn    You can alternate these meds so that you take one every 3 hrs.      Make sure you do not go over 4000mg of acetaminophen every 24hrs, especially if you are taking Norco or percocet 5/325mg.    Care of the Incision:    Remove gauze dressing (if present) after 48 hours.    Leave small strips in place (if present).  They will gradually come off.    If surgical glue was used on your incision, keep it dry for 24 hours.  Then you may shower but don t submerge under water for at least 2 weeks.  Gently pat your incision dry with a freshly laundered towel.    Do not touch your incision with bare hands or pick at scabs.    Leave your incision open to air.  Cover it only if draining, clothing rubs or irritates it.    Activity:    Gradually increase your activity.  Walk short distances several times each day and increase the distance as your strength allows.    To promote circulation, do not cross your legs while sitting.    No strenuous lifting or straining for 2-3 weeks.  Do not lift anything over 10-20 pounds until your doctor approves an increase.    Return to work will be determined by the type of work you do and should be discussed with your physician.    Do not drive or operate equipment while taking prescription pain medicines.  You may drive 1 week after surgery if you have stopped taking prescription pain medicines and can react quickly enough to make an emergency stop if necessary.    Diet:    Return to the diet you were on before surgery.    Drink plenty of water.    Avoid foods that cause constipation.      REMEMBER--most prescription pain pills cause constipation.  Walking, extra fluids, and increased fiber (fresh fruits and vegetables, etc.) are natural remedies for constipation.  You can also take mineral oil, 1-2 Tablespoons per  day.  If still constipated you may try a stool softener such as Colace or Miralax.    Call Your Physician if You Have:    Redness, increased swelling or cloudy drainage from your incision.    A temperature of more than 101 degrees F.    Worsening pain in your incision not relieved by your prescription pain pills and/or a short rest.    Jaundice (yellowing of skin or eyes)    Abdominal distention (stomach getting very large)    Swelling in your legs    Productive cough    Burning with urination    Any questions or concerns about your recovery, please call     Business hours [phone]    After hours [phone] Nurse Advice Line (24 hours a day)    Follow-up Care:    Make an appointment 2-3 week after your surgery.  Call [phone].    [facility] Same-Day Surgery   Adult Discharge Orders & Instructions     For 24 hours after surgery    1. Get plenty of rest.  A responsible adult must stay with you for at least 24 hours after you leave the hospital.   2. Do not drive or use heavy equipment.  If you have weakness or tingling, don't drive or use heavy equipment until this feeling goes away.  3. Do not drink alcohol.  4. Avoid strenuous or risky activities.  Ask for help when climbing stairs.   5. You may feel lightheaded.  If so, sit for a few minutes before standing.  Have someone help you get up.   6. You may have a slight fever. Call the doctor if your fever is over 100 F (37.7 C) (taken under the tongue) or lasts longer than 24 hours.  7. You may have a dry mouth, a sore throat, muscle aches or trouble sleeping.  These should go away after 24 hours.  8. Do not make important or legal decisions.  We don t expect you to have any problems from the surgery or treatment you had today. Just in case, here s what to do if you have pain, upset stomach (nausea), bleeding or infection:  Pain:  Take medicines your doctor has prescribed or over-the-counter medicine they have suggested. Resting and using ice packs  can help, too. For surgery on an arm or leg, raise it on a pillow to ease swelling. Call your doctor if these methods don t work.  Copyright [employer], Licensed under [employer]  Upset stomach (nausea):  Take anti-nausea medicine approved by your doctor. Drink clear liquids like apple juice, ginger ale, broth or 7-Up. Be sure to drink enough fluids. Rest can help, too. Move to normal foods when you re ready. Bleeding:  In the first 24 hours, you may see a little blood on your dressing, about the size of a quarter. You don t need to worry about this much blood, but if the blood spot keeps getting bigger:    Put pressure on the wound if you can, AND    Call your doctor.  Copyright [employer], Licensed under [employer]  Fever/Infection: Please call your doctor if you have any of these signs:    Redness    Swelling    Wound feels warm    Pain gets worse    Bad-smelling fluid leaks from wound    Fever or chills  Call your doctor for any of the followin.  It has been over 8 to 10 hours since surgery and you are still not able to urinate (pass water).    2.  Headache for over 24 hours    Nurse advice line: [phone]

## 2019-10-18 NOTE — ANESTHESIA CARE TRANSFER NOTE
Patient: Miguel A Vaughn    Procedure(s):  CHOLECYSTECTOMY, LAPAROSCOPIC    Diagnosis: * No pre-op diagnosis entered *  Diagnosis Additional Information: No value filed.    Anesthesia Type:   General, ETT, RSI     Note:  Airway :Face Mask  Patient transferred to:PACU  Handoff Report: Identifed the Patient, Identified the Reponsible Provider, Reviewed the pertinent medical history, Discussed the surgical course, Reviewed Intra-OP anesthesia mangement and issues during anesthesia, Set expectations for post-procedure period and Allowed opportunity for questions and acknowledgement of understanding      Vitals: (Last set prior to Anesthesia Care Transfer)    CRNA VITALS  10/17/2019 2225 - 10/17/2019 2305      10/17/2019             SpO2:  99 %                Electronically Signed By: SEBASTIÁN Tucker CRNA  October 17, 2019  11:05 PM

## 2019-10-18 NOTE — H&P
Name:  Miguel A Vaughn  Date/Time of Admission: 10/17/2019  4:38 PM   CSN: [id number]  Attending Provider: Hannah Nicolas P*   Room/Bed:  ED12/ED12  : 1960  58 year old        Subjective:     Procedure:  Laparoscopic c    HPI:  Miguel A Vaughn is a 58 year old male who presents with right upper pain and right flank pain.  Started a few weeks ago and doesn't seem to be going away.  The last similar episode was 2 nights ago and the pain is quite severe and he has not slept due to that.  He had a candy bar this afternoon and the pain recurred.  Hasn't noticed postpranial pain with any particular foods or with foods.  +nausea; +dry heaves.  No fevers; no chills; no pruritus; no melena; +diarrhea; no CP; No SOB. +smoker.  He has hx of chronic neck pain with hx of multiple surgeries in the past and does take oxycodone on a daily basis for his neck pain.      In ED, GBUS noted distended GB with sludge at the neck.  But no sonographic evidence of acute cholecystitis    Primary Care Physician:  Ferny Huerta     Allergies:    Allergies   Allergen Reactions     No Known Drug Allergies      Tape [Adhesive Tape]         Outpatient Meds:  (Not in a hospital admission)      Past Medical History:  Past Medical History:   Diagnosis Date     Benign neoplasm of brain (H)      Cervicalgia      Depressive disorder, not elsewhere classified 2008     Deviated nasal septum      Diabetes (H)      Family history of colonic polyps      Headache(784.0)      Hemorrhoids, internal      Hyperlipidemia LDL goal <130 2011     Hypersomnia with sleep apnea, unspecified      BENJAMIN (obstructive sleep apnea) 10/12/2011     Other encephalopathy      Sprain of right ankle 2011     Unspecified disorder of adrenal glands         Past Surgical History:  Past Surgical History:   Procedure Laterality Date     C WINTER W/O FACETEC FORAMOT/DSKC  VRT SEG, CERVICAL       C OPEN RX ANKLE DISLOCATN+FIXATN       COLONOSCOPY   05/12/08    Internal hemorrhoids.  Otherwise normal.     COLONOSCOPY  4/17/2013    Procedure: COLONOSCOPY;  colonoscopy;  Surgeon: Kody Reynodls MD;  Location: [facility]     DISCECTOMY, FUSION CERVICAL ANTERIOR ONE LEVEL, COMBINED N/A 4/2/2019    Procedure: C 3-4 ANTERIOR CERVICAL DISECTOMY AND FUSION;  Surgeon: Memo Wooten MD;  Location:  OR     EXAM UNDER ANESTHESIA RECTUM N/A 8/3/2016    Procedure: EXAM UNDER ANESTHESIA RECTUM;  Surgeon: Sami Zamora MD;  Location:  OR     EXPLORE SPINE, REMOVE HARDWARE, COMBINED N/A 4/2/2019    Procedure: C 4-5 HARDWARE REMOVAL;  Surgeon: Memo Wooten MD;  Location:  OR      FLEX SIGMOIDOSCOPY W/WO BRAD SPEC BY BRUSH/WASH  06/10/08    bx intra-anal lesions & electrocoagulation of perianal lesions.      REPAIR OF NASAL SEPTUM  12/16/2005    Revision septoplasty, submucosal resection of the inferior turbinates.     INCISION AND DRAINAGE PERINEAL, COMBINED N/A 8/3/2016    Procedure: COMBINED INCISION AND DRAINAGE PERINEAL;  Surgeon: Sami Zamora MD;  Location: [facility]     INJECT EPIDURAL CERVICAL N/A 8/16/2019    Procedure: INJECTION, SPINE, CERVICAL 6-7, EPIDURAL;  Surgeon: Brent Anguiano MD;  Location: [facility]     SURGICAL HISTORY OF -   08/30/2006    Left occiput C1, C1-C2 facet injection.  [facility]        Social History:  Social History     Socioeconomic History     Marital status:      Spouse name: Not on file     Number of children: Not on file     Years of education: Not on file     Highest education level: Not on file   Occupational History     Not on file   Social Needs     Financial resource strain: Not on file     Food insecurity:     Worry: Not on file     Inability: Not on file     Transportation needs:     Medical: Not on file     Non-medical: Not on file   Tobacco Use     Smoking status: Current Every Day Smoker     Packs/day: 1.00     Years: 25.00     Pack years: 25.00     Types: Cigarettes     Last attempt to quit:  10/1/2007     Years since quittin.0     Smokeless tobacco: Never Used   Substance and Sexual Activity     Alcohol use: Yes     Alcohol/week: 0.0 standard drinks     Comment: weekends     Drug use: No     Sexual activity: Yes     Partners: Female   Lifestyle     Physical activity:     Days per week: Not on file     Minutes per session: Not on file     Stress: Not on file   Relationships     Social connections:     Talks on phone: Not on file     Gets together: Not on file     Attends Denominational service: Not on file     Active member of club or organization: Not on file     Attends meetings of clubs or organizations: Not on file     Relationship status: Not on file     Intimate partner violence:     Fear of current or ex partner: Not on file     Emotionally abused: Not on file     Physically abused: Not on file     Forced sexual activity: Not on file   Other Topics Concern      Service No     Blood Transfusions No     Caffeine Concern No     Occupational Exposure No     Hobby Hazards No     Sleep Concern Yes     Comment: C-Pap      Stress Concern No     Weight Concern Yes     Special Diet No     Back Care No     Exercise No     Bike Helmet Not Asked     Comment: NA     Seat Belt Yes     Self-Exams No     Parent/sibling w/ CABG, MI or angioplasty before 65F 55M? Yes   Social History Narrative     Not on file       Family History:  Family History   Problem Relation Age of Onset     Cancer Father         skin cancer     Cancer Sister         skin, ovarian     Breast Cancer Sister      Cancer Brother         skin     Diabetes No family hx of             Review of Systems:     Constitutional: Denies fever or chills   Eyes: Denies change in visual acuity   HENT: Denies nasal congestion or sore throat   Respiratory: Denies cough or shortness of breath   Cardiovascular: Denies chest pain or edema   GI: Denies bloody stools;  + abdominal pain, nausea, vomiting, diarrhea   : Denies dysuria   Musculoskeletal:  Positive neck pain, back pain   Integument: Denies rash   Neurologic: Denies headache, focal weakness or sensory changes   Endocrine: Denies polyuria or polydipsia   Lymphatic: Denies swollen glands   Psychiatric: Denies depression or anxiety     Objective:     Vital Signs:  /68   Pulse 86   Temp 98.1  F (36.7  C) (Oral)   Resp 18   Wt 93 kg (205 lb)   SpO2 91%   BMI 31.17 kg/m      Physical Exam:   Physical Exam  Constitutional:       Appearance: Normal appearance. He is obese.   HENT:      Head: Atraumatic.      Nose: Nose normal.      Mouth/Throat:      Mouth: Mucous membranes are moist.   Eyes:      Extraocular Movements: Extraocular movements intact.      Conjunctiva/sclera: Conjunctivae normal.   Neck:      Musculoskeletal: Neck supple.   Cardiovascular:      Rate and Rhythm: Normal rate.      Pulses: Normal pulses.   Pulmonary:      Effort: Pulmonary effort is normal.   Abdominal:      General: There is no distension.      Palpations: Abdomen is soft.      Tenderness: There is tenderness. There is no guarding or rebound.   Musculoskeletal: Normal range of motion.   Skin:     General: Skin is warm and dry.      Capillary Refill: Capillary refill takes less than 2 seconds.   Neurological:      General: No focal deficit present.      Mental Status: He is alert and oriented to person, place, and time.   Psychiatric:         Mood and Affect: Mood normal.           Pre-operative labs and imaging, if any, were reviewed.      Assessment:     Biliary colic  Chronic calculus cholecystitis  Obesity  Tobacco abuse  Chronic neck pain      Plan:     1. The risks, benefits, alternatives, and possible complications of surgery were discussed with the patient and/or their legal guardian.  The patient is agreeable to proceed and written informed consent has been obtained.  All questions have been answered to the patient's satisfaction.  The surgical site has been marked pre-operatively and confirmed by the  patient.  2. Proceed with laparoscopic cholecystectomy, possible open in the operating room.  3. Follow up with surgeon in 1-2 weeks.        Electronically signed by:  Barry Molina MD  10/17/2019   9:03 PM

## 2019-10-18 NOTE — OR NURSING
[facility] Same Day Surgery  Discharge Call Back  Miguel A Vaughn  1960  MRN: [id number]  Home: [phone] (home)   PCP: Ferny Huerta    We are calling to see how you're doing since your surgery/procedure with us?   Comments: Good  Clinical Questions  1. Have you had time to look at your discharge instructions? Do you have any questions in regards to the instructions?   Comment: no questions regarding instructions.  Questions about long term disability papers (directed to bring papers in to Dr Molina)  2. Do you feel your pain is being controlled with the regimen the surgeon sent you home on? (ie: prescription medications, over the counter pain medications, ice packs)   Comments: so far so good  3. Have you noticed any drainage on your dressing? Do you know what to do if you have bleeding as a result of your procedure?   Comments: no/yes  4. Have you had any nausea/vomiting? Do you know how to treat this?   Comment: none/yes  5. Have you had any signs/symptoms of infection? (ie: fever, swelling, heat, drainage or redness) Do you know what to do if you have?   Comment: no/yes  6. Do you have a follow up appointment made with your surgeon? Do you have a number to contact them at if you need it?   Comment: yes        You may be randomly selected to fill out a [facility] survey. We would appreciate you taking the time to fill this out. It is important to us if you would answer all of the questions on the survey.

## 2019-10-18 NOTE — OP NOTE
OPERATIVE NOTE  [facility] SURGERY    DATE:  10/17/2019    SURGEON:  Barry Molina DO    ASSISTANT:  Single scrub tech    PREOPERATIVE DIAGNOSIS:  Biliary colic  Chronic calculus cholecystitis  Obesity  Tobacco abuse  Chronic neck pain    POSTOPERATIVE DIAGNOSIS:  Biliary colic  Chronic calculus cholecystitis  Obesity  Tobacco abuse  Chronic neck pain    OPERATION:  Laparoscopic cholecystectomy .    ANESTHESIA:  General endotracheal.    INDICATIONS FOR PROCEDURE:  The patient is a 58 year old year old male with recurrent biliary colic.  GBUS noted sludge at the neck of gallbladder; pain was not controlled despite IV narcotics.  Based on this, laparoscopic cholecystectomy was recommended.    FINDINGS:  Chronic calculus cholecystitis with omental adhesions.      PROCEDURE IN DETAIL: The patient was preoperatively identified. Consent was signed and placed on the chart. She/he was brought back to the operative suite where he was laid supine on the operating table. General endotracheal anesthesia was induced per anesthesia protocol. She/he was then prepped and draped in sterile fashion. We started by infiltrating 5 cc of local anesthetic in the right upper quadrant area and made small skin incision and accessing the abdominal cavity by using Optiview trocar and 5-mm trocar site visualizing all layers of the abdominal wall until we reached the peritoneal cavity. We then insufflated  with CO2 gas to create pneumoperitoneum. A second right subcostal port was inserted, also 5 mm, as well as subxiphoid at supraumbilical site. All were 5 mm ports except the subxiphoid. All were inserted under direct visualization and all sites were preanesthetized with local anesthetic prior to the insertion of the trocar. We then grabbed the fundus of the gallbladder, retracted it anteriorly and cephalad. A large bore IV on a 30cc syringe was used to aspirated the distended gallbladder in order to grab it properly.   There were large  number of adhesions to the greater omentum and the gallbladder.These were bluntly taken down. After we had successfully taken down these adhesions, we then grabbed the neck of the gallbladder, retracted it laterally, and undertook dissection of the Calot's triangle. We identified the duct and artery, skeletonized the structures, clipped them proximally and distally, and ligated them with EndoShears. At this point, we took the gallbladder off of the liver bed using Bovie electrosurgery. After the gallbladder had been completely liberated from the liver bed, we removed it from the abdomen using an Endopouch.  There was bleeding at the liver bed; this was slowed down by the cautery; however I inserted two sheets of SNOW along this area and pressure was held for 5 mins.  I recheck this area and noted that hemostasis was achieved.  I left the SNOW in place.   At this point, we then irrigated copiously in this area and then suctioned out all irrigant. Hemostasis was noted to be excellent at the conclusion of the case. We closed the 12mm trocar transfascially with a 0-vicryl on a UR6.All irrigant that could be visualized was removed from the abdomen. We then desufflated the abdomen, reduced pneumoperitoneum, and removed the trocars under direct visualization. We then undertook skin closure with interrupted Monocryl sutures in subcuticular fashion. The patient tolerated the procedure well and was brought to PACU in stable condition.    COMPLICATIONS: None.    ESTIMATED BLOOD LOSS: 20cc    SPECIMENS: Gallbladder.    DISPOSITION: Stable to PACU with anticipated discharge home later today.    [facility] herbert

## 2019-10-18 NOTE — ED NOTES
Verified with Dr Molina that she wants the 2 g Ancef not the 1 g started.  Also verified to administer Tylenol oral that was ordered.

## 2019-10-18 NOTE — ANESTHESIA PREPROCEDURE EVALUATION
Anesthesia Pre-Procedure Evaluation    Patient: Miguel A Vaughn   MRN: [id number] : 1960          Preoperative Diagnosis: * No pre-op diagnosis entered *    Procedure(s):  CHOLECYSTECTOMY, LAPAROSCOPIC    Past Medical History:   Diagnosis Date     Benign neoplasm of brain (H)      Cervicalgia      Depressive disorder, not elsewhere classified 2008     Deviated nasal septum      Diabetes (H)      Family history of colonic polyps      Headache(784.0)      Hemorrhoids, internal      Hyperlipidemia LDL goal <130 2011     Hypersomnia with sleep apnea, unspecified      BENJAMIN (obstructive sleep apnea) 10/12/2011     Other encephalopathy      Sprain of right ankle 2011     Unspecified disorder of adrenal glands      Past Surgical History:   Procedure Laterality Date     C WINTER W/O FACETEC FORAMOT/DSKC  VRT SEG, CERVICAL       C OPEN RX ANKLE DISLOCATN+FIXATN       COLONOSCOPY  08    Internal hemorrhoids.  Otherwise normal.     COLONOSCOPY  2013    Procedure: COLONOSCOPY;  colonoscopy;  Surgeon: Kody Reynolds MD;  Location:  GI     DISCECTOMY, FUSION CERVICAL ANTERIOR ONE LEVEL, COMBINED N/A 2019    Procedure: C 3-4 ANTERIOR CERVICAL DISECTOMY AND FUSION;  Surgeon: Memo Wooten MD;  Location:  OR     EXAM UNDER ANESTHESIA RECTUM N/A 8/3/2016    Procedure: EXAM UNDER ANESTHESIA RECTUM;  Surgeon: Sami Zamora MD;  Location:  OR     EXPLORE SPINE, REMOVE HARDWARE, COMBINED N/A 2019    Procedure: C 4-5 HARDWARE REMOVAL;  Surgeon: Memo Wooten MD;  Location:  OR      FLEX SIGMOIDOSCOPY W/WO BRAD SPEC BY BRUSH/WASH  06/10/08    bx intra-anal lesions & electrocoagulation of perianal lesions.      REPAIR OF NASAL SEPTUM  2005    Revision septoplasty, submucosal resection of the inferior turbinates.     INCISION AND DRAINAGE PERINEAL, COMBINED N/A 8/3/2016    Procedure: COMBINED INCISION AND DRAINAGE PERINEAL;  Surgeon: Sami Zamora  MD Antolin;  Location: [facility]     INJECT EPIDURAL CERVICAL N/A 8/16/2019    Procedure: INJECTION, SPINE, CERVICAL 6-7, EPIDURAL;  Surgeon: Brent Anguiano MD;  Location: [facility]     SURGICAL HISTORY OF -   08/30/2006    Left occiput C1, C1-C2 facet injection.  [facility]       Anesthesia Evaluation     .             ROS/MED HX    ENT/Pulmonary:     (+)sleep apnea, BENJAMIN risk factors snores loudly, hypertension, obese, daytime somnolence, uses CPAP , . .    Neurologic:  - neg neurologic ROS     Cardiovascular:  - neg cardiovascular ROS   (+) ----. : . . . :. . Previous cardiac testing date:results:date: results:ECG reviewed date: results:Sinus Rhythm   Low voltage with rightward P-axis and rotation -possible pulmonary disease.     ABNORMAL date: results:          METS/Exercise Tolerance:     Hematologic:  - neg hematologic  ROS       Musculoskeletal:   (+)  other musculoskeletal- Cervical fusion 2 levels - decreased ROm      GI/Hepatic:  - neg GI/hepatic ROS       Renal/Genitourinary:  - ROS Renal section negative       Endo:     (+) type II DM Not using insulin - not using insulin pump .      Psychiatric:  - neg psychiatric ROS       Infectious Disease:   (+) MRSA,       Malignancy:      - no malignancy   Other:    - neg other ROS                      Physical Exam  Normal systems: cardiovascular, pulmonary and dental    Airway   Mallampati: III  TM distance: >3 FB  Neck ROM: limited    Dental     Cardiovascular   Rhythm and rate: regular and normal  (-) no murmur    Pulmonary    breath sounds clear to auscultation            Lab Results   Component Value Date    WBC 10.7 10/17/2019    HGB 15.2 10/17/2019    HCT 45.1 10/17/2019     10/17/2019    CRP <2.9 10/17/2019    SED 14 08/03/2016     10/17/2019    POTASSIUM 4.5 10/17/2019    CHLORIDE 107 10/17/2019    CO2 29 10/17/2019    BUN 9 10/17/2019    CR 0.89 10/17/2019     (H) 10/17/2019    FRANCIA 9.4 10/17/2019    ALBUMIN 3.9 10/17/2019    PROTTOTAL 7.4  "10/17/2019    ALT 29 10/17/2019    AST 14 10/17/2019    ALKPHOS 105 10/17/2019    BILITOTAL 0.5 10/17/2019    LIPASE 51 07/27/2011    AMYLASE 75 07/27/2011    INR 0.95 06/06/2008    TSH 2.25 08/17/2018       Preop Vitals  BP Readings from Last 3 Encounters:   10/17/19 130/68   10/03/19 (!) 166/70   09/12/19 (!) 140/84    Pulse Readings from Last 3 Encounters:   10/17/19 86   09/12/19 94   08/16/19 77      Resp Readings from Last 3 Encounters:   10/17/19 18   09/12/19 18   08/16/19 16    SpO2 Readings from Last 3 Encounters:   10/17/19 91%   09/12/19 98%   08/16/19 98%      Temp Readings from Last 1 Encounters:   10/17/19 98.1  F (36.7  C) (Oral)    Ht Readings from Last 1 Encounters:   10/03/19 1.727 m (5' 8\")      Wt Readings from Last 1 Encounters:   10/17/19 93 kg (205 lb)    Estimated body mass index is 31.17 kg/m  as calculated from the following:    Height as of 10/3/19: 1.727 m (5' 8\").    Weight as of this encounter: 93 kg (205 lb).       Anesthesia Plan      History & Physical Review  History and physical reviewed and following examination; no interval change.    ASA Status:  2 .    NPO Status:  > 4 hours, full stomach and waived due to emergency    Plan for General, ETT and RSI with Intravenous and Propofol induction. Maintenance will be Balanced.    PONV prophylaxis:  Ondansetron (or other 5HT-3) and Dexamethasone or Solumedrol  Additional equipment: Videolaryngoscope      Postoperative Care  Postoperative pain management:  IV analgesics and Oral pain medications.      Consents  Anesthetic plan, risks, benefits and alternatives discussed with:  Patient and Spouse.  Use of blood products discussed: No .   .                 SEBASTIÁN Tucker CRNA  "

## 2019-10-21 ENCOUNTER — TELEPHONE (OUTPATIENT)
Dept: NEUROSURGERY | Facility: CLINIC | Age: 59
End: 2019-10-21

## 2019-10-21 LAB — COPATH REPORT: NORMAL

## 2019-10-21 NOTE — TELEPHONE ENCOUNTER
Type of surgery:C6-7 ACDF  Location of surgery: [facility]  Date and time of surgery: 11/12/19 @8:00am  Surgeon: MD Je  Pre-Op Appt Date: 10/28/19 klaudia/ Ferny Gomez  Post-Op Appt Date: 12/26/19 klaudia/ Payal   Packet sent out: Yes  Pre-cert/Authorization completed:  Yes  Date: 10/21/19 SRINIVAS
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
## 2019-10-24 ENCOUNTER — APPOINTMENT (OUTPATIENT)
Dept: CT IMAGING | Facility: CLINIC | Age: 59
End: 2019-10-24
Attending: FAMILY MEDICINE
Payer: COMMERCIAL

## 2019-10-24 ENCOUNTER — TELEPHONE (OUTPATIENT)
Dept: SURGERY | Facility: CLINIC | Age: 59
End: 2019-10-24

## 2019-10-24 ENCOUNTER — HOSPITAL ENCOUNTER (EMERGENCY)
Facility: CLINIC | Age: 59
Discharge: HOME | End: 2019-10-24
Attending: FAMILY MEDICINE | Admitting: FAMILY MEDICINE
Payer: COMMERCIAL

## 2019-10-24 VITALS
TEMPERATURE: 98.2 F | BODY MASS INDEX: 30.41 KG/M2 | SYSTOLIC BLOOD PRESSURE: 141 MMHG | WEIGHT: 200 LBS | HEART RATE: 86 BPM | OXYGEN SATURATION: 96 % | RESPIRATION RATE: 22 BRPM | DIASTOLIC BLOOD PRESSURE: 89 MMHG

## 2019-10-24 DIAGNOSIS — Z90.49 S/P LAPAROSCOPIC CHOLECYSTECTOMY: Primary | ICD-10-CM

## 2019-10-24 DIAGNOSIS — R10.9 RIGHT FLANK PAIN: ICD-10-CM

## 2019-10-24 LAB
ALBUMIN SERPL-MCNC: 3.9 G/DL (ref 3.4–5)
ALBUMIN UR-MCNC: 30 MG/DL
ALP SERPL-CCNC: 106 U/L (ref 40–150)
ALT SERPL W P-5'-P-CCNC: 31 U/L (ref 0–70)
ANION GAP SERPL CALCULATED.3IONS-SCNC: 2 MMOL/L (ref 3–14)
APPEARANCE UR: ABNORMAL
AST SERPL W P-5'-P-CCNC: 12 U/L (ref 0–45)
BASOPHILS # BLD AUTO: 0 10E9/L (ref 0–0.2)
BASOPHILS NFR BLD AUTO: 0.3 %
BILIRUB SERPL-MCNC: 0.7 MG/DL (ref 0.2–1.3)
BILIRUB UR QL STRIP: NEGATIVE
BUN SERPL-MCNC: 10 MG/DL (ref 7–30)
CALCIUM SERPL-MCNC: 9.4 MG/DL (ref 8.5–10.1)
CHLORIDE SERPL-SCNC: 106 MMOL/L (ref 94–109)
CO2 SERPL-SCNC: 32 MMOL/L (ref 20–32)
COLOR UR AUTO: ABNORMAL
CREAT SERPL-MCNC: 0.88 MG/DL (ref 0.66–1.25)
DIFFERENTIAL METHOD BLD: ABNORMAL
EOSINOPHIL NFR BLD AUTO: 4.5 %
ERYTHROCYTE [DISTWIDTH] IN BLOOD BY AUTOMATED COUNT: 12.7 % (ref 10–15)
ERYTHROCYTE [SEDIMENTATION RATE] IN BLOOD BY WESTERGREN METHOD: 9 MM/H (ref 0–20)
GFR SERPL CREATININE-BSD FRML MDRD: >90 ML/MIN/{1.73_M2}
GLUCOSE SERPL-MCNC: 96 MG/DL (ref 70–99)
GLUCOSE UR STRIP-MCNC: NEGATIVE MG/DL
HCT VFR BLD AUTO: 48.4 % (ref 40–53)
HGB BLD-MCNC: 16.5 G/DL (ref 13.3–17.7)
HGB UR QL STRIP: NEGATIVE
HYALINE CASTS #/AREA URNS LPF: 10 /LPF (ref 0–2)
IMM GRANULOCYTES # BLD: 0.1 10E9/L (ref 0–0.4)
IMM GRANULOCYTES NFR BLD: 0.6 %
KETONES UR STRIP-MCNC: 5 MG/DL
LEUKOCYTE ESTERASE UR QL STRIP: NEGATIVE
LIPASE SERPL-CCNC: 51 U/L (ref 73–393)
LYMPHOCYTES # BLD AUTO: 3.5 10E9/L (ref 0.8–5.3)
LYMPHOCYTES NFR BLD AUTO: 29.8 %
MCH RBC QN AUTO: 31.7 PG (ref 26.5–33)
MCHC RBC AUTO-ENTMCNC: 34.1 G/DL (ref 31.5–36.5)
MCV RBC AUTO: 93 FL (ref 78–100)
MONOCYTES # BLD AUTO: 0.9 10E9/L (ref 0–1.3)
MONOCYTES NFR BLD AUTO: 8 %
MUCOUS THREADS #/AREA URNS LPF: PRESENT /LPF
NEUTROPHILS # BLD AUTO: 6.7 10E9/L (ref 1.6–8.3)
NEUTROPHILS NFR BLD AUTO: 56.8 %
NITRATE UR QL: NEGATIVE
NRBC # BLD AUTO: 0 10*3/UL
NRBC BLD AUTO-RTO: 0 /100
PH UR STRIP: 5 PH (ref 5–7)
PLATELET # BLD AUTO: 230 10E9/L (ref 150–450)
POTASSIUM SERPL-SCNC: 3.9 MMOL/L (ref 3.4–5.3)
PROT SERPL-MCNC: 8 G/DL (ref 6.8–8.8)
RBC # BLD AUTO: 5.2 10E12/L (ref 4.4–5.9)
RBC #/AREA URNS AUTO: <1 /HPF (ref 0–2)
SODIUM SERPL-SCNC: 140 MMOL/L (ref 133–144)
SOURCE: ABNORMAL
SP GR UR STRIP: 1.03 (ref 1–1.03)
SQUAMOUS #/AREA URNS AUTO: 1 /HPF (ref 0–1)
UROBILINOGEN UR STRIP-MCNC: 0 MG/DL (ref 0–2)
WBC # BLD AUTO: 11.7 10E9/L (ref 4–11)
WBC #/AREA URNS AUTO: 2 /HPF (ref 0–5)

## 2019-10-24 PROCEDURE — 99285 EMERGENCY DEPT VISIT HI MDM: CPT | Mod: Z6 | Performed by: FAMILY MEDICINE

## 2019-10-24 PROCEDURE — 96375 TX/PRO/DX INJ NEW DRUG ADDON: CPT | Performed by: FAMILY MEDICINE

## 2019-10-24 PROCEDURE — 85652 RBC SED RATE AUTOMATED: CPT | Performed by: FAMILY MEDICINE

## 2019-10-24 PROCEDURE — 80053 COMPREHEN METABOLIC PANEL: CPT | Performed by: FAMILY MEDICINE

## 2019-10-24 PROCEDURE — 85025 COMPLETE CBC W/AUTO DIFF WBC: CPT | Performed by: FAMILY MEDICINE

## 2019-10-24 PROCEDURE — 99285 EMERGENCY DEPT VISIT HI MDM: CPT | Mod: 25 | Performed by: FAMILY MEDICINE

## 2019-10-24 PROCEDURE — 25000128 H RX IP 250 OP 636: Performed by: FAMILY MEDICINE

## 2019-10-24 PROCEDURE — 36415 COLL VENOUS BLD VENIPUNCTURE: CPT | Performed by: FAMILY MEDICINE

## 2019-10-24 PROCEDURE — 83690 ASSAY OF LIPASE: CPT | Performed by: FAMILY MEDICINE

## 2019-10-24 PROCEDURE — 74176 CT ABD & PELVIS W/O CONTRAST: CPT

## 2019-10-24 PROCEDURE — 72128 CT CHEST SPINE W/O DYE: CPT

## 2019-10-24 PROCEDURE — 96374 THER/PROPH/DIAG INJ IV PUSH: CPT | Performed by: FAMILY MEDICINE

## 2019-10-24 PROCEDURE — 81001 URINALYSIS AUTO W/SCOPE: CPT | Performed by: FAMILY MEDICINE

## 2019-10-24 RX ORDER — OXYCODONE AND ACETAMINOPHEN 5; 325 MG/1; MG/1
1-2 TABLET ORAL EVERY 6 HOURS PRN
Qty: 12 TABLET | Refills: 0 | Status: ON HOLD | OUTPATIENT
Start: 2019-10-24 | End: 2019-11-12

## 2019-10-24 RX ORDER — HYDROMORPHONE HYDROCHLORIDE 1 MG/ML
0.5 INJECTION, SOLUTION INTRAMUSCULAR; INTRAVENOUS; SUBCUTANEOUS ONCE
Status: COMPLETED | OUTPATIENT
Start: 2019-10-24 | End: 2019-10-24

## 2019-10-24 RX ORDER — KETOROLAC TROMETHAMINE 30 MG/ML
30 INJECTION, SOLUTION INTRAMUSCULAR; INTRAVENOUS ONCE
Status: COMPLETED | OUTPATIENT
Start: 2019-10-24 | End: 2019-10-24

## 2019-10-24 RX ADMIN — HYDROMORPHONE HYDROCHLORIDE 0.5 MG: 1 INJECTION, SOLUTION INTRAMUSCULAR; INTRAVENOUS; SUBCUTANEOUS at 21:54

## 2019-10-24 RX ADMIN — KETOROLAC TROMETHAMINE 30 MG: 30 INJECTION, SOLUTION INTRAMUSCULAR at 21:20

## 2019-10-24 NOTE — TELEPHONE ENCOUNTER
Patient calls today stating that he is having severe pain for the past few days. Initially pain was improving post op, however the past few days have been rough and worsening.  He is having nausea and decreased appetite. No increased bloating or distention.  The pain in constant and he is using ibuprofen for pain. Spoke with Dr. Molina who recommended HIDA and LFTS, however they do not do HIDAS any other day but wednesdays.  She recommended ER. I informed the patient who will wait a few hours to see if pain decreases and if it does not he will go to the ER. If it does he will keep scheduled post op.  Educated on s/s that warrant immediate ER evaluation.     Vijaya FARMER, Lead RN, BSN. . .  10/24/2019, 11:45 AM

## 2019-10-24 NOTE — ED AVS SNAPSHOT
[facility]  [address] [place] [address]  Phone:  [phone]  Fax:  [phone]                                    Miguel A Vaughn   MRN: [id number]    Department:  [facility]   Date of Visit:  10/24/2019           After Visit Summary Signature Page    I have received my discharge instructions, and my questions have been answered. I have discussed any challenges I see with this plan with the nurse or doctor.    ..........................................................................................................................................  Patient/Patient Representative Signature      ..........................................................................................................................................  Patient Representative Print Name and Relationship to Patient    ..................................................               ................................................  Date                                   Time    ..........................................................................................................................................  Reviewed by Signature/Title    ...................................................              ..............................................  Date                                               Time          22EPIC Rev 08/18

## 2019-10-25 ENCOUNTER — TELEPHONE (OUTPATIENT)
Dept: FAMILY MEDICINE | Facility: OTHER | Age: 59
End: 2019-10-25

## 2019-10-25 ENCOUNTER — OFFICE VISIT (OUTPATIENT)
Dept: SURGERY | Facility: OTHER | Age: 59
End: 2019-10-25
Payer: COMMERCIAL

## 2019-10-25 VITALS
SYSTOLIC BLOOD PRESSURE: 132 MMHG | DIASTOLIC BLOOD PRESSURE: 64 MMHG | WEIGHT: 211.75 LBS | TEMPERATURE: 97.1 F | BODY MASS INDEX: 32.2 KG/M2

## 2019-10-25 DIAGNOSIS — E11.9 TYPE 2 DIABETES MELLITUS WITHOUT COMPLICATION, WITHOUT LONG-TERM CURRENT USE OF INSULIN (H): ICD-10-CM

## 2019-10-25 DIAGNOSIS — M54.50 ACUTE RIGHT-SIDED LOW BACK PAIN, UNSPECIFIED WHETHER SCIATICA PRESENT: Primary | ICD-10-CM

## 2019-10-25 DIAGNOSIS — Z90.49 S/P LAPAROSCOPIC CHOLECYSTECTOMY: ICD-10-CM

## 2019-10-25 DIAGNOSIS — E78.5 HYPERLIPIDEMIA LDL GOAL <130: ICD-10-CM

## 2019-10-25 PROCEDURE — 99024 POSTOP FOLLOW-UP VISIT: CPT | Performed by: SURGERY

## 2019-10-25 RX ORDER — CYCLOBENZAPRINE HCL 10 MG
10 TABLET ORAL 3 TIMES DAILY PRN
Qty: 30 TABLET | Refills: 0 | Status: SHIPPED | OUTPATIENT
Start: 2019-10-25 | End: 2019-11-01

## 2019-10-25 NOTE — TELEPHONE ENCOUNTER
Reason for Call:  Other appointment    Detailed comments: patient did not get a copy of avs after his appt. Please mail to his home    Phone Number Patient can be reached at: Home number on file [phone] (home)    Best Time: any    Can we leave a detailed message on this number? YES    Call taken on 10/25/2019 at 12:24 PM by Joie Gorman
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
## 2019-10-25 NOTE — LETTER
10/25/2019         RE: Miguel A Vaughn  [address]  [place] [address]        Dear Colleague,    Thank you for referring your patient, Miguel A Vaughn, to the [facility]. Please see a copy of my visit note below.    [facility] FOLLOW-UP NOTE  GENERAL SURGERY    PCP: Ferny Huerta         Assessment and Plan:   Assessment:   Miguel A Vaughn is a 58 year old male who presented post operatively from lap kim on 10/17/2019 and is doing poorly.       ICD-10-CM    1. Acute right-sided low back pain, unspecified whether sciatica present M54.5 cyclobenzaprine (FLEXERIL) 10 MG tablet   2. S/P laparoscopic cholecystectomy Z90.49    3. Hyperlipidemia LDL goal <130 E78.5    4. Type 2 diabetes mellitus without complication, without long-term current use of insulin (H) E11.9        I reviewed the pathology report today with the patient and answered all questions.    Plan:  Patient went to ED and was diagnosed with acute right flank pain likely related to MSK.  CT abd/pel was done which showed post operative changes in the gallbladder fossa.  LFTs not elevated.  I will order a HIDA scan to r/o a biliary leak - but looking at his CT abd/pel and labs - this is quite low.  I explained this is pain is likely due to MSK.  I will give him muscle relaxant to help with the pain; I recommend alternative ice pack and hot pack to this area.  His HIDA will be done next week to r/o a bile leak; and I will see pt again next Friday to re-evaluate his pain and            Subjective:     Miguel A Vaughn is a 58 year old male who presents post operatively from lap kim on 10/17/2019. Pain has not been controled. Currently is not using pain medications. Eating a Regular and having regular bladder/bowel function. Overall doing poorly.  Pain mostly in the right flank and increased with any movement.  Similar to pain pre-op but much worse.  Some nausea when pain at its highest. No fevers; no chills; no pruritus.              Objective:     /64   Temp 97.1  F (36.2  C) (Temporal)   Wt 96 kg (211 lb 12 oz)   BMI 32.20 kg/m      Constitutional: Awake, alert, in acute distress in the sitting position and also when getting up to the exam table.  Respiratory: Non-labored.   Cardiovascular: Regular rate and rhythm.   Abdomen: soft; no tenderness. Incision is Healing well.  Firm and tender at the lower thoracic on the right side - feels like a firm MSK knot and tender on palpation    Barry Molina DO  [facility]              Again, thank you for allowing me to participate in the care of your patient.        Sincerely,        Barry Molina MD

## 2019-10-25 NOTE — TELEPHONE ENCOUNTER
Ferny, please review. I am guessing you will want to see patient before ordering this?    Ross Pizarro,

## 2019-10-25 NOTE — ED TRIAGE NOTES
Patient presents to ED with RLQ pain.  Had emergency gall bladder surgery last week and having increasing pain today.  Patient spoke with Dr. Molina and was told to come to ED for blood work and a scan otherwise she's going to end up sending him here tomorrow from his appointment anyway.

## 2019-10-25 NOTE — TELEPHONE ENCOUNTER
Reason for Call: Request for an order or referral:    Order or referral being requested: order    Date needed: as soon as possible    Has the patient been seen by the PCP for this problem? NO    Additional comments: patient was seen in the ER yesterday and was told to contact his primary care provider to have them order an MRI for the patient. Patient would like to possibly have this done on Monday. Please call and let patient know when this has been ordered.    Phone number Patient can be reached at:  Home number on file [phone] (home) or Cell number on file:    Telephone Information:   Mobile [phone]   Mobile [phone]       Best Time:  anytime    Can we leave a detailed message on this number?  YES    Call taken on 10/25/2019 at 8:42 AM by Tonya Lundberg

## 2019-10-25 NOTE — TELEPHONE ENCOUNTER
Let's see him this afternoon at 1440 or 1540 for evaluation and consideration.  Electronically signed:    Ferny Gomez PA-C

## 2019-10-25 NOTE — DISCHARGE INSTRUCTIONS
You have unexplained right flank pain which I suspect may be coming from the thoracic spine or posterior rib.  Your CT scan of the thoracic spine and abdomen and pelvis were reassuring.  There is a small collection of fluid near the gallbladder fossa where your gallbladder was removed.  Follow-up with Dr. Molina tomorrow as planned.  Reserve Percocet for moderate to severe breakthrough pain.  Return to the emergency department if you develop a fever, or worsening pain.

## 2019-10-25 NOTE — TELEPHONE ENCOUNTER
Called and spoke with patient. He actually has an appointment on Monday with LISETH and is okay with waiting till then to discuss this.     Ross Pizarro,

## 2019-10-28 ENCOUNTER — OFFICE VISIT (OUTPATIENT)
Dept: FAMILY MEDICINE | Facility: OTHER | Age: 59
End: 2019-10-28
Payer: COMMERCIAL

## 2019-10-28 VITALS
BODY MASS INDEX: 32.31 KG/M2 | DIASTOLIC BLOOD PRESSURE: 76 MMHG | OXYGEN SATURATION: 99 % | RESPIRATION RATE: 18 BRPM | HEART RATE: 90 BPM | SYSTOLIC BLOOD PRESSURE: 136 MMHG | WEIGHT: 212.5 LBS | TEMPERATURE: 97.5 F

## 2019-10-28 DIAGNOSIS — Z01.818 PREOP GENERAL PHYSICAL EXAM: Primary | ICD-10-CM

## 2019-10-28 DIAGNOSIS — M54.2 CERVICALGIA: ICD-10-CM

## 2019-10-28 DIAGNOSIS — Z22.322 MRSA (METHICILLIN RESISTANT STAPH AUREUS) CULTURE POSITIVE: ICD-10-CM

## 2019-10-28 DIAGNOSIS — R10.9 RIGHT FLANK PAIN: ICD-10-CM

## 2019-10-28 DIAGNOSIS — M54.12 CERVICAL RADICULOPATHY: ICD-10-CM

## 2019-10-28 DIAGNOSIS — E11.9 TYPE 2 DIABETES MELLITUS WITHOUT COMPLICATION, WITHOUT LONG-TERM CURRENT USE OF INSULIN (H): ICD-10-CM

## 2019-10-28 PROCEDURE — 90471 IMMUNIZATION ADMIN: CPT | Performed by: PHYSICIAN ASSISTANT

## 2019-10-28 PROCEDURE — 90682 RIV4 VACC RECOMBINANT DNA IM: CPT | Performed by: PHYSICIAN ASSISTANT

## 2019-10-28 PROCEDURE — 99214 OFFICE O/P EST MOD 30 MIN: CPT | Mod: 25 | Performed by: PHYSICIAN ASSISTANT

## 2019-10-28 ASSESSMENT — PAIN SCALES - GENERAL: PAINLEVEL: EXTREME PAIN (9)

## 2019-10-28 NOTE — PROGRESS NOTES
[facility] FOLLOW-UP NOTE  GENERAL SURGERY    PCP: Ferny Huerta         Assessment and Plan:   Assessment:   Miguel A Vaughn is a 58 year old male who presented post operatively from [facility] on 10/17/2019 and is doing poorly.       ICD-10-CM    1. Acute right-sided low back pain, unspecified whether sciatica present M54.5 cyclobenzaprine (FLEXERIL) 10 MG tablet   2. S/P laparoscopic cholecystectomy Z90.49    3. Hyperlipidemia LDL goal <130 E78.5    4. Type 2 diabetes mellitus without complication, without long-term current use of insulin (H) E11.9        I reviewed the pathology report today with the patient and answered all questions.    Plan:  Patient went to ED and was diagnosed with acute right flank pain likely related to MSK.  CT abd/pel was done which showed post operative changes in the gallbladder fossa.  LFTs not elevated.  I will order a HIDA scan to r/o a biliary leak - but looking at his CT abd/pel and labs - this is quite low.  I explained this is pain is likely due to MSK.  I will give him muscle relaxant to help with the pain; I recommend alternative ice pack and hot pack to this area.  His HIDA will be done next week to r/o a bile leak; and I will see pt again next Friday to re-evaluate his pain and            Subjective:     Miguel A Vaughn is a 58 year old male who presents post operatively from [facility] on 10/17/2019. Pain has not been controled. Currently is not using pain medications. Eating a Regular and having regular bladder/bowel function. Overall doing poorly.  Pain mostly in the right flank and increased with any movement.  Similar to pain pre-op but much worse.  Some nausea when pain at its highest. No fevers; no chills; no pruritus.             Objective:     /64   Temp 97.1  F (36.2  C) (Temporal)   Wt 96 kg (211 lb 12 oz)   BMI 32.20 kg/m     Constitutional: Awake, alert, in acute distress in the sitting position and also when getting up to the exam  table.  Respiratory: Non-labored.   Cardiovascular: Regular rate and rhythm.   Abdomen: soft; no tenderness. Incision is Healing well.  Firm and tender at the lower thoracic on the right side - feels like a firm MSK knot and tender on palpation    [facility] General Surgery

## 2019-10-29 ENCOUNTER — HOSPITAL ENCOUNTER (OUTPATIENT)
Dept: MRI IMAGING | Facility: CLINIC | Age: 59
Discharge: HOME | End: 2019-10-29
Attending: PHYSICIAN ASSISTANT | Admitting: PHYSICIAN ASSISTANT
Payer: COMMERCIAL

## 2019-10-29 DIAGNOSIS — Z01.818 PREOP GENERAL PHYSICAL EXAM: ICD-10-CM

## 2019-10-29 DIAGNOSIS — E11.9 TYPE 2 DIABETES MELLITUS WITHOUT COMPLICATION, WITHOUT LONG-TERM CURRENT USE OF INSULIN (H): ICD-10-CM

## 2019-10-29 DIAGNOSIS — M54.12 CERVICAL RADICULOPATHY: ICD-10-CM

## 2019-10-29 DIAGNOSIS — Z22.322 MRSA (METHICILLIN RESISTANT STAPH AUREUS) CULTURE POSITIVE: ICD-10-CM

## 2019-10-29 DIAGNOSIS — M54.2 CERVICALGIA: ICD-10-CM

## 2019-10-29 DIAGNOSIS — R10.9 RIGHT FLANK PAIN: ICD-10-CM

## 2019-10-29 PROCEDURE — 72146 MRI CHEST SPINE W/O DYE: CPT

## 2019-10-30 ENCOUNTER — HOSPITAL ENCOUNTER (OUTPATIENT)
Dept: NUCLEAR MEDICINE | Facility: CLINIC | Age: 59
Setting detail: NUCLEAR MEDICINE
Discharge: HOME | End: 2019-10-30
Attending: SURGERY | Admitting: SURGERY
Payer: COMMERCIAL

## 2019-10-30 DIAGNOSIS — Z90.49 S/P LAPAROSCOPIC CHOLECYSTECTOMY: ICD-10-CM

## 2019-10-30 PROCEDURE — 34300033 ZZH RX 343: Performed by: SURGERY

## 2019-10-30 PROCEDURE — 78226 HEPATOBILIARY SYSTEM IMAGING: CPT

## 2019-10-30 PROCEDURE — A9537 TC99M MEBROFENIN: HCPCS | Performed by: SURGERY

## 2019-10-30 RX ORDER — KIT FOR THE PREPARATION OF TECHNETIUM TC 99M MEBROFENIN 45 MG/10ML
5 INJECTION, POWDER, LYOPHILIZED, FOR SOLUTION INTRAVENOUS ONCE
Status: COMPLETED | OUTPATIENT
Start: 2019-10-30 | End: 2019-10-30

## 2019-10-30 RX ADMIN — MEBROFENIN 5.3 MILLICURIE: 45 INJECTION, POWDER, LYOPHILIZED, FOR SOLUTION INTRAVENOUS at 09:00

## 2019-11-01 ENCOUNTER — OFFICE VISIT (OUTPATIENT)
Dept: SURGERY | Facility: OTHER | Age: 59
End: 2019-11-01
Payer: COMMERCIAL

## 2019-11-01 VITALS
TEMPERATURE: 98.3 F | WEIGHT: 216.75 LBS | HEIGHT: 68 IN | BODY MASS INDEX: 32.85 KG/M2 | DIASTOLIC BLOOD PRESSURE: 80 MMHG | SYSTOLIC BLOOD PRESSURE: 146 MMHG

## 2019-11-01 DIAGNOSIS — M54.50 ACUTE RIGHT-SIDED LOW BACK PAIN, UNSPECIFIED WHETHER SCIATICA PRESENT: ICD-10-CM

## 2019-11-01 DIAGNOSIS — I10 HYPERTENSION, UNSPECIFIED TYPE: ICD-10-CM

## 2019-11-01 DIAGNOSIS — Z90.49 S/P LAPAROSCOPIC CHOLECYSTECTOMY: Primary | ICD-10-CM

## 2019-11-01 PROCEDURE — 99024 POSTOP FOLLOW-UP VISIT: CPT | Performed by: SURGERY

## 2019-11-01 RX ORDER — CYCLOBENZAPRINE HCL 10 MG
10 TABLET ORAL 3 TIMES DAILY PRN
Qty: 30 TABLET | Refills: 0 | Status: ON HOLD | OUTPATIENT
Start: 2019-11-01 | End: 2019-11-12

## 2019-11-01 ASSESSMENT — MIFFLIN-ST. JEOR: SCORE: 1772.67

## 2019-11-01 NOTE — PROGRESS NOTES
[facility] FOLLOW-UP NOTE  GENERAL SURGERY    PCP: Ferny Huerta         Assessment and Plan:   Assessment:   Miguel A Vaughn is a 58 year old male who presented post operatively from [facility] on 10/17/2019 and is doingbetter.       ICD-10-CM    1. S/P laparoscopic cholecystectomy Z90.49    2. Acute right-sided low back pain, unspecified whether sciatica present M54.5 cyclobenzaprine (FLEXERIL) 10 MG tablet   3. Hypertension, unspecified type I10      NM HEPATOBILIARY SCAN   10/30/2019 10:03 AM      TECHNIQUE:  5.3 mCi of technetium 99m labeled Mebrofenin were  intravenously given while dynamically imaging the right upper abdomen.        HISTORY:  Status post laparoscopic cholecystectomy     COMPARISON:   Nuclear Study: None.     Other Relevant Studies: CT abdomen and pelvis dated 10/24/2019.  Limited abdominal ultrasound dated 10/17/2019.     FINDINGS:  There is normal, homogeneous uptake of radiotracer in the  liver.  The small bowel is seen by the 10 minute image.     Radiotracer uptake in the central lower pelvis corresponds with  probable urinary bladder radiotracer. This is within normal limits.     No evidence for increased radiotracer uptake in the gallbladder fossa  or in the right lateral abdomen to suggest biliary leak.                                                                      IMPRESSION:  1. Essentially normal HIDA scan for a patient status post  cholecystectomy. No definite evidence for biliary leak is seen.     EVELINA GARCIA MD    Plan:  I explained to Miguel A that she does not have a bile leak.  His right flank pain is likely due to muscular skeletal.  He is to have his neck surgery with Dr. calhoun in 2 weeks.  I recommend that he be seen by his primary care physician to see if he qualifies for physical therapy or chiropractor.  I recommend that he continues on alternating the hot and cold pack.  And use Flexeril as needed for his pain.  He is to not lift more than 15 pounds for a good 6  "weeks from the date of surgery.  All his questions were answered to his satisfaction.           Subjective:     Miguel A Vaughn is a 58 year old male who presents post operatively from [facility] on 10/17/2019. Pain has not been controled. Currently is not using pain medications. Eating a Regular and having regular bladder/bowel function. Overall doing poorly.  Pain mostly in the right flank and increased with any movement.  Similar to pain pre-op but much worse.  Some nausea when pain at its highest. No fevers; no chills; no pruritus.             Objective:     BP (!) 146/80   Temp 98.3  F (36.8  C) (Temporal)   Ht 1.727 m (5' 8\")   Wt 98.3 kg (216 lb 12 oz)   BMI 32.96 kg/m     Constitutional: Awake, alert, in acute distress in the sitting position and also when getting up to the exam table.  Respiratory: Non-labored.   Cardiovascular: Regular rate and rhythm.   Abdomen: soft; no tenderness. Incision is Healing well.  Firm and tender at the lower thoracic on the right side - feels like a firm MSK knot and tender on palpation    [facility], [facility] General Surgery            "

## 2019-11-01 NOTE — PROGRESS NOTES
Outpatient Speech Language Pathology Discharge Note     Patient: Miguel A Vaughn  : 1960    Beginning/End Dates of Reporting Period:  08/15/2019 to 2019    Referring Provider:    Dr. Memo Wooten         Therapy Diagnosis: S/P cervical spinal fusion    Client Self Report: Patient came with wife with for session. UPDATE:  Patient did not schedule additional sessions.  Reports swallowing difficulties resolved.       Objective Measurements:     Goals:  Goal Identifier Safe Food Choices   Goal Description Patient will demonstrate undersstanding of foods that cause high choking risk by choosing foods that minimize pharyngeal residual by patient report to 90% accuracy.   Target Date 19   Date Met      Progress: Progressing     Goal Identifier Single Sips   Goal Description Patient will take single sips of thin liquids to 90% with no cues.   Target Date 19   Date Met      Progress: Progressing     Goal Identifier Self Monitoring   Goal Description The patient will demonstrate the ability to adequately self-monitor swallowing skills and perform appropriate compensatory techniques to reduce globus sensation and to safely consume least restrictive diet with minimal cues to 90% accuracy.   Target Date 19   Date Met      Progress: Progressing     Progress Toward Goals:   Progress limited due to attendance at 2 sessions.     Patient participated well in therapy session with both discussion and trials.  Patient participated well in therapy session with both discussion and trials.      Reviewed VFSS results, recommendations and video,identifying point of penetration for patient and his wife.  Both indicated clear understanding of safe swallow steps to complete in order to maximize safety of swallow.  Instructed patient in safe swallow strategies including single sips, small sips, second swallow and the importance of upright posture.  Completed trials with mixed texture consistency and patient  instructed to separate food from liquid and consume in single consistency trial.  Patient coughed more than 50% with this trial and cued patient to slow rate of speed as well.  No overt s/s of aspiration noted with thin liquid trials.    Overall safety of intake is increasing and both patient and wife demonstrating understanding of safe swallow strategies and warning signs of swallowing difficulties and aspiration.  Continues to require skilled intervention to maximize safety of oral intake.      Progress Toward Goals:    Progress this reporting period:     Plan:  Discharge from therapy.    Discharge:    Reason for Discharge: Patient has failed to schedule further appointments.    Equipment Issued: none    Discharge Plan: Patient to continue home program.

## 2019-11-01 NOTE — LETTER
11/1/2019         RE: Miguel A Vaughn  [address]  [place] [address]        Dear Colleague,    Thank you for referring your patient, Miguel A Vaughn, to the [facility]. Please see a copy of my visit note below.    Miguel A to follow up with Primary Care provider regarding elevated blood pressure.    146/80    Mary KIM CMA     [facility] FOLLOW-UP NOTE  GENERAL SURGERY    PCP: Ferny Huerta         Assessment and Plan:   Assessment:   Miguel A Vaughn is a 58 year old male who presented post operatively from [facility] on 10/17/2019 and is doingbetter.       ICD-10-CM    1. S/P laparoscopic cholecystectomy Z90.49    2. Acute right-sided low back pain, unspecified whether sciatica present M54.5 cyclobenzaprine (FLEXERIL) 10 MG tablet   3. Hypertension, unspecified type I10      NM HEPATOBILIARY SCAN   10/30/2019 10:03 AM      TECHNIQUE:  5.3 mCi of technetium 99m labeled Mebrofenin were  intravenously given while dynamically imaging the right upper abdomen.        HISTORY:  Status post laparoscopic cholecystectomy     COMPARISON:   Nuclear Study: None.     Other Relevant Studies: CT abdomen and pelvis dated 10/24/2019.  Limited abdominal ultrasound dated 10/17/2019.     FINDINGS:  There is normal, homogeneous uptake of radiotracer in the  liver.  The small bowel is seen by the 10 minute image.     Radiotracer uptake in the central lower pelvis corresponds with  probable urinary bladder radiotracer. This is within normal limits.     No evidence for increased radiotracer uptake in the gallbladder fossa  or in the right lateral abdomen to suggest biliary leak.                                                                      IMPRESSION:  1. Essentially normal HIDA scan for a patient status post  cholecystectomy. No definite evidence for biliary leak is seen.     EVELINA GARCIA MD    Plan:  I explained to Miguel A that she does not have a bile leak.  His right flank pain is likely due to  "muscular skeletal.  He is to have his neck surgery with Dr. calhoun in 2 weeks.  I recommend that he be seen by his primary care physician to see if he qualifies for physical therapy or chiropractor.  I recommend that he continues on alternating the hot and cold pack.  And use Flexeril as needed for his pain.  He is to not lift more than 15 pounds for a good 6 weeks from the date of surgery.  All his questions were answered to his satisfaction.           Subjective:     Miguel A Vaughn is a 58 year old male who presents post operatively from [facility] on 10/17/2019. Pain has not been controled. Currently is not using pain medications. Eating a Regular and having regular bladder/bowel function. Overall doing poorly.  Pain mostly in the right flank and increased with any movement.  Similar to pain pre-op but much worse.  Some nausea when pain at its highest. No fevers; no chills; no pruritus.             Objective:     BP (!) 146/80   Temp 98.3  F (36.8  C) (Temporal)   Ht 1.727 m (5' 8\")   Wt 98.3 kg (216 lb 12 oz)   BMI 32.96 kg/m      Constitutional: Awake, alert, in acute distress in the sitting position and also when getting up to the exam table.  Respiratory: Non-labored.   Cardiovascular: Regular rate and rhythm.   Abdomen: soft; no tenderness. Incision is Healing well.  Firm and tender at the lower thoracic on the right side - feels like a firm MSK knot and tender on palpation    Barry Molina DO  [facility]              Again, thank you for allowing me to participate in the care of your patient.        Sincerely,        Barry Molina MD    "

## 2019-11-01 NOTE — PROGRESS NOTES
Miguel A to follow up with Primary Care provider regarding elevated blood pressure.    146/80    Mary KIM CMA

## 2019-11-08 DIAGNOSIS — Z90.49 S/P LAPAROSCOPIC CHOLECYSTECTOMY: ICD-10-CM

## 2019-11-08 DIAGNOSIS — E11.9 TYPE 2 DIABETES MELLITUS WITHOUT COMPLICATION, WITHOUT LONG-TERM CURRENT USE OF INSULIN (H): ICD-10-CM

## 2019-11-08 DIAGNOSIS — M54.2 CERVICALGIA: ICD-10-CM

## 2019-11-08 DIAGNOSIS — R10.9 RIGHT FLANK PAIN: ICD-10-CM

## 2019-11-08 DIAGNOSIS — M54.12 CERVICAL RADICULOPATHY: ICD-10-CM

## 2019-11-08 DIAGNOSIS — Z22.322 MRSA (METHICILLIN RESISTANT STAPH AUREUS) CULTURE POSITIVE: ICD-10-CM

## 2019-11-08 DIAGNOSIS — Z01.818 PREOP GENERAL PHYSICAL EXAM: ICD-10-CM

## 2019-11-08 LAB
ALBUMIN SERPL-MCNC: 3.5 G/DL (ref 3.4–5)
ALP SERPL-CCNC: 118 U/L (ref 40–150)
ALT SERPL W P-5'-P-CCNC: 29 U/L (ref 0–70)
ANION GAP SERPL CALCULATED.3IONS-SCNC: 3 MMOL/L (ref 3–14)
AST SERPL W P-5'-P-CCNC: 9 U/L (ref 0–45)
BILIRUB DIRECT SERPL-MCNC: 0.1 MG/DL (ref 0–0.2)
BILIRUB SERPL-MCNC: 0.5 MG/DL (ref 0.2–1.3)
BUN SERPL-MCNC: 9 MG/DL (ref 7–30)
CALCIUM SERPL-MCNC: 8.7 MG/DL (ref 8.5–10.1)
CHLORIDE SERPL-SCNC: 105 MMOL/L (ref 94–109)
CO2 SERPL-SCNC: 29 MMOL/L (ref 20–32)
CREAT SERPL-MCNC: 0.99 MG/DL (ref 0.66–1.25)
ERYTHROCYTE [DISTWIDTH] IN BLOOD BY AUTOMATED COUNT: 12.8 % (ref 10–15)
GFR SERPL CREATININE-BSD FRML MDRD: 83 ML/MIN/{1.73_M2}
GLUCOSE SERPL-MCNC: 213 MG/DL (ref 70–99)
HBA1C MFR BLD: 9.8 % (ref 0–5.6)
HCT VFR BLD AUTO: 45.7 % (ref 40–53)
HGB BLD-MCNC: 15 G/DL (ref 13.3–17.7)
MCH RBC QN AUTO: 30.9 PG (ref 26.5–33)
MCHC RBC AUTO-ENTMCNC: 32.8 G/DL (ref 31.5–36.5)
MCV RBC AUTO: 94 FL (ref 78–100)
PLATELET # BLD AUTO: 222 10E9/L (ref 150–450)
POTASSIUM SERPL-SCNC: 4.3 MMOL/L (ref 3.4–5.3)
PROT SERPL-MCNC: 7.5 G/DL (ref 6.8–8.8)
RBC # BLD AUTO: 4.86 10E12/L (ref 4.4–5.9)
SODIUM SERPL-SCNC: 137 MMOL/L (ref 133–144)
WBC # BLD AUTO: 9 10E9/L (ref 4–11)

## 2019-11-08 PROCEDURE — 82248 BILIRUBIN DIRECT: CPT | Performed by: PHYSICIAN ASSISTANT

## 2019-11-08 PROCEDURE — 80053 COMPREHEN METABOLIC PANEL: CPT | Performed by: PHYSICIAN ASSISTANT

## 2019-11-08 PROCEDURE — 85027 COMPLETE CBC AUTOMATED: CPT | Performed by: PHYSICIAN ASSISTANT

## 2019-11-08 PROCEDURE — 36415 COLL VENOUS BLD VENIPUNCTURE: CPT | Performed by: PHYSICIAN ASSISTANT

## 2019-11-08 PROCEDURE — 83036 HEMOGLOBIN GLYCOSYLATED A1C: CPT | Performed by: PHYSICIAN ASSISTANT

## 2019-11-11 ENCOUNTER — ANESTHESIA EVENT (OUTPATIENT)
Dept: SURGERY | Facility: CLINIC | Age: 59
End: 2019-11-11
Payer: COMMERCIAL

## 2019-11-12 ENCOUNTER — HOSPITAL ENCOUNTER (OUTPATIENT)
Facility: CLINIC | Age: 59
Discharge: HOME | End: 2019-11-13
Attending: NEUROLOGICAL SURGERY | Admitting: NEUROLOGICAL SURGERY
Payer: COMMERCIAL

## 2019-11-12 ENCOUNTER — APPOINTMENT (OUTPATIENT)
Dept: GENERAL RADIOLOGY | Facility: CLINIC | Age: 59
End: 2019-11-12
Attending: NEUROLOGICAL SURGERY
Payer: COMMERCIAL

## 2019-11-12 ENCOUNTER — TELEPHONE (OUTPATIENT)
Dept: FAMILY MEDICINE | Facility: OTHER | Age: 59
End: 2019-11-12

## 2019-11-12 ENCOUNTER — ANESTHESIA (OUTPATIENT)
Dept: SURGERY | Facility: CLINIC | Age: 59
End: 2019-11-12
Payer: COMMERCIAL

## 2019-11-12 DIAGNOSIS — K80.50 RECURRENT BILIARY COLIC: ICD-10-CM

## 2019-11-12 DIAGNOSIS — E11.9 TYPE 2 DIABETES MELLITUS WITHOUT COMPLICATION, WITHOUT LONG-TERM CURRENT USE OF INSULIN (H): ICD-10-CM

## 2019-11-12 DIAGNOSIS — Z98.1 S/P CERVICAL SPINAL FUSION: ICD-10-CM

## 2019-11-12 DIAGNOSIS — Z90.49 S/P LAPAROSCOPIC CHOLECYSTECTOMY: ICD-10-CM

## 2019-11-12 DIAGNOSIS — M54.50 ACUTE RIGHT-SIDED LOW BACK PAIN, UNSPECIFIED WHETHER SCIATICA PRESENT: ICD-10-CM

## 2019-11-12 LAB
GLUCOSE BLDC GLUCOMTR-MCNC: 130 MG/DL (ref 70–99)
GLUCOSE BLDC GLUCOMTR-MCNC: 168 MG/DL (ref 70–99)
GLUCOSE BLDC GLUCOMTR-MCNC: 171 MG/DL (ref 70–99)

## 2019-11-12 PROCEDURE — 40000170 ZZH STATISTIC PRE-PROCEDURE ASSESSMENT II: Performed by: NEUROLOGICAL SURGERY

## 2019-11-12 PROCEDURE — 27210794 ZZH OR GENERAL SUPPLY STERILE: Performed by: NEUROLOGICAL SURGERY

## 2019-11-12 PROCEDURE — 22853 INSJ BIOMECHANICAL DEVICE: CPT | Performed by: NEUROLOGICAL SURGERY

## 2019-11-12 PROCEDURE — 99218 ZZC INITIAL OBSERVATION CARE,LEVL I: CPT | Performed by: PHYSICIAN ASSISTANT

## 2019-11-12 PROCEDURE — 71000014 ZZH RECOVERY PHASE 1 LEVEL 2 FIRST HR: Performed by: NEUROLOGICAL SURGERY

## 2019-11-12 PROCEDURE — 37000008 ZZH ANESTHESIA TECHNICAL FEE, 1ST 30 MIN: Performed by: NEUROLOGICAL SURGERY

## 2019-11-12 PROCEDURE — 25000128 H RX IP 250 OP 636: Performed by: NEUROLOGICAL SURGERY

## 2019-11-12 PROCEDURE — 25000566 ZZH SEVOFLURANE, EA 15 MIN: Performed by: NEUROLOGICAL SURGERY

## 2019-11-12 PROCEDURE — 37000009 ZZH ANESTHESIA TECHNICAL FEE, EACH ADDTL 15 MIN: Performed by: NEUROLOGICAL SURGERY

## 2019-11-12 PROCEDURE — 40000275 ZZH STATISTIC RCP TIME EA 10 MIN

## 2019-11-12 PROCEDURE — 22853 INSJ BIOMECHANICAL DEVICE: CPT | Mod: AS | Performed by: PHYSICIAN ASSISTANT

## 2019-11-12 PROCEDURE — 99207 ZZC CDG-CODE CATEGORY CHANGED: CPT | Performed by: PHYSICIAN ASSISTANT

## 2019-11-12 PROCEDURE — 25800030 ZZH RX IP 258 OP 636: Performed by: PHYSICIAN ASSISTANT

## 2019-11-12 PROCEDURE — 25000301 ZZH OR RX SURGIFLO W/THROMBIN KIT 2ML 1991 OPNP: Performed by: NEUROLOGICAL SURGERY

## 2019-11-12 PROCEDURE — 25800030 ZZH RX IP 258 OP 636: Performed by: NURSE ANESTHETIST, CERTIFIED REGISTERED

## 2019-11-12 PROCEDURE — 25000128 H RX IP 250 OP 636: Performed by: PHYSICIAN ASSISTANT

## 2019-11-12 PROCEDURE — 40000277 XR SURGERY CARM FLUORO LESS THAN 5 MIN W STILLS

## 2019-11-12 PROCEDURE — 25000125 ZZHC RX 250: Performed by: ANESTHESIOLOGY

## 2019-11-12 PROCEDURE — 22845 INSERT SPINE FIXATION DEVICE: CPT | Mod: AS | Performed by: PHYSICIAN ASSISTANT

## 2019-11-12 PROCEDURE — C1713 ANCHOR/SCREW BN/BN,TIS/BN: HCPCS | Performed by: NEUROLOGICAL SURGERY

## 2019-11-12 PROCEDURE — 25000125 ZZHC RX 250: Performed by: NEUROLOGICAL SURGERY

## 2019-11-12 PROCEDURE — 94660 CPAP INITIATION&MGMT: CPT

## 2019-11-12 PROCEDURE — 25000128 H RX IP 250 OP 636: Performed by: ANESTHESIOLOGY

## 2019-11-12 PROCEDURE — 25000128 H RX IP 250 OP 636: Performed by: NURSE ANESTHETIST, CERTIFIED REGISTERED

## 2019-11-12 PROCEDURE — 22551 ARTHRD ANT NTRBDY CERVICAL: CPT | Mod: AS | Performed by: PHYSICIAN ASSISTANT

## 2019-11-12 PROCEDURE — 36000069 ZZH SURGERY LEVEL 5 EA 15 ADDTL MIN: Performed by: NEUROLOGICAL SURGERY

## 2019-11-12 PROCEDURE — 22551 ARTHRD ANT NTRBDY CERVICAL: CPT | Performed by: NEUROLOGICAL SURGERY

## 2019-11-12 PROCEDURE — 25000132 ZZH RX MED GY IP 250 OP 250 PS 637: Performed by: PHYSICIAN ASSISTANT

## 2019-11-12 PROCEDURE — 25000131 ZZH RX MED GY IP 250 OP 636 PS 637: Performed by: PHYSICIAN ASSISTANT

## 2019-11-12 PROCEDURE — 82962 GLUCOSE BLOOD TEST: CPT

## 2019-11-12 PROCEDURE — C1762 CONN TISS, HUMAN(INC FASCIA): HCPCS | Performed by: NEUROLOGICAL SURGERY

## 2019-11-12 PROCEDURE — 36000071 ZZH SURGERY LEVEL 5 W FLUORO 1ST 30 MIN: Performed by: NEUROLOGICAL SURGERY

## 2019-11-12 PROCEDURE — 25000125 ZZHC RX 250: Performed by: NURSE ANESTHETIST, CERTIFIED REGISTERED

## 2019-11-12 PROCEDURE — 71000015 ZZH RECOVERY PHASE 1 LEVEL 2 EA ADDTL HR: Performed by: NEUROLOGICAL SURGERY

## 2019-11-12 PROCEDURE — 27810322 ZZHC OR SPINE - CAGE/SPACER/DISK/CORD/CONNECTOR OPNP: Performed by: NEUROLOGICAL SURGERY

## 2019-11-12 PROCEDURE — 22845 INSERT SPINE FIXATION DEVICE: CPT | Mod: 59 | Performed by: NEUROLOGICAL SURGERY

## 2019-11-12 DEVICE — GRAFT BONE PUTTY DBX 01ML 038010: Type: IMPLANTABLE DEVICE | Site: SPINE CERVICAL | Status: FUNCTIONAL

## 2019-11-12 DEVICE — IMPLANTABLE DEVICE: Type: IMPLANTABLE DEVICE | Site: SPINE CERVICAL | Status: FUNCTIONAL

## 2019-11-12 DEVICE — IMP PLATE CERV MEDT ZEVO 21MM 1 LVL 3001021: Type: IMPLANTABLE DEVICE | Site: SPINE CERVICAL | Status: FUNCTIONAL

## 2019-11-12 DEVICE — IMP SCR MEDT ZEVO 3.5X15MM ST VA 7723515: Type: IMPLANTABLE DEVICE | Site: SPINE CERVICAL | Status: FUNCTIONAL

## 2019-11-12 RX ORDER — METOCLOPRAMIDE 10 MG/1
10 TABLET ORAL EVERY 6 HOURS PRN
Status: DISCONTINUED | OUTPATIENT
Start: 2019-11-12 | End: 2019-11-13 | Stop reason: HOSPADM

## 2019-11-12 RX ORDER — KETAMINE HCL IN NACL, ISO-OSM 100MG/10ML
20 SYRINGE (ML) INJECTION ONCE
Status: COMPLETED | OUTPATIENT
Start: 2019-11-12 | End: 2019-11-12

## 2019-11-12 RX ORDER — ONDANSETRON 2 MG/ML
4 INJECTION INTRAMUSCULAR; INTRAVENOUS EVERY 6 HOURS PRN
Status: DISCONTINUED | OUTPATIENT
Start: 2019-11-12 | End: 2019-11-13 | Stop reason: HOSPADM

## 2019-11-12 RX ORDER — CYCLOBENZAPRINE HCL 10 MG
5-10 TABLET ORAL 3 TIMES DAILY PRN
Qty: 50 TABLET | Refills: 1 | Status: SHIPPED | OUTPATIENT
Start: 2019-11-12 | End: 2019-12-20

## 2019-11-12 RX ORDER — SODIUM CHLORIDE, SODIUM LACTATE, POTASSIUM CHLORIDE, CALCIUM CHLORIDE 600; 310; 30; 20 MG/100ML; MG/100ML; MG/100ML; MG/100ML
INJECTION, SOLUTION INTRAVENOUS CONTINUOUS
Status: DISCONTINUED | OUTPATIENT
Start: 2019-11-12 | End: 2019-11-12

## 2019-11-12 RX ORDER — ACETAMINOPHEN 325 MG/1
650 TABLET ORAL EVERY 6 HOURS PRN
Status: DISCONTINUED | OUTPATIENT
Start: 2019-11-12 | End: 2019-11-13 | Stop reason: HOSPADM

## 2019-11-12 RX ORDER — OXYCODONE AND ACETAMINOPHEN 5; 325 MG/1; MG/1
1-2 TABLET ORAL ONCE
Status: DISCONTINUED | OUTPATIENT
Start: 2019-11-12 | End: 2019-11-12

## 2019-11-12 RX ORDER — ATORVASTATIN CALCIUM 20 MG/1
20 TABLET, FILM COATED ORAL DAILY
Status: DISCONTINUED | OUTPATIENT
Start: 2019-11-12 | End: 2019-11-13 | Stop reason: HOSPADM

## 2019-11-12 RX ORDER — HYDROMORPHONE HYDROCHLORIDE 1 MG/ML
0.2 INJECTION, SOLUTION INTRAMUSCULAR; INTRAVENOUS; SUBCUTANEOUS
Status: DISCONTINUED | OUTPATIENT
Start: 2019-11-12 | End: 2019-11-13 | Stop reason: HOSPADM

## 2019-11-12 RX ORDER — NALOXONE HYDROCHLORIDE 0.4 MG/ML
.1-.4 INJECTION, SOLUTION INTRAMUSCULAR; INTRAVENOUS; SUBCUTANEOUS
Status: DISCONTINUED | OUTPATIENT
Start: 2019-11-12 | End: 2019-11-13 | Stop reason: HOSPADM

## 2019-11-12 RX ORDER — HYDROXYZINE HYDROCHLORIDE 50 MG/ML
50 INJECTION, SOLUTION INTRAMUSCULAR ONCE
Status: COMPLETED | OUTPATIENT
Start: 2019-11-12 | End: 2019-11-12

## 2019-11-12 RX ORDER — LIDOCAINE 40 MG/G
CREAM TOPICAL
Status: DISCONTINUED | OUTPATIENT
Start: 2019-11-12 | End: 2019-11-13 | Stop reason: HOSPADM

## 2019-11-12 RX ORDER — LIDOCAINE HYDROCHLORIDE 20 MG/ML
INJECTION, SOLUTION INFILTRATION; PERINEURAL PRN
Status: DISCONTINUED | OUTPATIENT
Start: 2019-11-12 | End: 2019-11-12

## 2019-11-12 RX ORDER — PROCHLORPERAZINE MALEATE 10 MG
10 TABLET ORAL EVERY 6 HOURS PRN
Status: DISCONTINUED | OUTPATIENT
Start: 2019-11-12 | End: 2019-11-13 | Stop reason: HOSPADM

## 2019-11-12 RX ORDER — FENTANYL CITRATE 50 UG/ML
INJECTION, SOLUTION INTRAMUSCULAR; INTRAVENOUS PRN
Status: DISCONTINUED | OUTPATIENT
Start: 2019-11-12 | End: 2019-11-12

## 2019-11-12 RX ORDER — OXYCODONE AND ACETAMINOPHEN 5; 325 MG/1; MG/1
1-2 TABLET ORAL EVERY 6 HOURS PRN
Qty: 50 TABLET | Refills: 0 | Status: SHIPPED | OUTPATIENT
Start: 2019-11-12 | End: 2021-12-07

## 2019-11-12 RX ORDER — AMOXICILLIN 250 MG
1-2 CAPSULE ORAL DAILY PRN
Status: DISCONTINUED | OUTPATIENT
Start: 2019-11-12 | End: 2019-11-13 | Stop reason: HOSPADM

## 2019-11-12 RX ORDER — MEPERIDINE HYDROCHLORIDE 25 MG/ML
12.5 INJECTION INTRAMUSCULAR; INTRAVENOUS; SUBCUTANEOUS ONCE
Status: COMPLETED | OUTPATIENT
Start: 2019-11-12 | End: 2019-11-12

## 2019-11-12 RX ORDER — CYCLOBENZAPRINE HCL 10 MG
10 TABLET ORAL 3 TIMES DAILY PRN
Status: DISCONTINUED | OUTPATIENT
Start: 2019-11-12 | End: 2019-11-13 | Stop reason: HOSPADM

## 2019-11-12 RX ORDER — PROPOFOL 10 MG/ML
INJECTION, EMULSION INTRAVENOUS PRN
Status: DISCONTINUED | OUTPATIENT
Start: 2019-11-12 | End: 2019-11-12

## 2019-11-12 RX ORDER — ACETAMINOPHEN 325 MG/1
975 TABLET ORAL ONCE
Status: COMPLETED | OUTPATIENT
Start: 2019-11-12 | End: 2019-11-12

## 2019-11-12 RX ORDER — ONDANSETRON 4 MG/1
4 TABLET, ORALLY DISINTEGRATING ORAL EVERY 6 HOURS PRN
Status: DISCONTINUED | OUTPATIENT
Start: 2019-11-12 | End: 2019-11-13 | Stop reason: HOSPADM

## 2019-11-12 RX ORDER — ROPINIROLE 0.25 MG/1
.25-.5 TABLET, FILM COATED ORAL
Status: DISCONTINUED | OUTPATIENT
Start: 2019-11-12 | End: 2019-11-13 | Stop reason: HOSPADM

## 2019-11-12 RX ORDER — MEPERIDINE HYDROCHLORIDE 25 MG/ML
12.5 INJECTION INTRAMUSCULAR; INTRAVENOUS; SUBCUTANEOUS
Status: DISCONTINUED | OUTPATIENT
Start: 2019-11-12 | End: 2019-11-12

## 2019-11-12 RX ORDER — ONDANSETRON 2 MG/ML
INJECTION INTRAMUSCULAR; INTRAVENOUS PRN
Status: DISCONTINUED | OUTPATIENT
Start: 2019-11-12 | End: 2019-11-12

## 2019-11-12 RX ORDER — OXYCODONE AND ACETAMINOPHEN 5; 325 MG/1; MG/1
1 TABLET ORAL ONCE
Status: DISCONTINUED | OUTPATIENT
Start: 2019-11-12 | End: 2019-11-12

## 2019-11-12 RX ORDER — CEFAZOLIN SODIUM 1 G/3ML
1 INJECTION, POWDER, FOR SOLUTION INTRAMUSCULAR; INTRAVENOUS SEE ADMIN INSTRUCTIONS
Status: DISCONTINUED | OUTPATIENT
Start: 2019-11-12 | End: 2019-11-12 | Stop reason: HOSPADM

## 2019-11-12 RX ORDER — METOCLOPRAMIDE HYDROCHLORIDE 5 MG/ML
10 INJECTION INTRAMUSCULAR; INTRAVENOUS EVERY 6 HOURS PRN
Status: DISCONTINUED | OUTPATIENT
Start: 2019-11-12 | End: 2019-11-13 | Stop reason: HOSPADM

## 2019-11-12 RX ORDER — NALOXONE HYDROCHLORIDE 0.4 MG/ML
.1-.4 INJECTION, SOLUTION INTRAMUSCULAR; INTRAVENOUS; SUBCUTANEOUS
Status: DISCONTINUED | OUTPATIENT
Start: 2019-11-12 | End: 2019-11-12

## 2019-11-12 RX ORDER — CEFAZOLIN SODIUM 2 G/100ML
2 INJECTION, SOLUTION INTRAVENOUS
Status: COMPLETED | OUTPATIENT
Start: 2019-11-12 | End: 2019-11-12

## 2019-11-12 RX ORDER — OXYCODONE AND ACETAMINOPHEN 5; 325 MG/1; MG/1
1-2 TABLET ORAL EVERY 4 HOURS PRN
Status: DISCONTINUED | OUTPATIENT
Start: 2019-11-12 | End: 2019-11-13 | Stop reason: HOSPADM

## 2019-11-12 RX ORDER — NICOTINE POLACRILEX 4 MG
15-30 LOZENGE BUCCAL
Status: DISCONTINUED | OUTPATIENT
Start: 2019-11-12 | End: 2019-11-13 | Stop reason: HOSPADM

## 2019-11-12 RX ORDER — CALCIUM CARBONATE 500 MG/1
1000 TABLET, CHEWABLE ORAL 4 TIMES DAILY PRN
Status: DISCONTINUED | OUTPATIENT
Start: 2019-11-12 | End: 2019-11-13 | Stop reason: HOSPADM

## 2019-11-12 RX ORDER — BUPIVACAINE HYDROCHLORIDE AND EPINEPHRINE 5; 5 MG/ML; UG/ML
INJECTION, SOLUTION PERINEURAL PRN
Status: DISCONTINUED | OUTPATIENT
Start: 2019-11-12 | End: 2019-11-12 | Stop reason: HOSPADM

## 2019-11-12 RX ORDER — GLYBURIDE-METFORMIN HYDROCHLORIDE 2.5; 5 MG/1; MG/1
1 TABLET ORAL 2 TIMES DAILY WITH MEALS
Status: DISCONTINUED | OUTPATIENT
Start: 2019-11-12 | End: 2019-11-12

## 2019-11-12 RX ORDER — FENTANYL CITRATE 0.05 MG/ML
25-50 INJECTION, SOLUTION INTRAMUSCULAR; INTRAVENOUS
Status: DISCONTINUED | OUTPATIENT
Start: 2019-11-12 | End: 2019-11-12

## 2019-11-12 RX ORDER — FAMOTIDINE 20 MG/1
20 TABLET, FILM COATED ORAL 2 TIMES DAILY
Status: DISCONTINUED | OUTPATIENT
Start: 2019-11-12 | End: 2019-11-13 | Stop reason: HOSPADM

## 2019-11-12 RX ORDER — HYDROMORPHONE HYDROCHLORIDE 1 MG/ML
.3-.5 INJECTION, SOLUTION INTRAMUSCULAR; INTRAVENOUS; SUBCUTANEOUS EVERY 10 MIN PRN
Status: DISCONTINUED | OUTPATIENT
Start: 2019-11-12 | End: 2019-11-12

## 2019-11-12 RX ORDER — ONDANSETRON 4 MG/1
4 TABLET, ORALLY DISINTEGRATING ORAL EVERY 30 MIN PRN
Status: DISCONTINUED | OUTPATIENT
Start: 2019-11-12 | End: 2019-11-12

## 2019-11-12 RX ORDER — EPHEDRINE SULFATE 50 MG/ML
INJECTION, SOLUTION INTRAMUSCULAR; INTRAVENOUS; SUBCUTANEOUS PRN
Status: DISCONTINUED | OUTPATIENT
Start: 2019-11-12 | End: 2019-11-12

## 2019-11-12 RX ORDER — DEXTROSE MONOHYDRATE 25 G/50ML
25-50 INJECTION, SOLUTION INTRAVENOUS
Status: DISCONTINUED | OUTPATIENT
Start: 2019-11-12 | End: 2019-11-13 | Stop reason: HOSPADM

## 2019-11-12 RX ORDER — HYDROXYZINE HYDROCHLORIDE 25 MG/1
25 TABLET, FILM COATED ORAL EVERY 6 HOURS PRN
Status: DISCONTINUED | OUTPATIENT
Start: 2019-11-12 | End: 2019-11-13 | Stop reason: HOSPADM

## 2019-11-12 RX ORDER — SODIUM CHLORIDE 9 MG/ML
INJECTION, SOLUTION INTRAVENOUS CONTINUOUS
Status: DISCONTINUED | OUTPATIENT
Start: 2019-11-12 | End: 2019-11-13

## 2019-11-12 RX ORDER — SODIUM CHLORIDE, SODIUM LACTATE, POTASSIUM CHLORIDE, CALCIUM CHLORIDE 600; 310; 30; 20 MG/100ML; MG/100ML; MG/100ML; MG/100ML
INJECTION, SOLUTION INTRAVENOUS CONTINUOUS PRN
Status: DISCONTINUED | OUTPATIENT
Start: 2019-11-12 | End: 2019-11-12

## 2019-11-12 RX ORDER — ONDANSETRON 2 MG/ML
4 INJECTION INTRAMUSCULAR; INTRAVENOUS EVERY 30 MIN PRN
Status: DISCONTINUED | OUTPATIENT
Start: 2019-11-12 | End: 2019-11-12

## 2019-11-12 RX ADMIN — Medication 20 MG: at 12:04

## 2019-11-12 RX ADMIN — FENTANYL CITRATE 100 MCG: 50 INJECTION, SOLUTION INTRAMUSCULAR; INTRAVENOUS at 08:02

## 2019-11-12 RX ADMIN — SODIUM CHLORIDE, POTASSIUM CHLORIDE, SODIUM LACTATE AND CALCIUM CHLORIDE: 600; 310; 30; 20 INJECTION, SOLUTION INTRAVENOUS at 07:57

## 2019-11-12 RX ADMIN — DEXMEDETOMIDINE HYDROCHLORIDE 0.5 MCG/KG/HR: 100 INJECTION, SOLUTION INTRAVENOUS at 08:02

## 2019-11-12 RX ADMIN — PHENYLEPHRINE HYDROCHLORIDE 50 MCG: 10 INJECTION INTRAVENOUS at 08:37

## 2019-11-12 RX ADMIN — CEFAZOLIN SODIUM 2 G: 2 INJECTION, SOLUTION INTRAVENOUS at 08:17

## 2019-11-12 RX ADMIN — SODIUM CHLORIDE: 9 INJECTION, SOLUTION INTRAVENOUS at 23:45

## 2019-11-12 RX ADMIN — ACETAMINOPHEN 975 MG: 325 TABLET, FILM COATED ORAL at 07:07

## 2019-11-12 RX ADMIN — ATORVASTATIN CALCIUM 20 MG: 20 TABLET, FILM COATED ORAL at 20:05

## 2019-11-12 RX ADMIN — LIDOCAINE HYDROCHLORIDE 100 MG: 20 INJECTION, SOLUTION INFILTRATION; PERINEURAL at 08:02

## 2019-11-12 RX ADMIN — HYDROMORPHONE HYDROCHLORIDE 0.5 MG: 1 INJECTION, SOLUTION INTRAMUSCULAR; INTRAVENOUS; SUBCUTANEOUS at 11:24

## 2019-11-12 RX ADMIN — OXYCODONE HYDROCHLORIDE AND ACETAMINOPHEN 2 TABLET: 5; 325 TABLET ORAL at 20:18

## 2019-11-12 RX ADMIN — FENTANYL CITRATE 50 MCG: 0.05 INJECTION, SOLUTION INTRAMUSCULAR; INTRAVENOUS at 10:51

## 2019-11-12 RX ADMIN — SODIUM CHLORIDE: 9 INJECTION, SOLUTION INTRAVENOUS at 13:58

## 2019-11-12 RX ADMIN — ONDANSETRON 4 MG: 2 INJECTION INTRAMUSCULAR; INTRAVENOUS at 09:26

## 2019-11-12 RX ADMIN — MIDAZOLAM 2 MG: 1 INJECTION INTRAMUSCULAR; INTRAVENOUS at 07:57

## 2019-11-12 RX ADMIN — PHENYLEPHRINE HYDROCHLORIDE 100 MCG: 10 INJECTION INTRAVENOUS at 09:04

## 2019-11-12 RX ADMIN — INSULIN ASPART 1 UNITS: 100 INJECTION, SOLUTION INTRAVENOUS; SUBCUTANEOUS at 17:43

## 2019-11-12 RX ADMIN — MIDAZOLAM HYDROCHLORIDE 1 MG: 1 INJECTION, SOLUTION INTRAMUSCULAR; INTRAVENOUS at 11:57

## 2019-11-12 RX ADMIN — FENTANYL CITRATE 50 MCG: 0.05 INJECTION, SOLUTION INTRAMUSCULAR; INTRAVENOUS at 10:18

## 2019-11-12 RX ADMIN — Medication 5 MG: at 08:26

## 2019-11-12 RX ADMIN — FAMOTIDINE 20 MG: 20 TABLET, FILM COATED ORAL at 20:05

## 2019-11-12 RX ADMIN — DEXMEDETOMIDINE HYDROCHLORIDE 12 MCG: 100 INJECTION, SOLUTION INTRAVENOUS at 08:02

## 2019-11-12 RX ADMIN — HYDROXYZINE HYDROCHLORIDE 50 MG: 50 INJECTION, SOLUTION INTRAMUSCULAR at 10:50

## 2019-11-12 RX ADMIN — Medication 10 MG: at 08:31

## 2019-11-12 RX ADMIN — OXYCODONE HYDROCHLORIDE AND ACETAMINOPHEN 1 TABLET: 5; 325 TABLET ORAL at 15:15

## 2019-11-12 RX ADMIN — HYDROMORPHONE HYDROCHLORIDE 0.5 MG: 1 INJECTION, SOLUTION INTRAMUSCULAR; INTRAVENOUS; SUBCUTANEOUS at 09:49

## 2019-11-12 RX ADMIN — HYDROMORPHONE HYDROCHLORIDE 0.5 MG: 1 INJECTION, SOLUTION INTRAMUSCULAR; INTRAVENOUS; SUBCUTANEOUS at 11:11

## 2019-11-12 RX ADMIN — HYDROMORPHONE HYDROCHLORIDE 0.5 MG: 1 INJECTION, SOLUTION INTRAMUSCULAR; INTRAVENOUS; SUBCUTANEOUS at 10:23

## 2019-11-12 RX ADMIN — PROPOFOL 200 MG: 10 INJECTION, EMULSION INTRAVENOUS at 08:02

## 2019-11-12 RX ADMIN — HYDROMORPHONE HYDROCHLORIDE 0.5 MG: 1 INJECTION, SOLUTION INTRAMUSCULAR; INTRAVENOUS; SUBCUTANEOUS at 10:35

## 2019-11-12 RX ADMIN — ROCURONIUM BROMIDE 20 MG: 10 INJECTION INTRAVENOUS at 08:30

## 2019-11-12 RX ADMIN — MEPERIDINE HYDROCHLORIDE 12.5 MG: 25 INJECTION INTRAMUSCULAR; INTRAVENOUS; SUBCUTANEOUS at 11:42

## 2019-11-12 RX ADMIN — SODIUM CHLORIDE, POTASSIUM CHLORIDE, SODIUM LACTATE AND CALCIUM CHLORIDE: 600; 310; 30; 20 INJECTION, SOLUTION INTRAVENOUS at 09:49

## 2019-11-12 RX ADMIN — ROCURONIUM BROMIDE 50 MG: 10 INJECTION INTRAVENOUS at 08:02

## 2019-11-12 ASSESSMENT — ACTIVITIES OF DAILY LIVING (ADL)
AMBULATION: 0-->INDEPENDENT
FALL_HISTORY_WITHIN_LAST_SIX_MONTHS: NO
BATHING: 0-->INDEPENDENT
TRANSFERRING: 0-->INDEPENDENT
RETIRED_EATING: 0-->INDEPENDENT
COGNITION: 0 - NO COGNITION ISSUES REPORTED
RETIRED_COMMUNICATION: 0-->UNDERSTANDS/COMMUNICATES WITHOUT DIFFICULTY
SWALLOWING: 0-->SWALLOWS FOODS/LIQUIDS WITHOUT DIFFICULTY
DRESS: 0-->INDEPENDENT
TOILETING: 0-->INDEPENDENT

## 2019-11-12 ASSESSMENT — LIFESTYLE VARIABLES: TOBACCO_USE: 1

## 2019-11-12 ASSESSMENT — MIFFLIN-ST. JEOR: SCORE: 1754.74

## 2019-11-12 NOTE — ANESTHESIA CARE TRANSFER NOTE
Patient: Miguel A Vaughn    Procedure(s):  CERVICAL 6-7 ANTERIOR  DECOMPRESSION AND FUSION    Diagnosis: Cervical radiculopathy [M54.12]  Diagnosis Additional Information: No value filed.    Anesthesia Type:   General, ETT     Note:  Airway :Face Mask  Patient transferred to:PACU  Handoff Report: Identifed the Patient, Identified the Reponsible Provider, Reviewed the pertinent medical history, Discussed the surgical course, Reviewed Intra-OP anesthesia mangement and issues during anesthesia, Set expectations for post-procedure period and Allowed opportunity for questions and acknowledgement of understanding      Vitals: (Last set prior to Anesthesia Care Transfer)    CRNA VITALS  11/12/2019 0920 - 11/12/2019 0958      11/12/2019             Resp Rate (set):  10                Electronically Signed By: SEBASTIÁN Gonzalez CRNA  November 12, 2019  9:58 AM

## 2019-11-12 NOTE — OP NOTE
Date of surgery: 11/12/2019  Surgeon: Memo Wooten MD  Assistant: COLTON Fatima  Note: Orly Moe was present for and assisted with the entire surgery, and his/her role as an assistant was crucial for aid in positioning, exposure, suctioning, retraction, and closure    Preoperative diagnosis: Cervical radiculopathy  Postoperative diagnosis: Cervical radiculopathy    Procedure:  1.  C6-7 anterior discectomy and interbody arthrodesis  3.  C6-7 insertion of intervertebral graft  5.  C6 to C7 anterior spinal instrumentation with insertion of Medtronic Zevo plate and vertebral body screws  6.  Use of intraoperative microscope and fluoroscopy    EBL: 25 mL    Indications: 59-year-old male with history of multiple prior neck surgeries who presented with progressive neck pain and bilateral arm pain.  MRI demonstrated progressive central and foraminal stenosis at C6-7, and EMG confirmed acute C7 radiculopathy.  Non-surgical management without improvement.  Risks, benefits, indications, and alternatives were discussed with the patient and family in detail.  All their questions were answered, and they wished to proceed with surgery.    Description of surgery: The patient was positioned supine.  Sterile prepping and draping procedures were performed.  Antibiotics were administered and timeout was performed.  A right horizontal neck incision was performed.  The monopolar was used to divide the platysma, and the Metzenbaum scissors were used to create a plane in the fascia medial to the sternocleidomastoid muscle.  Blunt dissection was used to come down upon the anterior cervical spine.  The spinal needle was used to verify the correct level.  The monopolar was used to elevate the longus coli, and the Trimline retractor was inserted.  The 15 blade was used to perform an annulotomy in the C6-7 disc space.  A complete discectomy was performed with combination of the high-speed drill, curettes, and pituitary rongeurs.  Interbody  arthrodesis was performed with a high-speed drill.  The posterior osteophytes were removed with the drill, and the posterior longitudinal ligament was removed with the Kerrison rongeurs, with decompression of the bilateral neural foramina.  An intervertebral graft was delivered in the disc space at C6-7.  A Medtronic Zevo plate was positioned, and fixed into place with bilateral vertebral body screws at C6 and C7.  Hemostasis was achieved.  Antibiotic irrigation was performed.  The platysma and dermal layers were closed with 3-0 Vicryl sutures, and the skin was closed with a running subcuticular stitch.  There were no intraprocedural complications.

## 2019-11-12 NOTE — CONSULTS
Consult Date:  11/12/2019      PRIMARY CARE PROVIDER:  Ferny Gomez PA-C      REQUESTING PHYSICIAN:  Memo Wooten MD, Neurosurgery      REASON FOR CONSULTATION:  Postoperative medical management of diabetes.      HISTORY OF PRESENT ILLNESS:  Miguel A Vaughn is a pleasant 59-year-old male with a past medical history of type 2 diabetes, obstructive sleep apnea, depressive disorder and cervicalgia who underwent a planned C6-7 anterior diskectomy and interbody arthrodesis.  Procedure was performed today on 11/12/2019 for treatment of cervical radiculopathy.  No immediate complications.  Estimated blood loss 25 mL. The patient was treated perioperatively with Ancef for surgical prophylaxis.      The patient is resting comfortably in bed on my arrival, complains of moderate amount of neck pain radiating into both arms.  He was given multiple pain medications in the PACU, including ketamine, Demerol and fentanyl.  He reports he recently had a cholecystectomy done.  He healed well from this but still reports intermittent right upper quadrant abdominal pain radiating to the back.  He denies any fevers, chills, headache, lightheadedness, chest pain, shortness of breath, nausea, vomiting, dysuria, focal weakness, numbness or tingling.  The patient has had multiple spine surgeries in the past and does take oxycodone on a daily basis.  He voices no other concerns currently.      PAST MEDICAL HISTORY:   1.  Benign neoplasm of the brain.   2.  Cervicalgia.   3.  Depressive disorder.   4.  Deviated nasal septum.   5.  Type 2 diabetes.   6.  Hemorrhoids.   7.  Headaches.   8.  Hyperlipidemia.   9.  Sleep apnea on CPAP.   10.  Encephalopathy.   11.  Right ankle sprain.   12.  Unspecified disorder of adrenal glands.      PAST SURGICAL HISTORY:    Past Surgical History:   Procedure Laterality Date     C WINTER W/O FACETEC FORAMOT/DSKC 1/2 VRT SEG, CERVICAL  1989     C OPEN RX ANKLE DISLOCATN+FIXATN  1982     COLONOSCOPY  05/12/08     Internal hemorrhoids.  Otherwise normal.     COLONOSCOPY  4/17/2013    Procedure: COLONOSCOPY;  colonoscopy;  Surgeon: Kody Reynolds MD;  [facility]: [facility]     DISCECTOMY, FUSION CERVICAL ANTERIOR ONE LEVEL, COMBINED N/A 4/2/2019    Procedure: C 3-4 ANTERIOR CERVICAL DISECTOMY AND FUSION;  Surgeon: Memo Wooten MD;  Location:  OR     EXAM UNDER ANESTHESIA RECTUM N/A 8/3/2016    Procedure: EXAM UNDER ANESTHESIA RECTUM;  Surgeon: Sami Zamora MD;  Location: [facility]     EXPLORE SPINE, REMOVE HARDWARE, COMBINED N/A 4/2/2019    Procedure: C 4-5 HARDWARE REMOVAL;  Surgeon: Memo Wooten MD;  Location: [facility]     HC FLEX SIGMOIDOSCOPY W/WO BRAD SPEC BY BRUSH/WASH  06/10/08    bx intra-anal lesions & electrocoagulation of perianal lesions.     HC REPAIR OF NASAL SEPTUM  12/16/2005    Revision septoplasty, submucosal resection of the inferior turbinates.     INCISION AND DRAINAGE PERINEAL, COMBINED N/A 8/3/2016    Procedure: COMBINED INCISION AND DRAINAGE PERINEAL;  Surgeon: Sami Zmaora MD;  Location: [facility]     INJECT EPIDURAL CERVICAL N/A 8/16/2019    Procedure: INJECTION, SPINE, CERVICAL 6-7, EPIDURAL;  Surgeon: Brent Anguiano MD;  Location: [facility]     LAPAROSCOPIC CHOLECYSTECTOMY N/A 10/17/2019    Procedure: CHOLECYSTECTOMY, LAPAROSCOPIC;  Surgeon: Barry Molina MD;  Location: [facility]     SURGICAL HISTORY OF -   08/30/2006    Left occiput C1, C1-C2 facet injection.  [facility]       SOCIAL HISTORY:  The patient is a current 1 pack a day smoker.  He drinks alcohol occasionally.  He is .      PRIOR TO ADMISSION MEDICATIONS:    Prior to Admission medications    Medication Sig Last Dose Taking? Auth Provider   acetaminophen (TYLENOL) 325 MG tablet Take 2 tablets (650 mg) by mouth every 6 hours as needed for mild pain  Yes Ramez Sargent MD   aspirin (ASA) 81 MG EC tablet Take 1 tablet (81 mg) by mouth daily  Yes Orly Moe PA-C   atorvastatin (LIPITOR) 20 MG tablet Take 1  tablet (20 mg) by mouth daily 11/11/2019 at Unknown time Yes Ferny Huerta PA-C   cyclobenzaprine (FLEXERIL) 10 MG tablet Take 0.5-1 tablets (5-10 mg) by mouth 3 times daily as needed for muscle spasms  Yes Orly Moe PA-C   fluticasone (FLONASE) 50 MCG/ACT spray INHALE 1-2 SPRAYS IN EACH NOSTRIL ONCE DAILY  Patient taking differently: Spray 1-2 sprays into both nostrils At Bedtime INHALE 1-2 SPRAYS IN EACH NOSTRIL ONCE DAILY 11/11/2019 at Unknown time Yes Ferny Huerta PA-C   glyBURIDE-metFORMIN (GLUCOVANCE) 2.5-500 MG tablet TAKE 2 TABLETS TWICE A DAY WITH MEALS 11/11/2019 at Unknown time Yes Ferny Huerta PA-C   oxyCODONE-acetaminophen (PERCOCET) 5-325 MG tablet Take 1-2 tablets by mouth every 6 hours as needed for moderate to severe pain  Yes Orly Moe PA-C   rOPINIRole (REQUIP) 0.25 MG tablet Take 1-2 tablets (0.25-0.5 mg) by mouth nightly as needed (restless legs) Past Week at Unknown time Yes Ferny Huerta PA-C   order for DME Equipment ordered: RESMED Auto PAP Mask type: Full face  Settings: 10-15 CM H2O   Reported, Patient        ALLERGIES:  NO KNOWN DRUG ALLERGIES.      REVIEW OF SYSTEMS:  A complete 10-point review of systems was performed and is negative other than the items previously mentioned above HPI.      PHYSICAL EXAMINATION:   VITAL SIGNS:  Blood pressure 116/69, heart rate 86 beats per minute, temperature 97.3, respiratory rate 16, oxygen saturation 98% on 3 liters of oxygen by nasal cannula.   GENERAL:  The patient is alert, oriented to person, place, date and situation, cooperative, lying in bed in no apparent distress.   HEENT:  Pupils equal, round, reactive to light, extraocular movements intact.  Head normocephalic.  Throat, lips, mucosa and tongue appear moist.   NECK:  Supple.   CARDIOVASCULAR:  Regular rate and rhythm, no murmurs, rubs or gallops.  Distal pulses are intact.  No edema.   PULMONARY:  Lungs are clear to auscultation bilaterally, no crackles, wheezes  or rhonchi.  Breathing is nonlabored   GASTROINTESTINAL:  Abdomen soft, nontender, nondistended with normoactive bowel sounds.   MUSCULOSKELETAL:  The patient moves all 4 extremities equally with normal strength.   NEUROLOGIC:  Alert, cranial nerves II-XII are grossly intact.  Motor function and sensation is intact in all 4 extremities.   SKIN:  Warm, dry, nondiaphoretic.  Dressing is attached to anterior neck which is clean and dry.   PSYCHIATRIC:  Normal mood and affect.      LABORATORY DATA:  Reviewed from 11/08/2019 showing potassium 4.3, creatinine 0.99, glucose 213, WBC 9.0, hemoglobin 15.0, platelet count 222,000.  Glucose today is 171.      ASSESSMENT AND PLAN:  Miguel A Salazar is a pleasant 59-year-old male with a past medical history of type 2 diabetes, sleep apnea, hyperlipidemia and cervical spine disease, who underwent a planned C6-7 anterior diskectomy and interbody arthrodesis on 11/12/2019.  The Hospitalist Service was consulted for routine postoperative medical management.     1.  Cervical radiculopathy, status post C6-7 anterior diskectomy and interbody arthrodesis, postoperative day 0.  The patient is doing well from this standpoint.  He did receive numerous pain medications in the PACU and is still in a moderate amount of pain.  He is chronically on oxycodone at home for his neck pain.  We will defer routine postoperative cares including pain management to the Neurosurgery team.     2.  Type 2 diabetes mellitus.  This is suboptimally controlled with hemoglobin A1c of 9.8%.  Prior to admission, he was on glyburide-metformin 2.5/500 mg 2 tablets twice a day.  We will hold oral medication.  Start moderate dose sliding scale NovoLog insulin q.i.d. with Accu-Cheks.  We will defer medication adjustments to primary care provider.  The patient states he has a followup planned.     3.  Hyperlipidemia.  Continue with prior to admission Lipitor.     4.  Obstructive sleep apnea.  We will order CPAP while  sleeping with home settings.  Recommend continuous pulse oximetry/capnography throughout this hospitalization.     5.  Recent cholecystectomy.  This was performed at an outside hospital, occurred approximately a month ago.  The patient reports intermittent right upper quadrant abdominal pain radiating to the back.  He is tolerating a diet normally.  Recent LFTs are unremarkable.  Defer followup to PCP.     6.  Deep venous thrombosis prophylaxis per Spine Surgery.      CODE STATUS:  Full code.      The patient was [facility]  He is in agreement with my assessment and plan of care.         RAMEZ SARGENT MD       As dictated by DIXON WHALEY PA-C            D: 2019   T: 2019   MT: ZULLY      Name:     JANET YOUNG   MRN:      [id number]        Account:       [id number]   :      1960           Consult Date:  2019      Document: [id number]       cc: Memo Wooten MD

## 2019-11-12 NOTE — PLAN OF CARE
Pt here with anterior C6-C7 decompression and fusion POD 0. A&Ox4. CMS RUE 4/5 with numbness and tingling, weak , RLE 4/5 with numbness and tingling, moderate dorsi and plantar flexion bilaterally. VSS, on 2L O2, using CPAP while sleeping, O2 sats were stable on Capno and 2L O2. Full liquid diet, thin liquids. Takes pills whole.  Bowel sounds hypoactive, not passing gas. Up with assist x1 and GB, has stood at bedside. Gave Percocet x1 for pain. Dressing marked with no extension. Voided in PACU with low PVR, need 1 more PVR. Pt scoring green on the Aggression Stop Light Tool. Plan probable discharge tomorrow. Discharge pending improvement overnight.

## 2019-11-12 NOTE — OR NURSING
Dr Wooten at bedside aware of pt c/o pain 7/10.  No change with meds given.  Ok to keep pt overnight if needed.  PA paged for orders

## 2019-11-12 NOTE — DISCHARGE INSTRUCTIONS
Today you were given 975 mg of Tylenol at 7:07AM. The recommended daily maximum dose is 4000 mg.       **Because you had anesthesia today and your history of sleep apnea, it is extremely important that you use your CPAP machine for the next 24 hours while napping or sleeping.**    Same Day Surgery Discharge Instructions for  Sedation and General Anesthesia       It's not unusual to feel dizzy, light-headed or faint for up to 24 hours after surgery or while taking pain medication.  If you have these symptoms: sit for a few minutes before standing and have someone assist you when you get up to walk or use the bathroom.      You should rest and relax for the next 24 hours. We recommend you make arrangements to have an adult stay with you for at least 24 hours after your discharge.  Avoid hazardous and strenuous activity.      DO NOT DRIVE any vehicle or operate mechanical equipment for 24 hours following the end of your surgery.  Even though you may feel normal, your reactions may be affected by the medication you have received.      Do not drink alcoholic beverages for 24 hours following surgery.       Slowly progress to your regular diet as you feel able. It's not unusual to feel nauseated and/or vomit after receiving anesthesia.  If you develop these symptoms, drink clear liquids (apple juice, ginger ale, broth, 7-up, etc. ) until you feel better.  If your nausea and vomiting persists for 24 hours, please notify your surgeon.        All narcotic pain medications, along with inactivity and anesthesia, can cause constipation. Drinking plenty of liquids and increasing fiber intake will help.      For any questions of a medical nature, call your surgeon.      Do not make important decisions for 24 hours.      If you had general anesthesia, you may have a sore throat for a couple of days related to the breathing tube used during surgery.  You may use Cepacol lozenges to help with this discomfort.  If it worsens or if you  develop a fever, contact your surgeon.       If you feel your pain is not well managed with the pain medications prescribed by your surgeon, please contact your surgeon's office to let them know so they can address your concerns.       [facility]  Dr. Wooten Discharge Instructions Following Spine Surgery  [phone]  Monday - Friday; 8:00 AM - 4:00 PM    In General:   After you have had surgery on your spine, remember do not twist, or excessively flex or extend the area that you had surgery.  These activities can prevent healing.  Pain is normal and to be expected following surgery.  Please call our office to schedule your appointment follow up appointment.      Bowel Care:  Many people have constipation (hard stools) after surgery.  To help prevent constipation: Drink plenty of fluid (8-10 glasses/day); Eat more fiber, such as whole grain bread, bran cereal, and fruits and vegetables; Stay active by walking; Over the counter stool softener may also help.      Medications:  Spine surgery and pain management is unique to all patients.  You will generally be given medications for pain, muscle spasms or tightness, and for constipation during the immediate post op period.  It is important that you use these as prescribed.  Please remember to bring your pill bottles to all of your appointments. Avoid driving while taking narcotic pain medications.  Avoid alcoholic beverages while taking narcotic pain medications. You can use ice to areas of pain as needed, 20 minutes at a time.  Changing positions and walking will help loosen your muscles as well.  No NSAIDs (Ibuprofen, Advil, Motrin, Aleve, Naproxen) for 7 days.    Driving:  No driving while on narcotic pain medications.  It is state law not to drive while under the influence of a drug to a degree which renders you incapable of safely driving.  The narcotic medication you will be taking after surgery falls under this  category.     Activity:   After surgery, most people feel less pain than they have had in a long time.  Walking and light activities will help you regain the use of your muscles.  You are encouraged to walk: start with short walks 5-10 minutes at a time for 4-5 times per day and increase as tolerated.  Stair climbing as tolerated, we recommend you use the railing. No lifting greater than 10 pounds: approximately equal to one gallon of milk. No twisting, bending in the area you have had surgery. No housework, vacuuming, laundry, leaf raking, lawn mowing, or snow removal. Wear your brace (if ordered) as directed.    Showers:  If you have sutures or staples you may shower two days after surgery. It is ok to let water run over your incision but do not touch or scrub on the incision. Pat dry immediately after showering. If there is a dressing in place, you may remove it 2 days after surgery. If you were closed with Derma hobbs (glue), you may shower without covering the incision. No baths, hot tubs, or pool activity for at least 6 weeks.     Nutrition:  In general, your diet restrictions will not change with your surgery.  You may need to eat small frequent meals initially until your appetite returns.  Eat plenty of high fiber foods and drink plenty of fluids. If you do not have a fluid restriction from or prior to surgery, we recommend 6-8 (8oz) glasses of water per day. Other fluids are fine, but water is best. Nausea is not uncommon; it is a common side effect to many pain medications.  We recommend that you take the pain medications with food, if this does not improve your symptoms, please call us.     Smoking:  For proper healing it is required that you quit using all tobacco products.  This includes smoking, chewing, nicotine gums, and nicotine patches.  Follow-Up Appointment  Neurosurgery Appointment with Orly Moe PA-C or Rocky Rice PA-C at the clinic in 6 weeks.  Call [phone] for appointment.  Call   Je at [phone] if these occur: Drainage from your incision, increased pain/redness/swelling, temperatures greater than 101.5, increased leg pain or swelling or unrelieved headaches    Go to the nearest Emergency Room if you experience: chest pain, shortness of breath, neck swelling or swallowing problems

## 2019-11-12 NOTE — ANESTHESIA PREPROCEDURE EVALUATION
Anesthesia Pre-Procedure Evaluation    Patient: Miguel A Vaughn   MRN: [id number] : 1960          Preoperative Diagnosis: Cervical radiculopathy [M54.12]    Procedure(s):  C 6-7 CERVICAL DECOMPRESSION AND FUSION ( ELECTRIC BED ; DAVID ; MEDTRONIC ZEVO ; MIDAS KRZYSZTOF ; LEICA MICROSCOPE )^    Past Medical History:   Diagnosis Date     Benign neoplasm of brain (H)      Cervicalgia      Depressive disorder, not elsewhere classified 2008     Deviated nasal septum      Diabetes (H)      Family history of colonic polyps      Headache(784.0)      Hemorrhoids, internal      Hyperlipidemia LDL goal <130 2011     Hypersomnia with sleep apnea, unspecified      BENJAMIN (obstructive sleep apnea) 10/12/2011     Other encephalopathy      Sprain of right ankle 2011     Unspecified disorder of adrenal glands      Past Surgical History:   Procedure Laterality Date     C WINTER W/O FACETEC FORAMOT/DSKC  VRT SEG, CERVICAL       C OPEN RX ANKLE DISLOCATN+FIXATN       COLONOSCOPY  08    Internal hemorrhoids.  Otherwise normal.     COLONOSCOPY  2013    Procedure: COLONOSCOPY;  colonoscopy;  Surgeon: Kody Reynolds MD;  Location:  GI     DISCECTOMY, FUSION CERVICAL ANTERIOR ONE LEVEL, COMBINED N/A 2019    Procedure: C 3-4 ANTERIOR CERVICAL DISECTOMY AND FUSION;  Surgeon: Memo Wooten MD;  Location:  OR     EXAM UNDER ANESTHESIA RECTUM N/A 8/3/2016    Procedure: EXAM UNDER ANESTHESIA RECTUM;  Surgeon: Sami Zamora MD;  Location:  OR     EXPLORE SPINE, REMOVE HARDWARE, COMBINED N/A 2019    Procedure: C 4-5 HARDWARE REMOVAL;  Surgeon: Memo Wooten MD;  Location:  OR      FLEX SIGMOIDOSCOPY W/WO BRAD SPEC BY BRUSH/WASH  06/10/08    bx intra-anal lesions & electrocoagulation of perianal lesions.      REPAIR OF NASAL SEPTUM  2005    Revision septoplasty, submucosal resection of the inferior turbinates.     INCISION AND DRAINAGE PERINEAL, COMBINED N/A 8/3/2016     Procedure: COMBINED INCISION AND DRAINAGE PERINEAL;  Surgeon: Sami Zamora MD;  [facility]     INJECT EPIDURAL CERVICAL N/A 8/16/2019    Procedure: INJECTION, SPINE, CERVICAL 6-7, EPIDURAL;  Surgeon: Brent Anguiano MD;  Location: [facility]     LAPAROSCOPIC CHOLECYSTECTOMY N/A 10/17/2019    Procedure: CHOLECYSTECTOMY, LAPAROSCOPIC;  Surgeon: Barry Molina MD;  Location: [facility]     SURGICAL HISTORY OF -   08/30/2006    Left occiput C1, C1-C2 facet injection.  [facility]       Anesthesia Evaluation     . Pt has had prior anesthetic.     No history of anesthetic complications          ROS/MED HX    ENT/Pulmonary:     (+)sleep apnea, tobacco use, Current use uses CPAP , . .    Neurologic: Comment: Cervical radiculopathy       Cardiovascular:     (+) Dyslipidemia, hypertension----. : . . PUENTES, . :. .       METS/Exercise Tolerance:  >4 METS   Hematologic:         Musculoskeletal:   (+) arthritis,  -       GI/Hepatic:        (-) GERD and liver disease   Renal/Genitourinary:      (-) renal disease   Endo:     (+) type II DM Not using insulin thyroid problem Obesity, .      Psychiatric:     (+) psychiatric history depression      Infectious Disease:   (+) MRSA,       Malignancy:         Other:                          Physical Exam  Normal systems: cardiovascular and pulmonary    Airway   Mallampati: II  TM distance: >3 FB  Neck ROM: full    Dental   (+) missing    Cardiovascular       Pulmonary             Lab Results   Component Value Date    WBC 9.0 11/08/2019    HGB 15.0 11/08/2019    HCT 45.7 11/08/2019     11/08/2019    CRP <2.9 10/17/2019    SED 9 10/24/2019     11/08/2019    POTASSIUM 4.3 11/08/2019    CHLORIDE 105 11/08/2019    CO2 29 11/08/2019    BUN 9 11/08/2019    CR 0.99 11/08/2019     (H) 11/08/2019    FRANCIA 8.7 11/08/2019    ALBUMIN 3.5 11/08/2019    PROTTOTAL 7.5 11/08/2019    ALT 29 11/08/2019    AST 9 11/08/2019    ALKPHOS 118 11/08/2019    BILITOTAL 0.5 11/08/2019    LIPASE 51 (L)  "10/24/2019    AMYLASE 75 07/27/2011    INR 0.95 06/06/2008    TSH 2.25 08/17/2018       Preop Vitals  BP Readings from Last 3 Encounters:   11/12/19 136/77   11/01/19 (!) 146/80   10/28/19 136/76    Pulse Readings from Last 3 Encounters:   11/12/19 93   10/28/19 90   10/24/19 86      Resp Readings from Last 3 Encounters:   11/12/19 18   10/28/19 18   10/24/19 22    SpO2 Readings from Last 3 Encounters:   11/12/19 97%   10/28/19 99%   10/24/19 96%      Temp Readings from Last 1 Encounters:   11/12/19 35.7  C (96.3  F) (Temporal)    Ht Readings from Last 1 Encounters:   11/12/19 1.727 m (5' 8\")      Wt Readings from Last 1 Encounters:   11/12/19 97 kg (213 lb 14.4 oz)    Estimated body mass index is 32.52 kg/m  as calculated from the following:    Height as of this encounter: 1.727 m (5' 8\").    Weight as of this encounter: 97 kg (213 lb 14.4 oz).       Anesthesia Plan      History & Physical Review  History and physical reviewed and following examination; no interval change.    ASA Status:  3 .    NPO Status:  > 8 hours    Plan for General and ETT with Intravenous induction. Maintenance will be Balanced.    PONV prophylaxis:  Ondansetron (or other 5HT-3) and Dexamethasone or Solumedrol  Additional equipment: Videolaryngoscope precedex gtt      Postoperative Care  Postoperative pain management:  Multi-modal analgesia.      Consents  Anesthetic plan, risks, benefits and alternatives discussed with:  Patient..                 Joe Gomez MD  "

## 2019-11-12 NOTE — ANESTHESIA POSTPROCEDURE EVALUATION
Patient: Miguel A Vaughn    Procedure(s):  CERVICAL 6-7 ANTERIOR  DECOMPRESSION AND FUSION    Diagnosis:Cervical radiculopathy [M54.12]  Diagnosis Additional Information: No value filed.    Anesthesia Type:  General, ETT    Note:  Anesthesia Post Evaluation    Patient location during evaluation: PACU  Patient participation: Able to fully participate in evaluation  Level of consciousness: awake and alert  Pain management: satisfactory to patient  Airway patency: patent  Cardiovascular status: hemodynamically stable  Respiratory status: acceptable and unassisted  Hydration status: balanced  PONV: none     Anesthetic complications: None          Last vitals:  Vitals:    11/12/19 1335 11/12/19 1426 11/12/19 1504   BP: 136/71 116/69 137/78   Pulse:      Resp: 18 16 16   Temp: 36.6  C (97.8  F) 36.3  C (97.3  F) 36.3  C (97.3  F)   SpO2: 96% 98% 95%         Electronically Signed By: Joe Gomez MD  November 12, 2019  3:48 PM

## 2019-11-12 NOTE — PROGRESS NOTES
Per assessment found to be obstructing when sleeping but maintaining O2 sats. Per pt, uses CPAP at home for BENJAMIN but does not have home unit with him.    Discussed with pt an option to use Hospital CPAP overnight and when napping.  In agreement to use Hospital CPAP.    Pt placed on CPAP at 1700 and tolerating      Pt remains on cont CPAP +10  40% overnight use for stable BENJAMIN and tolerating.   Pt Tolerating continuous CPAP.      Plan/assessments    1. Follow up for CPAP continuous for overnight use and as pt tolerates.   2. Recommend CPAP use when napping.     Thank you for allowing me to participate in the care of this patient.     Respiratory care team will continue to follow and assess closely pt respiratory status. RT to follow as scheduled/ as needed/per request.    Gillian Parada    Registered Respiratory Therapist (RRT)

## 2019-11-12 NOTE — OR NURSING
Spoke to Dr. Gomez about patients pain 7/10.    Order obtained for ketamine 20 mg and 1 mg of versed.

## 2019-11-12 NOTE — TELEPHONE ENCOUNTER
Results given to patient, they will call to schedule once he is back home from his neck surgery  Closing encounter  Graciela Roberson RT (R)       He needs to take better control of his diabetes.  Please help him make a follow up appointment to discuss this.  Electronically signed:    Ferny Gomez PA-C

## 2019-11-12 NOTE — BRIEF OP NOTE
[facility] Brief Operative Note    Pre-operative diagnosis: Cervical radiculopathy [M54.12]   Post-operative diagnosis S/p cervical fusion   Procedure: Procedure(s):  CERVICAL 6-7 ANTERIOR  DECOMPRESSION AND FUSION   Surgeon(s): Surgeon(s) and Role:     * Memo Wooten MD - Primary     * Orly Moe PA-C - Assisting   Estimated blood loss: * No values recorded between 11/12/2019  8:35 AM and 11/12/2019  9:44 AM *    Specimens: * No specimens in log *   Findings: See op note

## 2019-11-13 VITALS
WEIGHT: 213.9 LBS | SYSTOLIC BLOOD PRESSURE: 129 MMHG | TEMPERATURE: 98.1 F | DIASTOLIC BLOOD PRESSURE: 75 MMHG | OXYGEN SATURATION: 95 % | HEART RATE: 100 BPM | HEIGHT: 68 IN | BODY MASS INDEX: 32.42 KG/M2 | RESPIRATION RATE: 18 BRPM

## 2019-11-13 LAB
ANION GAP SERPL CALCULATED.3IONS-SCNC: 4 MMOL/L (ref 3–14)
BUN SERPL-MCNC: 9 MG/DL (ref 7–30)
CALCIUM SERPL-MCNC: 8 MG/DL (ref 8.5–10.1)
CHLORIDE SERPL-SCNC: 105 MMOL/L (ref 94–109)
CO2 SERPL-SCNC: 29 MMOL/L (ref 20–32)
CREAT SERPL-MCNC: 0.86 MG/DL (ref 0.66–1.25)
GFR SERPL CREATININE-BSD FRML MDRD: >90 ML/MIN/{1.73_M2}
GLUCOSE BLDC GLUCOMTR-MCNC: 119 MG/DL (ref 70–99)
GLUCOSE BLDC GLUCOMTR-MCNC: 123 MG/DL (ref 70–99)
GLUCOSE SERPL-MCNC: 113 MG/DL (ref 70–99)
POTASSIUM SERPL-SCNC: 4.1 MMOL/L (ref 3.4–5.3)
SODIUM SERPL-SCNC: 138 MMOL/L (ref 133–144)

## 2019-11-13 PROCEDURE — 25000132 ZZH RX MED GY IP 250 OP 250 PS 637: Performed by: NEUROLOGICAL SURGERY

## 2019-11-13 PROCEDURE — 36415 COLL VENOUS BLD VENIPUNCTURE: CPT | Performed by: HOSPITALIST

## 2019-11-13 PROCEDURE — 25000132 ZZH RX MED GY IP 250 OP 250 PS 637: Performed by: PHYSICIAN ASSISTANT

## 2019-11-13 PROCEDURE — 80048 BASIC METABOLIC PNL TOTAL CA: CPT | Performed by: HOSPITALIST

## 2019-11-13 PROCEDURE — 99224 ZZC SUBSEQUENT OBSERVATION CARE,LEVEL I: CPT | Performed by: HOSPITALIST

## 2019-11-13 PROCEDURE — 82962 GLUCOSE BLOOD TEST: CPT

## 2019-11-13 PROCEDURE — 99207 ZZC CDG-CODE CATEGORY CHANGED: CPT | Performed by: HOSPITALIST

## 2019-11-13 RX ORDER — ACETAMINOPHEN 325 MG/1
650 TABLET ORAL EVERY 6 HOURS PRN
COMMUNITY
Start: 2019-11-13 | End: 2023-11-01

## 2019-11-13 RX ADMIN — Medication 1 LOZENGE: at 00:38

## 2019-11-13 RX ADMIN — OXYCODONE HYDROCHLORIDE AND ACETAMINOPHEN 2 TABLET: 5; 325 TABLET ORAL at 00:24

## 2019-11-13 RX ADMIN — Medication 1 LOZENGE: at 08:31

## 2019-11-13 RX ADMIN — Medication 1 LOZENGE: at 02:20

## 2019-11-13 RX ADMIN — ATORVASTATIN CALCIUM 20 MG: 20 TABLET, FILM COATED ORAL at 08:28

## 2019-11-13 RX ADMIN — FAMOTIDINE 20 MG: 20 TABLET, FILM COATED ORAL at 08:28

## 2019-11-13 RX ADMIN — OXYCODONE HYDROCHLORIDE AND ACETAMINOPHEN 2 TABLET: 5; 325 TABLET ORAL at 04:46

## 2019-11-13 NOTE — PROVIDER NOTIFICATION
MD Notification    Notified Person: MD    Notified Person Name: Rosetta    Notification Date/Time: 11/13/19 @ 0808    Notification Interaction: TXT page    Purpose of Notification:  Med reconciliation. Pt trying to leave by 11am     Orders Received: Pending    Comments:

## 2019-11-13 NOTE — PLAN OF CARE
POD 1 from a C6-7 decompression/fusion. A&O. CMS intact except RUE weakness and N/T to R hand, pt states had prior to sx, but is improving. Bowel sounds hypoactive, not passing flatus, tolerating clear diet. VSS. Dressing marked, no change. Up with SBA. Voiding adequately.  C/o neck/throat pain, decreased with percocet and lozenge. Pt scoring green on the Aggression Stop Light Tool. Plan for discharge home today.

## 2019-11-13 NOTE — PLAN OF CARE
POD 1 from a spinal fusion. A&O. CMS intact. Bowel sounds active and audible in all quadrants, + flatus, tolerating full liquid diet. VSS. Dressing has scant amount of dried drainage. Up independently. C/o pain, decreased with tylenol. Pt scoring green on the Aggression Stop Light Tool. Discharge instructions and medications reviewed w/ pt and wife. All questions/ concerns answered at this time. Pt discharged home w/ wife.     not examined

## 2019-11-13 NOTE — PROGRESS NOTES
"[facility]  Hospitalist Progress Note        Ramez Sargent MD   11/13/2019        Interval History:      -no acute issues overnight, pain adequately controlled, planned for discharge today         Assessment and Plan:        Miguel A Salazar is a pleasant 59-year-old male with PMH of type 2 diabetes, sleep apnea, hyperlipidemia and cervical spine disease, who underwent a planned C6-7 anterior diskectomy and interbody arthrodesis on 11/12/2019.  The Hospitalist Service was consulted for routine postoperative medical management.     1.  Cervical radiculopathy, status post C6-7 anterior diskectomy and interbody arthrodesis  -management per primary, pain adequately controlled on PRN Percocet  - planned for discharge today     2.  Type 2 diabetes mellitus.    - This is suboptimally controlled with hemoglobin A1c of 9.8%.  Prior to admission, he was on glyburide-metformin 2.5/500 mg 2 tablets twice a day  - on SSI  - resume OHAs on discharge    3.  Hyperlipidemia.  Continue with prior to admission Lipitor.     4.  Obstructive sleep apnea.    - continue CPAP     5.  Recent cholecystectomy.  This was performed at an outside hospital, occurred approximately a month ago.  The patient reports intermittent right upper quadrant abdominal pain radiating to the back.  He is tolerating a diet normally.  Recent LFTs are unremarkable.  Defer followup to PCP.     6.  Deep venous thrombosis prophylaxis per Spine Surgery.      CODE STATUS:  Full code.     Disposition: plan for discharge home today by primary                      Physical Exam:      Blood pressure 129/75, pulse 100, temperature 98.1  F (36.7  C), temperature source Oral, resp. rate 18, height 1.727 m (5' 8\"), weight 97 kg (213 lb 14.4 oz), SpO2 95 %.  Vitals:    11/12/19 0641   Weight: 97 kg (213 lb 14.4 oz)     Vital Signs with Ranges  Temp:  [96.5  F (35.8  C)-98.1  F (36.7  C)] 98.1  F (36.7  C)  Pulse:  [] 100  Heart Rate:  [81-97] " 90  Resp:  [8-25] 18  BP: ()/(49-89) 129/75  SpO2:  [90 %-98 %] 95 %  I/O's Last 24 hours  I/O last 3 completed shifts:  In: 1550 [P.O.:450; I.V.:1100]  Out: 1500 [Urine:1500]    Constitutional: Alert, awake and oriented X 3; lying comfortably in bed in no apparent distress   HEENT: Pupils equal and reactive to light and accomodation, EOMI intact; neck supple no raised JVD or rigidity;  Dressing on neck clean   Oral cavity: Moist mucosa   Cardiovascular: Normal s1 s2, regular rate and rhythm, no murmur   Lungs: B/l clear to auscultation, no wheezes or crepitations   Abdomen: Soft, nt, nd, no guarding, rigidity or rebound; BS +   LE : No edema   Musculoskeletal: Power 5/5 in all extremities   Neuro: No focal neurological deficits noted, CN II to XII grossly intact   Psychiatry: normal mood and affect                Medications:          atorvastatin  20 mg Oral Daily     famotidine  20 mg Oral BID    Or     famotidine  20 mg Intravenous BID     insulin aspart  1-7 Units Subcutaneous TID AC     insulin aspart  1-5 Units Subcutaneous At Bedtime     sodium chloride (PF)  3 mL Intracatheter Q8H     PRN Meds: acetaminophen, sore throat lozenge, calcium carbonate, cyclobenzaprine, glucose **OR** dextrose **OR** glucagon, HYDROmorphone, hydrOXYzine, lidocaine 4%, lidocaine (buffered or not buffered), metoclopramide **OR** metoclopramide, naloxone, ondansetron **OR** ondansetron, - MEDICATION INSTRUCTIONS -, oxyCODONE-acetaminophen, prochlorperazine **OR** prochlorperazine, rOPINIRole, senna-docusate, sodium chloride (PF)         Data:      All new lab and imaging data was reviewed.   Recent Labs   Lab Test 11/08/19  0858 10/24/19  2144 10/17/19  1752   WBC 9.0 11.7* 10.7   HGB 15.0 16.5 15.2   MCV 94 93 95    230 216      Recent Labs   Lab Test 11/13/19  0508 11/08/19  0858 10/24/19  2144    137 140   POTASSIUM 4.1 4.3 3.9   CHLORIDE 105 105 106   CO2 29 29 32   BUN 9 9 10   CR 0.86 0.99 0.88   ANIONGAP 4  3 2*   FRANCIA 8.0* 8.7 9.4   * 213* 96     Recent Labs   Lab Test 03/13/19  0856   TROPI <0.015

## 2019-11-13 NOTE — DISCHARGE SUMMARY
[facility]    Discharge Summary  Neurosurgery    Date of Admission:  11/12/2019  Date of Discharge:  11/13/2019  Discharging Provider: Memo Wooten  Date of Service (when I saw the patient): 11/13/19    Discharge Diagnoses   Active Problems:    S/P cervical spinal fusion      History of Present Illness   Miguel A Vaughn is an 59 year old male who presented with neck and arm pain.    Hospital Course   Miguel A Vaughn was admitted on 11/12/2019.  The following problems were addressed during his hospitalization:    Active Problems:    S/P cervical spinal fusion    Assessment: C6-7 ACDF    Plan: Routine post-op care        Significant Results and Procedures   C6-7 ACDF    Pending Results   These results will be followed up by myself  Unresulted Labs Ordered in the Past 30 Days of this Admission     No orders found for last 31 day(s).          Code Status   Full Code    Primary Care Physician   Ferny Gomez    Physical Exam   Temp: 98.1  F (36.7  C) Temp src: Oral BP: 129/75 Pulse: 100 Heart Rate: 90 Resp: 18 SpO2: 95 % O2 Device: None (Room air) Oxygen Delivery: 2 LPM  Vitals:    11/12/19 0641   Weight: 97 kg (213 lb 14.4 oz)     Vital Signs with Ranges  Temp:  [96.5  F (35.8  C)-98.1  F (36.7  C)] 98.1  F (36.7  C)  Pulse:  [] 100  Heart Rate:  [81-97] 90  Resp:  [8-25] 18  BP: ()/(49-89) 129/75  SpO2:  [90 %-98 %] 95 %  I/O last 3 completed shifts:  In: 1550 [P.O.:450; I.V.:1100]  Out: 1500 [Urine:1500]    HEENT:  Normocephalic, atraumatic.  PERRLA.  EOM s intact.  Visual fields full to gross exam  Neck:  Supple, non-tender, without lymphadenopathy.  Heart:  No peripheral edema  Lungs:  No SOB  Abdomen:  Non-distended.   Skin:  Warm and dry.  Extremities:  No edema, cyanosis or clubbing.    NEUROLOGICAL EXAMINATION:     Mental status:  Alert and Oriented x 3, speech is fluent.  Cranial nerves:  II-XII intact.   Motor:  Strength is 5/5 throughout the upper and lower extremities  Shoulder  Abduction:  Right:  5/5   Left:  5/5  Biceps:                      Right:  5/5   Left:  5/5  Triceps:                     Right:  5/5   Left:  5/5  Wrist Extensors:       Right:  5/5   Left:  5/5  Wrist Flexors:           Right:  5/5   Left:  5/5  interosseus :            Right:  5/5   Left:  5/5   Hip Flexor:                Right: 5/5  Left:  5/5  Hip Adductor:             Right:  5/5  Left:  5/5  Hip Abductor:             Right:  5/5  Left:  5/5  Gastroc Soleus:        Right:  5/5  Left:  5/5  Tib/Ant:                      Right:  5/5  Left:  5/5  EHL:                     Right:  5/5  Left:  5/5  Sensation:  Intact  Reflexes:  Negative Babinski.  Negative Clonus.  Negative Navarro's.  Coordination:  Smooth finger to nose testing.   Negative pronator drift.  Smooth tandem walking  Incision c/d/i    Time Spent on this Encounter   I, Memo Wooten MD, personally saw the patient today and spent greater than 30 minutes discharging this patient.    Discharge Disposition   Discharged to home  Condition at discharge: Good    Consultations This Hospital Stay   HOSPITALIST IP CONSULT    Discharge Orders      XR Cervical Spine 2/3 Views     Reason for your hospital stay    Neck surgery     Follow-up and recommended labs and tests     Please follow up at [facility] in 6 weeks with xray prior.  Please call the clinic at [phone] to schedule your appointment.     Activity    Discharge instructions:  No lifting of more than 10 pounds, no bending, no twisting until follow up visit.    Ok to shampoo hair, shower or bathe, but, do not scrub or submerge incision under water until evaluated post-operatively in clinic.    Ok to walk as tolerated, avoid bed rest and prolonged sitting.    No contact sports until after follow up visit  No high impact activities such as; running/jogging, snowmobile or 4 nathan riding or any other recreational vehicles.    Do not take NSAIDS (ibuprofen, advil, aleve, naproxen,  etc) for pain control.    Do NOT drive while taking narcotic pain medication.    Call the [facility] at [phone] for increasing redness, swelling or pus draining from the incision, increased pain or any other questions and concerns.     Wound care and dressings    Keep your incision clean and dry at all times.  OK to remove dressing on postop day 2.  OK to shower on postop day 3 and allow water to run over incision, pat dry after shower.  No bathing swimming or submerging in water.  Call immediately or come to ED if any drainage occurs, or you develop new pain, redness, or swelling.     Diet    Follow this diet upon discharge: Home diet     Discharge Medications   Current Discharge Medication List      START taking these medications    Details   acetaminophen (TYLENOL) 325 MG tablet Take 2 tablets (650 mg) by mouth every 6 hours as needed for mild pain    Associated Diagnoses: S/P cervical spinal fusion         CONTINUE these medications which have CHANGED    Details   aspirin (ASA) 81 MG EC tablet Take 1 tablet (81 mg) by mouth daily  Qty: 90 tablet, Refills: 3    Associated Diagnoses: Type 2 diabetes mellitus without complication, without long-term current use of insulin (H)      cyclobenzaprine (FLEXERIL) 10 MG tablet Take 0.5-1 tablets (5-10 mg) by mouth 3 times daily as needed for muscle spasms  Qty: 50 tablet, Refills: 1    Associated Diagnoses: Acute right-sided low back pain, unspecified whether sciatica present      oxyCODONE-acetaminophen (PERCOCET) 5-325 MG tablet Take 1-2 tablets by mouth every 6 hours as needed for moderate to severe pain  Qty: 50 tablet, Refills: 0    Associated Diagnoses: S/P laparoscopic cholecystectomy; Recurrent biliary colic         CONTINUE these medications which have NOT CHANGED    Details   atorvastatin (LIPITOR) 20 MG tablet Take 1 tablet (20 mg) by mouth daily  Qty: 90 tablet, Refills: 1    Associated Diagnoses: Type 2 diabetes mellitus without complication,  without long-term current use of insulin (H); Hyperlipidemia LDL goal <130      fluticasone (FLONASE) 50 MCG/ACT spray INHALE 1-2 SPRAYS IN EACH NOSTRIL ONCE DAILY  Qty: 16 g, Refills: 5    Associated Diagnoses: Seasonal allergic rhinitis due to pollen      glyBURIDE-metFORMIN (GLUCOVANCE) 2.5-500 MG tablet TAKE 2 TABLETS TWICE A DAY WITH MEALS  Qty: 360 tablet, Refills: 0    Associated Diagnoses: Type 2 diabetes mellitus without complication, without long-term current use of insulin (H)      rOPINIRole (REQUIP) 0.25 MG tablet Take 1-2 tablets (0.25-0.5 mg) by mouth nightly as needed (restless legs)  Qty: 180 tablet, Refills: 1    Associated Diagnoses: Restless legs syndrome (RLS)      order for DME Equipment ordered: RESMED Auto PAP Mask type: Full face  Settings: 10-15 CM H2O      senna-docusate (SENOKOT-S/PERICOLACE) 8.6-50 MG tablet Take 1-2 tablets by mouth daily as needed for constipation Take while on oral narcotics to prevent or treat constipation.  Qty: 30 tablet, Refills: 1    Comments: While on narcotics  Associated Diagnoses: S/P cervical spinal fusion         STOP taking these medications       ibuprofen (ADVIL/MOTRIN) 200 MG tablet Comments:   Reason for Stopping:         methocarbamol (ROBAXIN) 750 MG tablet Comments:   Reason for Stopping:         oxyCODONE (ROXICODONE) 5 MG tablet Comments:   Reason for Stopping:             Allergies   Allergies   Allergen Reactions     No Known Drug Allergies      Tape [Adhesive Tape]

## 2019-11-13 NOTE — PLAN OF CARE
POD 0 from an anterior C6-7 Decompression and Fusion. A&Ox4. CMS intact ex N/T RUE (baseline), moderate strength. Bowel sounds hypoactive, denies flatus, tolerating full liquid diet. Voiding in urinal. VSS on RA. Dressing with small drainage - marked. Up with SBA. C/o neck pain, decreased with Percocet. Pt scoring green on the Aggression Stop Light Tool. Plan to discharge home tomorrow.

## 2019-11-14 ENCOUNTER — TELEPHONE (OUTPATIENT)
Dept: NEUROSURGERY | Facility: CLINIC | Age: 59
End: 2019-11-14

## 2019-11-14 NOTE — TELEPHONE ENCOUNTER
Discussed SOB with Scarlett MAYER. Stated in the hospital Respiratory Therapy saw and assessed. Advised he follow up with PCP. Patient was sleeping, so spoke with Nehal his wife, relayed.  She agreed with plan and will make appointment with PCP. Knows to be seen right away if symptoms worsen.

## 2019-11-14 NOTE — TELEPHONE ENCOUNTER
Patient is s/p C6-7 ACDF with Dr. Wooten on 11/12  Contacted patient for post-op follow up call. .     Post-Op Pain Management:   After surgery patient is doing well overall, but reports 7/10 pain.   Pain is controlled by Percocet only has taken once. Has not taken any Flexeril. Discussed his pain  Medication options with him.  Reminded patient to apply ice to help with pain management as well. He had not done this yet.  Able to get liquids down and pudding. Aware to continue to push fluids until swelling of neck goes down.  Pt. States he has shortness of breath. Did not have SOB prior to surgery. He states he had it in the hospital, and reported it to the night nurse. No change since then. He is not concerned and thinks it is due to swelling and flem. Educated him that with shortness of breath we encourage him to be seen and if it worsens to report to ED. Will also discuss with care team.  Does not get heart burn, states he has some heart burn now. No chest pain.    Incision Care:   Patient denies issues or concerns with incision today.   Reminded to watch for s/sx of infection: drainage, redness, swelling, warmth, and fever.   Advised proper incision care: No submerging incision for 8 weeks or until it is fully healed. Okay to shower, use soap, and gently pat dry.   Advised to call clinic with any incision questions or concerns.     Post-Op Activity Restrictions:   Reminded patient to follow activity restrictions for the next 6 weeks: No heavy lifting more than 10lbs, limit repetitive bending/twisting.     Nutrition:   Patient has been eating and drinking well.   No issues with bowel or bladder habits.   Advised patient to increase fluid, fiber intake, and use OTC stool softener if needed.     Follow-Up:  Patient scheduled for post-op visits.   Advised to call our clinic with any post-op questions or concerns: [phone]

## 2019-12-20 ENCOUNTER — ANCILLARY PROCEDURE (OUTPATIENT)
Dept: GENERAL RADIOLOGY | Facility: CLINIC | Age: 59
End: 2019-12-20
Attending: PHYSICIAN ASSISTANT
Payer: COMMERCIAL

## 2019-12-20 ENCOUNTER — OFFICE VISIT (OUTPATIENT)
Dept: NEUROSURGERY | Facility: CLINIC | Age: 59
End: 2019-12-20
Payer: COMMERCIAL

## 2019-12-20 VITALS
DIASTOLIC BLOOD PRESSURE: 72 MMHG | OXYGEN SATURATION: 97 % | TEMPERATURE: 97 F | WEIGHT: 210 LBS | BODY MASS INDEX: 31.83 KG/M2 | HEIGHT: 68 IN | SYSTOLIC BLOOD PRESSURE: 140 MMHG | HEART RATE: 104 BPM

## 2019-12-20 DIAGNOSIS — Z98.1 S/P CERVICAL SPINAL FUSION: Primary | ICD-10-CM

## 2019-12-20 DIAGNOSIS — Z98.1 S/P CERVICAL SPINAL FUSION: ICD-10-CM

## 2019-12-20 DIAGNOSIS — M54.50 ACUTE RIGHT-SIDED LOW BACK PAIN, UNSPECIFIED WHETHER SCIATICA PRESENT: ICD-10-CM

## 2019-12-20 PROCEDURE — 72040 X-RAY EXAM NECK SPINE 2-3 VW: CPT | Mod: TC

## 2019-12-20 PROCEDURE — 99024 POSTOP FOLLOW-UP VISIT: CPT | Performed by: PHYSICIAN ASSISTANT

## 2019-12-20 RX ORDER — CYCLOBENZAPRINE HCL 10 MG
5-10 TABLET ORAL 3 TIMES DAILY PRN
Qty: 50 TABLET | Refills: 1 | Status: SHIPPED | OUTPATIENT
Start: 2019-12-20 | End: 2021-12-07

## 2019-12-20 ASSESSMENT — MIFFLIN-ST. JEOR: SCORE: 1737.05

## 2019-12-20 ASSESSMENT — PAIN SCALES - GENERAL: PAINLEVEL: MODERATE PAIN (5)

## 2019-12-20 NOTE — PROGRESS NOTES
Miguel A to follow up with Primary Care provider regarding elevated blood pressure.      Julia Bernal, CMA

## 2019-12-20 NOTE — LETTER
[address] [address]  Phone: [phone]  Fax: [phone]    December 20, 2019        Miguel A Vaughn  [id number]  [place] [address]          To whom it may concern:    RE: Miguel A Vaughn    Patient was seen and treated today at our clinic. Patient to remain off work for another 6 weeks until seen and re-evaluated in clinic.    Please contact me for questions or concerns.      Sincerely,        Rocky Rice PA-C

## 2019-12-20 NOTE — NURSING NOTE
"Miguel A Vaughn is a 59 year old male who presents for:  No chief complaint on file.       Initial Vitals:  BP (!) 140/72   Pulse 104   Temp 97  F (36.1  C) (Temporal)   Ht 5' 8\" (1.727 m)   Wt 210 lb (95.3 kg)   SpO2 97%   BMI 31.93 kg/m   Estimated body mass index is 31.93 kg/m  as calculated from the following:    Height as of this encounter: 5' 8\" (1.727 m).    Weight as of this encounter: 210 lb (95.3 kg).. Body surface area is 2.14 meters squared. BP completed using cuff size: regular  Moderate Pain (5)        Nursing Comments:         Julia Bernal                    "

## 2019-12-20 NOTE — LETTER
12/20/2019         RE: Miguel A Vaughn  [address]  [place] [address]        Dear Colleague,    Thank you for referring your patient, Miguel A Vaughn, to the [facility]. Please see a copy of my visit note below.    [facility]  Neurosurgery followup:    HPI: 59-year-old male, 6 weeks out from C6-7 ACDF.  He does note a lot of axial neck pain posteriorly, near the level of C6-7.  Pain is exacerbated with any kind of increased intra-thoracic pressure such as sneezing or coughing.  He denies any radiating arm pain or paresthesias.    Exam:  Constitutional:  Alert, well nourished, NAD.  HEENT: Normocephalic, atraumatic.   Pulm:  Without shortness of breath   CV:  No pitting edema of BLE.     Neurological:  Awake  Alert  Oriented x 3  Motor exam:     Shoulder Abduction:  Right:  5/5    Left:  5/5  Biceps:                      Right:  5/5    Left:  5/5  Triceps:                     Right:  5/5    Left:  5/5  Wrist Extensors:       Right:  5/5    Left:  5/5  Wrist Flexors:           Right:  5/5    Left:  5/5  Intrinsics:                  Right:  5/5    Left:  5/5     Able to spontaneously move U/E bilaterally  Sensation intact throughout all U/E dermatomes  Incision: CDI  Imaging: AP and lateral x-rays show stable appearance of his instrumentation in the cervical spine.  No concern for complication.    A/P:   6 weeks out from C6-7 ACDF with Dr. Wooten.  He is struggling with some posterior cervical pain.  This could be facet mediated, which is not uncommon following cervical fusion.  We will try some injections at the C6-7 facet level.  I would also get him started with some physical therapy, to work on some soft tissue techniques.  We will see him back at his next regularly scheduled follow-up appointment.  He voiced agreement and understanding.  I did refill his Robaxin today.        Rocky Rice PA-C  [facility]  [address]  [facility]  [address]  [place] [address]    Tel [phone]  Pager [phone]      Miguel A to follow up with Primary Care provider regarding elevated blood pressure.      Julia Bernal CMA       Again, thank you for allowing me to participate in the care of your patient.        Sincerely,        Rocky Rice PA-C

## 2019-12-20 NOTE — PROGRESS NOTES
[facility]  Neurosurgery followup:    HPI: 59-year-old male, 6 weeks out from C6-7 ACDF.  He does note a lot of axial neck pain posteriorly, near the level of C6-7.  Pain is exacerbated with any kind of increased intra-thoracic pressure such as sneezing or coughing.  He denies any radiating arm pain or paresthesias.    Exam:  Constitutional:  Alert, well nourished, NAD.  HEENT: Normocephalic, atraumatic.   Pulm:  Without shortness of breath   CV:  No pitting edema of BLE.     Neurological:  Awake  Alert  Oriented x 3  Motor exam:     Shoulder Abduction:  Right:  5/5    Left:  5/5  Biceps:                      Right:  5/5    Left:  5/5  Triceps:                     Right:  5/5    Left:  5/5  Wrist Extensors:       Right:  5/5    Left:  5/5  Wrist Flexors:           Right:  5/5    Left:  5/5  Intrinsics:                  Right:  5/5    Left:  5/5     Able to spontaneously move U/E bilaterally  Sensation intact throughout all U/E dermatomes  Incision: CDI  Imaging: AP and lateral x-rays show stable appearance of his instrumentation in the cervical spine.  No concern for complication.    A/P:   6 weeks out from C6-7 ACDF with Dr. Wooten.  He is struggling with some posterior cervical pain.  This could be facet mediated, which is not uncommon following cervical fusion.  We will try some injections at the C6-7 facet level.  I would also get him started with some physical therapy, to work on some soft tissue techniques.  We will see him back at his next regularly scheduled follow-up appointment.  He voiced agreement and understanding.  I did refill his Robaxin today.        Rocky Rice PA-C  [facility]  [address]  [address] [place] [id number]    Tel [phone]  Pager [phone]

## 2019-12-27 ENCOUNTER — TELEPHONE (OUTPATIENT)
Dept: SURGERY | Facility: CLINIC | Age: 59
End: 2019-12-27

## 2019-12-27 NOTE — TELEPHONE ENCOUNTER
Pt scheduled for Injection  Date:1/17/20  Time:0730am  Dr. Anguiano    Instructed pt to have H&P and  for procedure.
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
## 2020-01-07 ENCOUNTER — HOSPITAL ENCOUNTER (OUTPATIENT)
Dept: PHYSICAL THERAPY | Facility: CLINIC | Age: 60
Setting detail: THERAPIES SERIES
End: 2020-01-07
Attending: PHYSICIAN ASSISTANT
Payer: COMMERCIAL

## 2020-01-07 ENCOUNTER — OFFICE VISIT (OUTPATIENT)
Dept: FAMILY MEDICINE | Facility: OTHER | Age: 60
End: 2020-01-07
Payer: COMMERCIAL

## 2020-01-07 VITALS
SYSTOLIC BLOOD PRESSURE: 122 MMHG | HEART RATE: 98 BPM | WEIGHT: 217.4 LBS | DIASTOLIC BLOOD PRESSURE: 62 MMHG | BODY MASS INDEX: 32.95 KG/M2 | OXYGEN SATURATION: 97 % | HEIGHT: 68 IN | TEMPERATURE: 97.1 F | RESPIRATION RATE: 18 BRPM

## 2020-01-07 DIAGNOSIS — G47.33 OSA (OBSTRUCTIVE SLEEP APNEA): ICD-10-CM

## 2020-01-07 DIAGNOSIS — M54.2 CERVICALGIA: ICD-10-CM

## 2020-01-07 DIAGNOSIS — E11.9 TYPE 2 DIABETES MELLITUS WITHOUT COMPLICATION, WITHOUT LONG-TERM CURRENT USE OF INSULIN (H): ICD-10-CM

## 2020-01-07 DIAGNOSIS — I10 HYPERTENSION, UNSPECIFIED TYPE: ICD-10-CM

## 2020-01-07 DIAGNOSIS — J06.9 VIRAL URI WITH COUGH: ICD-10-CM

## 2020-01-07 DIAGNOSIS — E78.5 HYPERLIPIDEMIA LDL GOAL <100: ICD-10-CM

## 2020-01-07 DIAGNOSIS — Z01.818 PREOP GENERAL PHYSICAL EXAM: Primary | ICD-10-CM

## 2020-01-07 DIAGNOSIS — Z98.1 S/P CERVICAL SPINAL FUSION: ICD-10-CM

## 2020-01-07 DIAGNOSIS — R35.0 URINARY FREQUENCY: ICD-10-CM

## 2020-01-07 DIAGNOSIS — M54.12 CERVICAL RADICULOPATHY: ICD-10-CM

## 2020-01-07 LAB
ALBUMIN SERPL-MCNC: 3.6 G/DL (ref 3.4–5)
ALBUMIN UR-MCNC: NEGATIVE MG/DL
ALP SERPL-CCNC: 119 U/L (ref 40–150)
ALT SERPL W P-5'-P-CCNC: 42 U/L (ref 0–70)
ANION GAP SERPL CALCULATED.3IONS-SCNC: 4 MMOL/L (ref 3–14)
APPEARANCE UR: CLEAR
AST SERPL W P-5'-P-CCNC: 14 U/L (ref 0–45)
BILIRUB SERPL-MCNC: 0.5 MG/DL (ref 0.2–1.3)
BILIRUB UR QL STRIP: NEGATIVE
BUN SERPL-MCNC: 12 MG/DL (ref 7–30)
CALCIUM SERPL-MCNC: 9.1 MG/DL (ref 8.5–10.1)
CHLORIDE SERPL-SCNC: 106 MMOL/L (ref 94–109)
CO2 SERPL-SCNC: 27 MMOL/L (ref 20–32)
COLOR UR AUTO: YELLOW
CREAT SERPL-MCNC: 0.78 MG/DL (ref 0.66–1.25)
GFR SERPL CREATININE-BSD FRML MDRD: >90 ML/MIN/{1.73_M2}
GLUCOSE SERPL-MCNC: 232 MG/DL (ref 70–99)
GLUCOSE UR STRIP-MCNC: >=1000 MG/DL
HBA1C MFR BLD: 9.5 % (ref 0–5.6)
HGB UR QL STRIP: NEGATIVE
KETONES UR STRIP-MCNC: NEGATIVE MG/DL
LDLC SERPL DIRECT ASSAY-MCNC: 146 MG/DL
LEUKOCYTE ESTERASE UR QL STRIP: NEGATIVE
NITRATE UR QL: NEGATIVE
PH UR STRIP: 5.5 PH (ref 5–7)
POTASSIUM SERPL-SCNC: 4.3 MMOL/L (ref 3.4–5.3)
PROT SERPL-MCNC: 7.5 G/DL (ref 6.8–8.8)
PSA SERPL-ACNC: 1.45 UG/L (ref 0–4)
SODIUM SERPL-SCNC: 137 MMOL/L (ref 133–144)
SOURCE: ABNORMAL
SP GR UR STRIP: 1.02 (ref 1–1.03)
UROBILINOGEN UR STRIP-ACNC: 0.2 EU/DL (ref 0.2–1)

## 2020-01-07 PROCEDURE — 36415 COLL VENOUS BLD VENIPUNCTURE: CPT | Performed by: PHYSICIAN ASSISTANT

## 2020-01-07 PROCEDURE — 83721 ASSAY OF BLOOD LIPOPROTEIN: CPT | Performed by: PHYSICIAN ASSISTANT

## 2020-01-07 PROCEDURE — 80053 COMPREHEN METABOLIC PANEL: CPT | Performed by: PHYSICIAN ASSISTANT

## 2020-01-07 PROCEDURE — 83036 HEMOGLOBIN GLYCOSYLATED A1C: CPT | Performed by: PHYSICIAN ASSISTANT

## 2020-01-07 PROCEDURE — 97162 PT EVAL MOD COMPLEX 30 MIN: CPT | Mod: GP

## 2020-01-07 PROCEDURE — 93000 ELECTROCARDIOGRAM COMPLETE: CPT | Performed by: PHYSICIAN ASSISTANT

## 2020-01-07 PROCEDURE — 81003 URINALYSIS AUTO W/O SCOPE: CPT | Performed by: PHYSICIAN ASSISTANT

## 2020-01-07 PROCEDURE — G0103 PSA SCREENING: HCPCS | Performed by: PHYSICIAN ASSISTANT

## 2020-01-07 PROCEDURE — 97110 THERAPEUTIC EXERCISES: CPT | Mod: GP

## 2020-01-07 PROCEDURE — 99215 OFFICE O/P EST HI 40 MIN: CPT | Performed by: PHYSICIAN ASSISTANT

## 2020-01-07 PROCEDURE — 97140 MANUAL THERAPY 1/> REGIONS: CPT | Mod: GP

## 2020-01-07 RX ORDER — ATORVASTATIN CALCIUM 20 MG/1
20 TABLET, FILM COATED ORAL DAILY
Qty: 90 TABLET | Refills: 1 | Status: SHIPPED | OUTPATIENT
Start: 2020-01-07 | End: 2021-12-07

## 2020-01-07 ASSESSMENT — MIFFLIN-ST. JEOR: SCORE: 1770.3

## 2020-01-07 ASSESSMENT — PAIN SCALES - GENERAL: PAINLEVEL: MODERATE PAIN (4)

## 2020-01-07 NOTE — PROGRESS NOTES
"   01/07/20 1010   General Information   Type of Visit Initial OP Ortho PT Evaluation   Start of Care Date 01/07/20   Referring Physician Rocky Rice PA-C    Patient/Family Goals Statement Improve mobility, decrease pain   Orders Evaluate and Treat   Orders Comment cervicalgia. please include soft tissue techniques   Date of Order 12/20/20   Certification Required? No   Medical Diagnosis S/P cervical spinal fusion Z98.1 - Primary   Surgical/Medical history reviewed Yes   Precautions/Limitations other (see comments)  (No lifting of more than 10 pounds, no bending, no twisting)   Weight-Bearing Status - LUE full weight-bearing   Weight-Bearing Status - RUE full weight-bearing   Weight-Bearing Status - LLE full weight-bearing   Weight-Bearing Status - RLE full weight-bearing       Present No   Body Part(s)   Body Part(s) Cervical Spine   Presentation and Etiology   Pertinent history of current problem (include personal factors and/or comorbidities that impact the POC) Patient presents today s/p C6-7 ACDF on 11/12/19. He continues to have posterior cervical pain >6 wks following surgery and is scheduled to have C6-7 Bilateral facet Injections on 1/17/20. Continues to have B UE weakness and numbness. Continues to have dizziness and feeling like he is going to pass out with neck extension. This was a concern prior to recent surgery as well, but arterial insufficiency was ruled out by CTA. He c/o \"lump\" in throat while eating. Same issue following previous surgery and had speech therapy treatment. Patient with history of multiple cervical surgeries: C3-4 ACDF in April 2019 & two other surgeries (unsure of dates). Additionally, had cholecystectomy in October 2019 & septum repair in 2005.    Impairments A. Pain;C. Swelling;E. Decreased flexibility;D. Decreased ROM;F. Decreased strength and endurance;G. Impaired balance;K. Numbness;L. Tingling;I. Impaired skin integrity;H. Impaired gait;N. Headaches;Q. " Dizziness;P. Bowel or bladder problems;O. Blurred vision   Functional Limitations perform activities of daily living;perform desired leisure / sports activities   Symptom Location neck   How/Where did it occur From insidious onset;From Degenerative Joint Disease   Onset date of current episode/exacerbation 11/12/19   Chronicity Recurrent   Pain rating (0-10 point scale) Best (/10);Worst (/10)   Best (/10) 3   Worst (/10) 4   Pain quality A. Sharp;B. Dull;C. Aching;F. Stabbing   Frequency of pain/symptoms A. Constant   Pain/symptoms are: Worse in the morning   Pain/symptoms exacerbated by C. Lifting;G. Certain positions;H. Overhead reach;J. ADL;K. Home tasks   Pain exacerbation comment looking up while in shower (neck extension)   Pain/symptoms eased by E. Changing positions;C. Rest;G. Heat;I. OTC medication(s)  (oxycodone as needed)   Progression of symptoms since onset: Unchanged   Current / Previous Interventions   Diagnostic Tests: X-ray;CT scan   X-ray Results Results   X-ray results Again noted is the anterior interbody fusion device at the C3-C4 level. Anterior interbody devices also in place at the C6-C7 level. The device at the C6-C7 level is new since the previous film. It appears to be in proper position. There is a solid anterior fusion between C4-C5 and C5-C6; Thoracic: Multilevel degenerative disc disease and facet arthropathy of the thoracic spine, as detailed in the body of the report. No high-grade spinal or neural foraminal stenosis.   CT Results Results   CT results 1. Patent arteries in the neck without evidence of dissection. 2. Mild atherosclerotic disease in the carotid arteries bilaterally left greater than right without significant stenosis by NASCET criteria.   Prior Level of Function   Prior Level of Function-Mobility IND   Prior Level of Function-ADLs IND   Functional Level Prior Comment wife assists as needed with ADLs. Currently driving, however doesn't like to drive by himself   Current  Level of Function   Current Community Support Family/friend caregiver   Patient role/employment history G. Disabled   Employment Comments currently on disability   Living environment House/townhome   Home/community accessibility stairs - mild difficulty   Current equipment-Gait/Locomotion None   Current equipment-ADL None   Fall Risk Screen   Fall screen completed by PT   Have you fallen 2 or more times in the past year? Yes   Have you fallen and had an injury in the past year? No   Is patient a fall risk? No;Department fall risk interventions implemented   Fall screen comments Reports passing out multiple times before November surgery, however no injuries. Dizzy/loss of conscious spells have decreased.    System Outcome Measures   Outcome Measures   (NDI: 28/50 (56%))   Cervical Spine   Observation patient presents with wife to evaluation, in no apparent distress. exhibits guarding at neck with transfers, limited turning of head with conversation   Integumentary  incision healing well.    Posture forward head, rounded shoulders, elevated UT's   Cervical Flexion ROM 50% limited   Cervical Extension ROM 50% limited  (dizzy)   Cervical Right Side Bending ROM >50% limited   Cervical Left Side Bending ROM >50% limited   Cervical Right Rotation ROM 15  (blurred vision)   Cervical Left Rotation ROM 10  (blurred vision)   Thoracic Right Rotation WFL, tightness   Thoracic Left Rotation WFL, tightness   Shoulder AROM Screen WFL, however increased neck pain with overhead reaching    Shoulder Shrug (C2-C4) Strength 5/5   Shoulder Abd (C5) Strength 4/5 R, 4+/5 L   Shoulder ER (C5, C6) Strength 4+/5 B   Shoulder IR (C5, C6) Strength 5/5 B   Elbow Flexion (C5, C6) Strength 4/5 R, 4+/5 L   Elbow Extension (C7) Strength 4/5 R, 4+/5 L   Shoulder/Wrist/Hand Strength Comments Dynamometer: 12 kg R, 18 kg L    Upper Trapezius Flexibility B limitations   Levator Scapula Flexibility B limitations   Pectoralis Minor Flexibility B  limitations   Vertebral Artery Test Did not complete - see CTA results   Cervical Distraction Test Positive relief of symptoms   Segmental Mobility-Thoracic Hypomobility throughout thoracic spine   Dermatome/Sensory Testing Decreased B UE sensation   Palpation TTP at B upper traps and along scapular borders   Neurological Testing Comments Did not assess reflexes today   Planned Therapy Interventions   Planned Therapy Interventions balance training;bed mobility training;joint mobilization;manual therapy;neuromuscular re-education;ROM;strengthening;stretching;transfer training;visual perception   Planned Therapy Interventions Comment other interventions as indicated   Planned Modality Interventions   Planned Modality Interventions Hot packs;Cryotherapy   Planned Modality Interventions Comments other modalities as indicated   Clinical Impression   Criteria for Skilled Therapeutic Interventions Met yes, treatment indicated   PT Diagnosis Mechanical neck pain with B UE radicular symptoms   Influenced by the following impairments pain, edema, ROM, strength, sensation, flexibility   Functional limitations due to impairments ADLs, sleeping, driving, home tasks, hobbies   Clinical Presentation Evolving/Changing   Clinical Presentation Rationale Patient is a 59 y.o. male s/p cervical fusion. Patient with history of multiple cervical surgeries and recent cholecystectomy as well. Patient exhibits decreased cervical ROM and flexibility as well as decreased B UE strength and sensation. Patient is limited with ADLs, driving, and sleeping d/t symptoms. Patient would benefit from skilled physical therapy intervention to address aforementioned deficits and limitations.    Clinical Decision Making (Complexity) Moderate complexity   Therapy Frequency 2 times/Week   Predicted Duration of Therapy Intervention (days/wks) 1-2 x per week for 12 weeks   Risk & Benefits of therapy have been explained Yes   Patient, Family & other staff in  agreement with plan of care Yes   Education Assessment   Preferred Learning Style Listening;Demonstration   Barriers to Learning No barriers   ORTHO GOALS   PT Ortho Eval Goals 1;2;3   Ortho Goal 1   Goal Identifier HEP   Goal Description Patient will report compliance with HEP at least 3 days per week in order to demonstrate improved self-management of pain.   Target Date 02/03/20   Ortho Goal 2   Goal Identifier NDI   Goal Description Patient will improve NDI score to 56% or greater in order to demonstrate functional improvements.   Target Date 03/02/20   Ortho Goal 3   Goal Identifier ROM   Goal Description Patient will demonstrate improved B cervical rotation ROM to at least 25 degrees and symptoms 2/10 or less in order to improve functional movements such as driving and sleeping.   Target Date 03/30/20   Total Evaluation Time   PT Cris, Moderate Complexity Minutes ([id number]) 25       Thank you for your referral.     Alba Sánchez, PT, DPT    [facility]    O: [phone]  E: [email or web address]

## 2020-01-08 ENCOUNTER — TELEPHONE (OUTPATIENT)
Dept: FAMILY MEDICINE | Facility: OTHER | Age: 60
End: 2020-01-08

## 2020-01-08 DIAGNOSIS — E11.9 TYPE 2 DIABETES MELLITUS WITHOUT COMPLICATION, WITHOUT LONG-TERM CURRENT USE OF INSULIN (H): Primary | ICD-10-CM

## 2020-01-08 NOTE — TELEPHONE ENCOUNTER
Results given to patient, he states that he will do what Ferny has advised and would like the prescriptions sent to his pharmacy   [facility].     Please send Rx for Glyburide   Thanks  Graciela ARTEAGA (R)

## 2020-01-08 NOTE — TELEPHONE ENCOUNTER
Notes recorded by Meg Batres CMA on 1/8/2020 at 2:51 PM CST  Left message for patient to return call.     Meg Batres MA     ------    Notes recorded by Ferny Huerta PA-C on 1/7/2020 at 5:19 PM CST  His diabetes is not in good control at this point in time.  I would advise that we add in additional glyburide at 2-1/2 mg twice daily and follow-up in 3 months (he will need to have an additional prescription to make up the difference.  Right now he is on a combination of medication.) He is to continue his current diabetes medication regimen and add in additional glyburide.  His LDL cholesterol is not in good control he needs to begin to look more carefully at his diet and make sure that he takes his Lipitor on a regular basis.  If he has been taking his Lipitor on a regular basis then we need to double the dose to 40 mg daily and recheck in 3 months.  Electronically signed:    Ferny Huerta PA-C

## 2020-01-09 RX ORDER — GLYBURIDE 2.5 MG/1
2.5 TABLET ORAL 2 TIMES DAILY WITH MEALS
Qty: 180 TABLET | Refills: 1 | Status: SHIPPED | OUTPATIENT
Start: 2020-01-09 | End: 2021-12-07

## 2020-01-16 DIAGNOSIS — R13.10 DYSPHAGIA: ICD-10-CM

## 2020-01-16 DIAGNOSIS — Z98.1 S/P CERVICAL SPINAL FUSION: Primary | ICD-10-CM

## 2020-01-17 ENCOUNTER — HOSPITAL ENCOUNTER (OUTPATIENT)
Facility: CLINIC | Age: 60
Discharge: HOME | End: 2020-01-17
Attending: ANESTHESIOLOGY | Admitting: ANESTHESIOLOGY
Payer: COMMERCIAL

## 2020-01-17 ENCOUNTER — ANESTHESIA EVENT (OUTPATIENT)
Dept: SURGERY | Facility: CLINIC | Age: 60
End: 2020-01-17
Payer: COMMERCIAL

## 2020-01-17 ENCOUNTER — HOSPITAL ENCOUNTER (OUTPATIENT)
Dept: GENERAL RADIOLOGY | Facility: CLINIC | Age: 60
End: 2020-01-17
Attending: ANESTHESIOLOGY | Admitting: ANESTHESIOLOGY
Payer: COMMERCIAL

## 2020-01-17 ENCOUNTER — ANESTHESIA (OUTPATIENT)
Dept: SURGERY | Facility: CLINIC | Age: 60
End: 2020-01-17
Payer: COMMERCIAL

## 2020-01-17 VITALS
RESPIRATION RATE: 18 BRPM | HEART RATE: 85 BPM | OXYGEN SATURATION: 95 % | DIASTOLIC BLOOD PRESSURE: 81 MMHG | SYSTOLIC BLOOD PRESSURE: 122 MMHG | TEMPERATURE: 96 F

## 2020-01-17 DIAGNOSIS — M54.12 CERVICAL RADICULOPATHY: ICD-10-CM

## 2020-01-17 LAB — GLUCOSE BLDC GLUCOMTR-MCNC: 173 MG/DL (ref 70–99)

## 2020-01-17 PROCEDURE — 25000128 H RX IP 250 OP 636: Performed by: ANESTHESIOLOGY

## 2020-01-17 PROCEDURE — 64490 INJ PARAVERT F JNT C/T 1 LEV: CPT | Mod: 50 | Performed by: ANESTHESIOLOGY

## 2020-01-17 PROCEDURE — 40000277 XR SURGERY CARM FLUORO LESS THAN 5 MIN W STILLS

## 2020-01-17 PROCEDURE — 82962 GLUCOSE BLOOD TEST: CPT

## 2020-01-17 PROCEDURE — 25000128 H RX IP 250 OP 636: Performed by: NURSE ANESTHETIST, CERTIFIED REGISTERED

## 2020-01-17 PROCEDURE — 37000008 ZZH ANESTHESIA TECHNICAL FEE, 1ST 30 MIN: Performed by: ANESTHESIOLOGY

## 2020-01-17 PROCEDURE — 25000125 ZZHC RX 250: Performed by: NURSE ANESTHETIST, CERTIFIED REGISTERED

## 2020-01-17 RX ORDER — IOPAMIDOL 612 MG/ML
INJECTION, SOLUTION INTRATHECAL PRN
Status: DISCONTINUED | OUTPATIENT
Start: 2020-01-17 | End: 2020-01-17 | Stop reason: HOSPADM

## 2020-01-17 RX ORDER — ONDANSETRON 2 MG/ML
4 INJECTION INTRAMUSCULAR; INTRAVENOUS EVERY 30 MIN PRN
Status: DISCONTINUED | OUTPATIENT
Start: 2020-01-17 | End: 2020-01-17 | Stop reason: HOSPADM

## 2020-01-17 RX ORDER — ONDANSETRON 4 MG/1
4 TABLET, ORALLY DISINTEGRATING ORAL EVERY 30 MIN PRN
Status: DISCONTINUED | OUTPATIENT
Start: 2020-01-17 | End: 2020-01-17 | Stop reason: HOSPADM

## 2020-01-17 RX ORDER — PROPOFOL 10 MG/ML
INJECTION, EMULSION INTRAVENOUS PRN
Status: DISCONTINUED | OUTPATIENT
Start: 2020-01-17 | End: 2020-01-17

## 2020-01-17 RX ORDER — LIDOCAINE HYDROCHLORIDE 20 MG/ML
INJECTION, SOLUTION INFILTRATION; PERINEURAL PRN
Status: DISCONTINUED | OUTPATIENT
Start: 2020-01-17 | End: 2020-01-17

## 2020-01-17 RX ORDER — SODIUM CHLORIDE, SODIUM LACTATE, POTASSIUM CHLORIDE, CALCIUM CHLORIDE 600; 310; 30; 20 MG/100ML; MG/100ML; MG/100ML; MG/100ML
INJECTION, SOLUTION INTRAVENOUS CONTINUOUS
Status: DISCONTINUED | OUTPATIENT
Start: 2020-01-17 | End: 2020-01-17 | Stop reason: HOSPADM

## 2020-01-17 RX ORDER — TRIAMCINOLONE ACETONIDE 40 MG/ML
INJECTION, SUSPENSION INTRA-ARTICULAR; INTRAMUSCULAR PRN
Status: DISCONTINUED | OUTPATIENT
Start: 2020-01-17 | End: 2020-01-17 | Stop reason: HOSPADM

## 2020-01-17 RX ORDER — LIDOCAINE 40 MG/G
CREAM TOPICAL
Status: DISCONTINUED | OUTPATIENT
Start: 2020-01-17 | End: 2020-01-17 | Stop reason: HOSPADM

## 2020-01-17 RX ORDER — BUPIVACAINE HYDROCHLORIDE 7.5 MG/ML
INJECTION, SOLUTION EPIDURAL; RETROBULBAR PRN
Status: DISCONTINUED | OUTPATIENT
Start: 2020-01-17 | End: 2020-01-17 | Stop reason: HOSPADM

## 2020-01-17 RX ORDER — KETOROLAC TROMETHAMINE 30 MG/ML
30 INJECTION, SOLUTION INTRAMUSCULAR; INTRAVENOUS ONCE
Status: COMPLETED | OUTPATIENT
Start: 2020-01-17 | End: 2020-01-17

## 2020-01-17 RX ADMIN — PROPOFOL 50 MG: 10 INJECTION, EMULSION INTRAVENOUS at 07:37

## 2020-01-17 RX ADMIN — PROPOFOL 30 MG: 10 INJECTION, EMULSION INTRAVENOUS at 07:43

## 2020-01-17 RX ADMIN — PROPOFOL 50 MG: 10 INJECTION, EMULSION INTRAVENOUS at 07:35

## 2020-01-17 RX ADMIN — LIDOCAINE HYDROCHLORIDE 40 MG: 20 INJECTION, SOLUTION INFILTRATION; PERINEURAL at 07:35

## 2020-01-17 RX ADMIN — PROPOFOL 50 MG: 10 INJECTION, EMULSION INTRAVENOUS at 07:40

## 2020-01-17 RX ADMIN — KETOROLAC TROMETHAMINE 30 MG: 30 INJECTION, SOLUTION INTRAMUSCULAR at 08:07

## 2020-01-17 ASSESSMENT — LIFESTYLE VARIABLES: TOBACCO_USE: 1

## 2020-01-17 NOTE — ANESTHESIA CARE TRANSFER NOTE
Patient: Miguel A Vaughn    Procedure(s):  Cervical 6-7 Bilateral facet Injections    Diagnosis: S/P cervical spinal fusion [Z98.1]  Diagnosis Additional Information: No value filed.    Anesthesia Type:   MAC     Note:  Airway :Room Air  Patient transferred to:Phase II  Handoff Report: Identifed the Patient, Identified the Reponsible Provider, Reviewed the pertinent medical history, Discussed the surgical course, Reviewed Intra-OP anesthesia mangement and issues during anesthesia, Set expectations for post-procedure period and Allowed opportunity for questions and acknowledgement of understanding      Vitals: (Last set prior to Anesthesia Care Transfer)    CRNA VITALS  1/17/2020 0721 - 1/17/2020 0809      1/17/2020             Resp Rate (observed):  17                Electronically Signed By: SEBASTIÁN Leon CRNA  January 17, 2020  8:09 AM

## 2020-01-17 NOTE — OP NOTE
CHIEF COMPLAINT:  neck pain    INTERVAL HISTORY:    I discussed the above note with the patient. I agree with above.   PHYSICAL EXAM:   Musculoskeletal: Strength of upper extremities is 5/5 bilaterally.  Pain on palpation of the   neck. Pain on   Rotation, extension and flexion of neck.   Neuro: No sensory perception differences of lower extremities.  PROCEDURE: Bilateral C6-7  Intra-articular zygopophyseal joint injection utilizing fluoroscopic guidance with contrast dye.   PROCEDURE DETAILS: After written informed consent was obtained from the patient, the patient was escorted to the procedure room.  The patient was placed in the sitting position.  A  time out  was conducted to verify the patient identity, procedure to be performed, side, site, allergies and any special requirements.  The skin over the neck was prepped and draped in normal sterile fashion. Fluoroscopy was used to identify the zygopophyseal joint with a lateral view.   The skin was anesthetized with 0.5 mL of 1% lidocaine with bicarbonate buffer.  A 25-gauge 2 inch Quincke needle was advanced under fluoroscopic guidance in the lateral approach until entry into the zygopophyseal joint was successful.  View was confirmed in AP view.  Then, <0.3 cc of Isovue contrast dye was injected producing a satisfactory arthrogram without evidence of intrathecal or intravascular spread.  Then, a 0.75 mL solution of 20mg of Triamcinolone and 0.5 mL of 0.75% bupivacaine was incrementally injected into each  joint.  After injection of the medication, as the needle tip was withdrawn, it was flushed with local anesthetic.   The patient was monitored with blood pressure and pulse oximetry machines with the assistance of an RN throughout the procedure.  The patient was alert and responsive to questions throughout the procedure.   The patient tolerated the procedure well and was observed in the post-procedural area.  The patient was dismissed without apparent  complications.     DIAGNOSIS:  1.  Existing C5-6 fusion with transitional C6-7 facet arthropathy/cervical spondylosis    PLAN:  1. Performed bilateral C6-7  zygopophyseal joint injections. If relief is present but fleeting, we can consider diagnostic, medial branch blocks.  If the relief from the medial branch is successful, I would recommend proceeding with longer term relief utilizing radiofrequency neurolysis.    Brent Anguiano MD  Diplomate of the American Board of Anesthesiology, Pain Medicine

## 2020-01-17 NOTE — ANESTHESIA POSTPROCEDURE EVALUATION
Patient: Miguel A Vaughn    Procedure(s):  Cervical 6-7 Bilateral facet Injections    Diagnosis:S/P cervical spinal fusion [Z98.1]  Diagnosis Additional Information: No value filed.    Anesthesia Type:  MAC    Note:  Anesthesia Post Evaluation    Patient location during evaluation: Phase 2  Patient participation: Able to fully participate in evaluation  Level of consciousness: awake and alert  Pain management: adequate  Airway patency: patent  Cardiovascular status: acceptable and stable  Respiratory status: acceptable and room air  Hydration status: acceptable  PONV: none     Anesthetic complications: None    Comments:  Patient was happy with the anesthesia care received and no anesthesia related complications were noted.  I will follow up with the patient again if it is needed.        Last vitals:  Vitals:    01/17/20 0810 01/17/20 0815 01/17/20 0830   BP:  94/81 122/81   Pulse:  82 85   Resp:      Temp:      SpO2: 95% 96% 95%         Electronically Signed By: SEBASTIÁN Leon CRNA  January 17, 2020  9:12 AM

## 2020-01-17 NOTE — ANESTHESIA PREPROCEDURE EVALUATION
Anesthesia Pre-Procedure Evaluation    Patient: Miguel A Vaughn   MRN: [id number] : 1960          Preoperative Diagnosis: S/P cervical spinal fusion [Z98.1]    Procedure(s):  Cervical 6-7 Bilateral facet Injections    Past Medical History:   Diagnosis Date     Benign neoplasm of brain (H)      Cervicalgia      Depressive disorder, not elsewhere classified 2008     Deviated nasal septum      Diabetes (H)      Family history of colonic polyps      Headache(784.0)      Hemorrhoids, internal      Hyperlipidemia LDL goal <130 2011     Hypersomnia with sleep apnea, unspecified      BENJAMIN (obstructive sleep apnea) 10/12/2011     Other encephalopathy      Sprain of right ankle 2011     Unspecified disorder of adrenal glands      Past Surgical History:   Procedure Laterality Date     C WINTER W/O FACETEC FORAMOT/DSKC  VRT SEG, CERVICAL       C OPEN RX ANKLE DISLOCATN+FIXATN       COLONOSCOPY  08    Internal hemorrhoids.  Otherwise normal.     COLONOSCOPY  2013    Procedure: COLONOSCOPY;  colonoscopy;  Surgeon: Kody Reynolds MD;  Location: [facility]     DECOMPRESSION, FUSION CERVICAL ANTERIOR ONE LEVEL, COMBINED N/A 2019    Procedure: CERVICAL 6-7 ANTERIOR  DECOMPRESSION AND FUSION;  Surgeon: Memo Wooten MD;  Location: [facility]     DISCECTOMY, FUSION CERVICAL ANTERIOR ONE LEVEL, COMBINED N/A 2019    Procedure: C 3-4 ANTERIOR CERVICAL DISECTOMY AND FUSION;  Surgeon: Memo Wooten MD;  Location:  OR     EXAM UNDER ANESTHESIA RECTUM N/A 8/3/2016    Procedure: EXAM UNDER ANESTHESIA RECTUM;  Surgeon: Sami Zamora MD;  Location:  OR     EXPLORE SPINE, REMOVE HARDWARE, COMBINED N/A 2019    Procedure: C 4-5 HARDWARE REMOVAL;  Surgeon: Memo Wooten MD;  Location:  OR     HC FLEX SIGMOIDOSCOPY W/WO BRAD SPEC BY BRUSH/WASH  06/10/08    bx intra-anal lesions & electrocoagulation of perianal lesions.      REPAIR OF NASAL SEPTUM  2005    Revision  septoplasty, submucosal resection of the inferior turbinates.     INCISION AND DRAINAGE PERINEAL, COMBINED N/A 8/3/2016    Procedure: COMBINED INCISION AND DRAINAGE PERINEAL;  Surgeon: Sami Zamora MD;  [facility]: [facility]     INJECT EPIDURAL CERVICAL N/A 8/16/2019    Procedure: INJECTION, SPINE, CERVICAL 6-7, EPIDURAL;  Surgeon: Brent Anguiano MD;  Location: [facility]     LAPAROSCOPIC CHOLECYSTECTOMY N/A 10/17/2019    Procedure: CHOLECYSTECTOMY, LAPAROSCOPIC;  Surgeon: Barry Molina MD;  Location: [facility]     SURGICAL HISTORY OF -   08/30/2006    Left occiput C1, C1-C2 facet injection.  F-[facility]       Anesthesia Evaluation     . Pt has had prior anesthetic.     No history of anesthetic complications          ROS/MED HX    ENT/Pulmonary:     (+)sleep apnea, tobacco use, Past use 1ppd for 25 years packs/day  uses CPAP , recent URI resolved . .    Neurologic: Comment: Cervical radiculopathy       Cardiovascular:     (+) Dyslipidemia, hypertension----. : . . . :. . Previous cardiac testing date:results:date: results:ECG reviewed date:1/7/20 results:appears normal, NSR, normal axis, normal intervals, no acute ST/T changes c/w ischemia, no LVH by voltage criteria, unchanged from previous tracings date: results:          METS/Exercise Tolerance:  >4 METS   Hematologic:  - neg hematologic  ROS       Musculoskeletal:   (+) arthritis,  -       GI/Hepatic:  - neg GI/hepatic ROS       Renal/Genitourinary:  - ROS Renal section negative       Endo:     (+) type II DM Last HgA1c: 9.5 date: 1/7/20 Not using insulin thyroid problem hypothyroidism, Obesity, .      Psychiatric:     (+) psychiatric history depression      Infectious Disease:   (+) MRSA,       Malignancy:      - no malignancy   Other:    - neg other ROS                      Physical Exam  Normal systems: cardiovascular and pulmonary    Airway   Mallampati: II  TM distance: >3 FB  Neck ROM: limited    Dental   (+) missing    Cardiovascular   Rhythm and rate: regular  "and normal      Pulmonary    breath sounds clear to auscultation            Lab Results   Component Value Date    WBC 9.0 11/08/2019    HGB 15.0 11/08/2019    HCT 45.7 11/08/2019     11/08/2019    CRP <2.9 10/17/2019    SED 9 10/24/2019     01/07/2020    POTASSIUM 4.3 01/07/2020    CHLORIDE 106 01/07/2020    CO2 27 01/07/2020    BUN 12 01/07/2020    CR 0.78 01/07/2020     (H) 01/07/2020    FRANCIA 9.1 01/07/2020    ALBUMIN 3.6 01/07/2020    PROTTOTAL 7.5 01/07/2020    ALT 42 01/07/2020    AST 14 01/07/2020    ALKPHOS 119 01/07/2020    BILITOTAL 0.5 01/07/2020    LIPASE 51 (L) 10/24/2019    AMYLASE 75 07/27/2011    INR 0.95 06/06/2008    TSH 2.25 08/17/2018       Preop Vitals  BP Readings from Last 3 Encounters:   01/07/20 122/62   12/20/19 (!) 140/72   11/13/19 129/75    Pulse Readings from Last 3 Encounters:   01/07/20 98   12/20/19 104   11/12/19 100      Resp Readings from Last 3 Encounters:   01/07/20 18   11/13/19 18   10/28/19 18    SpO2 Readings from Last 3 Encounters:   01/07/20 97%   12/20/19 97%   11/13/19 95%      Temp Readings from Last 1 Encounters:   01/07/20 97.1  F (36.2  C) (Temporal)    Ht Readings from Last 1 Encounters:   01/07/20 1.727 m (5' 7.98\")      Wt Readings from Last 1 Encounters:   01/07/20 98.6 kg (217 lb 6.4 oz)    Estimated body mass index is 33.08 kg/m  as calculated from the following:    Height as of 1/7/20: 1.727 m (5' 7.98\").    Weight as of 1/7/20: 98.6 kg (217 lb 6.4 oz).       Anesthesia Plan      History & Physical Review  History and physical reviewed and following examination; no interval change.    ASA Status:  3 .    NPO Status:  > 8 hours    Plan for MAC with Intravenous and Propofol induction. Maintenance will be TIVA.  Reason for MAC:  Deep or markedly invasive procedure (G8)         Postoperative Care      Consents  Anesthetic plan, risks, benefits and alternatives discussed with:  Patient.  Use of blood products discussed: No .   .           "       Di Dumont, APRN CRNA

## 2020-01-22 ENCOUNTER — HOSPITAL ENCOUNTER (OUTPATIENT)
Dept: PHYSICAL THERAPY | Facility: CLINIC | Age: 60
Setting detail: THERAPIES SERIES
End: 2020-01-22
Attending: PHYSICIAN ASSISTANT
Payer: COMMERCIAL

## 2020-01-22 PROCEDURE — 97140 MANUAL THERAPY 1/> REGIONS: CPT | Mod: GP

## 2020-01-22 PROCEDURE — 97110 THERAPEUTIC EXERCISES: CPT | Mod: GP

## 2020-01-31 ENCOUNTER — HOSPITAL ENCOUNTER (OUTPATIENT)
Dept: PHYSICAL THERAPY | Facility: CLINIC | Age: 60
Setting detail: THERAPIES SERIES
End: 2020-01-31
Attending: PHYSICIAN ASSISTANT
Payer: COMMERCIAL

## 2020-01-31 PROCEDURE — 97110 THERAPEUTIC EXERCISES: CPT | Mod: GP

## 2020-01-31 PROCEDURE — 97140 MANUAL THERAPY 1/> REGIONS: CPT | Mod: GP

## 2020-02-03 DIAGNOSIS — Z98.1 S/P CERVICAL SPINAL FUSION: Primary | ICD-10-CM

## 2020-02-05 ENCOUNTER — HOSPITAL ENCOUNTER (OUTPATIENT)
Dept: PHYSICAL THERAPY | Facility: CLINIC | Age: 60
Setting detail: THERAPIES SERIES
End: 2020-02-05
Attending: PHYSICIAN ASSISTANT
Payer: COMMERCIAL

## 2020-02-05 PROCEDURE — 97110 THERAPEUTIC EXERCISES: CPT | Mod: GP

## 2020-02-05 PROCEDURE — 97140 MANUAL THERAPY 1/> REGIONS: CPT | Mod: GP

## 2020-02-12 ENCOUNTER — HOSPITAL ENCOUNTER (OUTPATIENT)
Dept: PHYSICAL THERAPY | Facility: CLINIC | Age: 60
Setting detail: THERAPIES SERIES
End: 2020-02-12
Attending: PHYSICIAN ASSISTANT
Payer: COMMERCIAL

## 2020-02-12 PROCEDURE — 97112 NEUROMUSCULAR REEDUCATION: CPT | Mod: GP | Performed by: PHYSICAL THERAPIST

## 2020-02-14 ENCOUNTER — ANCILLARY PROCEDURE (OUTPATIENT)
Dept: GENERAL RADIOLOGY | Facility: CLINIC | Age: 60
End: 2020-02-14
Attending: PHYSICIAN ASSISTANT
Payer: COMMERCIAL

## 2020-02-14 ENCOUNTER — OFFICE VISIT (OUTPATIENT)
Dept: NEUROSURGERY | Facility: CLINIC | Age: 60
End: 2020-02-14
Payer: COMMERCIAL

## 2020-02-14 VITALS
OXYGEN SATURATION: 97 % | HEART RATE: 108 BPM | HEIGHT: 68 IN | DIASTOLIC BLOOD PRESSURE: 64 MMHG | TEMPERATURE: 97.3 F | SYSTOLIC BLOOD PRESSURE: 140 MMHG | BODY MASS INDEX: 32.8 KG/M2 | WEIGHT: 216.4 LBS

## 2020-02-14 DIAGNOSIS — Z98.1 S/P CERVICAL SPINAL FUSION: Primary | ICD-10-CM

## 2020-02-14 DIAGNOSIS — Z98.1 S/P CERVICAL SPINAL FUSION: ICD-10-CM

## 2020-02-14 PROCEDURE — 99024 POSTOP FOLLOW-UP VISIT: CPT | Performed by: PHYSICIAN ASSISTANT

## 2020-02-14 PROCEDURE — 72040 X-RAY EXAM NECK SPINE 2-3 VW: CPT | Mod: TC

## 2020-02-14 ASSESSMENT — MIFFLIN-ST. JEOR: SCORE: 1765.76

## 2020-02-14 NOTE — LETTER
2/14/2020         RE: Miguel A Vaughn  [address]  [place] [address]        Dear Colleague,    Thank you for referring your patient, Miguel A Vaughn, to the [facility]. Please see a copy of my visit note below.    Miguel A to follow up with Primary Care provider regarding elevated blood pressure.  Julia Bernal CMA       [facility]  Neurosurgery followup:    HPI: 3 months s/p C6-7 ACDF.  He continues to gradually improve.  He does have some ongoing neck pain and stiffness.  He has no arm pain.  He will be returning to work on March 2nd.    Exam:  Constitutional:  Alert, well nourished, NAD.  HEENT: Normocephalic, atraumatic.   Pulm:  Without shortness of breath   CV:  No pitting edema of BLE.     Neurological:  Awake  Alert  Oriented x 3  Motor exam:     Shoulder Abduction:  Right:  5/5    Left:  5/5  Biceps:                      Right:  5/5    Left:  5/5  Triceps:                     Right:  5/5    Left:  5/5  Wrist Extensors:       Right:  5/5    Left:  5/5  Wrist Flexors:           Right:  5/5    Left:  5/5  Intrinsics:                  Right:  5/5    Left:  5/5     Able to spontaneously move U/E bilaterally  Sensation intact throughout all U/E dermatomes  Incision: well healed  Imaging: AP lateral views show stable positioning of his cervical instrumentation.  No concern for complication.    A/P:   3-month status post C6-7 ACDF.  He continues to gradually improve.  His arm pain has resolved.  He can continue to gradually increase activities to tolerance.  He will go back to work on March 2.  I gave him a work letter for this.  We will see him back at his 6-month appointment.  He voiced agreement and understanding.          Rocky Rice PA-C  [facility]  [address]  [address] [place] [address], [place] [address]    Tel [phone]  Pager [phone]      Again, thank you for allowing me to participate in the care of your patient.         Sincerely,        Rocky Rice PA-C

## 2020-02-14 NOTE — LETTER
[address] [address]  Phone: [phone]  Fax: [phone]    02/14/20    Miguel A Vaughn  [address] [place] [address]      To whom it may concern:     Miguel A Vaughn will return to work on March 2nd, 2020, without restrictions.       Thank You.      Rocky Rice PA-C  [facility] Neurosurgery

## 2020-02-14 NOTE — NURSING NOTE
"Miguel A Vaughn is a 59 year old male who presents for:  Chief Complaint   Patient presents with     Surgical Followup     s/p C6-7 ACDF DOS:11/12/19        Initial Vitals:  BP (!) 140/64   Pulse 108   Temp 97.3  F (36.3  C) (Temporal)   Ht 5' 7.98\" (1.727 m)   Wt 216 lb 6.4 oz (98.2 kg)   SpO2 97%   BMI 32.92 kg/m   Estimated body mass index is 32.92 kg/m  as calculated from the following:    Height as of this encounter: 5' 7.98\" (1.727 m).    Weight as of this encounter: 216 lb 6.4 oz (98.2 kg).. Body surface area is 2.17 meters squared. BP completed using cuff size: regular  Data Unavailable    Nursing Comments:     Julia Bernal    "

## 2020-02-14 NOTE — PROGRESS NOTES
[facility]  Neurosurgery followup:    HPI: 3 months s/p C6-7 ACDF.  He continues to gradually improve.  He does have some ongoing neck pain and stiffness.  He has no arm pain.  He will be returning to work on March 2nd.    Exam:  Constitutional:  Alert, well nourished, NAD.  HEENT: Normocephalic, atraumatic.   Pulm:  Without shortness of breath   CV:  No pitting edema of BLE.     Neurological:  Awake  Alert  Oriented x 3  Motor exam:     Shoulder Abduction:  Right:  5/5    Left:  5/5  Biceps:                      Right:  5/5    Left:  5/5  Triceps:                     Right:  5/5    Left:  5/5  Wrist Extensors:       Right:  5/5    Left:  5/5  Wrist Flexors:           Right:  5/5    Left:  5/5  Intrinsics:                  Right:  5/5    Left:  5/5     Able to spontaneously move U/E bilaterally  Sensation intact throughout all U/E dermatomes  Incision: well healed  Imaging: AP lateral views show stable positioning of his cervical instrumentation.  No concern for complication.    A/P:   3-month status post C6-7 ACDF.  He continues to gradually improve.  His arm pain has resolved.  He can continue to gradually increase activities to tolerance.  He will go back to work on March 2.  I gave him a work letter for this.  We will see him back at his 6-month appointment.  He voiced agreement and understanding.          Rocky Rice PA-C  [facility]  [address] [address]    Tel [phone]  Pager [phone]

## 2020-02-19 ENCOUNTER — HOSPITAL ENCOUNTER (OUTPATIENT)
Dept: PHYSICAL THERAPY | Facility: CLINIC | Age: 60
Setting detail: THERAPIES SERIES
End: 2020-02-19
Attending: PHYSICIAN ASSISTANT
Payer: COMMERCIAL

## 2020-02-19 PROCEDURE — 97140 MANUAL THERAPY 1/> REGIONS: CPT | Mod: GP | Performed by: PHYSICAL THERAPIST

## 2020-02-19 PROCEDURE — 97110 THERAPEUTIC EXERCISES: CPT | Mod: GP | Performed by: PHYSICAL THERAPIST

## 2020-03-12 ENCOUNTER — TELEPHONE (OUTPATIENT)
Dept: PHYSICAL THERAPY | Facility: CLINIC | Age: 60
End: 2020-03-12

## 2020-03-12 NOTE — DISCHARGE SUMMARY
Outpatient Physical Therapy Discharge Note     Patient: Miguel A Vaughn  : 1960    Beginning/End Dates of Reporting Period:  6 total sessions between 2020 and 2020    Referring Provider: Rocky Rice PA-C     Therapy Diagnosis: Mechanical neck pain with B UE radicular symptoms     Client Self Report:At the time of the last session:   Neck is sore and stiff after his 5 yo grnadchild jumped on him. This is over the L side of his neck. He has been working on his cervical SB with assistance and he using light weights for biceps curls and horizontal abduction.   Spoke with patient by phone and he is back to work and doing well. He does not feel he needs PT at this time.     Objective Measurements:  At the time of the last session:  Objective Measure: NDI  Details: not today  Objective Measure: Cervical AROM seated  Details: extension: 10 degrees flexion, 4 degrees rotation R: 15 degrees, L 4 degrees  Objective Measure: Surgery  Details: 2020  Objective Measure: Precautions  Details: No lifting of more than 10 pounds, no bending, no twisting     Goals:  Goal Identifier HEP   Goal Description Patient will report compliance with HEP at least 3 days per week in order to demonstrate improved self-management of pain.   Target Date 03/10/20   Date Met      Progress:     Goal Identifier NDI   Goal Description Patient will improve NDI score to 56% or greater in order to demonstrate functional improvements.   Target Date 20   Date Met      Progress:     Goal Identifier ROM   Goal Description Patient will demonstrate improved B cervical rotation ROM to at least 25 degrees and symptoms 2/10 or less in order to improve functional movements such as driving and sleeping.   Target Date 20   Date Met      Progress:   Progress Toward Goals:   Not assessed this period.      Plan:  Discharge from therapy.    Discharge:    Reason for Discharge: Patient has met all goals.Back to work no restrictions,      Equipment Issued: band    Discharge Plan: Patient to continue home program.

## 2021-05-30 ENCOUNTER — RECORDS - HEALTHEAST (OUTPATIENT)
Dept: ADMINISTRATIVE | Facility: CLINIC | Age: 61
End: 2021-05-30

## 2021-12-06 ENCOUNTER — TELEPHONE (OUTPATIENT)
Dept: FAMILY MEDICINE | Facility: OTHER | Age: 61
End: 2021-12-06
Payer: COMMERCIAL

## 2021-12-06 NOTE — TELEPHONE ENCOUNTER
Call from patient stating him and his wife both feel like they are coming down with a cold. Patient has been congested, blowing his nose frequently and feels like he is in the beginning stages of getting sick. He asked if he should still come in for his appointment tomorrow.    RN was able to switch his appointment to a telephone appointment for 12/7/2021 instead of in person.     Provided patient with testing locations as he has limited Internet access to find locations himself.   Patient will be getting covid tested at a drive up location today.     Nora RANDOLPHN, RN

## 2021-12-07 ENCOUNTER — VIRTUAL VISIT (OUTPATIENT)
Dept: FAMILY MEDICINE | Facility: OTHER | Age: 61
End: 2021-12-07
Payer: COMMERCIAL

## 2021-12-07 DIAGNOSIS — E11.9 TYPE 2 DIABETES MELLITUS WITHOUT COMPLICATION, WITHOUT LONG-TERM CURRENT USE OF INSULIN (H): ICD-10-CM

## 2021-12-07 DIAGNOSIS — G25.81 RESTLESS LEGS SYNDROME (RLS): ICD-10-CM

## 2021-12-07 DIAGNOSIS — E78.5 HYPERLIPIDEMIA LDL GOAL <100: Primary | ICD-10-CM

## 2021-12-07 DIAGNOSIS — Z12.5 SCREENING FOR PROSTATE CANCER: ICD-10-CM

## 2021-12-07 DIAGNOSIS — I10 HYPERTENSION, UNSPECIFIED TYPE: ICD-10-CM

## 2021-12-07 DIAGNOSIS — I10 HTN, GOAL BELOW 140/80: ICD-10-CM

## 2021-12-07 PROCEDURE — 99214 OFFICE O/P EST MOD 30 MIN: CPT | Mod: 95 | Performed by: PHYSICIAN ASSISTANT

## 2021-12-07 RX ORDER — LOSARTAN POTASSIUM 25 MG/1
25 TABLET ORAL DAILY
Qty: 90 TABLET | Refills: 0 | Status: SHIPPED | OUTPATIENT
Start: 2021-12-07 | End: 2022-03-25

## 2021-12-07 RX ORDER — ATORVASTATIN CALCIUM 20 MG/1
20 TABLET, FILM COATED ORAL DAILY
Qty: 90 TABLET | Refills: 0 | Status: SHIPPED | OUTPATIENT
Start: 2021-12-07 | End: 2022-03-25

## 2021-12-07 RX ORDER — GLYBURIDE-METFORMIN HYDROCHLORIDE 2.5; 5 MG/1; MG/1
2 TABLET ORAL 2 TIMES DAILY WITH MEALS
Qty: 360 TABLET | Refills: 0 | Status: SHIPPED | OUTPATIENT
Start: 2021-12-07 | End: 2021-12-10 | Stop reason: ALTCHOICE

## 2021-12-07 RX ORDER — COVID-19 ANTIGEN TEST
220 KIT MISCELLANEOUS 2 TIMES DAILY WITH MEALS
COMMUNITY
End: 2023-08-09

## 2021-12-07 RX ORDER — ROPINIROLE 0.25 MG/1
.25-.5 TABLET, FILM COATED ORAL
Qty: 180 TABLET | Refills: 0 | Status: SHIPPED | OUTPATIENT
Start: 2021-12-07 | End: 2022-03-07

## 2021-12-07 NOTE — PROGRESS NOTES
Miguel A is a 61 year old who is being evaluated via a billable telephone visit.      What phone number would you like to be contacted at? [phone]  How would you like to obtain your AVS? Mail a copy    1. Hyperlipidemia LDL goal <100  Labs pending at this point time.  - atorvastatin (LIPITOR) 20 MG tablet; Take 1 tablet (20 mg) by mouth daily  Dispense: 90 tablet; Refill: 0  - Comprehensive metabolic panel (BMP + Alb, Alk Phos, ALT, AST, Total. Bili, TP); Future  - Lipid panel reflex to direct LDL Fasting; Future    2. Type 2 diabetes mellitus without complication, without long-term current use of insulin (H)  Labs pending at this point time.  - atorvastatin (LIPITOR) 20 MG tablet; Take 1 tablet (20 mg) by mouth daily  Dispense: 90 tablet; Refill: 0  - losartan (COZAAR) 25 MG tablet; Take 1 tablet (25 mg) by mouth daily  Dispense: 90 tablet; Refill: 0  - Hemoglobin A1c; Future  - Comprehensive metabolic panel (BMP + Alb, Alk Phos, ALT, AST, Total. Bili, TP); Future  - Lipid panel reflex to direct LDL Fasting; Future    3. Restless legs syndrome (RLS)  Labs pending at time of office visit.  - rOPINIRole (REQUIP) 0.25 MG tablet; Take 1-2 tablets (0.25-0.5 mg) by mouth nightly as needed (restless legs)  Dispense: 180 tablet; Refill: 0    4. Hypertension, unspecified type  Needs to have a face-to-face visit for blood pressure check.    -atOrvastatin (LIPITOR) 20 MG tablet; Take 1 tablet (20 mg) by mouth daily  Dispense: 90 tablet; Refill: 0    5. HTN, goal below 140/80  Needs office visit for blood pressure check  - CBC with platelets; Future    6. Screening for prostate cancer  - PSA, screen; Future      Subjective   Miguel A is a 61 year old who presents for the following health issues     HPI     Diabetes Follow-up      How often are you checking your blood sugar? Not at all    What concerns do you have today about your diabetes? None and Other: Haven't taken pills and toes are always cold     Do you have any of these  symptoms? (Select all that apply)  Numbness in feet, Burning in feet, Redness, sores, or blisters on feet, Excessive thirst, Blurry vision and Weight loss    Have you had a diabetic eye exam in the last 12 months? No        BP Readings from Last 2 Encounters:   02/14/20 (!) 140/64   01/17/20 122/81     Hemoglobin A1C (%)   Date Value   01/07/2020 9.5 (H)   11/08/2019 9.8 (H)     LDL Cholesterol Calculated (mg/dL)   Date Value   07/30/2019 87   11/17/2014 140 (H)     LDL Cholesterol Direct (mg/dL)   Date Value   01/07/2020 146 (H)   11/19/2018 115 (H)           How many servings of fruits and vegetables do you eat daily?  2-3    On average, how many sweetened beverages do you drink each day (Examples: soda, juice, sweet tea, etc.  Do NOT count diet or artificially sweetened beverages)?   2    How many days per week do you exercise enough to make your heart beat faster? 3 or less    How many minutes a day do you exercise enough to make your heart beat faster? 9 or less    How many days per week do you miss taking your medication? 0        Review of Systems   Constitutional, HEENT, cardiovascular, pulmonary, GI, , musculoskeletal, neuro, skin, endocrine and psych systems are negative, except as otherwise noted.      Objective           Vitals:  No vitals were obtained today due to virtual visit.    Physical Exam   healthy, alert and no distress  PSYCH: Alert and oriented times 3; coherent speech, normal   rate and volume, able to articulate logical thoughts, able   to abstract reason, no tangential thoughts, no hallucinations   or delusions  His affect is normal  RESP: No cough, no audible wheezing, able to talk in full sentences  Remainder of exam unable to be completed due to telephone visits    No results found for this or any previous visit (from the past 24 hour(s)).            Phone call duration: 7 minutes

## 2021-12-10 ENCOUNTER — TELEPHONE (OUTPATIENT)
Dept: FAMILY MEDICINE | Facility: OTHER | Age: 61
End: 2021-12-10
Payer: COMMERCIAL

## 2021-12-10 DIAGNOSIS — E11.9 TYPE 2 DIABETES MELLITUS WITHOUT COMPLICATION, WITHOUT LONG-TERM CURRENT USE OF INSULIN (H): Primary | ICD-10-CM

## 2021-12-10 DIAGNOSIS — E11.9 TYPE 2 DIABETES MELLITUS WITHOUT COMPLICATION, WITHOUT LONG-TERM CURRENT USE OF INSULIN (H): ICD-10-CM

## 2021-12-10 RX ORDER — GLYBURIDE 2.5 MG/1
2.5 TABLET ORAL
Qty: 180 TABLET | Refills: 1 | Status: SHIPPED | OUTPATIENT
Start: 2021-12-10 | End: 2021-12-10

## 2021-12-10 RX ORDER — GLYBURIDE 2.5 MG/1
5 TABLET ORAL
Qty: 180 TABLET | Refills: 1 | Status: SHIPPED | OUTPATIENT
Start: 2021-12-10 | End: 2023-09-05

## 2021-12-10 NOTE — TELEPHONE ENCOUNTER
Review of previous medication list.   Huddle with provider. Previous dose was a 5 mg dose of Glyburide.   RX sent.     CLIFF Landaverde, RN, N  [place]  December 10, 2021

## 2021-12-10 NOTE — TELEPHONE ENCOUNTER
Pharmacy calling to verify the following medication?     Has there been a dose change?     Sent Glyburide/Metformin on 12/10/21    Do you want the 12/7/21 cancelled?       AGUSTÍN LandaverdeN, RN, SHUN  [place]  December 10, 2021

## 2021-12-13 NOTE — TELEPHONE ENCOUNTER
Patient called stating that the pharmacy did not receive the prescription for the Metformin and so he needs it resent.

## 2021-12-21 ENCOUNTER — LAB (OUTPATIENT)
Dept: LAB | Facility: OTHER | Age: 61
End: 2021-12-21
Payer: COMMERCIAL

## 2021-12-21 DIAGNOSIS — Z12.5 SCREENING FOR PROSTATE CANCER: ICD-10-CM

## 2021-12-21 DIAGNOSIS — E78.5 HYPERLIPIDEMIA LDL GOAL <100: ICD-10-CM

## 2021-12-21 DIAGNOSIS — E11.9 TYPE 2 DIABETES MELLITUS WITHOUT COMPLICATION, WITHOUT LONG-TERM CURRENT USE OF INSULIN (H): ICD-10-CM

## 2021-12-21 DIAGNOSIS — I10 HTN, GOAL BELOW 140/80: ICD-10-CM

## 2021-12-21 LAB
ALBUMIN SERPL-MCNC: 3.8 G/DL (ref 3.4–5)
ALP SERPL-CCNC: 134 U/L (ref 40–150)
ALT SERPL W P-5'-P-CCNC: 29 U/L (ref 0–70)
ANION GAP SERPL CALCULATED.3IONS-SCNC: 4 MMOL/L (ref 3–14)
AST SERPL W P-5'-P-CCNC: 13 U/L (ref 0–45)
BILIRUB SERPL-MCNC: 0.3 MG/DL (ref 0.2–1.3)
BUN SERPL-MCNC: 10 MG/DL (ref 7–30)
CALCIUM SERPL-MCNC: 9.6 MG/DL (ref 8.5–10.1)
CHLORIDE BLD-SCNC: 104 MMOL/L (ref 94–109)
CHOLEST SERPL-MCNC: 151 MG/DL
CO2 SERPL-SCNC: 29 MMOL/L (ref 20–32)
CREAT SERPL-MCNC: 0.71 MG/DL (ref 0.66–1.25)
ERYTHROCYTE [DISTWIDTH] IN BLOOD BY AUTOMATED COUNT: 12.9 % (ref 10–15)
FASTING STATUS PATIENT QL REPORTED: YES
GFR SERPL CREATININE-BSD FRML MDRD: >90 ML/MIN/1.73M2
GLUCOSE BLD-MCNC: 267 MG/DL (ref 70–99)
HBA1C MFR BLD: 13.4 % (ref 0–5.6)
HCT VFR BLD AUTO: 49.1 % (ref 40–53)
HDLC SERPL-MCNC: 40 MG/DL
HGB BLD-MCNC: 16.7 G/DL (ref 13.3–17.7)
LDLC SERPL CALC-MCNC: 77 MG/DL
MCH RBC QN AUTO: 31.7 PG (ref 26.5–33)
MCHC RBC AUTO-ENTMCNC: 34 G/DL (ref 31.5–36.5)
MCV RBC AUTO: 93 FL (ref 78–100)
NONHDLC SERPL-MCNC: 111 MG/DL
PLATELET # BLD AUTO: 237 10E3/UL (ref 150–450)
POTASSIUM BLD-SCNC: 4.2 MMOL/L (ref 3.4–5.3)
PROT SERPL-MCNC: 8.1 G/DL (ref 6.8–8.8)
PSA SERPL-MCNC: 2.29 UG/L (ref 0–4)
RBC # BLD AUTO: 5.26 10E6/UL (ref 4.4–5.9)
SODIUM SERPL-SCNC: 137 MMOL/L (ref 133–144)
TRIGL SERPL-MCNC: 169 MG/DL
WBC # BLD AUTO: 9.3 10E3/UL (ref 4–11)

## 2021-12-21 PROCEDURE — 85027 COMPLETE CBC AUTOMATED: CPT

## 2021-12-21 PROCEDURE — 80061 LIPID PANEL: CPT

## 2021-12-21 PROCEDURE — 80053 COMPREHEN METABOLIC PANEL: CPT

## 2021-12-21 PROCEDURE — G0103 PSA SCREENING: HCPCS

## 2021-12-21 PROCEDURE — 83036 HEMOGLOBIN GLYCOSYLATED A1C: CPT

## 2021-12-21 PROCEDURE — 36415 COLL VENOUS BLD VENIPUNCTURE: CPT

## 2021-12-22 ENCOUNTER — OFFICE VISIT (OUTPATIENT)
Dept: SLEEP MEDICINE | Facility: CLINIC | Age: 61
End: 2021-12-22
Payer: COMMERCIAL

## 2021-12-22 VITALS
SYSTOLIC BLOOD PRESSURE: 110 MMHG | WEIGHT: 200.8 LBS | HEIGHT: 68 IN | DIASTOLIC BLOOD PRESSURE: 60 MMHG | HEART RATE: 95 BPM | OXYGEN SATURATION: 96 % | BODY MASS INDEX: 30.43 KG/M2

## 2021-12-22 DIAGNOSIS — E11.9 TYPE 2 DIABETES MELLITUS WITHOUT COMPLICATION, WITHOUT LONG-TERM CURRENT USE OF INSULIN (H): ICD-10-CM

## 2021-12-22 DIAGNOSIS — G47.33 OSA (OBSTRUCTIVE SLEEP APNEA): Primary | ICD-10-CM

## 2021-12-22 DIAGNOSIS — I10 HTN, GOAL BELOW 140/80: Primary | ICD-10-CM

## 2021-12-22 DIAGNOSIS — E78.5 HYPERLIPIDEMIA LDL GOAL <100: ICD-10-CM

## 2021-12-22 PROCEDURE — 99213 OFFICE O/P EST LOW 20 MIN: CPT | Performed by: PHYSICIAN ASSISTANT

## 2021-12-22 ASSESSMENT — MIFFLIN-ST. JEOR: SCORE: 1685.32
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
## 2021-12-24 ENCOUNTER — TELEPHONE (OUTPATIENT)
Dept: FAMILY MEDICINE | Facility: OTHER | Age: 61
End: 2021-12-24
Payer: COMMERCIAL

## 2021-12-24 NOTE — TELEPHONE ENCOUNTER
Diabetes Education Scheduling Outreach #1:    Call to patient to schedule. Left message with phone number to call to schedule.    Plan for 2nd outreach attempt within 1 week.    Karina Pruitt  [facility]  Diabetes and Nutrition Scheduling

## 2021-12-27 ENCOUNTER — ANCILLARY PROCEDURE (OUTPATIENT)
Dept: GENERAL RADIOLOGY | Facility: OTHER | Age: 61
End: 2021-12-27
Attending: PHYSICIAN ASSISTANT
Payer: COMMERCIAL

## 2021-12-27 ENCOUNTER — OFFICE VISIT (OUTPATIENT)
Dept: FAMILY MEDICINE | Facility: OTHER | Age: 61
End: 2021-12-27
Payer: COMMERCIAL

## 2021-12-27 VITALS
HEIGHT: 68 IN | WEIGHT: 205 LBS | BODY MASS INDEX: 31.07 KG/M2 | TEMPERATURE: 97.1 F | DIASTOLIC BLOOD PRESSURE: 86 MMHG | SYSTOLIC BLOOD PRESSURE: 144 MMHG | RESPIRATION RATE: 16 BRPM | OXYGEN SATURATION: 97 % | HEART RATE: 75 BPM

## 2021-12-27 DIAGNOSIS — E11.9 TYPE 2 DIABETES MELLITUS WITHOUT COMPLICATION, WITHOUT LONG-TERM CURRENT USE OF INSULIN (H): ICD-10-CM

## 2021-12-27 DIAGNOSIS — E78.5 HYPERLIPIDEMIA LDL GOAL <100: ICD-10-CM

## 2021-12-27 DIAGNOSIS — M54.12 CERVICAL RADICULOPATHY: ICD-10-CM

## 2021-12-27 DIAGNOSIS — I10 HTN, GOAL BELOW 140/80: ICD-10-CM

## 2021-12-27 DIAGNOSIS — E11.9 TYPE 2 DIABETES MELLITUS WITHOUT COMPLICATION, WITHOUT LONG-TERM CURRENT USE OF INSULIN (H): Primary | ICD-10-CM

## 2021-12-27 PROCEDURE — 99214 OFFICE O/P EST MOD 30 MIN: CPT | Performed by: PHYSICIAN ASSISTANT

## 2021-12-27 PROCEDURE — 72040 X-RAY EXAM NECK SPINE 2-3 VW: CPT | Performed by: RADIOLOGY

## 2021-12-27 PROCEDURE — 0064A COVID-19,PF,MODERNA (18+ YRS BOOSTER .25ML): CPT | Performed by: PHYSICIAN ASSISTANT

## 2021-12-27 PROCEDURE — 91306 COVID-19,PF,MODERNA (18+ YRS BOOSTER .25ML): CPT | Performed by: PHYSICIAN ASSISTANT

## 2021-12-27 RX ORDER — GLUCOSAMINE HCL/CHONDROITIN SU 500-400 MG
CAPSULE ORAL
Qty: 100 EACH | Refills: 3 | Status: SHIPPED | OUTPATIENT
Start: 2021-12-27

## 2021-12-27 RX ORDER — LANCETS
EACH MISCELLANEOUS
Qty: 100 EACH | Refills: 1 | Status: SHIPPED | OUTPATIENT
Start: 2021-12-27

## 2021-12-27 ASSESSMENT — MIFFLIN-ST. JEOR: SCORE: 1704.37

## 2021-12-27 ASSESSMENT — PAIN SCALES - GENERAL: PAINLEVEL: NO PAIN (0)

## 2021-12-27 NOTE — PROGRESS NOTES
Assessment & Plan     Patient is seen in collaboration with nurse practitioner student Saundra Nguyen.  I was present during the entire examination and documentation is reviewed with the student.      Type 2 diabetes mellitus without complication, without long-term current use of insulin (H)  Due to no medical insurance patient was not taking any medication until a week ago. After we restarted the medications he reported that he is feeling better already. We will recheck lab in three months.  Referral to diabetic education was initiated, patient will call and make an appointment.  Patient c/o of numbness feeling when he bend his neck back to look sealing.   - XR Cervical Spine 2/3 Views; Future  - Adult Neurology Referral; Future    - blood glucose monitoring (NO BRAND SPECIFIED) meter device kit; Use to test blood sugar 1 times daily or as directed. Preferred blood glucose meter OR supplies to accompany: Blood Glucose Monitor Brands: per insurance.  - blood glucose (NO BRAND SPECIFIED) test strip; Use to test blood sugar 1 times daily or as directed. To accompany: Blood Glucose Monitor Brands: per insurance.  - blood glucose calibration (NO BRAND SPECIFIED) solution; To accompany: Blood Glucose Monitor Brands: per insurance.  - thin (NO BRAND SPECIFIED) lancets; Use with lanceting device. To accompany: Blood Glucose Monitor Brands: per insurance.  - alcohol swab prep pads; Use to swab area of injection/erwin as directed.    Hyperlipidemia LDL goal <100  Due to no medical insurance patient was not taking any medication until a week ago. After we restarted the medications he reported that he is feeling better already. We will recheck lab in three months.  Increase LDL for recent labs; will monitor with new medications      HTN, goal below 140/80  Due to no medical insurance patient was not taking any medication until a week ago. After we restarted the medications he reported that he is feeling better already. We  "will recheck lab in three months. We will monitor.     Cervical radiculopathy  We discussed the increase signs and symptoms. Xray pending at the time of dictations.   - XR Cervical Spine 2/3 Views; Future  - Adult Neurology Referral; Future    Tobacco Cessation:   reports that he has been smoking cigarettes. He has a 12.50 pack-year smoking history. He has quit using smokeless tobacco.  His smokeless tobacco use included snuff.     Continue for the good work.  Recheck in 3months.    SELF MONITORING:Check  Blood sugar regularly   Regular exercise    Return in about 3 months (around 3/27/2022) for blood pressure and diabetes recheck .    3 months     Ferny Gomez PA-C  [facility] DARNELL Grady is a 61 year old who presents for the following health issues   Follow up lab result and diabetic check.  When bend neck he reported that he feels numb all of his back.    History of Present Illness       Diabetes:   He presents for follow up of diabetes.  He is not checking blood glucose. He is concerned about other.  He is having numbness in feet, excessive thirst and blurry vision. The patient has not had a diabetic eye exam in the last 12 months.         Hyperlipidemia:  He presents for follow up of hyperlipidemia.  He is taking medication to lower cholesterol. He is not having myalgia or other side effects to statin medications.    Hypertension: He presents for follow up of hypertension.  He does not check blood pressure  regularly outside of the clinic. Outpatient blood pressures have not been over 140/90. He does not follow a low salt diet.     Migraines:   Since the patient's last clinic visit, headaches are: no change  The patient is getting headaches:  2-3 times a week  He is able to do normal daily activities when he has a migraine.  The patient is taking the following rescue/relief medications:  Ibuprofen (Advil, Motrin)   Patient states \"I get total relief\" from the rescue/relief " "medications.   The patient is taking the following medications to prevent migraines:  No medications to prevent migraines  In the past 4 weeks, the patient has gone to an Urgent Care or Emergency Room 0 times times due to headaches.    He eats 0-1 servings of fruits and vegetables daily.He consumes 2 sweetened beverage(s) daily.He exercises with enough effort to increase his heart rate 9 or less minutes per day.  He exercises with enough effort to increase his heart rate 3 or less days per week. He is missing 1 dose(s) of medications per week.       Diabetes Follow-up and test results      How often are you checking your blood sugar? Not at all    What concerns do you have today about your diabetes? None and Other: been out of medication     Do you have any of these symptoms? (Select all that apply)  Numbness in feet    Have you had a diabetic eye exam in the last 12 months? No     Three months recheck    BP Readings from Last 2 Encounters:   12/27/21 (!) 144/86   12/22/21 110/60     Hemoglobin A1C POCT (%)   Date Value   01/07/2020 9.5 (H)   11/08/2019 9.8 (H)     Hemoglobin A1C (%)   Date Value   12/21/2021 13.4 (H)     LDL Cholesterol Calculated (mg/dL)   Date Value   12/21/2021 77   07/30/2019 87   11/17/2014 140 (H)     LDL Cholesterol Direct (mg/dL)   Date Value   01/07/2020 146 (H)   11/19/2018 115 (H)       Review of Systems    Constitutional, HEENT, cardiovascular, pulmonary, GI, , musculoskeletal, neuro, skin, endocrine and psych systems are negative, except as otherwise noted.      Objective    BP (!) 144/86   Pulse 75   Temp 97.1  F (36.2  C) (Temporal)   Resp 16   Ht 1.727 m (5' 8\")   Wt 93 kg (205 lb)   SpO2 97%   BMI 31.17 kg/m    Body mass index is 31.17 kg/m .  Physical Exam   GENERAL: healthy, alert and no distress  EYES: Eyes grossly normal to inspection, PERRL and conjunctivae and sclerae normal  HENT: ear canals and TM's normal, nose and mouth without ulcers or lesions  NECK: no " adenopathy, no asymmetry, masses, or scars and thyroid normal to palpation. Feels numbness when neck is extended.   RESP: lungs clear to auscultation - no rales, rhonchi or wheezes  CV: regular rate and rhythm, normal S1 S2, no S3 or S4, no murmur, click or rub, no peripheral edema and peripheral pulses strong  ABDOMEN: soft, nontender, no hepatosplenomegaly, no masses and bowel sounds normal  MS: no gross musculoskeletal defects noted, no edema. Numbness felt when extension is reportedy.   SKIN: no suspicious lesions or rashes  NEURO: Normal strength and tone, mentation intact and speech normal  PSYCH: mentation appears normal, affect normal/bright

## 2021-12-28 ENCOUNTER — APPOINTMENT (OUTPATIENT)
Dept: SLEEP MEDICINE | Facility: CLINIC | Age: 61
End: 2021-12-28
Payer: COMMERCIAL

## 2021-12-29 ENCOUNTER — TELEPHONE (OUTPATIENT)
Dept: NEUROSURGERY | Facility: CLINIC | Age: 61
End: 2021-12-29
Payer: COMMERCIAL

## 2021-12-29 NOTE — TELEPHONE ENCOUNTER
As per Dr Wooten, Please schedule patient for appointment with [facility] to review XR CERVICAL SPINE from 12/27/21.

## 2022-01-04 NOTE — TELEPHONE ENCOUNTER
Diabetes Education Scheduling Outreach #2:    Call to patient to schedule. Left message with phone number to call to schedule.    Karina Pruitt  [facility]  Diabetes and Nutrition Scheduling

## 2022-01-13 ENCOUNTER — OFFICE VISIT (OUTPATIENT)
Dept: NEUROSURGERY | Facility: CLINIC | Age: 62
End: 2022-01-13
Payer: COMMERCIAL

## 2022-01-13 DIAGNOSIS — M54.16 LUMBAR RADICULOPATHY: ICD-10-CM

## 2022-01-13 DIAGNOSIS — R13.10 DYSPHAGIA, UNSPECIFIED TYPE: ICD-10-CM

## 2022-01-13 DIAGNOSIS — Z98.1 S/P CERVICAL SPINAL FUSION: Primary | ICD-10-CM

## 2022-01-13 PROCEDURE — 99213 OFFICE O/P EST LOW 20 MIN: CPT | Performed by: NURSE PRACTITIONER

## 2022-01-13 NOTE — PATIENT INSTRUCTIONS
-Cervical and lumbar MRI ordered. Please contact [phone] to schedule. I will contact you with the results.  -Please contact our clinic with questions or concerns at [phone].

## 2022-01-13 NOTE — LETTER
1/13/2022         RE: Miguel A Vaughn  [address]  [place] [address]        Dear Colleague,    Thank you for referring your patient, Miguel A Vaughn, to the [facility] [place]. Please see a copy of my visit note below.    [facility] Neurosurgery  Neurosurgery Followup:    HPI: 2 years s/p C6-7 ACDF with Dr. Wooten on 11/12/2019. Presents today with right-sided neck pain with bilateral hand numbness. He also has bilateral low back pain that radiates to the bilateral posterior thigh pain to the foot. He states the symptoms are worse in the right at this time. He has peripheral neuropathy as well. No overt weakness. No bowel/bladder complaints. He states symptoms are worse with ROM of his neck. He had a cervical XR. He has not had an MRI recently. He continues his home PT.     Medical, surgical, family, and social history unchanged since prior exam.    Exam:  Constitutional:  Alert, well nourished, NAD.  HEENT: Normocephalic, atraumatic.   Pulm:  Without shortness of breath   CV:  No pitting edema of BLE.     Vital Signs:  There were no vitals taken for this visit.    Neurological:  Awake  Alert  Oriented x 3  Motor exam:     Shoulder Abduction:  Right:  5/5    Left:  5/5  Biceps:                      Right:  5/5    Left:  5/5  Triceps:                     Right:  5/5    Left:  5/5  Wrist Extensors:       Right:  5/5    Left:  5/5  Wrist Flexors:           Right:  5/5    Left:  5/5  Intrinsics:                  Right:  5/5    Left:  5/5     Able to spontaneously move U/E bilaterally  Sensation intact throughout all U/E dermatomes    Incisions:  Healed.    Imaging:   Cervical XR 12/27/2021  IMPRESSION: Changes of anterior cervical discectomy and fusion at C3-C4 where there is an anterior plate and screw apparatus and interbody device. No solid bony bridging is evident at this level. Solid-appearing anterior fusion at C4-C5 and C5-C6. Anterior cervical  discectomy and  fusion hardware at C6-C7 appears unchanged. Unchanged alignment with straightening of usual lordosis without subluxation. Unremarkable prevertebral soft tissues.    A/P: s/p cervical spine fusion. Will plan for cervical and lumbar MRI at this time. I will contact him with the results and further recommendations. He verbalized understanding and agreement.    Patient Instructions   -Cervical and lumbar MRI ordered. Please contact [phone] to schedule. I will contact you with the results.  -Please contact our clinic with questions or concerns at [phone].      Rochelle Rausch CNP  [facility] Neurosurgery  [address] [place] [address]  Tel [phone]  Fax [phone]        Again, thank you for allowing me to participate in the care of your patient.        Sincerely,        Rochelle Rausch, NP

## 2022-01-13 NOTE — PROGRESS NOTES
[facility] Neurosurgery  Neurosurgery Followup:    HPI: 2 years s/p C6-7 ACDF with Dr. Wooten on 11/12/2019. Presents today with right-sided neck pain with bilateral hand numbness. He also has bilateral low back pain that radiates to the bilateral posterior thigh pain to the foot. He states the symptoms are worse in the right at this time. He has peripheral neuropathy as well. No overt weakness. No bowel/bladder complaints. He states symptoms are worse with ROM of his neck. He had a cervical XR. He has not had an MRI recently. He continues his home PT.     Medical, surgical, family, and social history unchanged since prior exam.    Exam:  Constitutional:  Alert, well nourished, NAD.  HEENT: Normocephalic, atraumatic.   Pulm:  Without shortness of breath   CV:  No pitting edema of BLE.     Vital Signs:  There were no vitals taken for this visit.    Neurological:  Awake  Alert  Oriented x 3  Motor exam:     Shoulder Abduction:  Right:  5/5    Left:  5/5  Biceps:                      Right:  5/5    Left:  5/5  Triceps:                     Right:  5/5    Left:  5/5  Wrist Extensors:       Right:  5/5    Left:  5/5  Wrist Flexors:           Right:  5/5    Left:  5/5  Intrinsics:                  Right:  5/5    Left:  5/5     Able to spontaneously move U/E bilaterally  Sensation intact throughout all U/E dermatomes    Incisions:  Healed.    Imaging:   Cervical XR 12/27/2021  IMPRESSION: Changes of anterior cervical discectomy and fusion at C3-C4 where there is an anterior plate and screw apparatus and interbody device. No solid bony bridging is evident at this level. Solid-appearing anterior fusion at C4-C5 and C5-C6. Anterior cervical  discectomy and fusion hardware at C6-C7 appears unchanged. Unchanged alignment with straightening of usual lordosis without subluxation. Unremarkable prevertebral soft tissues.    A/P: s/p cervical spine fusion. Will plan for cervical and lumbar MRI at this time. I will contact him  with the results and further recommendations. He verbalized understanding and agreement.    Patient Instructions   -Cervical and lumbar MRI ordered. Please contact [phone] to schedule. I will contact you with the results.  -Please contact our clinic with questions or concerns at [phone].      Rochelle Rausch, [facility] Neurosurgery  [address] [place] [address]  Tel [phone]  Fax [phone]

## 2022-01-19 ENCOUNTER — HOSPITAL ENCOUNTER (OUTPATIENT)
Dept: MRI IMAGING | Facility: CLINIC | Age: 62
End: 2022-01-19
Attending: NURSE PRACTITIONER
Payer: COMMERCIAL

## 2022-01-19 DIAGNOSIS — M54.16 LUMBAR RADICULOPATHY: ICD-10-CM

## 2022-01-19 DIAGNOSIS — Z98.1 S/P CERVICAL SPINAL FUSION: ICD-10-CM

## 2022-01-19 DIAGNOSIS — R13.10 DYSPHAGIA, UNSPECIFIED TYPE: ICD-10-CM

## 2022-01-19 PROCEDURE — 72148 MRI LUMBAR SPINE W/O DYE: CPT

## 2022-01-19 PROCEDURE — 72156 MRI NECK SPINE W/O & W/DYE: CPT

## 2022-01-19 PROCEDURE — 255N000002 HC RX 255 OP 636: Performed by: NURSE PRACTITIONER

## 2022-01-19 PROCEDURE — A9585 GADOBUTROL INJECTION: HCPCS | Performed by: NURSE PRACTITIONER

## 2022-01-19 RX ORDER — GADOBUTROL 604.72 MG/ML
10 INJECTION INTRAVENOUS ONCE
Status: COMPLETED | OUTPATIENT
Start: 2022-01-19 | End: 2022-01-19

## 2022-01-19 RX ADMIN — GADOBUTROL 10 ML: 604.72 INJECTION INTRAVENOUS at 13:53

## 2022-01-24 ENCOUNTER — TELEPHONE (OUTPATIENT)
Dept: NEUROSURGERY | Facility: CLINIC | Age: 62
End: 2022-01-24
Payer: COMMERCIAL

## 2022-01-24 DIAGNOSIS — M54.50 LOW BACK PAIN POTENTIALLY ASSOCIATED WITH RADICULOPATHY: ICD-10-CM

## 2022-01-24 DIAGNOSIS — M54.16 RADICULOPATHY, LUMBAR REGION: ICD-10-CM

## 2022-01-24 DIAGNOSIS — Z98.1 S/P CERVICAL SPINAL FUSION: Primary | ICD-10-CM

## 2022-01-24 NOTE — TELEPHONE ENCOUNTER
Orders placed as per patient's request and Rochelle Rausch's recommendations.   Patient notified.

## 2022-01-24 NOTE — TELEPHONE ENCOUNTER
Attempted to contact patient to discuss cervical and lumbar MRI results. Left message to return call. Cervical MRI with C3-7 fusion with some degenerative changes without high grade stenosis. Would recommend cervical PT at this time. Lumbar MRI with multilevel degenerative findings including moderate to severe central stenosis at L4-5 with multilevel neural foraminal stenosis. Would recommend PT and LESI at this time. He should follow up in clinic with Dr. Wooten if symptoms persist or worsen.

## 2022-01-24 NOTE — TELEPHONE ENCOUNTER
Miguel A returned Rochelle's call and stated that he would like orders for PT and an injection. Please reach him at [phone].

## 2022-01-26 ENCOUNTER — TELEPHONE (OUTPATIENT)
Dept: PALLIATIVE MEDICINE | Facility: CLINIC | Age: 62
End: 2022-01-26
Payer: COMMERCIAL

## 2022-01-31 ENCOUNTER — HOSPITAL ENCOUNTER (OUTPATIENT)
Facility: CLINIC | Age: 62
End: 2022-01-31
Attending: ANESTHESIOLOGY | Admitting: ANESTHESIOLOGY
Payer: COMMERCIAL

## 2022-02-07 DIAGNOSIS — Z11.59 ENCOUNTER FOR SCREENING FOR OTHER VIRAL DISEASES: Primary | ICD-10-CM

## 2022-02-21 ENCOUNTER — TELEPHONE (OUTPATIENT)
Dept: NEUROSURGERY | Facility: CLINIC | Age: 62
End: 2022-02-21
Payer: COMMERCIAL

## 2022-02-21 NOTE — TELEPHONE ENCOUNTER
Writer reached out to prior authorization department regarding status. Will update patient once we hear back.

## 2022-02-21 NOTE — TELEPHONE ENCOUNTER
Reason for Call:  Other call back    Detailed comments: pt calling to state that medical insurance denied prior auth for inj with [facility] because of the way it was worded- needing it to be resent to insurance    Phone Number Patient can be reached at: Home number on file [phone] (home)    Best Time: any    Can we leave a detailed message on this number? YES    Call taken on 2/21/2022 at 8:17 AM by Pippa Gasca

## 2022-02-22 NOTE — TELEPHONE ENCOUNTER
A representative from [facility] ([phone]) called to confirm use of x-ray guiding for needle placement during injection.    She stated that this discrepancy was the reason for the denial.    Writer was able to confirm that yes fluoro will be used for guidance.    Representative stated that she will recommend approval and move the request forward in the next 20 minutes.

## 2022-02-22 NOTE — TELEPHONE ENCOUNTER
"Rochelle Rausch CNP contacted [facility]. They told her that we need an updated order with the level of injection faxed to their location. It needs to say \"request for appeal\" on the fax as well     Fax: [phone]   Case #: [id number]     Order updated and faxed.    Faxed Updated Injection Order February 22, 2022 to fax number [phone] ([facility])    Right Fax confirmed at 10:44 AM    Annie Alvarez RN      "

## 2022-02-22 NOTE — TELEPHONE ENCOUNTER
Called patient to provide update.    Spoke with prior authorization department. They called [employer] and the injection was denied because of the type of SUZY. They explained to [employer] that it is a Lumbar SUZY but [employer] want to know the approach.     The ordering provider can call [phone] press option 2 and then option 5 to request a rapid appeal to overturn the denial and use reference number [id number].    Patient is scheduled for this injection on 2/24/22.    Will route to ordering provider, Rochelle Rausch CNP.

## 2022-02-23 NOTE — TELEPHONE ENCOUNTER
Lakeshia RN from [facility] clinical review team called with an update.    [facility] is not able to validate the request as 'urgent' because it does not meet federal guidelines. She will move the request along. She is unable to provide a definitive date and time that the request may be approved.    Case: [id number]  Provider services number: [phone] (call this number to check for updates on the case)    Writer spoke to the patient and provided update on above. Patient voiced understanding.    Message sent to Dr. Anguiano's team to cancel injection appointment scheduled for tomorrow 2/24.    [facility]/Prior auth will contact patient if/when [facility] approves the injection.

## 2022-02-23 NOTE — TELEPHONE ENCOUNTER
Jordan from [facility] called to provide an update on approval status. Jordan states that in order for this request to be approved as 'urgent' there would need to be documentation in the chart to show that patient has severe pain or that delay would cause harm to the patient.     Jordan said they would call us back in 1-2 hours with an update if the request will be approved as urgent or not. If approved as urgent, they would have up to 72 hours from when they received the original request (yesterday around 11:45 am) so they would have to have a decision by Friday at approximately 11:45 am.

## 2022-02-23 NOTE — TELEPHONE ENCOUNTER
"Jordan from [facility] called at 3:15. He updated nursing that patient's injection was approved by their physician. Next step is waiting for authorization dates but ok'd patient to get back on the schedule for injection.    Writer updated [facility] auth department. That department can check \"Availity System\" to see date range for approval.     Writer sent a message to Dr. Anguiano's scheduling team to see if they could get the patient back on the schedule. They will contact patient to reschedule.    Updated patient of approval.    "

## 2022-02-24 ENCOUNTER — HOSPITAL ENCOUNTER (OUTPATIENT)
Facility: CLINIC | Age: 62
Discharge: HOME | End: 2022-02-24
Attending: ANESTHESIOLOGY | Admitting: ANESTHESIOLOGY
Payer: COMMERCIAL

## 2022-02-24 ENCOUNTER — HOSPITAL ENCOUNTER (OUTPATIENT)
Dept: GENERAL RADIOLOGY | Facility: CLINIC | Age: 62
End: 2022-02-24
Attending: ANESTHESIOLOGY | Admitting: ANESTHESIOLOGY
Payer: COMMERCIAL

## 2022-02-24 VITALS
TEMPERATURE: 97.9 F | SYSTOLIC BLOOD PRESSURE: 146 MMHG | HEART RATE: 89 BPM | RESPIRATION RATE: 18 BRPM | OXYGEN SATURATION: 96 % | DIASTOLIC BLOOD PRESSURE: 82 MMHG

## 2022-02-24 DIAGNOSIS — M54.50 LOW BACK PAIN POTENTIALLY ASSOCIATED WITH RADICULOPATHY: ICD-10-CM

## 2022-02-24 PROCEDURE — 999N000179 XR SURGERY CARM FLUORO LESS THAN 5 MIN W STILLS: Mod: TC

## 2022-02-24 PROCEDURE — 62323 NJX INTERLAMINAR LMBR/SAC: CPT | Performed by: ANESTHESIOLOGY

## 2022-02-24 PROCEDURE — 250N000011 HC RX IP 250 OP 636: Performed by: ANESTHESIOLOGY

## 2022-02-24 PROCEDURE — 250N000013 HC RX MED GY IP 250 OP 250 PS 637: Performed by: ANESTHESIOLOGY

## 2022-02-24 RX ORDER — DIPHENHYDRAMINE HCL 25 MG
50 CAPSULE ORAL ONCE
Status: COMPLETED | OUTPATIENT
Start: 2022-02-24 | End: 2022-02-24

## 2022-02-24 RX ORDER — TRIAMCINOLONE ACETONIDE 40 MG/ML
INJECTION, SUSPENSION INTRA-ARTICULAR; INTRAMUSCULAR PRN
Status: DISCONTINUED | OUTPATIENT
Start: 2022-02-24 | End: 2022-02-24 | Stop reason: HOSPADM

## 2022-02-24 RX ORDER — METHYLPREDNISOLONE ACETATE 40 MG/ML
INJECTION, SUSPENSION INTRA-ARTICULAR; INTRALESIONAL; INTRAMUSCULAR; SOFT TISSUE PRN
Status: DISCONTINUED | OUTPATIENT
Start: 2022-02-24 | End: 2022-02-24 | Stop reason: HOSPADM

## 2022-02-24 RX ORDER — IOPAMIDOL 612 MG/ML
INJECTION, SOLUTION INTRATHECAL PRN
Status: DISCONTINUED | OUTPATIENT
Start: 2022-02-24 | End: 2022-02-24 | Stop reason: HOSPADM

## 2022-02-24 RX ADMIN — DIPHENHYDRAMINE HYDROCHLORIDE 50 MG: 25 CAPSULE ORAL at 12:37

## 2022-02-24 NOTE — DISCHARGE INSTRUCTIONS

## 2022-02-24 NOTE — OP NOTE
CHIEF COMPLAINT: Low back and buttock pain  PROCEDURE: L5-S1 Interlaminar epidural steroid injection using fluoroscopic guidance with contrast dye.   PROCEDURE DETAILS: After written informed consent was obtained from the patient, the patient was escorted to the procedure room.  The patient was placed in the prone position.  A  time out  was conducted to verify patient identity, procedure to be performed, side, site, allergies and any special requirements.  The skin over the neck and upper back region were prepped and draped in normal sterile fashion. Fluoroscopy was used to identify the interspace in an AP view and the skin was anesthetized with 2 mL of 1% lidocaine with bicarbonate buffer.  A 20-gauge 3-1/2 inch Tuohy needle was advanced using the loss of resistance technique with preservative free normal saline with fluoroscopic guidance. After negative aspiration for CSF and blood, 1.5 cc of Omnipaque contrast dye was injected revealing the appropriate epidurogram without evidence of intrathecal or intravascular spread. Following this, a 3-mL solution of 40 mg of triamcinolone with 2 cc preservative-free normal saline was slowly injected.  There is good flow of medication covering L3 down to S1 bilaterally.  After injection of the medication, as the needle tip was withdrawn, it was flushed with local anesthetic.  The patient was monitored with blood pressure and pulse oximetry machines with the assistance of an RN throughout the procedure.  The patient was alert and responsive to questions throughout the procedure.   The patient tolerated the procedure well and was observed in the post-procedural area.  The patient was dismissed without apparent complications.     BLOOD LOSS: <5 cc    DIAGNOSIS:  1.  Disc degeneration at L4-5 and L5-S1  2.  L5-S1 anterior listhesis  3.  Severe spinal stenosis at L4-5 and L5-S1.  PLAN:  1. Performed a  interlaminar epidural steroid injection.   2. The patient was instructed to  follow-up at the [place] [facility] if today's procedure is not helpful.  Brent Anguiano MD  Diplomate of the American Board of Anesthesiology, Pain Medicine

## 2022-03-07 DIAGNOSIS — G25.81 RESTLESS LEGS SYNDROME (RLS): ICD-10-CM

## 2022-03-07 RX ORDER — ROPINIROLE 0.25 MG/1
TABLET, FILM COATED ORAL
Qty: 60 TABLET | Refills: 0 | Status: SHIPPED | OUTPATIENT
Start: 2022-03-07 | End: 2023-05-18

## 2022-03-24 DIAGNOSIS — E78.5 HYPERLIPIDEMIA LDL GOAL <100: ICD-10-CM

## 2022-03-24 DIAGNOSIS — I10 HYPERTENSION, UNSPECIFIED TYPE: ICD-10-CM

## 2022-03-24 DIAGNOSIS — E11.9 TYPE 2 DIABETES MELLITUS WITHOUT COMPLICATION, WITHOUT LONG-TERM CURRENT USE OF INSULIN (H): ICD-10-CM

## 2022-03-25 RX ORDER — ATORVASTATIN CALCIUM 20 MG/1
TABLET, FILM COATED ORAL
Qty: 90 TABLET | Refills: 0 | Status: SHIPPED | OUTPATIENT
Start: 2022-03-25 | End: 2023-05-25

## 2022-03-25 RX ORDER — LOSARTAN POTASSIUM 25 MG/1
TABLET ORAL
Qty: 90 TABLET | Refills: 0 | Status: SHIPPED | OUTPATIENT
Start: 2022-03-25 | End: 2023-11-15

## 2022-03-25 NOTE — TELEPHONE ENCOUNTER
"Pending Prescriptions:                       Disp   Refills    atorvastatin (LIPITOR) 20 MG tablet [Pharm*90 tab*0        Sig: Take 1 tablet by mouth once daily    losartan (COZAAR) 25 MG tablet [Pharmacy M*90 tab*0        Sig: Take 1 tablet by mouth once daily    Routing refill request to provider for review/approval because:  Requested Prescriptions   Pending Prescriptions Disp Refills    atorvastatin (LIPITOR) 20 MG tablet [Pharmacy Med Name: Atorvastatin Calcium 20 MG Oral Tablet] 90 tablet 0     Sig: Take 1 tablet by mouth once daily        Statins Protocol Passed - 3/24/2022 12:28 PM        Passed - LDL on file in past 12 months     Recent Labs   Lab Test 12/21/21  1022   LDL 77               Passed - No abnormal creatine kinase in past 12 months     No lab results found.             Passed - Recent (12 mo) or future (30 days) visit within the authorizing provider's specialty     Patient has had an office visit with the authorizing provider or a provider within the authorizing providers department within the previous 12 mos or has a future within next 30 days. See \"Patient Info\" tab in inbasket, or \"Choose Columns\" in Meds & Orders section of the refill encounter.              Passed - Medication is active on med list        Passed - Patient is age 18 or older           losartan (COZAAR) 25 MG tablet [Pharmacy Med Name: Losartan Potassium 25 MG Oral Tablet] 90 tablet 0     Sig: Take 1 tablet by mouth once daily        Angiotensin-II Receptors Failed - 3/24/2022 12:28 PM        Failed - Last blood pressure under 140/90 in past 12 months       BP Readings from Last 3 Encounters:   02/24/22 (!) 146/82   12/27/21 (!) 144/86   12/22/21 110/60                 Passed - Recent (12 mo) or future (30 days) visit within the authorizing provider's specialty     Patient has had an office visit with the authorizing provider or a provider within the authorizing providers department within the previous 12 mos or has a future " "within next 30 days. See \"Patient Info\" tab in inbasket, or \"Choose Columns\" in Meds & Orders section of the refill encounter.              Passed - Medication is active on med list        Passed - Patient is age 18 or older        Passed - Normal serum creatinine on file in past 12 months     Recent Labs   Lab Test 12/21/21  1022 10/17/19  1752 09/26/19  0945   CR 0.71   < >  --    CREAT  --   --  0.8    < > = values in this interval not displayed.       Ok to refill medication if creatinine is low          Passed - Normal serum potassium on file in past 12 months       Recent Labs   Lab Test 12/21/21  1022   POTASSIUM 4.2                        Seeing PCP in 1 week.    Appointments in Next Year      Apr 04, 2022  9:30 AM  (Arrive by 9:10 AM)  Provider Visit with Ferny Huerta PA-C  [facility] ([facility] ) [phone]          Kaleigh Becerra RN on 3/25/2022 at 3:38 PM        "

## 2022-08-10 NOTE — TELEPHONE ENCOUNTER
Md filled today, attempted to reach patient two times she  and hung up the phone unable to sign and close this [place] encounter    glyBURIDE-metFORMIN (GLUCOVANCE) 2.5-500 MG tablet    Pharmacy message: medication not available, Please send 2 separate Rxs for each med. Thanks

## 2022-09-02 NOTE — ANESTHESIA PREPROCEDURE EVALUATION
Anesthesia Pre-Procedure Evaluation    Patient: Miguel A Vaughn   MRN: [id number] : 1960          Preoperative Diagnosis: S/P cervical spinal fusion    Procedure(s):  INJECTION, SPINE, CERVICAL 6-7, EPIDURAL    Past Medical History:   Diagnosis Date     Benign neoplasm of brain (H)      Cervicalgia      Depressive disorder, not elsewhere classified 2008     Deviated nasal septum      Diabetes (H)      Family history of colonic polyps      Headache(784.0)      Hemorrhoids, internal      Hyperlipidemia LDL goal <130 2011     Hypersomnia with sleep apnea, unspecified      BENJAMIN (obstructive sleep apnea) 10/12/2011     Other encephalopathy      Sprain of right ankle 2011     Unspecified disorder of adrenal glands      Past Surgical History:   Procedure Laterality Date     C WINTER W/O FACETEC FORAMOT/DSKC  VRT SEG, CERVICAL       C OPEN RX ANKLE DISLOCATN+FIXATN       COLONOSCOPY  08    Internal hemorrhoids.  Otherwise normal.     COLONOSCOPY  2013    Procedure: COLONOSCOPY;  colonoscopy;  Surgeon: Kody Reynolds MD;  Location:  GI     DISCECTOMY, FUSION CERVICAL ANTERIOR ONE LEVEL, COMBINED N/A 2019    Procedure: C 3-4 ANTERIOR CERVICAL DISECTOMY AND FUSION;  Surgeon: Memo Wooten MD;  Location:  OR     EXAM UNDER ANESTHESIA RECTUM N/A 8/3/2016    Procedure: EXAM UNDER ANESTHESIA RECTUM;  Surgeon: Sami Zamora MD;  Location:  OR     EXPLORE SPINE, REMOVE HARDWARE, COMBINED N/A 2019    Procedure: C 4-5 HARDWARE REMOVAL;  Surgeon: Memo Wooten MD;  Location:  OR      FLEX SIGMOIDOSCOPY W/WO BRAD SPEC BY BRUSH/WASH  06/10/08    bx intra-anal lesions & electrocoagulation of perianal lesions.     HC REPAIR OF NASAL SEPTUM  2005    Revision septoplasty, submucosal resection of the inferior turbinates.     INCISION AND DRAINAGE PERINEAL, COMBINED N/A 8/3/2016    Procedure: COMBINED INCISION AND DRAINAGE PERINEAL;  Surgeon: Noah  Sami Dangelo MD;  Location: [facility] OR     SURGICAL HISTORY OF -   08/30/2006    Left occiput C1, C1-C2 facet injection.  [facility]       Anesthesia Evaluation     . Pt has had prior anesthetic. Type: General    No history of anesthetic complications          ROS/MED HX    ENT/Pulmonary:     (+)sleep apnea, tobacco use, Current use uses CPAP , . .    Neurologic:     (+)other neuro Right ankle numbness after surgery    Cardiovascular:  - neg cardiovascular ROS   (+) Dyslipidemia, ----. : . . . :. . Previous cardiac testing date:results:date: results: date:7-2019 results:SR date: results:          METS/Exercise Tolerance:     Hematologic:  - neg hematologic  ROS       Musculoskeletal:   (+)  other musculoskeletal- cervical radiculopathy      GI/Hepatic:        (-) GERD   Renal/Genitourinary:  - ROS Renal section negative       Endo:  - neg endo ROS   (+) type II DM Not using insulin Obesity (BMI 31), .      Psychiatric:  - neg psychiatric ROS   (+) psychiatric history depression      Infectious Disease:  - neg infectious disease ROS       Malignancy:      - no malignancy   Other:    (+) No chance of pregnancy C-spine cleared: N/A, H/O Chronic Pain,no other significant disability                         Physical Exam  Normal systems: cardiovascular and pulmonary    Airway   Mallampati: III  TM distance: >3 FB  Neck ROM: limited    Dental   (+) missing    Cardiovascular   Rhythm and rate: regular and normal      Pulmonary             Lab Results   Component Value Date    WBC 10.4 03/29/2019    HGB 15.2 07/30/2019    HCT 49.3 03/29/2019     03/29/2019    CRP 71.9 (H) 08/03/2016    SED 14 08/03/2016     07/30/2019    POTASSIUM 4.3 07/30/2019    CHLORIDE 105 07/30/2019    CO2 29 07/30/2019    BUN 15 07/30/2019    CR 0.82 07/30/2019     (H) 07/30/2019    FRANCIA 9.0 07/30/2019    ALBUMIN 3.7 07/30/2019    PROTTOTAL 7.7 07/30/2019    ALT 26 07/30/2019    AST 15 07/30/2019    ALKPHOS 108 07/30/2019    BILITOTAL 0.5  "07/30/2019    LIPASE 51 07/27/2011    AMYLASE 75 07/27/2011    INR 0.95 06/06/2008    TSH 2.25 08/17/2018       Preop Vitals  BP Readings from Last 3 Encounters:   08/13/19 136/72   07/30/19 134/74   07/01/19 139/76    Pulse Readings from Last 3 Encounters:   08/13/19 87   07/30/19 80   07/01/19 89      Resp Readings from Last 3 Encounters:   07/30/19 18   07/01/19 18   04/03/19 16    SpO2 Readings from Last 3 Encounters:   08/13/19 96%   07/30/19 98%   07/01/19 96%      Temp Readings from Last 1 Encounters:   07/30/19 97.9  F (36.6  C) (Temporal)    Ht Readings from Last 1 Encounters:   08/13/19 1.753 m (5' 9\")      Wt Readings from Last 1 Encounters:   08/13/19 94.3 kg (208 lb)    Estimated body mass index is 30.72 kg/m  as calculated from the following:    Height as of 8/13/19: 1.753 m (5' 9\").    Weight as of 8/13/19: 94.3 kg (208 lb).       Anesthesia Plan      History & Physical Review  History and physical reviewed and following examination; no interval change.    ASA Status:  3 .    NPO Status:  > 8 hours    Plan for MAC with Propofol induction. Maintenance will be TIVA.  Reason for MAC:  Deep or markedly invasive procedure (G8)  PONV prophylaxis:  Ondansetron (or other 5HT-3)  Additional equipment: Videolaryngoscope         Postoperative Care      Consents  Anesthetic plan, risks, benefits and alternatives discussed with:  Patient..                 SEBASTIÁN Cole CRNA  " No

## 2022-11-15 ENCOUNTER — TRANSFERRED RECORDS (OUTPATIENT)
Dept: HEALTH INFORMATION MANAGEMENT | Facility: CLINIC | Age: 62
End: 2022-11-15

## 2022-11-15 LAB — RETINOPATHY: NEGATIVE

## 2023-02-20 ENCOUNTER — TELEPHONE (OUTPATIENT)
Dept: PALLIATIVE MEDICINE | Facility: CLINIC | Age: 63
End: 2023-02-20
Payer: COMMERCIAL

## 2023-02-20 NOTE — TELEPHONE ENCOUNTER
Patient called and left message that he wanted to schedule injection.  Returned call and informed patient his order was >1 year old and would need to contact [facility] to place a new order, gave patient number.

## 2023-05-16 ENCOUNTER — TRANSFERRED RECORDS (OUTPATIENT)
Dept: MULTI SPECIALTY CLINIC | Facility: CLINIC | Age: 63
End: 2023-05-16

## 2023-05-16 LAB — RETINOPATHY: NORMAL

## 2023-05-18 DIAGNOSIS — G25.81 RESTLESS LEGS SYNDROME (RLS): ICD-10-CM

## 2023-05-18 RX ORDER — ROPINIROLE 0.25 MG/1
TABLET, FILM COATED ORAL
Qty: 60 TABLET | Refills: 0 | OUTPATIENT
Start: 2023-05-18

## 2023-05-18 RX ORDER — ROPINIROLE 0.25 MG/1
TABLET, FILM COATED ORAL
Qty: 60 TABLET | Refills: 0 | Status: SHIPPED | OUTPATIENT
Start: 2023-05-18 | End: 2023-05-23

## 2023-05-18 NOTE — TELEPHONE ENCOUNTER
Patient returned call, informed him a visit is needed before any refills as last office visit was 4/2022.     Scheduled med check with [facility] on Tues 5/23. If appropriate, can josh refill be sent?

## 2023-05-23 ENCOUNTER — TELEPHONE (OUTPATIENT)
Dept: DERMATOLOGY | Facility: CLINIC | Age: 63
End: 2023-05-23

## 2023-05-23 ENCOUNTER — OFFICE VISIT (OUTPATIENT)
Dept: FAMILY MEDICINE | Facility: OTHER | Age: 63
End: 2023-05-23
Payer: COMMERCIAL

## 2023-05-23 VITALS
RESPIRATION RATE: 18 BRPM | HEIGHT: 69 IN | HEART RATE: 82 BPM | SYSTOLIC BLOOD PRESSURE: 132 MMHG | DIASTOLIC BLOOD PRESSURE: 68 MMHG | WEIGHT: 176 LBS | BODY MASS INDEX: 26.07 KG/M2 | OXYGEN SATURATION: 97 % | TEMPERATURE: 97.5 F

## 2023-05-23 DIAGNOSIS — C44.310 BCC (BASAL CELL CARCINOMA), FACE: Primary | ICD-10-CM

## 2023-05-23 DIAGNOSIS — Z12.11 SCREEN FOR COLON CANCER: ICD-10-CM

## 2023-05-23 DIAGNOSIS — G25.81 RESTLESS LEGS SYNDROME (RLS): ICD-10-CM

## 2023-05-23 DIAGNOSIS — E11.9 TYPE 2 DIABETES MELLITUS WITHOUT COMPLICATION, WITHOUT LONG-TERM CURRENT USE OF INSULIN (H): ICD-10-CM

## 2023-05-23 DIAGNOSIS — Z23 NEED FOR SHINGLES VACCINE: ICD-10-CM

## 2023-05-23 DIAGNOSIS — Z11.4 SCREENING FOR HIV (HUMAN IMMUNODEFICIENCY VIRUS): ICD-10-CM

## 2023-05-23 DIAGNOSIS — Z11.59 NEED FOR HEPATITIS C SCREENING TEST: ICD-10-CM

## 2023-05-23 DIAGNOSIS — I10 HTN, GOAL BELOW 140/80: ICD-10-CM

## 2023-05-23 DIAGNOSIS — Z12.5 ENCOUNTER FOR SCREENING FOR MALIGNANT NEOPLASM OF PROSTATE: ICD-10-CM

## 2023-05-23 LAB
ALBUMIN SERPL BCG-MCNC: 4 G/DL (ref 3.5–5.2)
ALP SERPL-CCNC: 128 U/L (ref 40–129)
ALT SERPL W P-5'-P-CCNC: 19 U/L (ref 10–50)
ANION GAP SERPL CALCULATED.3IONS-SCNC: 6 MMOL/L (ref 7–15)
AST SERPL W P-5'-P-CCNC: 16 U/L (ref 10–50)
BILIRUB SERPL-MCNC: 0.5 MG/DL
BUN SERPL-MCNC: 10.2 MG/DL (ref 8–23)
CALCIUM SERPL-MCNC: 9.4 MG/DL (ref 8.8–10.2)
CHLORIDE SERPL-SCNC: 100 MMOL/L (ref 98–107)
CHOLEST SERPL-MCNC: 217 MG/DL
CREAT SERPL-MCNC: 0.8 MG/DL (ref 0.67–1.17)
CREAT UR-MCNC: 131 MG/DL
DEPRECATED HCO3 PLAS-SCNC: 28 MMOL/L (ref 22–29)
GFR SERPL CREATININE-BSD FRML MDRD: >90 ML/MIN/1.73M2
GLUCOSE SERPL-MCNC: 294 MG/DL (ref 70–99)
HBA1C MFR BLD: 13.8 % (ref 0–5.6)
HDLC SERPL-MCNC: 37 MG/DL
LDLC SERPL CALC-MCNC: 159 MG/DL
MICROALBUMIN UR-MCNC: 12.6 MG/L
MICROALBUMIN/CREAT UR: 9.62 MG/G CR (ref 0–17)
NONHDLC SERPL-MCNC: 180 MG/DL
POTASSIUM SERPL-SCNC: 5.1 MMOL/L (ref 3.4–5.3)
PROT SERPL-MCNC: 7.2 G/DL (ref 6.4–8.3)
PSA SERPL DL<=0.01 NG/ML-MCNC: 2.62 NG/ML (ref 0–4.5)
SODIUM SERPL-SCNC: 134 MMOL/L (ref 136–145)
TRIGL SERPL-MCNC: 107 MG/DL
TSH SERPL DL<=0.005 MIU/L-ACNC: 1.1 UIU/ML (ref 0.3–4.2)

## 2023-05-23 PROCEDURE — 99214 OFFICE O/P EST MOD 30 MIN: CPT | Performed by: PHYSICIAN ASSISTANT

## 2023-05-23 PROCEDURE — 86803 HEPATITIS C AB TEST: CPT | Performed by: PHYSICIAN ASSISTANT

## 2023-05-23 PROCEDURE — 36415 COLL VENOUS BLD VENIPUNCTURE: CPT | Performed by: PHYSICIAN ASSISTANT

## 2023-05-23 PROCEDURE — G0103 PSA SCREENING: HCPCS | Performed by: PHYSICIAN ASSISTANT

## 2023-05-23 PROCEDURE — 83036 HEMOGLOBIN GLYCOSYLATED A1C: CPT | Performed by: PHYSICIAN ASSISTANT

## 2023-05-23 PROCEDURE — 84443 ASSAY THYROID STIM HORMONE: CPT | Performed by: PHYSICIAN ASSISTANT

## 2023-05-23 PROCEDURE — 82043 UR ALBUMIN QUANTITATIVE: CPT | Performed by: PHYSICIAN ASSISTANT

## 2023-05-23 PROCEDURE — 87389 HIV-1 AG W/HIV-1&-2 AB AG IA: CPT | Performed by: PHYSICIAN ASSISTANT

## 2023-05-23 PROCEDURE — 80061 LIPID PANEL: CPT | Performed by: PHYSICIAN ASSISTANT

## 2023-05-23 PROCEDURE — 82570 ASSAY OF URINE CREATININE: CPT | Performed by: PHYSICIAN ASSISTANT

## 2023-05-23 PROCEDURE — 80053 COMPREHEN METABOLIC PANEL: CPT | Performed by: PHYSICIAN ASSISTANT

## 2023-05-23 RX ORDER — ROPINIROLE 0.25 MG/1
TABLET, FILM COATED ORAL
Qty: 60 TABLET | Refills: 0 | Status: SHIPPED | OUTPATIENT
Start: 2023-05-23 | End: 2023-07-11

## 2023-05-23 ASSESSMENT — PAIN SCALES - GENERAL: PAINLEVEL: MILD PAIN (3)

## 2023-05-23 NOTE — TELEPHONE ENCOUNTER
Called and talked with patient in regards to needing to be seen for a lesion on face. We were able to get him scheduled with  Da on May 31st, at 3:30pm.       Rosa Maria Andrea LPN

## 2023-05-23 NOTE — PROGRESS NOTES
Assessment & Plan     Type 2 diabetes mellitus without complication, without long-term current use of insulin (H)  Patient is not been taking any of his medications at all stating that he felt better off of them and decided not to take them.  Advised that he at least needs to be on metformin and do that we can help to diabetes control.  May need to add glipizide or glyburide daily based on his hemoglobin A1c.  Urged him to reconsider his controlled diabetes.  Follow-up in 3 months.   - Albumin Random Urine Quantitative with Creat Ratio; Future  - HEMOGLOBIN A1C; Future  - Lipid panel reflex to direct LDL Non-fasting; Future  - TSH with free T4 reflex; Future  - PSA, screen; Future  - metFORMIN (GLUCOPHAGE) 500 MG tablet; Take 1 tablet (500 mg) by mouth 2 times daily (with meals)    HTN, goal below 140/80  Good control this at this point in time.  Follow-up in 3 months.  - COMPREHENSIVE METABOLIC PANEL; Future    BCC (basal cell carcinoma), face - suspected nasal   Highly suspicious for nasal lesion today.  Advised that he needs to see dermatology as soon as possible for intervention.  - Adult Dermatology Referral; Future    Restless legs syndrome (RLS)  Refilled today.  Follow-up as needed.  - rOPINIRole (REQUIP) 0.25 MG tablet; TAKE 1 TO 2 TABLETS BY MOUTH NIGHTLY AS NEEDED (RESTLESS  LEGS)    Encounter for screening for malignant neoplasm of prostate  - PSA, screen; Future    Screen for colon cancer  - Colonoscopy Screening  Referral; Future    Need for hepatitis C screening test  - Hepatitis C Screen Reflex to HCV RNA Quant and Genotype; Future    Screening for HIV (human immunodeficiency virus)  - HIV Antigen Antibody Combo; Future    Need for shingles vaccine  Advise shingles vaccine at his pharmacy due to insurance reasons.     Nicotine/Tobacco Cessation:  He reports that he has been smoking cigarettes. He has a 12.50 pack-year smoking history. He has quit using smokeless tobacco.  His smokeless  "tobacco use included snuff.  Nicotine/Tobacco Cessation Plan:   Information offered: Patient not interested at this time      BMI:   Estimated body mass index is 26.22 kg/m  as calculated from the following:    Height as of this encounter: 1.745 m (5' 8.7\").    Weight as of this encounter: 79.8 kg (176 lb).   Weight management plan: Discussed healthy diet and exercise guidelines    Work on weight loss  Regular exercise  SHARON Landon [facility] DARNELL Grady is a 62 year old, presenting for the following health issues:  Recheck Medication        5/23/2023    10:36 AM   Additional Questions   Roomed by Julianne   Accompanied by Wife- Nehal in [facility]     History of Present Illness       Headaches:   Since the patient's last clinic visit, headaches are: no change  The patient is getting headaches:  Quite a bit  He is able to do normal daily activities when he has a migraine.  The patient is taking the following rescue/relief medications:  Ibuprofen (Advil, Motrin)   Patient states \"I get some relief\" from the rescue/relief medications.   The patient is taking the following medications to prevent migraines:  No medications to prevent migraines  In the past 4 weeks, the patient has gone to an Urgent Care or Emergency Room 0 times times due to headaches.    He eats 0-1 servings of fruits and vegetables daily.He consumes 4 sweetened beverage(s) daily.He exercises with enough effort to increase his heart rate 10 to 19 minutes per day.  He exercises with enough effort to increase his heart rate 5 days per week.        Medication Followup of Requip    Taking Medication as prescribed: yes- PRN    Side Effects:  None    Medication Helping Symptoms:  yes    Review of Systems   Constitutional, HEENT, cardiovascular, pulmonary, GI, , musculoskeletal, neuro, skin, endocrine and psych systems are negative, except as otherwise noted.      Objective    /68   Pulse 82   Temp 97.5  F (36.4 " " C) (Temporal)   Resp 18   Ht 1.745 m (5' 8.7\")   Wt 79.8 kg (176 lb)   SpO2 97%   BMI 26.22 kg/m    Body mass index is 26.22 kg/m .  Physical Exam   GENERAL: healthy, alert and no distress  NECK: no adenopathy, no asymmetry, masses, or scars and thyroid normal to palpation  RESP: lungs clear to auscultation - no rales, rhonchi or wheezes  CV: regular rate and rhythm, normal S1 S2, no S3 or S4, no murmur, click or rub, no peripheral edema and peripheral pulses strong  ABDOMEN: soft, nontender, no hepatosplenomegaly, no masses and bowel sounds normal  MS: no gross musculoskeletal defects noted, no edema  SKIN: Highly suspicious for basal cell carcinoma to the tip of his nose today.  There is a 8 mm in rough diameter ulceration that has been present for months by report.    No results found for this or any previous visit (from the past 24 hour(s)).              "

## 2023-05-23 NOTE — TELEPHONE ENCOUNTER
This encounter is being sent to inform the clinic that this patient has a referral from BCC (basal cell carcinoma), face; nasal lesion for the diagnoses of Ferny Koch PA-C in  FAMILY PRACTICE and has requested that this patient be seen within Urgent: 3-5 Days and/or with Urgent: 3-5 Days.  Based on the availability of our provider(s), we are unable to accommodate this request.      Were all sites offered this patient?  Yes  Pt requesting  location only please due to where he lives - Thanks!    Does scheduling algorithm request to schedule next available?  Patient appointment has not been scheduled.  Please review the referral request for accommodation and contact the patient.  If unable to accommodate, please resubmit a referral and indicate a preferred partner or affiliate location using Provider Finder or Scheduling Instructions field.      Referral Order in Epic  Records in Epic  Records from past in Care Everywhere from [facility] where he had Skin Cancer removed years ago    This was sent over as a Derm Surg Referral Order but per Debbie at [facility] [place] she said [facility] Derm [facility] as never seen here before and no biopsy done yet    Please review and call Pt to schedule    Pt said Tuesdays, Wednesdays, and Thursdays work best for him to come to  Derm Clinic for an Appt     Thank you!!

## 2023-05-24 LAB
HCV AB SERPL QL IA: NONREACTIVE
HIV 1+2 AB+HIV1 P24 AG SERPL QL IA: NONREACTIVE

## 2023-05-25 DIAGNOSIS — E78.5 HYPERLIPIDEMIA LDL GOAL <100: Primary | ICD-10-CM

## 2023-05-25 RX ORDER — ROSUVASTATIN CALCIUM 10 MG/1
10 TABLET, COATED ORAL DAILY
Qty: 90 TABLET | Refills: 3 | Status: SHIPPED | OUTPATIENT
Start: 2023-05-25 | End: 2023-11-01
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
## 2023-05-31 ENCOUNTER — OFFICE VISIT (OUTPATIENT)
Dept: DERMATOLOGY | Facility: CLINIC | Age: 63
End: 2023-05-31
Payer: COMMERCIAL

## 2023-05-31 DIAGNOSIS — L82.1 SEBORRHEIC KERATOSES: ICD-10-CM

## 2023-05-31 DIAGNOSIS — D48.5 NEOPLASM OF UNCERTAIN BEHAVIOR OF SKIN: Primary | ICD-10-CM

## 2023-05-31 DIAGNOSIS — L82.0 SEBORRHEIC KERATOSES, INFLAMED: ICD-10-CM

## 2023-05-31 DIAGNOSIS — L57.0 AK (ACTINIC KERATOSIS): ICD-10-CM

## 2023-05-31 PROCEDURE — 88305 TISSUE EXAM BY PATHOLOGIST: CPT | Performed by: DERMATOLOGY

## 2023-05-31 PROCEDURE — 17110 DESTRUCTION B9 LES UP TO 14: CPT | Performed by: PHYSICIAN ASSISTANT

## 2023-05-31 PROCEDURE — 99203 OFFICE O/P NEW LOW 30 MIN: CPT | Mod: 25 | Performed by: PHYSICIAN ASSISTANT

## 2023-05-31 PROCEDURE — 11104 PUNCH BX SKIN SINGLE LESION: CPT | Mod: XS | Performed by: PHYSICIAN ASSISTANT

## 2023-05-31 RX ORDER — FLUOROURACIL 50 MG/G
CREAM TOPICAL
Qty: 40 G | Refills: 0 | Status: SHIPPED | OUTPATIENT
Start: 2023-05-31 | End: 2023-08-09

## 2023-05-31 RX ORDER — CALCIPOTRIENE 50 UG/G
CREAM TOPICAL
Qty: 60 G | Refills: 0 | Status: SHIPPED | OUTPATIENT
Start: 2023-05-31 | End: 2023-08-09

## 2023-05-31 NOTE — NURSING NOTE
Miguel A Vaughn's goals for this visit include:   Chief Complaint   Patient presents with     Derm Problem     Patient here for a skin lesion on the nose, HX of BCC       He requests these members of his care team be copied on today's visit information:     PCP: Ferny Koch    Referring Provider:  No referring provider defined for this encounter.    There were no vitals taken for this visit.    Do you need any medication refills at today's visit? No       Nora Dickinson EMT

## 2023-05-31 NOTE — PATIENT INSTRUCTIONS
Patient Education     Checking for Skin Cancer  You can find cancer early by checking your skin each month. There are 3 kinds of skin cancer. They are melanoma, basal cell carcinoma, and squamous cell carcinoma. Doing monthly skin checks is the best way to find new marks or skin changes. Follow the instructions below for checking your skin.   The ABCDEs of checking moles for melanoma   Check your moles or growths for signs of melanoma using ABCDE:   Asymmetry: the sides of the mole or growth don t match  Border: the edges are ragged, notched, or blurred  Color: the color within the mole or growth varies  Diameter: the mole or growth is larger than 6 mm (size of a pencil eraser)  Evolving: the size, shape, or color of the mole or growth is changing (evolving is not shown in the images below)    Checking for other types of skin cancer  Basal cell carcinoma or squamous cell carcinoma have symptoms such as:     A spot or mole that looks different from all other marks on your skin  Changes in how an area feels, such as itching, tenderness, or pain  Changes in the skin's surface, such as oozing, bleeding, or scaliness  A sore that does not heal  New swelling or redness beyond the border of a mole    Who s at risk?  Anyone can get skin cancer. But you are at greater risk if you have:   Fair skin, light-colored hair, or light-colored eyes  Many moles or abnormal moles on your skin  A history of sunburns from sunlight or tanning beds  A family history of skin cancer  A history of exposure to radiation or chemicals  A weakened immune system  If you have had skin cancer in the past, you are at risk for recurring skin cancer.   How to check your skin  Do your monthly skin checkups in front of a full-length mirror. Check all parts of your body, including your:   Head (ears, face, neck, and scalp)  Torso (front, back, and sides)  Arms (tops, undersides, upper, and lower armpits)  Hands (palms, backs, and fingers, including  under the nails)  Buttocks and genitals  Legs (front, back, and sides)  Feet (tops, soles, toes, including under the nails, and between toes)  If you have a lot of moles, take digital photos of them each month. Make sure to take photos both up close and from a distance. These can help you see if any moles change over time.   Most skin changes are not cancer. But if you see any changes in your skin, call your doctor right away. Only he or she can diagnose a problem. If you have skin cancer, seeing your doctor can be the first step toward getting the treatment that could save your life.   [employer] last reviewed this educational content on 4/1/2019 2000-2020 [employer]. [address] [place] [address], [place] [address]. All rights reserved. This information is not intended as a substitute for professional medical care. Always follow your healthcare professional's instructions.       When should I call my doctor?  If you are worsening or not improving, please, contact us or seek urgent care as noted below.     Who should I call with questions (adults)?  [facility] (adult and pediatric): [phone]  [facility] (adult): [phone]  For urgent needs outside of business hours call the [facility] at [phone] and ask for the dermatology resident on call to be paged  If this is a medical emergency and you are unable to reach an ER, Call [phone]    Who should I call with questions (pediatric)?  [facility]- Pediatric Dermatology  Dr. Arely Tsang, Dr. Kathy Alcaraz, Dr. Yahaira Mtz, COLTON Meraz, Dr. Shahrzad Raza, Dr. Bere Fernando & Dr. Antolin Espinoza  Non-urgent nurse triage line; [phone]- Violeta and Reyna RAMIREZ Care Coordinatorbrayan Raman (/Complex ) [phone]    If you need a prescription refill, please contact your pharmacy. Refills are approved or denied by our  Physicians during normal business hours, Monday through Fridays  Per office policy, refills will not be granted if you have not been seen within the past year (or sooner depending on your child's condition)    Scheduling Information:  Pediatric Appointment Scheduling and Call Center [phone]  Radiology Scheduling- [phone]  Sedation Unit Scheduling- [phone]  [place] Scheduling- General [phone]; Pediatric Dermatology [phone]  Main  Services: [phone]  Croatian: [phone]  Iraqi: [phone]  Hmong/Latvian/Amharic: [phone]  Preadmission Nursing Department Fax Number: [phone] (Fax all pre-operative paperwork to this number)    For urgent matters arising during evenings, weekends, or holidays that cannot wait for normal business hours please call [phone] and ask for the dermatology resident on call to be paged.              Wound Care After a Biopsy    What is a skin biopsy?  A skin biopsy allows the doctor to examine a very small piece of tissue under the microscope to determine the diagnosis and the best treatment for the skin condition. A local anesthetic (numbing medicine) is injected with a very small needle into the skin area to be tested. A small piece of skin is taken from the area. Sometimes a suture (stitch) is used.     What are the risks of a skin biopsy?  I will experience scar, bleeding, swelling, pain, crusting and redness. I may experience incomplete removal or recurrence. Risks of this procedure are excessive bleeding, bruising, infection, nerve damage, numbness, thick (hypertrophic or keloidal) scar and non-diagnostic biopsy.    How should I care for my wound for the first 24 hours?  Keep the wound dry and covered for 24 hours  If it bleeds, hold direct pressure on the area for 15 minutes. If bleeding does not stop, call us or go to the emergency room  Avoid strenuous exercise the first 1-2 days or as your doctor instructs you    How should I  care for the wound after 24 hours?  After 24 hours, remove the bandage  You may bathe or shower as normal  If you had a scalp biopsy, you can shampoo as usual and can use shower water to clean the biopsy site daily  Clean the wound once a day with gentle soap and water  Do not scrub, be gentle  Apply white petroleum/Vaseline after cleaning the wound with a cotton swab or a clean finger, and keep the site covered with a Bandaid /bandage. Bandages are not necessary with a scalp biopsy  If you are unable to cover the site with a Bandaid /bandage, re-apply ointment 2-3 times a day to keep the site moist. Moisture will help with healing  Avoid strenuous activity for first 1-2 days  Avoid lakes, rivers, pools, and oceans until the stitches are removed or the site is healed    How do I clean my wound?  Wash hands thoroughly with soap or use hand  before all wound care  Clean the wound with gentle soap and water  Apply white petroleum/Vaseline  to wound after it is clean  Replace the Bandaid /bandage to keep the wound covered for the first few days or as instructed by your doctor  If you had a scalp biopsy, warm shower water to the area on a daily basis should suffice    What should I use to clean my wound?   Cotton-tipped applicators (Qtips )  White petroleum jelly (Vaseline ). Use a clean new container and use Q-tips to apply.  Bandaids  as needed  Gentle soap     How should I care for my wound long term?  Do not get your wound dirty  Keep up with wound care for one week or until the area is healed.  A small scab will form and fall off by itself when the area is completely healed. The area will be red and will become pink in color as it heals. Sun protection is very important for how your scar will turn out. Sunscreen with an SPF 30 or greater is recommended once the area is healed.  You should have some soreness but it should be mild and slowly go away over several days. Talk to your doctor about using tylenol  for pain,    When should I call my doctor?  If you have increased:   Pain or swelling  Pus or drainage (clear or slightly yellow drainage is ok)  Temperature over 100F  Spreading redness or warmth around wound    When will I hear about my results?  The biopsy results can take 2 weeks to come back.  Your results will automatically release to [employer] before your provider has even reviewed them.  The clinic will call you with the results, send you a [employer] message, or have you schedule a follow-up clinic or phone time to discuss the results.  Contact our clinics if you do not hear from us in 2 weeks.    Who should I call with questions?  [facility]: [phone]  [facility]: [phone]  For urgent needs outside of business hours call the [facility] at [phone] and ask for the dermatology resident on call

## 2023-05-31 NOTE — NURSING NOTE
The following medication was given:     MEDICATION:  Lidocaine with epinephrine 1% 1:200944  ROUTE: SQ  SITE: see procedure note  DOSE: 1.5ml  LOT #: [id number]  : [employer]  EXPIRATION DATE: 12/31/2024  NDC#: 22902-542-35  Was there drug waste? yes  Multi-dose vial: Yes    Kendra Humphrey CMA  May 31, 2023
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
## 2023-06-04 LAB
PATH REPORT.COMMENTS IMP SPEC: ABNORMAL
PATH REPORT.COMMENTS IMP SPEC: ABNORMAL
PATH REPORT.COMMENTS IMP SPEC: YES
PATH REPORT.FINAL DX SPEC: ABNORMAL
PATH REPORT.GROSS SPEC: ABNORMAL
PATH REPORT.MICROSCOPIC SPEC OTHER STN: ABNORMAL
PATH REPORT.RELEVANT HX SPEC: ABNORMAL

## 2023-06-05 ENCOUNTER — TELEPHONE (OUTPATIENT)
Dept: DERMATOLOGY | Facility: CLINIC | Age: 63
End: 2023-06-05
Payer: COMMERCIAL

## 2023-06-05 NOTE — TELEPHONE ENCOUNTER
Excision/Mohs previsit information                                                    Diagnosis: basal cell carcinoma  Site(s): Central nasal tip    Over the counter Chlorhexidine surgical soap to wash all skin below the belly button twice before surgery should be recommended for the following:  - Surgical sites below the waist  - Immunosuppressed  - Previous surgical site infection  - Anticipated wound care challenges    Medication & Allergy Information                                                      Review and update allergy and medication list.    Do you take the following medications:    Coumadin, Eliquis, Pradaxa, Xarelto:  NO   -If on Coumadin, INR should be checked within 7 days of surgery.  Range should be 3.5 or less or within therapeutic range.    Past Medical History                                                    Do you currently or have you previously had any of the following conditions:      Hepatitis:  NO    HIV/AIDS:  NO    Prolonged bleeding or bleeding disorder:  NO    Pacemaker or Defibrillator:  NO.      History of artificial or heart valve replacement:  NO    Endocarditis (inflammation of the inner lining of the heart's chambers and valves):  NO    Have you ever had a prosthetic joint infection:  NO    Pregnant or Breastfeeding:  N/A    Mobility device (wheelchair, transfer difficulty): NO    Important Reminders:                                                        Ok to take all of their medications as prescribed    Patients can eat, no need to be fasting    Patient will not be able to get the site wet for 48 hrs    No submerging wound in standing water (lake, pool, bathtub, hot tub) for 2 weeks    No physical activity for 48 hrs (further restrictions will be discussed by MD at time of visit)    If any positives, send to RN for further review  Negin Conde RN

## 2023-06-12 ENCOUNTER — TELEPHONE (OUTPATIENT)
Dept: GASTROENTEROLOGY | Facility: CLINIC | Age: 63
End: 2023-06-12
Payer: COMMERCIAL

## 2023-06-12 NOTE — TELEPHONE ENCOUNTER
"Endoscopy Scheduling Screen    Have you had a positive Covid test in the last 14 days?  No    Are you active on MyChart?   No    What insurance is in the chart?  BC/BS: Schedule in ASC unless patient meets exclusion criteria.     Ordering/Referring Provider: Zee   (If ordering provider performs procedure, schedule with ordering provider unless otherwise instructed. )    BMI: Estimated body mass index is 26.22 kg/m  as calculated from the following:    Height as of 5/23/23: 1.745 m (5' 8.7\").    Weight as of 5/23/23: 79.8 kg (176 lb).     Sedation Ordered  moderate sedation.   If patient BMI > 50 do not schedule in ASC.    Are you taking any prescription medications for pain?   No    Are you taking methadone or Suboxone?  No    Do you have a history of malignant hyperthermia or adverse reaction to anesthesia?  No    (Females) Are you currently pregnant?   No     Have you been diagnosed or told you have pulmonary hypertension?   No    Do you have an LVAD?  No    Have you been told you have moderate to severe sleep apnea?  Yes-cpap used (RN Review required for scheduling unless scheduling in Hospital.)    Have you been told you have COPD, asthma, or any other lung disease?  No    Do you have any heart conditions?  No     Have you ever had or are you awaiting a heart or lung transplant?   No    Have you had a stroke or transient ischemic attack (TIA aka \"mini stroke\" in the last 6 months?   No    Have you been diagnosed with or been told you have cirrhosis of the liver?   No    Are you currently on dialysis?   No    Do you need assistance transferring?   No    BMI: Estimated body mass index is 26.22 kg/m  as calculated from the following:    Height as of 5/23/23: 1.745 m (5' 8.7\").    Weight as of 5/23/23: 79.8 kg (176 lb).     Is patients BMI > 40 and scheduling location [facility]?  No    Do you take the medication Phentermine, Ozempic or Wegovy?  No    Do you take the medication Naltrexone?  No    Do you take blood " thinners?  No    Preferred Pharmacy:      [facility] [address] [place], [place] - [address] [facility]  [address] [facility] [address]  Phone: [phone] Fax: [phone]        Prep   Are you currently on dialysis or do you have chronic kidney disease?  No (standard prep)    Do you have a diagnosis of diabetes?  Yes (Golytely Prep)    Do you have a diagnosis of cystic fibrosis (CF)?  No    On a regular basis do you go 3 -5 days between bowel movements?  No    BMI > 40?  No    Final Scheduling Details   Colonoscopy prep sent?  Standard Golytely    Procedure scheduled  Colonoscopy     Surgeon:  Pool     Date of procedure:  8/15/23     Schedule PAC:   No    Location  PH    Sedation   MAC/Deep Sedation    Patient Reminders:    You will receive a call from a Nurse to review instructions and health history.  This assessment must be completed prior to your procedure.  Failure to complete the Nurse assessment may result in the procedure being cancelled.       On the day of your procedure, please designate an adult(s) who can drive you home stay with you for the next 24 hours. The medicines used in the exam will make you sleepy. You will not be able to drive.       You cannot take public transportation, ride share services, or non-medical taxi service without a responsible caregiver.  Medical transport services are allowed with the requirement that a responsible caregiver will receive you at your destination.  We require that drivers and caregivers are confirmed prior to your procedure.

## 2023-06-21 ENCOUNTER — VIRTUAL VISIT (OUTPATIENT)
Dept: DERMATOLOGY | Facility: CLINIC | Age: 63
End: 2023-06-21
Payer: COMMERCIAL

## 2023-06-21 DIAGNOSIS — C44.311 BASAL CELL CARCINOMA OF NASAL TIP: Primary | ICD-10-CM

## 2023-06-21 PROCEDURE — 99213 OFFICE O/P EST LOW 20 MIN: CPT | Mod: 93 | Performed by: DERMATOLOGY

## 2023-06-21 NOTE — PATIENT INSTRUCTIONS
[facility] Dermatology Visit    Thank you for allowing us to participate in your care. Your findings, instructions and follow-up plan are as follows:         When should I call my doctor?  If you are worsening or not improving, please, contact us or seek urgent care as noted below.     Who should I call with questions (adults)?  [facility] (adult and pediatric): [phone]  [facility] (adult): [phone]  For urgent needs outside of business hours call the [facility] at [phone] and ask for the dermatology resident on call  If this is a medical emergency and you are unable to reach an ER, Call 911    Who should I call with questions (pediatric)?  [facility]- Pediatric Dermatology  Dr. Arely Tsang, Dr. Kathy Alcaraz, Dr. Yahaira Mtz, Noris Blanchard, PA  Dr. Shahrzad Raza, Dr. Bere Fernando & Dr. Antolin Espinoza  Non Urgent  Nurse Triage Line; [phone]- Violeta and Reyna RN Care Coordinators   Danisha (/Complex ) [phone]    If you need a prescription refill, please contact your pharmacy. Refills are approved or denied by our physicians during normal business hours, Monday through Fridays  Per office policy, refills will not be granted if you have not been seen within the past year (or sooner depending on your child's condition).    Scheduling Information:  Pediatric Appointment Scheduling and Call Center [phone]  Radiology Scheduling- [phone]  Sedation Unit Scheduling- [phone]  [place] Scheduling- General [phone]; Pediatric Dermatology [phone]  Main  Services: [phone]  Indian: [phone]  Azerbaijani: [phone]  Hmong/Yovanny/Ugandan: [phone]  Preadmission Nursing Department Fax Number: [phone] (fax all pre-operative paperwork to this number)    For urgent matters arising during evenings, weekends, or  holidays that cannot wait for normal business hours please call [phone] and ask for the dermatology resident on call to be paged.
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
## 2023-06-21 NOTE — NURSING NOTE
Chief Complaint   Patient presents with     RECHECK     Teledermatology Nurse Call Patients:     Are you in the [facility] at the time of the encounter? yes    Today's visit will be billed to you and your insurance.    A teledermatology visit is not as thorough as an in-person visit and the quality of the photograph sent may not be of the same quality as that taken by the dermatology clinic.

## 2023-06-28 ENCOUNTER — TELEPHONE (OUTPATIENT)
Dept: DERMATOLOGY | Facility: CLINIC | Age: 63
End: 2023-06-28
Payer: COMMERCIAL

## 2023-06-28 NOTE — TELEPHONE ENCOUNTER
[facility] Call Center    Phone Message    May a detailed message be left on voicemail: yes     Reason for Call: Other: Pt called and will need to r/s his MOHS on 7/05. Pt said he is available between 7/25-8/04.  Pt will be going out of town tomorrow between 10-noon. Please call him back soon.Thanks    Action Taken: Message routed to:  Adult Clinics: Dermatology p [id number]    Travel Screening: Not Applicable

## 2023-06-28 NOTE — TELEPHONE ENCOUNTER
Patient cancelled his Mohs appointment on 7/5/2023, as he will be out of town.  He was rescheduled to 8/2/2023 - the first available appointment stated he could make.

## 2023-07-11 DIAGNOSIS — G25.81 RESTLESS LEGS SYNDROME (RLS): ICD-10-CM

## 2023-07-11 RX ORDER — ROPINIROLE 0.25 MG/1
TABLET, FILM COATED ORAL
Qty: 60 TABLET | Refills: 0 | Status: SHIPPED | OUTPATIENT
Start: 2023-07-11 | End: 2023-10-24

## 2023-07-26 ENCOUNTER — OFFICE VISIT (OUTPATIENT)
Dept: DERMATOLOGY | Facility: CLINIC | Age: 63
End: 2023-07-26
Payer: COMMERCIAL

## 2023-07-26 VITALS — DIASTOLIC BLOOD PRESSURE: 74 MMHG | HEART RATE: 75 BPM | OXYGEN SATURATION: 96 % | SYSTOLIC BLOOD PRESSURE: 133 MMHG

## 2023-07-26 DIAGNOSIS — C44.311 BASAL CELL CARCINOMA (BCC) OF NASAL TIP: Primary | ICD-10-CM

## 2023-07-26 PROCEDURE — 15240 FTH/GFT F/C/C/M/N/AX/G/H/F20: CPT | Mod: GC | Performed by: DERMATOLOGY

## 2023-07-26 PROCEDURE — 17311 MOHS 1 STAGE H/N/HF/G: CPT | Mod: GC | Performed by: DERMATOLOGY

## 2023-07-26 ASSESSMENT — PAIN SCALES - GENERAL: PAINLEVEL: NO PAIN (0)

## 2023-07-26 NOTE — NURSING NOTE
The following medication was given:     MEDICATION:  Lidocaine with epinephrine 1% 1:210706  ROUTE: SQ  SITE: see procedure note  DOSE: 7.5 mL  LOT #: [id number]  : [employer]  EXPIRATION DATE: 01-  NDC#: 89770-141-45  Was there drug waste? Yes, 1.5 mL  Multi-dose vial: Yes    [place] Andrea LPN  July 26, 2023      Vaseline and pressure dressing applied to Mohs site on Central Nasal Tip.  Wound care instructions reviewed with patient and AVS provided.  Patient verbalized understanding.  Patient will follow up for suture removal: Yes.  No further questions or concerns at this time.

## 2023-07-26 NOTE — LETTER
7/26/2023         RE: Miguel A Vaughn  [address]  [place] [address]        Dear Colleague,    Thank you for referring your patient, Miguel A Vaughn, to the [facility]. Please see a copy of my visit note below.    [facility] [place] Procedure Note       Dermatology Problem List:  1. Hx of NMSC   - BCC, central nasal tip (infiltrating subtype), s/p Mohs with Burow's FTSG  7/5/23  - NMSC -left side of face in the 1990's  2. AKs  - efudex/calipotriene initiated 5/31/23 to scalp, forehead and temples  3. ISK - cryo  ___________________________________________      Date of Service:  Jul 26, 2023  Surgery: Mohs micrographic surgery    Case 1  Repair Type: graft (Burow's repair)  Repair Size: 1.3 x 0.5 cm  Suture Material: Fast Absorbing Gut 5-0  Tumor Type: BCC - Basal cell carcinoma  Location: Central nasal tip  Derm-Path Accession #: [id number]  PreOp Size: 1.0 x 1.0 cm  PostOp Size: 1.6 x 1.5 cm  Mohs Accession #: [id number]  Level of Defect: cartilage      Procedure:  We discussed the principles of treatment and most likely complications including scarring, bleeding, infection, swelling, pain, crusting, nerve damage, large wound,  incomplete excision, wound dehiscence,  nerve damage, recurrence, and a second procedure may be recommended to obtain the best cosmetic or functional result.    Informed consent was obtained and the patient underwent the procedure as follows:  The patient was placed supine on the operating table.  The cancer was identified, outlined with a marker, and verified by the patient.  The entire surgical field was prepped with Hibiclens;Sterile saline.  The surgical site was anesthetized using Lidocaine 1% with epi 1:100,000.    The area of clinically apparent tumor was not debulked. The layer of tissue was then surgically excised using a #15 blade and was then transferred onto a specimen sheet maintaining the orientation  of the specimen. Hemostasis was obtained using heat cautery (Hyfercator). The wound site was then covered with a dressing while the tissue samples were processed for examination.    The excised tissue was transported to the Mohs histology laboratory maintaining the tissue orientation.  The tissue specimen was relaxed so that the entire surgical margin was in a a single horizontal plane for sectioning and inked for precise mapping.  A precise reference map was drawn to reflect the sectioning of the specimen, colored inking of the margins, and orientation on the patient. The tissue was processed using horizontal sectioning of the base and continuous peripheral margins.  The histopathologic sections were reviewed in conjunction with the reference map.    Total blocks: 1    Total slides:  2    There were no cancer cells visualized on examination, therefore Mohs surgery was complete.      Reconstruction:  Intermediate Closure with Burows Full Thickness Skin Graft    PROCEDURES:  The patient was taken to the operative suite and placed supine on the operating room table.  The defect was identified.  Appropriate markings were made with a marking pen to plan the reconstruction.  The area was then infiltrated with 1% lidocaine with epinephrine.  The area was then prepped and draped in usual sterile fashion.  The wound was debeveled and undermined extensively in all directions to allow for proper closure.     Hemostasis was obtained using electrocoagulation. A cone of redundant tissue was excised on nasal dorsum just superior to defect placed in saline. The dermis and subcutaneous tissue were then advanced together and secured using buried vertical mattress 4-0 and 5-0 Monocryl sutures and percutaneous simple running 5-0 fast absorbing gut sutures were carefully placed for maximum eversion and approximation.    The remaining defect was repaired with a Burows wedge full thickness skin graft using the previously excised cone of  redundant skin. The 1.3 x  0.5 cm graft was trimmed to fit the remaining defect and thinned, removing all subcutaneous fat. The graft was placed onto the recipient site and meticulously secured with interrupted and simple running 5-0 fast absorbing gut percutaneous sutures. A xeroform bolster was secured over the burrows wedge skin graft with suture.    The wound was cleansed with saline, and ointment was applied along the wound surface. A sterile pressure of non-adherent gauze was applied and wound care instructions were given verbally and in writing. The patient will return in seven to ten days for suture removal. The patient left the operating suite in stable condition.      Dr. Aroldo Sorto performed the micrographic surgery; Dr. Karina Stovall (Mohs micrographic surgery fellow) performed the reconstruction under the direct supervision of Aroldo Sorto DO, who was present for the entire reconstruction.    F/U with dermatology surgery: 1 week for bolster removal and wound check     Karina Stovall MD  Micrographic Surgery and Dermatologic Oncology (MSDO) Fellow, PGY-5      Staff Physician Comments:   I saw and evaluated the patient with the Mohs Surgery Fellow (Dr. Karina Stovall) and I agree with the assessment and plan and the above description of the procedure. I was present for the key portions of the procedure and entire exam. I was immediately available in the clinic throughout the procedures.     Aroldo Sorto DO    Department of Dermatology  [facility]: Phone: [phone], Fax:[phone]  [facility]: Phone: [phone], Fax: [phone]    Care and Laboratory Testing Performed at:  [facility]   Dermatology [facility]  [address] [place] [address]      Again, thank you for allowing me to participate in the care of your patient.         Sincerely,        Aroldo Sorto MD

## 2023-07-26 NOTE — PATIENT INSTRUCTIONS
Mohs Wound Care Instructions  I will experience scar, altered skin color, bleeding, swelling, pain, crusting and redness. I may experience altered sensation. Risks are excessive bleeding, infection, muscle weakness, thick (hypertrophic or keloidal) scar, and recurrence. A second procedure may be recommended to obtain the best cosmetic or functional result.  Possible complications of any surgical procedure are bleeding, infection, scarring, alteration in skin color and sensation, muscle weakness in the area, wound dehiscence or seperation, or recurrence of the lesion or disease. On occasion, after healing, a secondary procedure or revision may be recommended in order to obtain the best cosmetic or functional result.   After your surgery, a pressure bandage will be placed over the area that has sutures. This will help prevent bleeding. Please follow these instructions until you come back to clinic for suture removal on 08-, as they will help you to prevent complications as your wound heals.  For the First 48 hours After Surgery:  Leave the pressure bandage on and keep it dry. If it should come loose, you may retape it, but do not take it off.  Relax and take it easy. Do not do any vigorous exercise, heavy lifting, or bending forward. This could cause the wound to bleed.  Post-operative pain is usually mild. You may alternate between 1000 mg of Tylenol (acetaminophen) and 400 mg of Ibuprofen every 4 hours.  Do not take more than 4,000mg of acetaminophen in a 24 hour period or 3200 mg of Ibuprofen in a 24 hr period.  Avoid alcohol and vitamin E as these may increase your tendency to bleed.  You may put an ice pack around the bandaged area for 20 minutes every 2-3 hours. This may help reduce swelling, bruising, and pain. Make sure the ice pack is waterproof so that the pressure bandage does not get wet.   You may see a small amount of drainage or blood on your pressure bandage. This is normal. However, if drainage  or bleeding continues or saturates the bandage, you will need to apply firm pressure over the bandage with a washcloth for 15 minutes. If bleeding continues after applying pressure for 15 minutes then go to the nearest emergency room.  48 Hours After Surgery  Carefully remove the bandage and start daily wound care and dressing changes. Be careful not to get the bolster (Yellow Gauze) wet with water. You may wash the around the site.   Daily Wound Care:  Wash wound with a mild soap and water.  Use caution when washing the wound, be gentle and do not let the forceful shower stream hit the wound directly.  Pat dry.  Apply Vaseline (from a new container or tube) over the suture line and bolster (Yellow Gauze) with a Q-tip. It is very important to keep the wound continuously moist, as wounds heal best in a moist environment.  Keep the site covered until sutures have either been removed or dissolved.  You can cover it with a Telfa (non-stick) dressing and tape or a band-aid.    If you are unable to keep wound covered, you must apply Vaseline every 2-3 hours (while awake) to ensure it is being kept moist for optimal healing. A dressing overnight is recommended to keep the area moist.  Call Us If:  You have pain that is not controlled with Tylenol/Ibuprofen  You have signs or symptoms of an infection, such as: fever over 100 degrees F, redness, warmth, or foul-smelling or yellow drainage from the wound.  Who should I call with questions?  [facility]: [phone]   [facility]: [phone]  For urgent needs outside of business hours call the [facility] at [phone] and ask to speak with the dermatology resident on call

## 2023-07-26 NOTE — PROGRESS NOTES
[facility] [place] Procedure Note       Dermatology Problem List:  1. Hx of NMSC   - BCC, central nasal tip (infiltrating subtype), s/p Mohs with Burow's FTSG  7/5/23  - NMSC -left side of face in the 1990's  2. AKs  - efudex/calipotriene initiated 5/31/23 to scalp, forehead and temples  3. ISK - cryo  ___________________________________________      Date of Service:  Jul 26, 2023  Surgery: Mohs micrographic surgery    Case 1  Repair Type: graft (Burow's repair)  Repair Size: 1.3 x 0.5 cm  Suture Material: Fast Absorbing Gut 5-0  Tumor Type: BCC - Basal cell carcinoma  Location: Central nasal tip  Derm-Path Accession #: [id number]  PreOp Size: 1.0 x 1.0 cm  PostOp Size: 1.6 x 1.5 cm  Mohs Accession #: [id number]  Level of Defect: cartilage      Procedure:  We discussed the principles of treatment and most likely complications including scarring, bleeding, infection, swelling, pain, crusting, nerve damage, large wound,  incomplete excision, wound dehiscence,  nerve damage, recurrence, and a second procedure may be recommended to obtain the best cosmetic or functional result.    Informed consent was obtained and the patient underwent the procedure as follows:  The patient was placed supine on the operating table.  The cancer was identified, outlined with a marker, and verified by the patient.  The entire surgical field was prepped with Hibiclens;Sterile saline.  The surgical site was anesthetized using Lidocaine 1% with epi 1:100,000.    The area of clinically apparent tumor was not debulked. The layer of tissue was then surgically excised using a #15 blade and was then transferred onto a specimen sheet maintaining the orientation of the specimen. Hemostasis was obtained using heat cautery (Hyfercator). The wound site was then covered with a dressing while the tissue samples were processed for examination.    The excised tissue was transported to the Mohs histology laboratory  maintaining the tissue orientation.  The tissue specimen was relaxed so that the entire surgical margin was in a a single horizontal plane for sectioning and inked for precise mapping.  A precise reference map was drawn to reflect the sectioning of the specimen, colored inking of the margins, and orientation on the patient. The tissue was processed using horizontal sectioning of the base and continuous peripheral margins.  The histopathologic sections were reviewed in conjunction with the reference map.    Total blocks: 1    Total slides:  2    There were no cancer cells visualized on examination, therefore Mohs surgery was complete.      Reconstruction:  Intermediate Closure with Burows Full Thickness Skin Graft    PROCEDURES:  The patient was taken to the operative suite and placed supine on the operating room table.  The defect was identified.  Appropriate markings were made with a marking pen to plan the reconstruction.  The area was then infiltrated with 1% lidocaine with epinephrine.  The area was then prepped and draped in usual sterile fashion.  The wound was debeveled and undermined extensively in all directions to allow for proper closure.     Hemostasis was obtained using electrocoagulation. A cone of redundant tissue was excised on nasal dorsum just superior to defect placed in saline. The dermis and subcutaneous tissue were then advanced together and secured using buried vertical mattress 4-0 and 5-0 Monocryl sutures and percutaneous simple running 5-0 fast absorbing gut sutures were carefully placed for maximum eversion and approximation.    The remaining defect was repaired with a Burows wedge full thickness skin graft using the previously excised cone of redundant skin. The 1.3 x  0.5 cm graft was trimmed to fit the remaining defect and thinned, removing all subcutaneous fat. The graft was placed onto the recipient site and meticulously secured with interrupted and simple running 5-0 fast absorbing  gut percutaneous sutures. A xeroform bolster was secured over the burrows wedge skin graft with suture.    The wound was cleansed with saline, and ointment was applied along the wound surface. A sterile pressure of non-adherent gauze was applied and wound care instructions were given verbally and in writing. The patient will return in seven to ten days for suture removal. The patient left the operating suite in stable condition.      Dr. Aroldo Sorto performed the micrographic surgery; Dr. Karina Stovall (Mohs micrographic surgery fellow) performed the reconstruction under the direct supervision of Aroldo Sorto DO, who was present for the entire reconstruction.    F/U with dermatology surgery: 1 week for bolster removal and wound check     Karina Stovall MD  Micrographic Surgery and Dermatologic Oncology (MSDO) Fellow, PGY-5      Staff Physician Comments:   I saw and evaluated the patient with the Mohs Surgery Fellow (Dr. Karina Stovall) and I agree with the assessment and plan and the above description of the procedure. I was present for the key portions of the procedure and entire exam. I was immediately available in the clinic throughout the procedures.     Aroldo Sorto DO    Department of Dermatology  [facility]: Phone: [phone], Fax:[phone]  [facility]: Phone: [phone], Fax: [phone]    Care and Laboratory Testing Performed at:  [facility]   Dermatology Clinic  [address] [place] [address]

## 2023-07-26 NOTE — NURSING NOTE
Miguel A Vaughn's chief complaint for this visit includes:  Chief Complaint   Patient presents with    Procedure     Mohs- central nasal tip, BCC    Derm Problem     Spot check Efudex&Calcipotriene treated areas and left upper back.      PCP: Ferny Koch    Referring Provider:  No referring provider defined for this encounter.    /74   Pulse 75   SpO2 96%   No Pain (0)        Allergies   Allergen Reactions    No Known Drug Allergy     Tape [Adhesive Tape]      Pt states no         Do you need any medication refills at today's visit? No      [place] Andrea LPN

## 2023-08-02 ENCOUNTER — OFFICE VISIT (OUTPATIENT)
Dept: DERMATOLOGY | Facility: CLINIC | Age: 63
End: 2023-08-02
Payer: COMMERCIAL

## 2023-08-02 DIAGNOSIS — Z85.828 HISTORY OF BASAL CELL CARCINOMA OF SKIN: Primary | ICD-10-CM

## 2023-08-02 DIAGNOSIS — T81.49XA SURGICAL SITE INFECTION: ICD-10-CM

## 2023-08-02 PROCEDURE — 99024 POSTOP FOLLOW-UP VISIT: CPT | Performed by: DERMATOLOGY

## 2023-08-02 RX ORDER — CEPHALEXIN 500 MG/1
500 CAPSULE ORAL 3 TIMES DAILY
Qty: 21 CAPSULE | Refills: 0 | Status: SHIPPED | OUTPATIENT
Start: 2023-08-02 | End: 2023-08-09

## 2023-08-02 ASSESSMENT — PAIN SCALES - GENERAL: PAINLEVEL: SEVERE PAIN (7)
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
## 2023-08-07 RX ORDER — BISACODYL 5 MG/1
TABLET, DELAYED RELEASE ORAL
Qty: 4 TABLET | Refills: 0 | Status: SHIPPED | OUTPATIENT
Start: 2023-08-07 | End: 2023-09-04

## 2023-08-09 ENCOUNTER — OFFICE VISIT (OUTPATIENT)
Dept: DERMATOLOGY | Facility: CLINIC | Age: 63
End: 2023-08-09
Payer: COMMERCIAL

## 2023-08-09 DIAGNOSIS — Z85.828 HISTORY OF BASAL CELL CARCINOMA OF SKIN: Primary | ICD-10-CM

## 2023-08-09 DIAGNOSIS — Z51.89 VISIT FOR WOUND CHECK: ICD-10-CM

## 2023-08-09 PROCEDURE — 99024 POSTOP FOLLOW-UP VISIT: CPT | Performed by: DERMATOLOGY
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
## 2023-08-09 NOTE — NURSING NOTE
Miguel A Vaughn's goals for this visit include:   Chief Complaint   Patient presents with    Wound Check       He requests these members of his care team be copied on today's visit information: n/a    PCP: Ferny Koch    Referring Provider:  No referring provider defined for this encounter.    There were no vitals taken for this visit.    Do you need any medication refills at today's visit? No  Alivia Saha RN
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
## 2023-08-14 ENCOUNTER — ANESTHESIA EVENT (OUTPATIENT)
Dept: GASTROENTEROLOGY | Facility: CLINIC | Age: 63
End: 2023-08-14
Payer: COMMERCIAL

## 2023-08-14 ASSESSMENT — LIFESTYLE VARIABLES: TOBACCO_USE: 1

## 2023-08-14 NOTE — ANESTHESIA PREPROCEDURE EVALUATION
Anesthesia Pre-Procedure Evaluation    Patient: Miguel A Vaughn   MRN: [id number] : 1960        Procedure : Procedure(s):  Colonoscopy          Past Medical History:   Diagnosis Date    Benign neoplasm of brain (H)     Cervicalgia     Depressive disorder, not elsewhere classified 2008    Deviated nasal septum     Diabetes (H)     Family history of colonic polyps     Headache(784.0)     Hemorrhoids, internal     Hyperlipidemia LDL goal <130 2011    Hypersomnia with sleep apnea, unspecified     BENJAMIN (obstructive sleep apnea) 10/12/2011    BENJAMIN (obstructive sleep apnea)     Other encephalopathy     Sprain of right ankle 2011    Unspecified disorder of adrenal glands       Past Surgical History:   Procedure Laterality Date    COLONOSCOPY  08    Internal hemorrhoids.  Otherwise normal.    COLONOSCOPY  2013    Procedure: COLONOSCOPY;  colonoscopy;  Surgeon: Kody Reynolds MD;  Location: [facility]    DECOMPRESSION, FUSION CERVICAL ANTERIOR ONE LEVEL, COMBINED N/A 2019    Procedure: CERVICAL 6-7 ANTERIOR  DECOMPRESSION AND FUSION;  Surgeon: Memo Wooten MD;  Location:  OR    DISCECTOMY, FUSION CERVICAL ANTERIOR ONE LEVEL, COMBINED N/A 2019    Procedure: C 3-4 ANTERIOR CERVICAL DISECTOMY AND FUSION;  Surgeon: Memo Wooten MD;  Location:  OR    EXAM UNDER ANESTHESIA RECTUM N/A 8/3/2016    Procedure: EXAM UNDER ANESTHESIA RECTUM;  Surgeon: Sami Zamora MD;  Location:  OR    EXPLORE SPINE, REMOVE HARDWARE, COMBINED N/A 2019    Procedure: C 4-5 HARDWARE REMOVAL;  Surgeon: Memo Wooten MD;  Location:  OR     FLEX SIGMOIDOSCOPY W/WO BRAD SPEC BY BRUSH/WASH  06/10/08    bx intra-anal lesions & electrocoagulation of perianal lesions.    HC REPAIR OF NASAL SEPTUM  2005    Revision septoplasty, submucosal resection of the inferior turbinates.    INCISION AND DRAINAGE PERINEAL, COMBINED N/A 8/3/2016    Procedure: COMBINED INCISION AND DRAINAGE  PERINEAL;  Surgeon: Sami Zamora MD;  Location: [facility]    INJECT EPIDURAL CERVICAL N/A 8/16/2019    Procedure: INJECTION, SPINE, CERVICAL 6-7, EPIDURAL;  Surgeon: Brent Anguiano MD;  Location: [facility]    INJECT EPIDURAL LUMBAR N/A 2/24/2022    Procedure: Lumbar 5-Sacral 1 Interlaminar epidural steroid injection using fluoroscopic guidance with contrast dye;  Surgeon: Brent Anguiano MD;  Location: [facility]    INJECT FACET JOINT Bilateral 1/17/2020    Procedure: Cervical 6-7 Bilateral facet Injections;  Surgeon: Brent Anguiano MD;  Location: [facility]    LAPAROSCOPIC CHOLECYSTECTOMY N/A 10/17/2019    Procedure: CHOLECYSTECTOMY, LAPAROSCOPIC;  Surgeon: Barry Molina MD;  Location: [facility]    SURGICAL HISTORY OF -   08/30/2006    Left occiput C1, C1-C2 facet injection.  [facility]    ZZC WINTER W/O FACETEC FORAMOT/DSKC 1/2 VRT SEG, CERVICAL  1989    Z OPEN RX ANKLE DISLOCATN+FIXATN  1982      Allergies   Allergen Reactions    No Known Drug Allergy     Tape [Adhesive Tape]      Pt states no      Social History     Tobacco Use    Smoking status: Every Day     Packs/day: 0.50     Years: 25.00     Pack years: 12.50     Types: Cigarettes     Last attempt to quit: 11/9/2019     Years since quitting: 3.7    Smokeless tobacco: Former     Types: Snuff   Substance Use Topics    Alcohol use: Yes     Alcohol/week: 0.0 standard drinks of alcohol     Comment: weekends      Wt Readings from Last 1 Encounters:   05/23/23 79.8 kg (176 lb)        Anesthesia Evaluation            ROS/MED HX  ENT/Pulmonary:     (+) sleep apnea,          allergic rhinitis,     tobacco use, Current use,  13  Pack-Year Hx,                     Neurologic:  - neg neurologic ROS     Cardiovascular:     (+) Dyslipidemia hypertension-range: < 140/80/ -   -  - -                                 Previous cardiac testing   Echo: Date: Results:    Stress Test:  Date: Results:    ECG Reviewed:  Date: 1/7/2023 Results:  Sinus  Rhythm   WITHIN NORMAL LIMITS    Cath:  Date:  Results:      METS/Exercise Tolerance:     Hematologic:  - neg hematologic  ROS     Musculoskeletal: Comment: Degenerative disc disease, lumbar  Restless legs syndrome  Degenerative disc disease, cervical      GI/Hepatic:  - neg GI/hepatic ROS     Renal/Genitourinary:  - neg Renal ROS     Endo:     (+)  type II DM, Last HgA1c: 13.8, date: 5/23/2023, Not using insulin, - not using insulin pump.                Psychiatric/Substance Use:  - neg psychiatric ROS     Infectious Disease:     (+)   MRSA,         Malignancy: Comment: basal cell carcinoma - face  (+) Malignancy, History of Skin.Skin CA Remission status post Surgery.      Other:  - neg other ROS          Physical Exam    Airway  airway exam normal      Mallampati: II   TM distance: > 3 FB   Neck ROM: full   Mouth opening: > 3 cm    Respiratory Devices and Support         Dental  no notable dental history         Cardiovascular   cardiovascular exam normal       Rhythm and rate: regular and normal     Pulmonary   pulmonary exam normal        breath sounds clear to auscultation           OUTSIDE LABS:  CBC:   Lab Results   Component Value Date    WBC 9.3 12/21/2021    WBC 9.0 11/08/2019    HGB 16.7 12/21/2021    HGB 15.0 11/08/2019    HCT 49.1 12/21/2021    HCT 45.7 11/08/2019     12/21/2021     11/08/2019     BMP:   Lab Results   Component Value Date     (L) 05/23/2023     12/21/2021    POTASSIUM 5.1 05/23/2023    POTASSIUM 4.2 12/21/2021    CHLORIDE 100 05/23/2023    CHLORIDE 104 12/21/2021    CO2 28 05/23/2023    CO2 29 12/21/2021    BUN 10.2 05/23/2023    BUN 10 12/21/2021    CR 0.80 05/23/2023    CR 0.71 12/21/2021     (H) 05/23/2023     (H) 12/21/2021     COAGS:   Lab Results   Component Value Date    INR 0.95 06/06/2008     POC:   Lab Results   Component Value Date     (H) 01/17/2020     HEPATIC:   Lab Results   Component Value Date    ALBUMIN 4.0 05/23/2023    PROTTOTAL 7.2 05/23/2023    ALT 19 05/23/2023     AST 16 05/23/2023    ALKPHOS 128 05/23/2023    BILITOTAL 0.5 05/23/2023     OTHER:   Lab Results   Component Value Date    A1C 13.8 (H) 05/23/2023    FRANCIA 9.4 05/23/2023    LIPASE 51 (L) 10/24/2019    AMYLASE 75 07/27/2011    TSH 1.10 05/23/2023    CRP <2.9 10/17/2019    SED 9 10/24/2019       Anesthesia Plan    ASA Status:  3    NPO Status:  NPO Appropriate    Anesthesia Type: MAC.     - Reason for MAC: straight local not clinically adequate   Induction: Intravenous, Propofol.   Maintenance: TIVA.        Consents    Anesthesia Plan(s) and associated risks, benefits, and realistic alternatives discussed. Questions answered and patient/representative(s) expressed understanding.     - Discussed:     - Discussed with:  Patient      - Extended Intubation/Ventilatory Support Discussed: No.      - Patient is DNR/DNI Status: No     Use of blood products discussed: No .     Postoperative Care    Pain management: IV analgesics.        Comments:    Other Comments: The risks and benefits of anesthesia, and the alternatives where applicable, have been discussed with the patient, and they wish to proceed.            SEBASTIÁN Tucker CRNA
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
## 2023-08-15 ENCOUNTER — HOSPITAL ENCOUNTER (OUTPATIENT)
Facility: CLINIC | Age: 63
Discharge: HOME | End: 2023-08-15
Attending: INTERNAL MEDICINE | Admitting: INTERNAL MEDICINE
Payer: COMMERCIAL

## 2023-08-15 ENCOUNTER — ANESTHESIA (OUTPATIENT)
Dept: GASTROENTEROLOGY | Facility: CLINIC | Age: 63
End: 2023-08-15
Payer: COMMERCIAL

## 2023-08-15 VITALS
OXYGEN SATURATION: 98 % | HEIGHT: 69 IN | BODY MASS INDEX: 24.71 KG/M2 | HEART RATE: 75 BPM | TEMPERATURE: 97 F | DIASTOLIC BLOOD PRESSURE: 93 MMHG | WEIGHT: 166.8 LBS | RESPIRATION RATE: 18 BRPM | SYSTOLIC BLOOD PRESSURE: 132 MMHG

## 2023-08-15 DIAGNOSIS — Z12.11 SCREENING FOR COLON CANCER: Primary | ICD-10-CM

## 2023-08-15 PROBLEM — K57.30 DIVERTICULAR DISEASE OF COLON: Status: ACTIVE | Noted: 2023-08-15

## 2023-08-15 PROBLEM — Z86.0100 HISTORY OF COLONIC POLYPS: Status: ACTIVE | Noted: 2023-08-15

## 2023-08-15 LAB
COLONOSCOPY: NORMAL
GLUCOSE BLDC GLUCOMTR-MCNC: 150 MG/DL (ref 70–99)

## 2023-08-15 PROCEDURE — 250N000009 HC RX 250: Performed by: NURSE ANESTHETIST, CERTIFIED REGISTERED

## 2023-08-15 PROCEDURE — 82962 GLUCOSE BLOOD TEST: CPT

## 2023-08-15 PROCEDURE — 250N000011 HC RX IP 250 OP 636: Performed by: NURSE ANESTHETIST, CERTIFIED REGISTERED

## 2023-08-15 PROCEDURE — 258N000003 HC RX IP 258 OP 636: Performed by: NURSE ANESTHETIST, CERTIFIED REGISTERED

## 2023-08-15 PROCEDURE — 45380 COLONOSCOPY AND BIOPSY: CPT | Performed by: INTERNAL MEDICINE

## 2023-08-15 PROCEDURE — 370N000017 HC ANESTHESIA TECHNICAL FEE, PER MIN: Performed by: INTERNAL MEDICINE

## 2023-08-15 PROCEDURE — 88305 TISSUE EXAM BY PATHOLOGIST: CPT | Mod: TC | Performed by: INTERNAL MEDICINE

## 2023-08-15 PROCEDURE — 45385 COLONOSCOPY W/LESION REMOVAL: CPT | Mod: PT | Performed by: INTERNAL MEDICINE

## 2023-08-15 RX ORDER — PROPOFOL 10 MG/ML
INJECTION, EMULSION INTRAVENOUS PRN
Status: DISCONTINUED | OUTPATIENT
Start: 2023-08-15 | End: 2023-08-15

## 2023-08-15 RX ORDER — ONDANSETRON 4 MG/1
4 TABLET, ORALLY DISINTEGRATING ORAL EVERY 30 MIN PRN
Status: CANCELLED | OUTPATIENT
Start: 2023-08-15

## 2023-08-15 RX ORDER — ONDANSETRON 2 MG/ML
4 INJECTION INTRAMUSCULAR; INTRAVENOUS EVERY 30 MIN PRN
Status: CANCELLED | OUTPATIENT
Start: 2023-08-15

## 2023-08-15 RX ORDER — SODIUM CHLORIDE, SODIUM LACTATE, POTASSIUM CHLORIDE, CALCIUM CHLORIDE 600; 310; 30; 20 MG/100ML; MG/100ML; MG/100ML; MG/100ML
INJECTION, SOLUTION INTRAVENOUS CONTINUOUS
Status: DISCONTINUED | OUTPATIENT
Start: 2023-08-15 | End: 2023-08-15 | Stop reason: HOSPADM

## 2023-08-15 RX ORDER — LIDOCAINE HYDROCHLORIDE 20 MG/ML
INJECTION, SOLUTION INFILTRATION; PERINEURAL PRN
Status: DISCONTINUED | OUTPATIENT
Start: 2023-08-15 | End: 2023-08-15

## 2023-08-15 RX ORDER — PROPOFOL 10 MG/ML
INJECTION, EMULSION INTRAVENOUS CONTINUOUS PRN
Status: DISCONTINUED | OUTPATIENT
Start: 2023-08-15 | End: 2023-08-15

## 2023-08-15 RX ORDER — FENTANYL CITRATE 50 UG/ML
25 INJECTION, SOLUTION INTRAMUSCULAR; INTRAVENOUS
Status: CANCELLED | OUTPATIENT
Start: 2023-08-15

## 2023-08-15 RX ORDER — LIDOCAINE 40 MG/G
CREAM TOPICAL
Status: DISCONTINUED | OUTPATIENT
Start: 2023-08-15 | End: 2023-08-15 | Stop reason: HOSPADM

## 2023-08-15 RX ADMIN — PROPOFOL 200 MCG/KG/MIN: 10 INJECTION, EMULSION INTRAVENOUS at 07:31

## 2023-08-15 RX ADMIN — LIDOCAINE HYDROCHLORIDE 50 MG: 20 INJECTION, SOLUTION INFILTRATION; PERINEURAL at 07:31

## 2023-08-15 RX ADMIN — SODIUM CHLORIDE, POTASSIUM CHLORIDE, SODIUM LACTATE AND CALCIUM CHLORIDE: 600; 310; 30; 20 INJECTION, SOLUTION INTRAVENOUS at 07:12

## 2023-08-15 RX ADMIN — LIDOCAINE HYDROCHLORIDE 0.1 ML: 10 INJECTION, SOLUTION EPIDURAL; INFILTRATION; INTRACAUDAL; PERINEURAL at 07:10

## 2023-08-15 RX ADMIN — SODIUM CHLORIDE, POTASSIUM CHLORIDE, SODIUM LACTATE AND CALCIUM CHLORIDE 10 ML: 600; 310; 30; 20 INJECTION, SOLUTION INTRAVENOUS at 07:10

## 2023-08-15 RX ADMIN — PROPOFOL 100 MG: 10 INJECTION, EMULSION INTRAVENOUS at 07:31

## 2023-08-15 ASSESSMENT — ACTIVITIES OF DAILY LIVING (ADL): ADLS_ACUITY_SCORE: 37

## 2023-08-15 NOTE — ANESTHESIA CARE TRANSFER NOTE
Patient: Miguel A Vaughn    Procedure: Procedure(s):  Colonoscopy with polypectomy       Diagnosis: Screen for colon cancer [Z12.11]  Diagnosis Additional Information: No value filed.    Anesthesia Type:   MAC     Note:    Oropharynx: spontaneously breathing  Level of Consciousness: awake  Oxygen Supplementation: room air    Independent Airway: airway patency satisfactory and stable  Dentition: dentition unchanged  Vital Signs Stable: post-procedure vital signs reviewed and stable  Report to RN Given: handoff report given  Patient transferred to: Phase II    Handoff Report: Identifed the Patient, Identified the Reponsible Provider, Reviewed the pertinent medical history, Discussed the surgical course, Reviewed Intra-OP anesthesia mangement and issues during anesthesia, Set expectations for post-procedure period and Allowed opportunity for questions and acknowledgement of understanding      Vitals:  Vitals Value Taken Time   /64 08/15/23 0800   Temp     Pulse 77 08/15/23 0800   Resp 15 08/15/23 0758   SpO2 97 % 08/15/23 0805   Vitals shown include unvalidated device data.    Electronically Signed By: SEBASTIÁN Tucker CRNA  August 15, 2023  8:07 AM

## 2023-08-15 NOTE — ANESTHESIA POSTPROCEDURE EVALUATION
Patient: Miguel A Vaughn    Procedure: Procedure(s):  Colonoscopy with polypectomy       Anesthesia Type:  MAC    Note:  Disposition: Outpatient   Postop Pain Control: Uneventful            Sign Out: Well controlled pain   PONV: No   Neuro/Psych: Uneventful            Sign Out: Acceptable/Baseline neuro status   Airway/Respiratory: Uneventful            Sign Out: Acceptable/Baseline resp. status   CV/Hemodynamics: Uneventful            Sign Out: Acceptable CV status   Other NRE: NONE   DID A NON-ROUTINE EVENT OCCUR? No    Event details/Postop Comments:  Pt was happy with anesthesia care.  No complications.  I will follow up with the pt if needed.           Last vitals:  Vitals:    08/15/23 0702 08/15/23 0758   BP: 127/71 106/67   Pulse:  81   Resp: 16 15   Temp: 97.2  F (36.2  C)    SpO2: 97% 97%       Electronically Signed By: SEBASTIÁN Tucker CRNA  August 15, 2023  8:07 AM

## 2023-08-15 NOTE — LETTER
August 16, 2023      Miguel A Vaughn  [address]  [place] [address]        Dear ,    We are writing to inform you of your test results.    Benign polyp removed.  A repeat exam in 5 yrs with an extended prep is suggested.    Resulted Orders   Surgical Pathology Exam   Result Value Ref Range    Case Report       Surgical Pathology Report                         Case: [id number]                                  Authorizing Provider:  Jose Lanza MD        Collected:           08/15/2023 07:47 AM          Ordering Location:     [facility]          Received:            08/15/2023 08:10 AM                                 [facility]                                                          Pathologist:           Salas Bahena MD                                                           Specimen:    Large Intestine, Colon, Descending, Descending colon polyp                                 Final Diagnosis       Colon, descending: Polypectomy:  - Tubular adenoma  - No evidence of high-grade dysplasia or invasive malignancy        Clinical Information       Procedure:  Colonoscopy with polypectomy  Pre-op Diagnosis: Screen for colon cancer [Z12.11]  Post-op Diagnosis: Z12.11 - Screen for colon cancer [ICD-10-CM]      Gross Description       A(1). Large Intestine, Colon, Descending, Descending colon polyp:  The specimen is received in formalin, labeled with the patient's name, medical record number and other identifying information and designated  descending colon polyp . It consists of 3 tan soft tissue fragments ranging from 0.2-0.3 cm. Entirely submitted in one cassette. [COLTON Cobos(ASCP)]        Microscopic Description       The microscopic findings substantiates the final diagnosis.        Performing Labs       The technical component of this testing was completed at [facility]      Case Images         If you  have any questions or concerns, please call the clinic at the number listed above.       Sincerely,      Jose Lanza MD

## 2023-08-15 NOTE — DISCHARGE INSTRUCTIONS
[facility]    Home Care Following Endoscopy          Activity:  You have just undergone an endoscopic procedure usually performed with conscious sedation.  Do not work or operate machinery (including a car) for at least 12 hours.    I encourage you to walk and attempt to pass this air as soon as possible.    Diet:  Return to the diet you were on before your procedure but eat lightly for the first 12-24 hours.  Drink plenty of water.  Resume any regular medications unless otherwise advised by your physician.  Please begin any new medication prescribed as a result of your procedure as directed by your physician.   If you had any biopsy or polyp removed please refrain from aspirin or aspirin products for 2 days.  If on Coumadin please restart as instructed by your physician.   Pain:  You may take Tylenol as needed for pain.  Expected Recovery:  You can expect some mild abdominal fullness and/or discomfort due to the air used to inflate your intestinal tract.     Call Your Physician if You Have:    After Colonoscopy:  Worsening persisting abdominal pain which is worse with activity.  Fevers (>101 degrees F), chills or shakes.  Passage of continued blood with bowel movements.     Any questions or concerns about your recovery, please call [phone] or after hours [phone] ([phone]) Nurse Advice Line.    Follow-up Care:  You did have polyps/biopsy tissue sample(s) removed.  The polyps/biopsy tissue sample(s) will be sent to pathology.    You should receive letter in your My Chart from Dr. Lanza with your results within 1-2 weeks. If you do not participate in My Chart a physical letter will come in the mail in 2-3 weeks.  Please call if you have not received a notification of your results.  If asked to return to clinic please make an appointment 1 week after your procedure.  Call [phone].

## 2023-08-16 LAB
PATH REPORT.COMMENTS IMP SPEC: NORMAL
PATH REPORT.COMMENTS IMP SPEC: NORMAL
PATH REPORT.FINAL DX SPEC: NORMAL
PATH REPORT.GROSS SPEC: NORMAL
PATH REPORT.MICROSCOPIC SPEC OTHER STN: NORMAL
PATH REPORT.RELEVANT HX SPEC: NORMAL
PHOTO IMAGE: NORMAL

## 2023-08-16 PROCEDURE — 88305 TISSUE EXAM BY PATHOLOGIST: CPT | Mod: 26 | Performed by: PATHOLOGY

## 2023-08-23 ENCOUNTER — OFFICE VISIT (OUTPATIENT)
Dept: DERMATOLOGY | Facility: CLINIC | Age: 63
End: 2023-08-23
Payer: COMMERCIAL

## 2023-08-23 DIAGNOSIS — Z85.828 HISTORY OF BASAL CELL CARCINOMA OF SKIN: Primary | ICD-10-CM

## 2023-08-23 DIAGNOSIS — Z51.89 VISIT FOR WOUND CHECK: ICD-10-CM

## 2023-08-23 PROCEDURE — 99024 POSTOP FOLLOW-UP VISIT: CPT | Performed by: DERMATOLOGY
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
## 2023-08-29 ENCOUNTER — NURSE TRIAGE (OUTPATIENT)
Dept: FAMILY MEDICINE | Facility: OTHER | Age: 63
End: 2023-08-29
Payer: COMMERCIAL

## 2023-08-29 ENCOUNTER — HOSPITAL ENCOUNTER (EMERGENCY)
Facility: CLINIC | Age: 63
Discharge: HOME | End: 2023-08-29
Attending: EMERGENCY MEDICINE | Admitting: EMERGENCY MEDICINE
Payer: COMMERCIAL

## 2023-08-29 VITALS
WEIGHT: 173 LBS | HEART RATE: 102 BPM | RESPIRATION RATE: 18 BRPM | OXYGEN SATURATION: 100 % | BODY MASS INDEX: 25.77 KG/M2 | SYSTOLIC BLOOD PRESSURE: 144 MMHG | DIASTOLIC BLOOD PRESSURE: 87 MMHG | TEMPERATURE: 97.6 F

## 2023-08-29 DIAGNOSIS — N41.0 ACUTE PROSTATITIS: ICD-10-CM

## 2023-08-29 LAB
ALBUMIN SERPL BCG-MCNC: 3.9 G/DL (ref 3.5–5.2)
ALBUMIN UR-MCNC: NEGATIVE MG/DL
ALP SERPL-CCNC: 139 U/L (ref 40–129)
ALT SERPL W P-5'-P-CCNC: 16 U/L (ref 0–70)
ANION GAP SERPL CALCULATED.3IONS-SCNC: 11 MMOL/L (ref 7–15)
APPEARANCE UR: CLEAR
AST SERPL W P-5'-P-CCNC: 16 U/L (ref 0–45)
BACTERIA #/AREA URNS HPF: ABNORMAL /HPF
BASOPHILS # BLD AUTO: 0.1 10E3/UL (ref 0–0.2)
BASOPHILS NFR BLD AUTO: 0 %
BILIRUB SERPL-MCNC: 0.4 MG/DL
BILIRUB UR QL STRIP: NEGATIVE
BUN SERPL-MCNC: 10.1 MG/DL (ref 8–23)
CALCIUM SERPL-MCNC: 9.5 MG/DL (ref 8.8–10.2)
CHLORIDE SERPL-SCNC: 96 MMOL/L (ref 98–107)
COLOR UR AUTO: YELLOW
CREAT SERPL-MCNC: 0.68 MG/DL (ref 0.67–1.17)
DEPRECATED HCO3 PLAS-SCNC: 26 MMOL/L (ref 22–29)
EOSINOPHIL # BLD AUTO: 0.4 10E3/UL (ref 0–0.7)
EOSINOPHIL NFR BLD AUTO: 2 %
ERYTHROCYTE [DISTWIDTH] IN BLOOD BY AUTOMATED COUNT: 11.9 % (ref 10–15)
GFR SERPL CREATININE-BSD FRML MDRD: >90 ML/MIN/1.73M2
GLUCOSE SERPL-MCNC: 299 MG/DL (ref 70–99)
GLUCOSE UR STRIP-MCNC: >499 MG/DL
HCT VFR BLD AUTO: 47.4 % (ref 40–53)
HGB BLD-MCNC: 16.1 G/DL (ref 13.3–17.7)
HGB UR QL STRIP: NEGATIVE
IMM GRANULOCYTES # BLD: 0.1 10E3/UL
IMM GRANULOCYTES NFR BLD: 1 %
KETONES UR STRIP-MCNC: NEGATIVE MG/DL
LEUKOCYTE ESTERASE UR QL STRIP: ABNORMAL
LYMPHOCYTES # BLD AUTO: 2.8 10E3/UL (ref 0.8–5.3)
LYMPHOCYTES NFR BLD AUTO: 18 %
MCH RBC QN AUTO: 30.7 PG (ref 26.5–33)
MCHC RBC AUTO-ENTMCNC: 34 G/DL (ref 31.5–36.5)
MCV RBC AUTO: 90 FL (ref 78–100)
MONOCYTES # BLD AUTO: 1 10E3/UL (ref 0–1.3)
MONOCYTES NFR BLD AUTO: 7 %
NEUTROPHILS # BLD AUTO: 11.5 10E3/UL (ref 1.6–8.3)
NEUTROPHILS NFR BLD AUTO: 72 %
NITRATE UR QL: NEGATIVE
NRBC # BLD AUTO: 0 10E3/UL
NRBC BLD AUTO-RTO: 0 /100
PH UR STRIP: 5 [PH] (ref 5–7)
PLATELET # BLD AUTO: 281 10E3/UL (ref 150–450)
POTASSIUM SERPL-SCNC: 4.2 MMOL/L (ref 3.4–5.3)
PROT SERPL-MCNC: 7.8 G/DL (ref 6.4–8.3)
PSA SERPL DL<=0.01 NG/ML-MCNC: 7.74 NG/ML (ref 0–4.5)
RBC # BLD AUTO: 5.25 10E6/UL (ref 4.4–5.9)
RBC URINE: 11 /HPF
SODIUM SERPL-SCNC: 133 MMOL/L (ref 136–145)
SP GR UR STRIP: 1.03 (ref 1–1.03)
SQUAMOUS EPITHELIAL: <1 /HPF
UROBILINOGEN UR STRIP-MCNC: NORMAL MG/DL
WBC # BLD AUTO: 15.8 10E3/UL (ref 4–11)
WBC CLUMPS #/AREA URNS HPF: PRESENT /HPF
WBC URINE: 62 /HPF

## 2023-08-29 PROCEDURE — 87086 URINE CULTURE/COLONY COUNT: CPT | Performed by: EMERGENCY MEDICINE

## 2023-08-29 PROCEDURE — G0103 PSA SCREENING: HCPCS | Performed by: EMERGENCY MEDICINE

## 2023-08-29 PROCEDURE — 80053 COMPREHEN METABOLIC PANEL: CPT | Performed by: EMERGENCY MEDICINE

## 2023-08-29 PROCEDURE — 85018 HEMOGLOBIN: CPT | Performed by: EMERGENCY MEDICINE

## 2023-08-29 PROCEDURE — 81001 URINALYSIS AUTO W/SCOPE: CPT | Performed by: EMERGENCY MEDICINE

## 2023-08-29 PROCEDURE — 99284 EMERGENCY DEPT VISIT MOD MDM: CPT | Performed by: EMERGENCY MEDICINE

## 2023-08-29 PROCEDURE — 51798 US URINE CAPACITY MEASURE: CPT | Performed by: EMERGENCY MEDICINE

## 2023-08-29 PROCEDURE — 36415 COLL VENOUS BLD VENIPUNCTURE: CPT | Performed by: EMERGENCY MEDICINE

## 2023-08-29 RX ORDER — LEVOFLOXACIN 500 MG/1
500 TABLET, FILM COATED ORAL DAILY
Qty: 28 TABLET | Refills: 0 | Status: SHIPPED | OUTPATIENT
Start: 2023-08-29 | End: 2023-09-04

## 2023-08-29 RX ORDER — PHENAZOPYRIDINE HYDROCHLORIDE 200 MG/1
200 TABLET, FILM COATED ORAL 3 TIMES DAILY PRN
Qty: 42 TABLET | Refills: 0 | Status: SHIPPED | OUTPATIENT
Start: 2023-08-29 | End: 2023-09-04

## 2023-08-29 ASSESSMENT — ACTIVITIES OF DAILY LIVING (ADL): ADLS_ACUITY_SCORE: 37

## 2023-08-29 NOTE — ED TRIAGE NOTES
"PT has urinary retention for 2 weeks. Has burning sensation when he tries to go, and his bladder \"feels full\". also has constipation. Symptoms started after colonoscopy.         "
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
## 2023-08-29 NOTE — TELEPHONE ENCOUNTER
Messaged designated provider asking that they review.     Kaleigh Becerra, BSN, RN, PHN  Registered Nurse-Clinic Triage  [facility]  8/29/2023 at 8:15 AM

## 2023-08-29 NOTE — TELEPHONE ENCOUNTER
"Nurse Triage SBAR    Is this a 2nd Level Triage? YES, LICENSED PRACTITIONER REVIEW IS REQUIRED    Situation: Patient calling in regarding urinary retention. He states he is unable to urinate but a few \"trickles\" and bladder feels very full. Pain is 9-10/10.     Background: Patient states this has been going on since colonoscopy on 8/15 and he went to UC on 8/23 where they said it was due to his prostate enlarged from colonoscopy. Since UC visit he states it is getting worse.     Assessment: RN did advise patient to go to ED. Patient wanted to get providers recommendation though. RN advised patient provider is out but will get the message to his covering providers.     Protocol Recommended Disposition:   Go To ED/UCC Now (Or To Office With PCP Approval)    Recommendation: Can covering provider advise if patient should present to ED? Or can be evaluated in clinic?    CLIFF Mary, RN        Routed to provider    Does the patient meet one of the following criteria for ADS visit consideration? 16+ years old, with an [facility] PCP     TIP  Providers, please consider if this condition is appropriate for management at one of our Acute and Diagnostic Services sites.     If patient is a good candidate, please use dotphrase <dot>triageresponse and select Refer to ADS to document.Worse pain.    Barely comes out. Just trickles out. Bladder feels fdul     9-10/10  Reason for Disposition   Unable to urinate (or only a few drops) and bladder feels very full    Additional Information   Negative: Shock suspected (e.g., cold/pale/clammy skin, too weak to stand, low BP, rapid pulse)   Negative: Sounds like a life-threatening emergency to the triager    Protocols used: Urination Pain - Male-A-[place]    "

## 2023-08-29 NOTE — DISCHARGE INSTRUCTIONS
1.  Stop the ciprofloxacin  2.  Start the Levaquin 1 tablet daily for the next 28 days  3.  Use the Pyridium for dysuria up to 3 times a day for the next couple of weeks as needed.  4.  Follow-up with your provider on Tuesday as planned  5.  I put in a referral to urology should you have problems with this moving forward.  6.  Your urine culture results should be back in a couple of days.  7.  Your PSA was elevated consistent with prostatitis.  This needs to be rechecked after you are done being treated for prostatitis to make sure it comes back down to normal otherwise there would be concern for prostate cancer.  8.  Your kidney function was normal, your white blood cell count was mildly elevated consistent with the infection.    Hopefully this helps and you get better quickly. Return to the ER if vomiting, high fevers, inability to eat, worsening symptoms etc.  It was a pleasure to meet you.

## 2023-08-30 LAB — BACTERIA UR CULT: NO GROWTH

## 2023-08-31 NOTE — RESULT ENCOUNTER NOTE
"Final urine culture report shows \"NO GROWTH\" and is NEGATIVE.  Recommendations in treatment per [facility] ED Lab result Urine culture protocol.  "

## 2023-09-04 ENCOUNTER — HOSPITAL ENCOUNTER (EMERGENCY)
Facility: CLINIC | Age: 63
Discharge: HOME | End: 2023-09-04
Attending: EMERGENCY MEDICINE | Admitting: EMERGENCY MEDICINE
Payer: COMMERCIAL

## 2023-09-04 ENCOUNTER — APPOINTMENT (OUTPATIENT)
Dept: CT IMAGING | Facility: CLINIC | Age: 63
End: 2023-09-04
Attending: EMERGENCY MEDICINE
Payer: COMMERCIAL

## 2023-09-04 VITALS
HEART RATE: 105 BPM | BODY MASS INDEX: 25.77 KG/M2 | TEMPERATURE: 97.9 F | OXYGEN SATURATION: 98 % | WEIGHT: 173 LBS | SYSTOLIC BLOOD PRESSURE: 151 MMHG | RESPIRATION RATE: 18 BRPM | DIASTOLIC BLOOD PRESSURE: 87 MMHG

## 2023-09-04 DIAGNOSIS — E11.9 TYPE 2 DIABETES MELLITUS WITHOUT COMPLICATION, WITHOUT LONG-TERM CURRENT USE OF INSULIN (H): ICD-10-CM

## 2023-09-04 DIAGNOSIS — N41.2 PROSTATIC ABSCESS: ICD-10-CM

## 2023-09-04 DIAGNOSIS — N41.0 PROSTATITIS, ACUTE: ICD-10-CM

## 2023-09-04 LAB
ALBUMIN SERPL BCG-MCNC: 3.5 G/DL (ref 3.5–5.2)
ALBUMIN UR-MCNC: NEGATIVE MG/DL
ALP SERPL-CCNC: 124 U/L (ref 40–129)
ALT SERPL W P-5'-P-CCNC: 21 U/L (ref 0–70)
ANION GAP SERPL CALCULATED.3IONS-SCNC: 14 MMOL/L (ref 7–15)
APPEARANCE UR: CLEAR
AST SERPL W P-5'-P-CCNC: 17 U/L (ref 0–45)
BASOPHILS # BLD AUTO: 0.1 10E3/UL (ref 0–0.2)
BASOPHILS NFR BLD AUTO: 1 %
BILIRUB SERPL-MCNC: 0.3 MG/DL
BILIRUB UR QL STRIP: NEGATIVE
BUN SERPL-MCNC: 12.7 MG/DL (ref 8–23)
CALCIUM SERPL-MCNC: 9 MG/DL (ref 8.8–10.2)
CHLORIDE SERPL-SCNC: 96 MMOL/L (ref 98–107)
COLOR UR AUTO: ABNORMAL
CREAT SERPL-MCNC: 0.7 MG/DL (ref 0.67–1.17)
DEPRECATED HCO3 PLAS-SCNC: 23 MMOL/L (ref 22–29)
EOSINOPHIL # BLD AUTO: 0.3 10E3/UL (ref 0–0.7)
EOSINOPHIL NFR BLD AUTO: 2 %
ERYTHROCYTE [DISTWIDTH] IN BLOOD BY AUTOMATED COUNT: 12.2 % (ref 10–15)
GFR SERPL CREATININE-BSD FRML MDRD: >90 ML/MIN/1.73M2
GLUCOSE SERPL-MCNC: 375 MG/DL (ref 70–99)
GLUCOSE UR STRIP-MCNC: >499 MG/DL
HCT VFR BLD AUTO: 41.5 % (ref 40–53)
HGB BLD-MCNC: 14.3 G/DL (ref 13.3–17.7)
HGB UR QL STRIP: NEGATIVE
HYALINE CASTS: 1 /LPF
IMM GRANULOCYTES # BLD: 0.1 10E3/UL
IMM GRANULOCYTES NFR BLD: 0 %
KETONES UR STRIP-MCNC: NEGATIVE MG/DL
LACTATE SERPL-SCNC: 1.6 MMOL/L (ref 0.7–2)
LEUKOCYTE ESTERASE UR QL STRIP: NEGATIVE
LYMPHOCYTES # BLD AUTO: 2.3 10E3/UL (ref 0.8–5.3)
LYMPHOCYTES NFR BLD AUTO: 20 %
MCH RBC QN AUTO: 30.8 PG (ref 26.5–33)
MCHC RBC AUTO-ENTMCNC: 34.5 G/DL (ref 31.5–36.5)
MCV RBC AUTO: 89 FL (ref 78–100)
MONOCYTES # BLD AUTO: 1.3 10E3/UL (ref 0–1.3)
MONOCYTES NFR BLD AUTO: 11 %
NEUTROPHILS # BLD AUTO: 7.6 10E3/UL (ref 1.6–8.3)
NEUTROPHILS NFR BLD AUTO: 66 %
NITRATE UR QL: POSITIVE
NRBC # BLD AUTO: 0 10E3/UL
NRBC BLD AUTO-RTO: 0 /100
PH UR STRIP: 6 [PH] (ref 5–7)
PLATELET # BLD AUTO: 297 10E3/UL (ref 150–450)
POTASSIUM SERPL-SCNC: 4.1 MMOL/L (ref 3.4–5.3)
PROT SERPL-MCNC: 7.1 G/DL (ref 6.4–8.3)
RBC # BLD AUTO: 4.64 10E6/UL (ref 4.4–5.9)
RBC URINE: 1 /HPF
SODIUM SERPL-SCNC: 133 MMOL/L (ref 136–145)
SP GR UR STRIP: 1.03 (ref 1–1.03)
UROBILINOGEN UR STRIP-MCNC: 4 MG/DL
WBC # BLD AUTO: 11.6 10E3/UL (ref 4–11)
WBC URINE: 3 /HPF

## 2023-09-04 PROCEDURE — 36415 COLL VENOUS BLD VENIPUNCTURE: CPT | Performed by: EMERGENCY MEDICINE

## 2023-09-04 PROCEDURE — 51702 INSERT TEMP BLADDER CATH: CPT | Performed by: EMERGENCY MEDICINE

## 2023-09-04 PROCEDURE — 83605 ASSAY OF LACTIC ACID: CPT | Performed by: EMERGENCY MEDICINE

## 2023-09-04 PROCEDURE — 51798 US URINE CAPACITY MEASURE: CPT | Performed by: EMERGENCY MEDICINE

## 2023-09-04 PROCEDURE — 99285 EMERGENCY DEPT VISIT HI MDM: CPT | Mod: 25 | Performed by: EMERGENCY MEDICINE

## 2023-09-04 PROCEDURE — 81001 URINALYSIS AUTO W/SCOPE: CPT | Performed by: EMERGENCY MEDICINE

## 2023-09-04 PROCEDURE — 87086 URINE CULTURE/COLONY COUNT: CPT | Performed by: EMERGENCY MEDICINE

## 2023-09-04 PROCEDURE — 83036 HEMOGLOBIN GLYCOSYLATED A1C: CPT

## 2023-09-04 PROCEDURE — 99284 EMERGENCY DEPT VISIT MOD MDM: CPT | Performed by: EMERGENCY MEDICINE

## 2023-09-04 PROCEDURE — 250N000009 HC RX 250: Performed by: EMERGENCY MEDICINE

## 2023-09-04 PROCEDURE — 96365 THER/PROPH/DIAG IV INF INIT: CPT | Mod: 59 | Performed by: EMERGENCY MEDICINE

## 2023-09-04 PROCEDURE — 74177 CT ABD & PELVIS W/CONTRAST: CPT

## 2023-09-04 PROCEDURE — 85014 HEMATOCRIT: CPT | Performed by: EMERGENCY MEDICINE

## 2023-09-04 PROCEDURE — 258N000003 HC RX IP 258 OP 636: Performed by: EMERGENCY MEDICINE

## 2023-09-04 PROCEDURE — 96361 HYDRATE IV INFUSION ADD-ON: CPT | Mod: 59 | Performed by: EMERGENCY MEDICINE

## 2023-09-04 PROCEDURE — 250N000011 HC RX IP 250 OP 636: Mod: JZ | Performed by: EMERGENCY MEDICINE

## 2023-09-04 PROCEDURE — 96375 TX/PRO/DX INJ NEW DRUG ADDON: CPT | Performed by: EMERGENCY MEDICINE

## 2023-09-04 PROCEDURE — 80053 COMPREHEN METABOLIC PANEL: CPT | Performed by: EMERGENCY MEDICINE

## 2023-09-04 PROCEDURE — 250N000011 HC RX IP 250 OP 636: Performed by: EMERGENCY MEDICINE

## 2023-09-04 RX ORDER — DOCUSATE SODIUM 100 MG/1
100 CAPSULE, LIQUID FILLED ORAL DAILY PRN
COMMUNITY
End: 2023-11-01

## 2023-09-04 RX ORDER — CEFTRIAXONE 1 G/1
1 INJECTION, POWDER, FOR SOLUTION INTRAMUSCULAR; INTRAVENOUS EVERY 24 HOURS
Status: CANCELLED
Start: 2023-09-06

## 2023-09-04 RX ORDER — HEPARIN SODIUM,PORCINE 10 UNIT/ML
5-20 VIAL (ML) INTRAVENOUS DAILY PRN
Status: CANCELLED | OUTPATIENT
Start: 2023-09-05

## 2023-09-04 RX ORDER — MEPERIDINE HYDROCHLORIDE 25 MG/ML
25 INJECTION INTRAMUSCULAR; INTRAVENOUS; SUBCUTANEOUS EVERY 30 MIN PRN
Status: CANCELLED | OUTPATIENT
Start: 2023-09-05

## 2023-09-04 RX ORDER — METHYLPREDNISOLONE SODIUM SUCCINATE 125 MG/2ML
125 INJECTION, POWDER, LYOPHILIZED, FOR SOLUTION INTRAMUSCULAR; INTRAVENOUS
Status: CANCELLED
Start: 2023-09-05

## 2023-09-04 RX ORDER — ALBUTEROL SULFATE 0.83 MG/ML
2.5 SOLUTION RESPIRATORY (INHALATION)
Status: CANCELLED | OUTPATIENT
Start: 2023-09-05

## 2023-09-04 RX ORDER — CEPHALEXIN 500 MG/1
500 CAPSULE ORAL 4 TIMES DAILY
Qty: 120 CAPSULE | Refills: 0 | Status: SHIPPED | OUTPATIENT
Start: 2023-09-09 | End: 2023-09-04

## 2023-09-04 RX ORDER — IOPAMIDOL 755 MG/ML
500 INJECTION, SOLUTION INTRAVASCULAR ONCE
Status: COMPLETED | OUTPATIENT
Start: 2023-09-04 | End: 2023-09-04

## 2023-09-04 RX ORDER — CEPHALEXIN 500 MG/1
500 CAPSULE ORAL 3 TIMES DAILY
Qty: 120 CAPSULE | Refills: 0 | Status: SHIPPED | OUTPATIENT
Start: 2023-09-04 | End: 2023-11-01

## 2023-09-04 RX ORDER — ALBUTEROL SULFATE 90 UG/1
1-2 AEROSOL, METERED RESPIRATORY (INHALATION)
Status: CANCELLED
Start: 2023-09-05

## 2023-09-04 RX ORDER — ACETAMINOPHEN 500 MG
1000 TABLET ORAL EVERY 8 HOURS PRN
COMMUNITY

## 2023-09-04 RX ORDER — HEPARIN SODIUM (PORCINE) LOCK FLUSH IV SOLN 100 UNIT/ML 100 UNIT/ML
5 SOLUTION INTRAVENOUS
Status: CANCELLED | OUTPATIENT
Start: 2023-09-05

## 2023-09-04 RX ORDER — CEFTRIAXONE 1 G/1
1 INJECTION, POWDER, FOR SOLUTION INTRAMUSCULAR; INTRAVENOUS ONCE
Status: COMPLETED | OUTPATIENT
Start: 2023-09-04 | End: 2023-09-04

## 2023-09-04 RX ORDER — EPINEPHRINE 1 MG/ML
0.3 INJECTION, SOLUTION INTRAMUSCULAR; SUBCUTANEOUS EVERY 5 MIN PRN
Status: CANCELLED | OUTPATIENT
Start: 2023-09-05

## 2023-09-04 RX ORDER — KETOROLAC TROMETHAMINE 15 MG/ML
15 INJECTION, SOLUTION INTRAMUSCULAR; INTRAVENOUS ONCE
Status: COMPLETED | OUTPATIENT
Start: 2023-09-04 | End: 2023-09-04

## 2023-09-04 RX ORDER — DIPHENHYDRAMINE HYDROCHLORIDE 50 MG/ML
50 INJECTION INTRAMUSCULAR; INTRAVENOUS
Status: CANCELLED
Start: 2023-09-05

## 2023-09-04 RX ADMIN — SODIUM CHLORIDE 60 ML: 9 INJECTION, SOLUTION INTRAVENOUS at 11:10

## 2023-09-04 RX ADMIN — CEFTRIAXONE SODIUM 1 G: 1 INJECTION, POWDER, FOR SOLUTION INTRAMUSCULAR; INTRAVENOUS at 12:11

## 2023-09-04 RX ADMIN — SODIUM CHLORIDE 1000 ML: 9 INJECTION, SOLUTION INTRAVENOUS at 10:38

## 2023-09-04 RX ADMIN — KETOROLAC TROMETHAMINE 15 MG: 15 INJECTION, SOLUTION INTRAMUSCULAR; INTRAVENOUS at 10:40

## 2023-09-04 RX ADMIN — IOPAMIDOL 85 ML: 755 INJECTION, SOLUTION INTRAVENOUS at 11:10

## 2023-09-04 ASSESSMENT — ACTIVITIES OF DAILY LIVING (ADL)
ADLS_ACUITY_SCORE: 37
ADLS_ACUITY_SCORE: 37
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
## 2023-09-04 NOTE — DISCHARGE INSTRUCTIONS
Your CT scan showed that you have 2 abscesses in your prostate.  This is likely causing your pain and urinary retention.  You will need to keep the catheter in place until you see urology    Since you cannot tolerate the ciprofloxacin or Levaquin, you were changed to a different antibiotic.  STOP both of these medications    You were given a dose of antibiotics through the IV today.  You will complete a 5-day course, and will get your next dose tomorrow.  An infusion therapy plan has been created.  You will need to call infusion therapy tomorrow and presented there for your next IV dose of antibiotics.    After 5 days of antibiotics, starting Saturday, 9/9/2023 you can start taking the oral antibiotic Keflex.  We will take this 3 times daily, and Dr. Yancey wanted you to continue this for 30 days.    Messages were sent to your primary provider and the urologist who will see you on 9/20/2023.  They may contact you if plan changes or if they want to see you sooner    Keep your appointment with Ferny Koch in clinic tomorrow for recheck    If you develop a fever or any significant worsening symptoms, do not hesitate to return to the emergency room for evaluation

## 2023-09-04 NOTE — MEDICATION SCRIBE - ADMISSION MEDICATION HISTORY
Medication Scribe Admission Medication History    Admission medication history is complete. The information provided in this note is only as accurate as the sources available at the time of the update.    Medication reconciliation/reorder completed by provider prior to medication history? No    Information Source(s): Patient via in-person    Pertinent Information:     Changes made to PTA medication list:  Added: Colace OTC as reported by patient   Deleted: Levofloxacin 500 mg - no longer taking   Phenazopyridine 200 mg - not taking   Bisacodyl 5 mg - completed therapy   Golytely - therapy completed   Changed: Acetaminophen 325 mg tablet changed to 500 mg tablet     Medication Affordability:  Not including over the counter (OTC) medications, was there a time in the past 3 months when you did not take your medications as prescribed because of cost?: No    Allergies reviewed with patient and updates made in EHR: yes     Medication History Completed By: DONAVAN JAUREGUI 9/4/2023 12:56 PM    Prior to Admission medications    Medication Sig Last Dose Taking? Auth Provider Long Term End Date   acetaminophen (TYLENOL) 500 MG tablet Take 1,000 mg by mouth every 8 hours as needed for mild pain 9/3/2023 at hs Yes Reported, Patient     aspirin (ASA) 81 MG EC tablet Take 1 tablet (81 mg) by mouth daily  Patient taking differently: Take 81 mg by mouth At Bedtime 9/3/2023 at hs Yes Orly Moe PA-C     cephALEXin (KEFLEX) 500 MG capsule Take 1 capsule (500 mg) by mouth 3 times daily  Yes Pippa Mclean DO     docusate sodium (COLACE) 100 MG capsule Take 100 mg by mouth daily as needed for constipation 9/4/2023 at am Yes Reported, Patient     fluticasone (FLONASE) 50 MCG/ACT spray INHALE 1-2 SPRAYS IN EACH NOSTRIL ONCE DAILY  Patient taking differently: Spray 1-2 sprays into both nostrils daily as needed INHALE 1-2 SPRAYS IN EACH NOSTRIL ONCE DAILY Past Month at unknown Yes Ferny Koch PA-C     glyBURIDE (DIABETA  /MICRONASE) 2.5 MG tablet Take 2 tablets (5 mg) by mouth daily (with breakfast) 9/3/2023 at hs Yes eFrny Koch PA-C Yes    losartan (COZAAR) 25 MG tablet Take 1 tablet by mouth once daily  Patient taking differently: Take 25 mg by mouth At Bedtime 9/3/2023 at hs Yes Philip Mcelroy PA-C Yes    metFORMIN (GLUCOPHAGE) 500 MG tablet Take 1 tablet (500 mg) by mouth 2 times daily (with meals) 9/3/2023 at hs Yes Ferny Koch PA-C Yes    rOPINIRole (REQUIP) 0.25 MG tablet TAKE 1 TO 2 TABLETS BY MOUTH NIGHTLY AS NEEDED (FOR  RESTLESS  LEGS)  Patient taking differently: Take 1-2 tablets by mouth nightly as needed TAKE 1 TO 2 TABLETS BY MOUTH NIGHTLY AS NEEDED (FOR  RESTLESS  LEGS) 9/3/2023 at hs Yes Ferny Koch PA-C Yes    rosuvastatin (CRESTOR) 10 MG tablet Take 1 tablet (10 mg) by mouth daily  Patient taking differently: Take 10 mg by mouth At Bedtime 9/3/2023 at hs Yes Ferny Koch PA-C Yes    acetaminophen (TYLENOL) 325 MG tablet Take 2 tablets (650 mg) by mouth every 6 hours as needed for mild pain   Ramez Sargent MD     alcohol swab prep pads Use to swab area of injection/erwin as directed.   Ferny Koch PA-C     blood glucose (NO BRAND SPECIFIED) test strip Use to test blood sugar 1 times daily or as directed. To accompany: Blood Glucose Monitor Brands: per insurance.   Ferny Koch PA-C     blood glucose calibration (NO BRAND SPECIFIED) solution To accompany: Blood Glucose Monitor Brands: per insurance.   Ferny Koch PA-C     blood glucose monitoring (NO BRAND SPECIFIED) meter device kit Use to test blood sugar 1 times daily or as directed. Preferred blood glucose meter OR supplies to accompany: Blood Glucose Monitor Brands: per insurance.   Ferny Koch PA-C     order for DME Equipment ordered: RESMED Auto PAP Mask type: Full face  Settings: 10-15 CM H2O   Reported, Patient     thin (NO BRAND SPECIFIED) lancets Use with lanceting device. To accompany: Blood Glucose Monitor Brands:  per insurance.   Ferny Koch PA-C

## 2023-09-04 NOTE — ED TRIAGE NOTES
Patient is here due to not being able to urinate. He had a colonoscopy 2 weeks ago, was here a few days ago for the same thing. He was told he has a swollen prostate.      Triage Assessment       Row Name 09/04/23 1007       Triage Assessment (Adult)    Airway WDL WDL       Respiratory WDL    Respiratory WDL WDL       Skin Circulation/Temperature WDL    Skin Circulation/Temperature WDL WDL       Cardiac WDL    Cardiac WDL WDL       Peripheral/Neurovascular WDL    Peripheral Neurovascular WDL WDL       Cognitive/Neuro/Behavioral WDL    Cognitive/Neuro/Behavioral WDL WDL

## 2023-09-05 ENCOUNTER — INFUSION THERAPY VISIT (OUTPATIENT)
Dept: INFUSION THERAPY | Facility: CLINIC | Age: 63
End: 2023-09-05
Attending: PHYSICIAN ASSISTANT
Payer: COMMERCIAL

## 2023-09-05 ENCOUNTER — OFFICE VISIT (OUTPATIENT)
Dept: FAMILY MEDICINE | Facility: OTHER | Age: 63
End: 2023-09-05
Payer: COMMERCIAL

## 2023-09-05 VITALS
DIASTOLIC BLOOD PRESSURE: 67 MMHG | OXYGEN SATURATION: 95 % | RESPIRATION RATE: 16 BRPM | SYSTOLIC BLOOD PRESSURE: 130 MMHG | HEART RATE: 95 BPM | TEMPERATURE: 97.9 F

## 2023-09-05 VITALS
TEMPERATURE: 97.6 F | HEIGHT: 69 IN | WEIGHT: 175 LBS | RESPIRATION RATE: 20 BRPM | OXYGEN SATURATION: 96 % | BODY MASS INDEX: 25.92 KG/M2 | SYSTOLIC BLOOD PRESSURE: 122 MMHG | HEART RATE: 96 BPM | DIASTOLIC BLOOD PRESSURE: 68 MMHG

## 2023-09-05 DIAGNOSIS — G44.1 VASCULAR HEADACHE: ICD-10-CM

## 2023-09-05 DIAGNOSIS — R42 DIZZINESS: ICD-10-CM

## 2023-09-05 DIAGNOSIS — E11.9 TYPE 2 DIABETES MELLITUS WITHOUT COMPLICATION, WITHOUT LONG-TERM CURRENT USE OF INSULIN (H): Primary | ICD-10-CM

## 2023-09-05 DIAGNOSIS — N41.2 PROSTATIC ABSCESS: Primary | ICD-10-CM

## 2023-09-05 DIAGNOSIS — Z23 NEED FOR SHINGLES VACCINE: ICD-10-CM

## 2023-09-05 DIAGNOSIS — K59.01 SLOW TRANSIT CONSTIPATION: ICD-10-CM

## 2023-09-05 DIAGNOSIS — I10 HTN, GOAL BELOW 140/80: ICD-10-CM

## 2023-09-05 DIAGNOSIS — N41.0 PROSTATITIS, ACUTE: ICD-10-CM

## 2023-09-05 DIAGNOSIS — R63.4 WEIGHT LOSS: ICD-10-CM

## 2023-09-05 DIAGNOSIS — R09.89 BRUIT OF RIGHT CAROTID ARTERY: ICD-10-CM

## 2023-09-05 DIAGNOSIS — C44.310 BCC (BASAL CELL CARCINOMA), FACE: ICD-10-CM

## 2023-09-05 DIAGNOSIS — K08.9 POOR DENTITION: ICD-10-CM

## 2023-09-05 DIAGNOSIS — N41.2 PROSTATIC ABSCESS: ICD-10-CM

## 2023-09-05 LAB — HBA1C MFR BLD: 11.9 %

## 2023-09-05 PROCEDURE — 250N000011 HC RX IP 250 OP 636: Mod: JZ | Performed by: EMERGENCY MEDICINE

## 2023-09-05 PROCEDURE — 99214 OFFICE O/P EST MOD 30 MIN: CPT | Performed by: PHYSICIAN ASSISTANT

## 2023-09-05 PROCEDURE — 96365 THER/PROPH/DIAG IV INF INIT: CPT

## 2023-09-05 RX ORDER — POLYETHYLENE GLYCOL 3350 17 G/17G
1 POWDER, FOR SOLUTION ORAL DAILY
Qty: 578 G | Refills: 0 | Status: SHIPPED | OUTPATIENT
Start: 2023-09-05 | End: 2023-11-01

## 2023-09-05 RX ORDER — EPINEPHRINE 1 MG/ML
0.3 INJECTION, SOLUTION INTRAMUSCULAR; SUBCUTANEOUS EVERY 5 MIN PRN
Status: CANCELLED | OUTPATIENT
Start: 2023-09-05

## 2023-09-05 RX ORDER — CEFTRIAXONE 1 G/1
1 INJECTION, POWDER, FOR SOLUTION INTRAMUSCULAR; INTRAVENOUS EVERY 24 HOURS
Status: CANCELLED
Start: 2023-09-06

## 2023-09-05 RX ORDER — DIPHENHYDRAMINE HYDROCHLORIDE 50 MG/ML
50 INJECTION INTRAMUSCULAR; INTRAVENOUS
Status: CANCELLED
Start: 2023-09-05

## 2023-09-05 RX ORDER — MEPERIDINE HYDROCHLORIDE 25 MG/ML
25 INJECTION INTRAMUSCULAR; INTRAVENOUS; SUBCUTANEOUS EVERY 30 MIN PRN
Status: CANCELLED | OUTPATIENT
Start: 2023-09-05

## 2023-09-05 RX ORDER — GLYBURIDE 2.5 MG/1
5 TABLET ORAL
Qty: 180 TABLET | Refills: 1 | Status: SHIPPED | OUTPATIENT
Start: 2023-09-05 | End: 2024-02-09

## 2023-09-05 RX ORDER — HEPARIN SODIUM (PORCINE) LOCK FLUSH IV SOLN 100 UNIT/ML 100 UNIT/ML
5 SOLUTION INTRAVENOUS
Status: CANCELLED | OUTPATIENT
Start: 2023-09-05

## 2023-09-05 RX ORDER — CEFTRIAXONE 1 G/1
1 INJECTION, POWDER, FOR SOLUTION INTRAMUSCULAR; INTRAVENOUS ONCE
Status: COMPLETED | OUTPATIENT
Start: 2023-09-05 | End: 2023-09-05

## 2023-09-05 RX ORDER — HEPARIN SODIUM,PORCINE 10 UNIT/ML
5-20 VIAL (ML) INTRAVENOUS DAILY PRN
Status: CANCELLED | OUTPATIENT
Start: 2023-09-05

## 2023-09-05 RX ORDER — ALBUTEROL SULFATE 90 UG/1
1-2 AEROSOL, METERED RESPIRATORY (INHALATION)
Status: CANCELLED
Start: 2023-09-05

## 2023-09-05 RX ORDER — TRAMADOL HYDROCHLORIDE 50 MG/1
50 TABLET ORAL EVERY 6 HOURS PRN
Qty: 30 TABLET | Refills: 0 | Status: SHIPPED | OUTPATIENT
Start: 2023-09-05 | End: 2023-09-08

## 2023-09-05 RX ORDER — METHYLPREDNISOLONE SODIUM SUCCINATE 125 MG/2ML
125 INJECTION, POWDER, LYOPHILIZED, FOR SOLUTION INTRAMUSCULAR; INTRAVENOUS
Status: CANCELLED
Start: 2023-09-05

## 2023-09-05 RX ORDER — ALBUTEROL SULFATE 0.83 MG/ML
2.5 SOLUTION RESPIRATORY (INHALATION)
Status: CANCELLED | OUTPATIENT
Start: 2023-09-05

## 2023-09-05 RX ADMIN — CEFTRIAXONE SODIUM 1 G: 1 INJECTION, POWDER, FOR SOLUTION INTRAMUSCULAR; INTRAVENOUS at 13:59

## 2023-09-05 ASSESSMENT — PAIN SCALES - GENERAL
PAINLEVEL: SEVERE PAIN (7)
PAINLEVEL: SEVERE PAIN (6)

## 2023-09-05 NOTE — PROGRESS NOTES
Assessment & Plan     Type 2 diabetes mellitus without complication, without long-term current use of insulin (H)  Poor control has not been compliant with medications.  He does have a minor argument with his wife as far as medications that need to be done.  Does not appear that he has been using glyburide as intended.  Refilled medications.  Advised diabetic education for consideration of insulin as we discussed at his last visit.  - glyBURIDE (DIABETA /MICRONASE) 2.5 MG tablet; Take 2 tablets (5 mg) by mouth daily (with breakfast)  - Hemoglobin A1c; Future  - AMB Adult Diabetes Educator Referral; Future    Weight loss  Concerns for the potential of a cancerous process versus poorly controlled diabetes and catabolic state.  Further evaluation based on labs that are pending at this point time.    Poor dentition  Has not been able to eat well due to poor dentition.  Referral placed.  - Dental Referral; Future    HTN, goal below 140/80  Blood pressure under good control no new concerns.  Follow-up in 6 months.    BCC (basal cell carcinoma), face - suspected nasal  Needs to continue to follow-up with dermatology.    Slow transit constipation  More than likely secondary to poor dietary intake and pain medications.  Medications as noted below.  - polyethylene glycol (MIRALAX) 17 GM/Dose powder; Take 17 g (1 Capful) by mouth daily    Vascular headache  Bruit of right carotid artery  Dizziness  Advised trial of medications and ultrasound.  May need to refer to neurology in the near future.  - traMADol (ULTRAM) 50 MG tablet; Take 1 tablet (50 mg) by mouth every 6 hours as needed for severe pain  - US Carotid Bilateral; Future    Prostatic abscess  Advised that he continue to follow-up with his specialist related to IV therapy.    Need for shingles vaccine  Based on his need for IV antibiotic for prostatic abscess will defer for now.     Ferny Koch PA-C  [facility]   Miguel A is a 62  year old, presenting for the following health issues:  Recheck Medication and ER follow up      9/5/2023     9:41 AM   Additional Questions   Roomed by Hannah   Accompanied by wife       History of Present Illness       Reason for visit:  Follow up    He eats 0-1 servings of fruits and vegetables daily.He consumes 2 sweetened beverage(s) daily.He exercises with enough effort to increase his heart rate 9 or less minutes per day.  He exercises with enough effort to increase his heart rate 3 or less days per week.   He is taking medications regularly.       Diabetes Follow-up    How often are you checking your blood sugar? Not at all  What concerns do you have today about your diabetes? None   Do you have any of these symptoms? (Select all that apply)  Numbness in feet and Burning in feet          Hyperlipidemia Follow-Up    Are you regularly taking any medication or supplement to lower your cholesterol?   Yes, Crestor  Are you having muscle aches or other side effects that you think could be caused by your cholesterol lowering medication?  Yes-      Hypertension Follow-up    Do you check your blood pressure regularly outside of the clinic? Yes   Are you following a low salt diet? No  Are your blood pressures ever more than 140 on the top number (systolic) OR more   than 90 on the bottom number (diastolic), for example 140/90? Yes    BP Readings from Last 2 Encounters:   09/05/23 122/68   09/04/23 (!) 151/87     Hemoglobin A1C (%)   Date Value   05/23/2023 13.8 (H)   12/21/2021 13.4 (H)   01/07/2020 9.5 (H)   11/08/2019 9.8 (H)     LDL Cholesterol Calculated (mg/dL)   Date Value   05/23/2023 159 (H)   12/21/2021 77   07/30/2019 87   11/17/2014 140 (H)     LDL Cholesterol Direct (mg/dL)   Date Value   01/07/2020 146 (H)   11/19/2018 115 (H)     ED/UC Followup:    Facility:  [facility]   Date of visit: 8/29/23  Reason for visit: urinary retention  Current Status: in pain today    Review of Systems  "  Constitutional, HEENT, cardiovascular, pulmonary, GI, , musculoskeletal, neuro, skin, endocrine and psych systems are negative, except as otherwise noted.      Objective    /68   Pulse 96   Temp 97.6  F (36.4  C) (Temporal)   Resp 20   Ht 1.745 m (5' 8.7\")   Wt 79.4 kg (175 lb)   SpO2 96%   BMI 26.07 kg/m    Body mass index is 26.07 kg/m .  Physical Exam   GENERAL: healthy, alert and no distress  EYES: Eyes grossly normal to inspection, PERRL and conjunctivae and sclerae normal  HENT: ear canals and TM's normal, nose and mouth without ulcers or lesions  NECK: no adenopathy, no asymmetry, masses, or scars and thyroid normal to palpation, right-sided carotid bruit noted today.  RESP: lungs clear to auscultation - no rales, rhonchi or wheezes  CV: regular rate and rhythm, normal S1 S2, no S3 or S4, no murmur, click or rub, no peripheral edema and peripheral pulses strong  ABDOMEN: soft, nontender, no hepatosplenomegaly, no masses and bowel sounds normal  MS: no gross musculoskeletal defects noted, no edema  SKIN: no suspicious lesions or rashes  NEURO: Normal strength and tone, mentation intact and speech normal  PSYCH: mentation appears normal, affect normal/bright    Results for orders placed or performed during the hospital encounter of 09/04/23 (from the past 24 hour(s))   CBC with platelets differential    Narrative    The following orders were created for panel order CBC with platelets differential.  Procedure                               Abnormality         Status                     ---------                               -----------         ------                     CBC with platelets and d...[[id number]]  Abnormal            Final result                 Please view results for these tests on the individual orders.   Comprehensive metabolic panel   Result Value Ref Range    Sodium 133 (L) 136 - 145 mmol/L    Potassium 4.1 3.4 - 5.3 mmol/L    Chloride 96 (L) 98 - 107 mmol/L    Carbon Dioxide " (CO2) 23 22 - 29 mmol/L    Anion Gap 14 7 - 15 mmol/L    Urea Nitrogen 12.7 8.0 - 23.0 mg/dL    Creatinine 0.70 0.67 - 1.17 mg/dL    Calcium 9.0 8.8 - 10.2 mg/dL    Glucose 375 (H) 70 - 99 mg/dL    Alkaline Phosphatase 124 40 - 129 U/L    AST 17 0 - 45 U/L    ALT 21 0 - 70 U/L    Protein Total 7.1 6.4 - 8.3 g/dL    Albumin 3.5 3.5 - 5.2 g/dL    Bilirubin Total 0.3 <=1.2 mg/dL    GFR Estimate >90 >60 mL/min/1.73m2   Lactic acid whole blood   Result Value Ref Range    Lactic Acid 1.6 0.7 - 2.0 mmol/L   CBC with platelets and differential   Result Value Ref Range    WBC Count 11.6 (H) 4.0 - 11.0 10e3/uL    RBC Count 4.64 4.40 - 5.90 10e6/uL    Hemoglobin 14.3 13.3 - 17.7 g/dL    Hematocrit 41.5 40.0 - 53.0 %    MCV 89 78 - 100 fL    MCH 30.8 26.5 - 33.0 pg    MCHC 34.5 31.5 - 36.5 g/dL    RDW 12.2 10.0 - 15.0 %    Platelet Count 297 150 - 450 10e3/uL    % Neutrophils 66 %    % Lymphocytes 20 %    % Monocytes 11 %    % Eosinophils 2 %    % Basophils 1 %    % Immature Granulocytes 0 %    NRBCs per 100 WBC 0 <1 /100    Absolute Neutrophils 7.6 1.6 - 8.3 10e3/uL    Absolute Lymphocytes 2.3 0.8 - 5.3 10e3/uL    Absolute Monocytes 1.3 0.0 - 1.3 10e3/uL    Absolute Eosinophils 0.3 0.0 - 0.7 10e3/uL    Absolute Basophils 0.1 0.0 - 0.2 10e3/uL    Absolute Immature Granulocytes 0.1 <=0.4 10e3/uL    Absolute NRBCs 0.0 10e3/uL   Hemoglobin A1c   Result Value Ref Range    Hemoglobin A1C 11.9 (H) <5.7 %   UA with Microscopic reflex to Culture    Specimen: Urine, Catheter   Result Value Ref Range    Color Urine Stephania (A) Colorless, Straw, Light Yellow, Yellow    Appearance Urine Clear Clear    Glucose Urine >499 (A) Negative mg/dL    Bilirubin Urine Negative Negative    Ketones Urine Negative Negative mg/dL    Specific Gravity Urine 1.029 1.003 - 1.035    Blood Urine Negative Negative    pH Urine 6.0 5.0 - 7.0    Protein Albumin Urine Negative Negative mg/dL    Urobilinogen Urine 4.0 (A) Normal, 2.0 mg/dL    Nitrite Urine Positive  (A) Negative    Leukocyte Esterase Urine Negative Negative    RBC Urine 1 <=2 /HPF    WBC Urine 3 <=5 /HPF    Hyaline Casts Urine 1 <=2 /LPF    Narrative    Urine Culture ordered based on laboratory criteria   Urine Culture    Specimen: Urine, Catheter   Result Value Ref Range    Culture No growth, less than 1 day    CT Abdomen Pelvis w Contrast    Narrative    EXAM: CT ABDOMEN PELVIS W CONTRAST  LOCATION: [facility]  DATE: 9/4/2023    INDICATION: Abdominal pelvic pain, urinary retention, recent diagnosis prostatitis, s p colonoscopy  COMPARISON: 10/24/2019  TECHNIQUE: CT scan of the abdomen and pelvis was performed following injection of IV contrast. Multiplanar reformats were obtained. Dose reduction techniques were used.  CONTRAST: Isovue 370, 85mL    FINDINGS:   LOWER CHEST: Partially imaged coronary artery calcifications.    HEPATOBILIARY: Cholecystectomy.    PANCREAS: Normal.    SPLEEN: Normal.    ADRENAL GLANDS: There are adrenal lesions less than 1 cm. No follow up is needed for nodules this size.    KIDNEYS/BLADDER: No significant mass, stone, or hydronephrosis. Bladder wall thickening and trabeculation with Barkley catheter in situ. Mild perivesical fat stranding.    BOWEL: Left-sided diverticulosis with muscular hypertrophy in the sigmoid colon. No acute inflammation or obstruction.    LYMPH NODES: Normal.    VASCULATURE: No abdominal aortic aneurysm.    PELVIC ORGANS: Heterogeneous enhancement of the prostate with 3.1 x 2.0 x 2.5 cm ovoid fluid collection in the right transition zone and 1.2 x 0.9 x 1.0 cm collection in the left condition zone (3/199, 200; 4/60, 61).    MUSCULOSKELETAL: Multilevel degenerative changes in the spine.      Impression    IMPRESSION:   1.  Acute prostatitis with abscesses in the transition zone bilaterally.    2.  Thick-walled urinary bladder with trabeculation and perivesical fat stranding, which could be reactive from adjacent prostatitis or  from underlying cystitis.

## 2023-09-05 NOTE — PROGRESS NOTES
Infusion Nursing Note:  Miguel A Vaughn presents today for Ceftriaxone.    Patient seen by provider today: Yes: COLTON Carlson.   present during visit today: Not Applicable.    Note: Arrives ambulatory, wife present. Pt is having his 2nd dose of Ceftriaxone today. Was in Emergency Dept yesterday. Has some fatigue and lightheadedness which has been ongoing. 6/10 mid abdominal pain today, will be picking up a new prescription for Tramadol for this. B/P 101/55. , afebrile.       Intravenous Access:  Peripheral IV in placed from ER.   Site WDL, flushes easily.     Post Infusion Assessment:  Patient tolerated infusion without incident.  Site patent and intact, free from redness, edema or discomfort.  No evidence of extravasations.  PIV access discontinued per protocol. Pt does not like IV in antecubital. Will place new one tomorrow.       Discharge Plan:   Copy of AVS reviewed with patient and spouse. Patient will return 9/6 for next appointment.  Patient discharged in stable condition accompanied by: wife.  Departure Mode: Ambulatory.      Alyssa Cisneros RN

## 2023-09-06 ENCOUNTER — OFFICE VISIT (OUTPATIENT)
Dept: DERMATOLOGY | Facility: CLINIC | Age: 63
End: 2023-09-06
Payer: COMMERCIAL

## 2023-09-06 ENCOUNTER — HOSPITAL ENCOUNTER (OUTPATIENT)
Dept: ULTRASOUND IMAGING | Facility: CLINIC | Age: 63
Discharge: HOME | End: 2023-09-06
Attending: PHYSICIAN ASSISTANT
Payer: COMMERCIAL

## 2023-09-06 ENCOUNTER — INFUSION THERAPY VISIT (OUTPATIENT)
Dept: INFUSION THERAPY | Facility: CLINIC | Age: 63
End: 2023-09-06
Attending: PHYSICIAN ASSISTANT
Payer: COMMERCIAL

## 2023-09-06 VITALS
SYSTOLIC BLOOD PRESSURE: 114 MMHG | OXYGEN SATURATION: 96 % | RESPIRATION RATE: 16 BRPM | HEART RATE: 91 BPM | DIASTOLIC BLOOD PRESSURE: 69 MMHG | TEMPERATURE: 98.8 F

## 2023-09-06 DIAGNOSIS — G44.1 VASCULAR HEADACHE: ICD-10-CM

## 2023-09-06 DIAGNOSIS — L57.0 ACTINIC KERATOSIS: Primary | ICD-10-CM

## 2023-09-06 DIAGNOSIS — R09.89 BRUIT OF RIGHT CAROTID ARTERY: ICD-10-CM

## 2023-09-06 DIAGNOSIS — N41.0 PROSTATITIS, ACUTE: ICD-10-CM

## 2023-09-06 DIAGNOSIS — N41.2 PROSTATIC ABSCESS: Primary | ICD-10-CM

## 2023-09-06 LAB — BACTERIA UR CULT: NO GROWTH

## 2023-09-06 PROCEDURE — 17003 DESTRUCT PREMALG LES 2-14: CPT | Performed by: PHYSICIAN ASSISTANT

## 2023-09-06 PROCEDURE — 93880 EXTRACRANIAL BILAT STUDY: CPT

## 2023-09-06 PROCEDURE — 99214 OFFICE O/P EST MOD 30 MIN: CPT | Mod: 25 | Performed by: PHYSICIAN ASSISTANT

## 2023-09-06 PROCEDURE — 250N000011 HC RX IP 250 OP 636: Mod: JZ | Performed by: EMERGENCY MEDICINE

## 2023-09-06 PROCEDURE — 17000 DESTRUCT PREMALG LESION: CPT | Performed by: PHYSICIAN ASSISTANT

## 2023-09-06 PROCEDURE — 96365 THER/PROPH/DIAG IV INF INIT: CPT

## 2023-09-06 RX ORDER — HEPARIN SODIUM,PORCINE 10 UNIT/ML
5-20 VIAL (ML) INTRAVENOUS DAILY PRN
Status: DISCONTINUED | OUTPATIENT
Start: 2023-09-06 | End: 2023-09-06 | Stop reason: HOSPADM

## 2023-09-06 RX ORDER — CEFTRIAXONE 1 G/1
1 INJECTION, POWDER, FOR SOLUTION INTRAMUSCULAR; INTRAVENOUS EVERY 24 HOURS
Status: CANCELLED
Start: 2023-09-07

## 2023-09-06 RX ORDER — ALBUTEROL SULFATE 0.83 MG/ML
2.5 SOLUTION RESPIRATORY (INHALATION)
Status: CANCELLED | OUTPATIENT
Start: 2023-09-06

## 2023-09-06 RX ORDER — METHYLPREDNISOLONE SODIUM SUCCINATE 125 MG/2ML
125 INJECTION, POWDER, LYOPHILIZED, FOR SOLUTION INTRAMUSCULAR; INTRAVENOUS
Status: CANCELLED
Start: 2023-09-06

## 2023-09-06 RX ORDER — MEPERIDINE HYDROCHLORIDE 25 MG/ML
25 INJECTION INTRAMUSCULAR; INTRAVENOUS; SUBCUTANEOUS EVERY 30 MIN PRN
Status: CANCELLED | OUTPATIENT
Start: 2023-09-06

## 2023-09-06 RX ORDER — EPINEPHRINE 1 MG/ML
0.3 INJECTION, SOLUTION INTRAMUSCULAR; SUBCUTANEOUS EVERY 5 MIN PRN
Status: CANCELLED | OUTPATIENT
Start: 2023-09-06

## 2023-09-06 RX ORDER — CEFTRIAXONE 1 G/1
1 INJECTION, POWDER, FOR SOLUTION INTRAMUSCULAR; INTRAVENOUS EVERY 24 HOURS
Status: DISCONTINUED | OUTPATIENT
Start: 2023-09-06 | End: 2023-09-06 | Stop reason: HOSPADM

## 2023-09-06 RX ORDER — HEPARIN SODIUM,PORCINE 10 UNIT/ML
5-20 VIAL (ML) INTRAVENOUS DAILY PRN
Status: CANCELLED | OUTPATIENT
Start: 2023-09-06

## 2023-09-06 RX ORDER — DIPHENHYDRAMINE HYDROCHLORIDE 50 MG/ML
50 INJECTION INTRAMUSCULAR; INTRAVENOUS
Status: CANCELLED
Start: 2023-09-06

## 2023-09-06 RX ORDER — ALBUTEROL SULFATE 90 UG/1
1-2 AEROSOL, METERED RESPIRATORY (INHALATION)
Status: CANCELLED
Start: 2023-09-06

## 2023-09-06 RX ORDER — HEPARIN SODIUM (PORCINE) LOCK FLUSH IV SOLN 100 UNIT/ML 100 UNIT/ML
5 SOLUTION INTRAVENOUS
Status: CANCELLED | OUTPATIENT
Start: 2023-09-06

## 2023-09-06 RX ADMIN — Medication 5 ML: at 14:45

## 2023-09-06 RX ADMIN — CEFTRIAXONE SODIUM 1 G: 1 INJECTION, POWDER, FOR SOLUTION INTRAMUSCULAR; INTRAVENOUS at 14:19

## 2023-09-06 ASSESSMENT — PAIN SCALES - GENERAL: PAINLEVEL: NO PAIN (0)

## 2023-09-06 NOTE — PROGRESS NOTES
Infusion Nursing Note:  Miguel A Vaughn presents today for Rocephin.    Patient seen by provider today: Yes: Ferny lAvarado 9/5/2023   present during visit today: Not Applicable.    Note:  Reports has had poor health for the last month after a colonoscopy.  Prior to this appointment was at appointment for skin cancer on nose and after this appointment has Carotid US here at [facility].    Intravenous Access:  Peripheral IV placed.    Treatment Conditions:  Not Applicable.      Post Infusion Assessment:  Patient tolerated infusion without incident.  Blood return noted pre and post infusion.  Site patent and intact, free from redness, edema or discomfort.  No evidence of extravasations.  IV site left in place per patient request for appointment tomorrow at 1pm.       Discharge Plan:   Patient declined prescription refills.  Discharge instructions reviewed with: Patient.  Patient and/or family verbalized understanding of discharge instructions and all questions answered.  AVS to patient via [employer].  Patient will return 9/7 for next appointment.   Patient discharged in stable condition accompanied by: self and wife.  Departure Mode: Ambulatory.      Rebekah Corrales RN
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
## 2023-09-06 NOTE — NURSING NOTE
Miguel A Vaughn's goals for this visit include:   Chief Complaint   Patient presents with    Skin Check     Full body skin check. Spot on back that itches on back, recheck spot on nose that MOHS was done on July 2023. Personal history of NMSC.       He requests these members of his care team be copied on today's visit information:     PCP: Ferny Koch    Referring Provider:  No referring provider defined for this encounter.    There were no vitals taken for this visit.    Do you need any medication refills at today's visit? No  Roma Hardwick CMA

## 2023-09-07 ENCOUNTER — INFUSION THERAPY VISIT (OUTPATIENT)
Dept: INFUSION THERAPY | Facility: CLINIC | Age: 63
End: 2023-09-07
Attending: PHYSICIAN ASSISTANT
Payer: COMMERCIAL

## 2023-09-07 VITALS
HEART RATE: 84 BPM | TEMPERATURE: 97 F | SYSTOLIC BLOOD PRESSURE: 120 MMHG | RESPIRATION RATE: 16 BRPM | DIASTOLIC BLOOD PRESSURE: 68 MMHG

## 2023-09-07 DIAGNOSIS — I65.21 CAROTID STENOSIS, RIGHT: Primary | ICD-10-CM

## 2023-09-07 DIAGNOSIS — N41.0 PROSTATITIS, ACUTE: ICD-10-CM

## 2023-09-07 DIAGNOSIS — N41.2 PROSTATIC ABSCESS: Primary | ICD-10-CM

## 2023-09-07 PROCEDURE — 250N000011 HC RX IP 250 OP 636: Mod: JZ | Performed by: PHYSICIAN ASSISTANT

## 2023-09-07 PROCEDURE — 96365 THER/PROPH/DIAG IV INF INIT: CPT

## 2023-09-07 RX ORDER — METHYLPREDNISOLONE SODIUM SUCCINATE 125 MG/2ML
125 INJECTION, POWDER, LYOPHILIZED, FOR SOLUTION INTRAMUSCULAR; INTRAVENOUS
Status: CANCELLED
Start: 2023-09-07

## 2023-09-07 RX ORDER — EPINEPHRINE 1 MG/ML
0.3 INJECTION, SOLUTION INTRAMUSCULAR; SUBCUTANEOUS EVERY 5 MIN PRN
Status: CANCELLED | OUTPATIENT
Start: 2023-09-07

## 2023-09-07 RX ORDER — ALBUTEROL SULFATE 0.83 MG/ML
2.5 SOLUTION RESPIRATORY (INHALATION)
Status: CANCELLED | OUTPATIENT
Start: 2023-09-07

## 2023-09-07 RX ORDER — HEPARIN SODIUM,PORCINE 10 UNIT/ML
5-20 VIAL (ML) INTRAVENOUS DAILY PRN
Status: CANCELLED | OUTPATIENT
Start: 2023-09-07

## 2023-09-07 RX ORDER — HEPARIN SODIUM (PORCINE) LOCK FLUSH IV SOLN 100 UNIT/ML 100 UNIT/ML
5 SOLUTION INTRAVENOUS
Status: CANCELLED | OUTPATIENT
Start: 2023-09-07

## 2023-09-07 RX ORDER — CEFTRIAXONE 1 G/1
1 INJECTION, POWDER, FOR SOLUTION INTRAMUSCULAR; INTRAVENOUS EVERY 24 HOURS
Status: DISCONTINUED | OUTPATIENT
Start: 2023-09-07 | End: 2023-09-07 | Stop reason: HOSPADM

## 2023-09-07 RX ORDER — CEFTRIAXONE 1 G/1
1 INJECTION, POWDER, FOR SOLUTION INTRAMUSCULAR; INTRAVENOUS EVERY 24 HOURS
Status: DISCONTINUED | OUTPATIENT
Start: 2023-09-08 | End: 2023-09-07

## 2023-09-07 RX ORDER — CEFTRIAXONE 1 G/1
1 INJECTION, POWDER, FOR SOLUTION INTRAMUSCULAR; INTRAVENOUS EVERY 24 HOURS
Status: CANCELLED
Start: 2023-09-08

## 2023-09-07 RX ORDER — DIPHENHYDRAMINE HYDROCHLORIDE 50 MG/ML
50 INJECTION INTRAMUSCULAR; INTRAVENOUS
Status: CANCELLED
Start: 2023-09-07

## 2023-09-07 RX ORDER — MEPERIDINE HYDROCHLORIDE 25 MG/ML
25 INJECTION INTRAMUSCULAR; INTRAVENOUS; SUBCUTANEOUS EVERY 30 MIN PRN
Status: CANCELLED | OUTPATIENT
Start: 2023-09-07

## 2023-09-07 RX ORDER — ALBUTEROL SULFATE 90 UG/1
1-2 AEROSOL, METERED RESPIRATORY (INHALATION)
Status: CANCELLED
Start: 2023-09-07

## 2023-09-07 RX ADMIN — CEFTRIAXONE SODIUM 1 G: 1 INJECTION, POWDER, FOR SOLUTION INTRAMUSCULAR; INTRAVENOUS at 13:24

## 2023-09-07 NOTE — RESULT ENCOUNTER NOTE
Spoke with patient over the phone. Patient verified understanding and has no further questions at this time.  Laurence Sauceda, CMA

## 2023-09-07 NOTE — PROGRESS NOTES
Infusion Nursing Note:  Miguel A Vaughn presents today for Rocephin.    Patient seen by provider today: No   present during visit today: Not Applicable.    Note: N/A.      Intravenous Access:  Patient has a patent IV in place, blood return noted pre/post infusion.  IV left in place for tomorrows infusion, saline locked after Rocephin treatment was complete today.    Treatment Conditions:  Not Applicable.      Post Infusion Assessment:  Patient tolerated infusion without incident.       Discharge Plan:   Patient declined prescription refills.  Discharge instructions reviewed with: Patient and Family.  Patient and/or family verbalized understanding of discharge instructions and all questions answered.  AVS to patient via [email or web address].  Patient will return 9-8-23 for next appointment.   Patient discharged in stable condition accompanied by: wife.  Departure Mode: Ambulatory.      Florencia Loco RN

## 2023-09-08 ENCOUNTER — INFUSION THERAPY VISIT (OUTPATIENT)
Dept: INFUSION THERAPY | Facility: CLINIC | Age: 63
End: 2023-09-08
Attending: PHYSICIAN ASSISTANT
Payer: COMMERCIAL

## 2023-09-08 VITALS
TEMPERATURE: 98.1 F | DIASTOLIC BLOOD PRESSURE: 64 MMHG | HEART RATE: 100 BPM | RESPIRATION RATE: 18 BRPM | SYSTOLIC BLOOD PRESSURE: 118 MMHG

## 2023-09-08 DIAGNOSIS — N41.0 PROSTATITIS, ACUTE: ICD-10-CM

## 2023-09-08 DIAGNOSIS — N41.2 PROSTATIC ABSCESS: Primary | ICD-10-CM

## 2023-09-08 PROCEDURE — 250N000011 HC RX IP 250 OP 636: Mod: JZ | Performed by: PHYSICIAN ASSISTANT

## 2023-09-08 PROCEDURE — 96365 THER/PROPH/DIAG IV INF INIT: CPT

## 2023-09-08 RX ORDER — METHYLPREDNISOLONE SODIUM SUCCINATE 125 MG/2ML
125 INJECTION, POWDER, LYOPHILIZED, FOR SOLUTION INTRAMUSCULAR; INTRAVENOUS
Status: CANCELLED
Start: 2023-09-08

## 2023-09-08 RX ORDER — HEPARIN SODIUM,PORCINE 10 UNIT/ML
5-20 VIAL (ML) INTRAVENOUS DAILY PRN
Status: DISCONTINUED | OUTPATIENT
Start: 2023-09-08 | End: 2023-09-08

## 2023-09-08 RX ORDER — HEPARIN SODIUM (PORCINE) LOCK FLUSH IV SOLN 100 UNIT/ML 100 UNIT/ML
5 SOLUTION INTRAVENOUS
Status: CANCELLED | OUTPATIENT
Start: 2023-09-08

## 2023-09-08 RX ORDER — CEFTRIAXONE 1 G/1
1 INJECTION, POWDER, FOR SOLUTION INTRAMUSCULAR; INTRAVENOUS EVERY 24 HOURS
Status: DISCONTINUED | OUTPATIENT
Start: 2023-09-09 | End: 2023-09-08

## 2023-09-08 RX ORDER — MEPERIDINE HYDROCHLORIDE 25 MG/ML
25 INJECTION INTRAMUSCULAR; INTRAVENOUS; SUBCUTANEOUS EVERY 30 MIN PRN
Status: CANCELLED | OUTPATIENT
Start: 2023-09-08

## 2023-09-08 RX ORDER — CEFTRIAXONE 1 G/1
1 INJECTION, POWDER, FOR SOLUTION INTRAMUSCULAR; INTRAVENOUS ONCE
Status: COMPLETED | OUTPATIENT
Start: 2023-09-08 | End: 2023-09-08

## 2023-09-08 RX ORDER — ALBUTEROL SULFATE 0.83 MG/ML
2.5 SOLUTION RESPIRATORY (INHALATION)
Status: CANCELLED | OUTPATIENT
Start: 2023-09-08

## 2023-09-08 RX ORDER — CEFTRIAXONE 1 G/1
1 INJECTION, POWDER, FOR SOLUTION INTRAMUSCULAR; INTRAVENOUS EVERY 24 HOURS
Status: CANCELLED
Start: 2023-09-09

## 2023-09-08 RX ORDER — DIPHENHYDRAMINE HYDROCHLORIDE 50 MG/ML
50 INJECTION INTRAMUSCULAR; INTRAVENOUS
Status: CANCELLED
Start: 2023-09-08

## 2023-09-08 RX ORDER — HEPARIN SODIUM,PORCINE 10 UNIT/ML
5-20 VIAL (ML) INTRAVENOUS DAILY PRN
Status: CANCELLED | OUTPATIENT
Start: 2023-09-08

## 2023-09-08 RX ORDER — ALBUTEROL SULFATE 90 UG/1
1-2 AEROSOL, METERED RESPIRATORY (INHALATION)
Status: CANCELLED
Start: 2023-09-08

## 2023-09-08 RX ORDER — EPINEPHRINE 1 MG/ML
0.3 INJECTION, SOLUTION INTRAMUSCULAR; SUBCUTANEOUS EVERY 5 MIN PRN
Status: CANCELLED | OUTPATIENT
Start: 2023-09-08

## 2023-09-08 RX ADMIN — CEFTRIAXONE SODIUM 1 G: 1 INJECTION, POWDER, FOR SOLUTION INTRAMUSCULAR; INTRAVENOUS at 14:28

## 2023-09-08 ASSESSMENT — PAIN SCALES - GENERAL: PAINLEVEL: SEVERE PAIN (6)

## 2023-09-08 NOTE — PROGRESS NOTES
Infusion Nursing Note:  Miguel A Vaughn presents today for Final dose of Ceftriaxone.    Patient seen by provider today: No   present during visit today: Not Applicable.    Note: Patient is alert, pleasant, still dealing with abdominal pain that has been going on for a while. Taking Tramadol as needed which helps a little. Has tolerated IV antibiotic without side effects/ VSS, afebrile.       Intravenous Access:  Peripheral IV in place from yesterday.   Flushes easily, site WDL.     Treatment Conditions:  Not Applicable.      Post Infusion Assessment:  Patient tolerated infusion without incident.  Site patent and intact, free from redness, edema or discomfort.  No evidence of extravasations.  PIV access discontinued per protocol.       Discharge Plan:   Patient and wife verbalized understanding of discharge instructions and all questions answered. Has upcoming appts this month with Vascular for Carotid stenosis and Urology for Prostatitis.  Patient discharged in stable condition accompanied by: wife.  Departure Mode: Ambulatory.      Alyssa Cisneros RN

## 2023-09-20 ENCOUNTER — OFFICE VISIT (OUTPATIENT)
Dept: UROLOGY | Facility: CLINIC | Age: 63
End: 2023-09-20
Attending: EMERGENCY MEDICINE
Payer: COMMERCIAL

## 2023-09-20 VITALS
BODY MASS INDEX: 25.92 KG/M2 | WEIGHT: 175 LBS | SYSTOLIC BLOOD PRESSURE: 138 MMHG | DIASTOLIC BLOOD PRESSURE: 75 MMHG | HEART RATE: 92 BPM | TEMPERATURE: 97.7 F | OXYGEN SATURATION: 98 % | HEIGHT: 69 IN

## 2023-09-20 DIAGNOSIS — N41.0 ACUTE PROSTATITIS: ICD-10-CM

## 2023-09-20 DIAGNOSIS — R33.9 URINARY RETENTION: Primary | ICD-10-CM

## 2023-09-20 PROCEDURE — 99203 OFFICE O/P NEW LOW 30 MIN: CPT | Mod: 24 | Performed by: STUDENT IN AN ORGANIZED HEALTH CARE EDUCATION/TRAINING PROGRAM

## 2023-09-20 RX ORDER — TAMSULOSIN HYDROCHLORIDE 0.4 MG/1
0.4 CAPSULE ORAL EVERY EVENING
Qty: 90 CAPSULE | Refills: 1 | Status: SHIPPED | OUTPATIENT
Start: 2023-09-20

## 2023-09-20 ASSESSMENT — PAIN SCALES - GENERAL: PAINLEVEL: EXTREME PAIN (8)

## 2023-09-20 NOTE — NURSING NOTE
"Initial /75   Pulse 92   Temp 97.7  F (36.5  C) (Tympanic)   Ht 1.745 m (5' 8.7\")   Wt 79.4 kg (175 lb)   SpO2 98%   BMI 26.07 kg/m   Estimated body mass index is 26.07 kg/m  as calculated from the following:    Height as of this encounter: 1.745 m (5' 8.7\").    Weight as of this encounter: 79.4 kg (175 lb). .  Vijaya COOK CMA 09/20/23 10:55 AM  "

## 2023-09-20 NOTE — NURSING NOTE
Miguel A Vaughn was brought back to the procedure room for a trail void per Lazara Whiteside. 325ml cc of sterile water was instilled into his bladder. 9 cc fluid was removed from his catheter balloon and a 16 Camb[place] coude casas catheter was removed. The patient was able to urinate 375 cc. Urine was bright red, but patient reported urine stream was clear towards the end.  Bladder scan was preformed and resulted in 99ml.     No antibiotic was given to patient as patient is currently on an antibiotic per Lazara Whiteside.    Vijaya Chavez, CMA

## 2023-09-20 NOTE — PROGRESS NOTES
Chief Complaint:   Prostate abscess         History of Present Illness:   Miguel A Vaughn is a 62 year old male with a history of HTN, T2 DM, and BENJAMIN who presents for evaluation of prostate abscess.     The patient had a colonoscopy on 8/15/2023. He presented to urgent care on 8/23/2023 with difficulty urinating and dysuria since the procedure. UA was positive for nitrites, blood, and leukocyte esterase. He was started on ciprofloxacin for prostatitis and tamsulosin.     He was then seen in the ED on 8/29/2023 with similar symptoms and minimal improvement with ciprofloxacin. Bladder scan showed minimal residual urine. UA was notable for 62 WBCs and 11 RBCs, urine culture was negative. The prostate was tender to palpation on exam. His PSA was 7.74 ng/mL. He was switched to levofloxacin.     He returned to the ED on 9/04/2023 for similar symptoms. He was not able to tolerate ciprofloxacin or levofloxacin due to muscular pain. CT abdomen pelvis with contrast showed acute prostatitis with a 3.1 cm abscess in the right transition zone and 1.2 cm abscess in the left transition zone. Urine culture was negative. WBC count was elevated at 11.6. He was afebrile. He was bladder scanned for 600 mL and an indwelling catheter was placed. Dr. Yancey was consulted for antibiotic recommendations and the patient was scheduled for four days of outpatient IV Rocephin and oral Keflex.          Past Medical History:     Past Medical History:   Diagnosis Date    Benign neoplasm of brain (H)     Cervicalgia     Depressive disorder, not elsewhere classified 07/16/2008    Deviated nasal septum     Diabetes (H)     Diverticular disease of colon 08/15/2023    Family history of colonic polyps     Headache(784.0)     Hemorrhoids, internal     History of colonic polyps 08/15/2023    Hyperlipidemia LDL goal <130 07/12/2011    Hypersomnia with sleep apnea, unspecified     BENJAMIN (obstructive sleep apnea) 10/12/2011    BNEJAMIN (obstructive sleep  apnea)     Other encephalopathy     Sprain of right ankle 07/07/2011    Unspecified disorder of adrenal glands             Past Surgical History:     Past Surgical History:   Procedure Laterality Date    COLONOSCOPY  05/12/08    Internal hemorrhoids.  Otherwise normal.    COLONOSCOPY  4/17/2013    Procedure: COLONOSCOPY;  colonoscopy;  Surgeon: Kody Reynolds MD;  Location: [facility]    COLONOSCOPY N/A 8/15/2023    Procedure: Colonoscopy with polypectomy;  Surgeon: Jose Lanza MD;  Location: [facility]    DECOMPRESSION, FUSION CERVICAL ANTERIOR ONE LEVEL, COMBINED N/A 11/12/2019    Procedure: CERVICAL 6-7 ANTERIOR  DECOMPRESSION AND FUSION;  Surgeon: Memo Wooten MD;  Location: [facility]    DISCECTOMY, FUSION CERVICAL ANTERIOR ONE LEVEL, COMBINED N/A 4/2/2019    Procedure: C 3-4 ANTERIOR CERVICAL DISECTOMY AND FUSION;  Surgeon: Memo Wooten MD;  Location:  OR    EXAM UNDER ANESTHESIA RECTUM N/A 8/3/2016    Procedure: EXAM UNDER ANESTHESIA RECTUM;  Surgeon: Sami Zamora MD;  Location: [facility]    EXPLORE SPINE, REMOVE HARDWARE, COMBINED N/A 4/2/2019    Procedure: C 4-5 HARDWARE REMOVAL;  Surgeon: Memo Wooten MD;  Location: [facility]    HC FLEX SIGMOIDOSCOPY W/WO BRAD SPEC BY BRUSH/WASH  06/10/08    bx intra-anal lesions & electrocoagulation of perianal lesions.    HC REPAIR OF NASAL SEPTUM  12/16/2005    Revision septoplasty, submucosal resection of the inferior turbinates.    INCISION AND DRAINAGE PERINEAL, COMBINED N/A 8/3/2016    Procedure: COMBINED INCISION AND DRAINAGE PERINEAL;  Surgeon: Sami Zamora MD;  Location: [facility]    INJECT EPIDURAL CERVICAL N/A 8/16/2019    Procedure: INJECTION, SPINE, CERVICAL 6-7, EPIDURAL;  Surgeon: Brent Anguiano MD;  Location: [facility]    INJECT EPIDURAL LUMBAR N/A 2/24/2022    Procedure: Lumbar 5-Sacral 1 Interlaminar epidural steroid injection using fluoroscopic guidance with contrast dye;  Surgeon: Brent Anguiano MD;  Location: [facility]    INJECT  "FACET JOINT Bilateral 1/17/2020    Procedure: Cervical 6-7 Bilateral facet Injections;  Surgeon: Brent Anguiano MD;  Location: [facility]    LAPAROSCOPIC CHOLECYSTECTOMY N/A 10/17/2019    Procedure: CHOLECYSTECTOMY, LAPAROSCOPIC;  Surgeon: Barry Molina MD;  Location: [facility]    SURGICAL HISTORY OF -   08/30/2006    Left occiput C1, C1-C2 facet injection.  -[facility]    Z WINTER W/O FACETEC FORAMOT/DSKC 1/2 VRT SEG, CERVICAL  1989    [facility] OPEN RX ANKLE DISLOCATN+FIXATN  1982            Medications     Current Outpatient Medications   Medication    acetaminophen (TYLENOL) 325 MG tablet    acetaminophen (TYLENOL) 500 MG tablet    alcohol swab prep pads    aspirin (ASA) 81 MG EC tablet    blood glucose (NO BRAND SPECIFIED) test strip    blood glucose calibration (NO BRAND SPECIFIED) solution    blood glucose monitoring (NO BRAND SPECIFIED) meter device kit    cephALEXin (KEFLEX) 500 MG capsule    docusate sodium (COLACE) 100 MG capsule    fluticasone (FLONASE) 50 MCG/ACT spray    glyBURIDE (DIABETA /MICRONASE) 2.5 MG tablet    losartan (COZAAR) 25 MG tablet    metFORMIN (GLUCOPHAGE) 500 MG tablet    order for DME    polyethylene glycol (MIRALAX) 17 GM/Dose powder    rOPINIRole (REQUIP) 0.25 MG tablet    rosuvastatin (CRESTOR) 10 MG tablet    thin (NO BRAND SPECIFIED) lancets     No current facility-administered medications for this visit.            Allergies:   Iodinated contrast media, Ciprofloxacin, No known drug allergy, and Tape [adhesive tape]         Review of Systems:  From intake questionnaire   Negative 14 system review except as noted on HPI, nurse's note.         Physical Exam:   Patient is a 62 year old  male   Vitals: Blood pressure 138/75, pulse 92, temperature 97.7  F (36.5  C), temperature source Tympanic, height 1.745 m (5' 8.7\"), weight 79.4 kg (175 lb), SpO2 98 %.  General Appearance Adult: Alert, no acute distress, oriented.  Lungs: Non-labored breathing.  Heart: No obvious jugular venous distension " present.  Neuro: Alert, oriented, speech and mentation normal      Labs and Pathology:    I personally reviewed all applicable laboratory data and went over findings with patient  Significant for:    CBC RESULTS:  Recent Labs   Lab Test 09/04/23  1036 08/29/23  1045 12/21/21  1022 11/08/19  0858   WBC 11.6* 15.8* 9.3 9.0   HGB 14.3 16.1 16.7 15.0    281 237 222        BMP RESULTS:  Recent Labs   Lab Test 09/04/23  1036 08/29/23  1045 08/15/23  0659 05/23/23  1054 12/21/21  1022 01/07/20  0911 11/13/19  0508 11/08/19  0858 10/24/19  2144   * 133*  --  134* 137 137 138 137 140   POTASSIUM 4.1 4.2  --  5.1 4.2 4.3 4.1 4.3 3.9   CHLORIDE 96* 96*  --  100 104 106 105 105 106   CO2 23 26  --  28 29 27 29 29 32   ANIONGAP 14 11  --  6* 4 4 4 3 2*   * 299* 150* 294* 267* 232* 113* 213* 96   BUN 12.7 10.1  --  10.2 10 12 9 9 10   CR 0.70 0.68  --  0.80 0.71 0.78 0.86 0.99 0.88   GFRESTIMATED >90 >90  --  >90 >90 >90 >90 83 >90   GFRESTBLACK  --   --   --   --   --  >90 >90 >90 >90   FRANCIA 9.0 9.5  --  9.4 9.6 9.1 8.0* 8.7 9.4       UA RESULTS:   Recent Labs   Lab Test 09/04/23  1037 08/29/23  0934 01/07/20  0907 10/24/19  2121   SG 1.029 1.032 1.025 1.026   URINEPH 6.0 5.0 5.5 5.0   NITRITE Positive* Negative Negative Negative   RBCU 1 11*  --  <1   WBCU 3 62*  --  2       PSA RESULTS  PSA   Date Value Ref Range Status   01/07/2020 1.45 0 - 4 ug/L Final     Comment:     Assay Method:  Chemiluminescence using Siemens Vista analyzer   08/02/2016 3.12 0 - 4 ug/L Final     Comment:     Assay Method:  Chemiluminescence using Siemens Vista analyzer   10/31/2011 1.09 0 - 4 ug/L Final   11/14/2005 0.58 0 - 4 ug/L Final     Comment:     Note:  New methodology.     Prostate Specific Antigen Screen   Date Value Ref Range Status   08/29/2023 7.74 (H) 0.00 - 4.50 ng/mL Final   05/23/2023 2.62 0.00 - 4.50 ng/mL Final   12/21/2021 2.29 0.00 - 4.00 ug/L Final            Assessment and Plan:     Assessment: 62 year old male  seen in evaluation for prostate abscess and urinary retention. He completed a course of IV Rochepin and currently completing a course of Keflex. He had an indwelling catheter placed on 9/04/2023 for urinary retention.     A TOV was performed today. The patient was able to sufficiently empty his bladder without the catheter. I sent a prescription for tamsulosin to his pharmacy. He was advised to stay well hydrated and make an attempt to void every 3-4 hours.     Dr. Webber recommended a CT pelvis with contrast in four weeks to evaluate for resolution of the prostate abscesses. The patient is in agreement.     Plan:  Complete Keflex as prescribed.   Start tamsulosin 0.4 mg daily.   CT pelvis with contrast in four weeks. Follow up per results of CT.     TASHA AGUILERA PA-C  Department of Urology

## 2023-09-25 ENCOUNTER — OFFICE VISIT (OUTPATIENT)
Dept: VASCULAR SURGERY | Facility: CLINIC | Age: 63
End: 2023-09-25
Attending: PHYSICIAN ASSISTANT
Payer: COMMERCIAL

## 2023-09-25 VITALS — SYSTOLIC BLOOD PRESSURE: 144 MMHG | HEART RATE: 104 BPM | DIASTOLIC BLOOD PRESSURE: 76 MMHG

## 2023-09-25 DIAGNOSIS — I65.21 CAROTID STENOSIS, RIGHT: Primary | ICD-10-CM

## 2023-09-25 DIAGNOSIS — E78.5 DYSLIPIDEMIA, GOAL LDL BELOW 70: ICD-10-CM

## 2023-09-25 DIAGNOSIS — R42 DIZZY SPELLS: ICD-10-CM

## 2023-09-25 DIAGNOSIS — E11.65 UNCONTROLLED TYPE 2 DIABETES MELLITUS WITH HYPERGLYCEMIA (H): ICD-10-CM

## 2023-09-25 PROCEDURE — G0463 HOSPITAL OUTPT CLINIC VISIT: HCPCS | Performed by: HOSPITALIST

## 2023-09-25 PROCEDURE — 99204 OFFICE O/P NEW MOD 45 MIN: CPT | Performed by: HOSPITALIST

## 2023-09-25 RX ORDER — ROSUVASTATIN CALCIUM 20 MG/1
20 TABLET, COATED ORAL DAILY
Qty: 30 TABLET | Refills: 11 | Status: SHIPPED | OUTPATIENT
Start: 2023-09-25 | End: 2024-08-27

## 2023-09-25 ASSESSMENT — PAIN SCALES - GENERAL
PAINLEVEL: NO PAIN (0)
PAINLEVEL: NO PAIN (0)

## 2023-09-25 NOTE — PROGRESS NOTES
[facility]        Patient is here for a consult to discuss carotid stenosis. Patient has symptoms of dizziness.Pt stated the dizziness occurs about a dozen times a day and if he tilts his head back he passes out. Pt states he feels like his body is numb all over occurs intermittently. Wife is accompanied with wife Nehal. Pt currently smoking 1/2 ppd for last 50 years.     Pt is currently taking Statin.    BP (!) 144/76 (BP Location: Left arm)   Pulse 104     The provider has been notified that the patient has concerns of the dizzy spells.     Questions patient would like addressed today are: N/A.    Refills are needed: N/A    Has homecare services and agency name:  No

## 2023-09-25 NOTE — PATIENT INSTRUCTIONS
Jazz Grady,    Thank you for entrusting your care with us today. After your visit today with MD Annie French this is the plan that was discussed at your appointment.    See neurology for your dizziness.  A referral was placed.  If you do not hear from them in the next 2 business days, please call them to schedule.    Follow up in 1 year with repeat ultrasound.    Start taking the increased dose of rosuvastatin 20mg.    I am including additional information on these things and our contact information if you have any questions or concerns.   Please do not hesitate to reach out to us if you felt we did not answer your questions or you are unsure of the treatment plan after your visit today. Our number is [phone].Thank you for trusting us with your care.         Again thank you for your time.     Carotid Stenosis: Care Instructions  Coronary artery disease (CAD)       Carotid stenosis is narrowing of one or both of the carotid arteries. These arteries take blood from the heart to the brain. There is one on each side of the neck.        Carotid artery stenosis is caused by a process called hardening of the arteries, or atherosclerosis. A substance called plaque builds up inside the carotid arteries. Things that can lead to plaque include diabetes, high blood pressure, high cholesterol, and smoking. This plaque may limit blood flow to your brain. If plaque breaks open, it may form a blood clot. Or pieces of the plaque may break off. A piece of plaque or a blood clot could move to the brain, causing a stroke or transient ischemic attack (TIA).    The goal of treatment is to lower your risk of having a stroke or TIA. You can lower your risk by having a heart-healthy lifestyle and taking medicine. Sometimes a surgery or procedure is also done.    Follow-up care is a key part of your treatment and safety. Be sure to make and go to all appointments, and call your doctor if you are having problems. It's also a good idea to  know your test results and keep a list of the medicines you take.    How can you care for yourself at home?  Take your medicines exactly as prescribed. Call your doctor if you think you are having a problem with your medicine. You may take medicine to lower your blood pressure, to lower your cholesterol, or to prevent blood clots.  If you take a blood thinner, such as aspirin, be sure to get instructions about how to take your medicine safely. Blood thinners can cause serious bleeding problems.  Do not smoke. People who smoke have a higher chance of stroke than those who quit. If you need help quitting, talk to your doctor about stop-smoking programs and medicines. These can increase your chances of quitting for good.  Eat heart-healthy foods. These foods include vegetables, fruits, nuts, beans, lean meat, fish, and whole grains. You limit things that are not so good for your heart, like sodium, alcohol, and sugar.  Stay at a healthy weight. Lose weight if you need to.  Be active. Ask your doctor what type and level of exercise is safe for you.  Limit alcohol to 2 drinks a day for men and 1 drink a day for women. Too much alcohol can cause health problems.  Manage other health problems such as diabetes, high blood pressure, and high cholesterol. If you think you may have a problem with alcohol or drug use, talk to your doctor.  Avoid infections such as COVID-19, colds, and the flu. Get the flu vaccine every year. Get a pneumococcal vaccine. If you have had one before, ask your doctor whether you need another dose. Stay up to date on your COVID-19 vaccines.    When should you call for help?     Call 911 anytime you think you may need emergency care. For example, call if:    You passed out (lost consciousness).  You have symptoms of a stroke. These may include:  Sudden numbness, tingling, weakness, or loss of movement in your face, arm, or leg, especially on only one side of your body.  Sudden vision changes.  Sudden  trouble speaking.  Sudden confusion or trouble understanding simple statements.  Sudden problems with walking or balance.  A sudden, severe headache that is different from past headaches.    Call your doctor now or seek immediate medical care if:    You are dizzy or lightheaded, or you feel like you may faint.  Watch closely for changes in your health, and be sure to contact your doctor if you have any problems.    Current as of: September 7, 2022  Author: [employer]wise Staff  Medical Review:LEYLA Akhtar MD - Internal Medicine & Derik Talbot MD - Family Medicine & Shoshana Marcelino MD - Family Medicine & Barrett Traylor MD, PhD - Cardiology      Carotid Duplex    Steps for the test are:  You will be asked to lie on a stretcher. It will be okay for you to wear your street clothes. In some situations, you may be asked to change into a gown.    Ultrasound gel, usually warmed for your comfort, will be placed on either side of your neck.    Through the gel, the technician will apply to your neck a small hand-held device that emits sound waves.   When the test is completed, the technician will remove excess gel from your neck. The gel is water-soluble and will not stain your skin or clothes.    How to Prepare:  Eat and take medications as usual.   Wear minimal or no jewelry to ease application and removal of gel.    Risks  There are typically no side effects or complications associated with a carotid duplex ultrasound examination.    What Can I Expect After the Test?  The technician will send the ultrasound images to your vascular surgeon for evaluation. Typically, a report is available in 2-3 days. If anything critical is found, it is standard practice to notify the vascular surgeon immediately.  Reference: https://vascular.org/patient-resources/vascular-tests

## 2023-09-25 NOTE — PROGRESS NOTES
"VASCULAR MEDICINE CONSULT NOTE          LOCATION:  [facility]       Date of Service: 9/25/2023      PRIMARY CARE PROVIDER: Ferny Koch  Referring provider;  Ferny Huerta      Reason for the visit/chief complaint:   Carotid artery stenosis      HPI:  Miguel A Vaughn is a pleasant 62 year old male who presents to our [facility] accompanied by his wife Nehal with the above mentioned complaint.    Mr. Vaughn has medical history significant for type 2 diabetes mellitus that is uncontrolled, hemoglobin A1c 11.9%, hypertension, uncontrolled dyslipidemia , long history of smoking/ current, family history of carotid artery disease and cervical radiculopathy s/p C3-4 ACDF and C4-5 hardware removal 2019.    US carotid was done on 09/06 due to right sided carotid bruit heard on exam.  This showed:  RIGHT:  moderate disease (50-69% ICA stenosis) by velocity criteria with a maximum systolic velocity of 184 cm/s. This is worsened from the prior study 7/26/2011 where the maximum systolic velocity was measured at 124 cm/s.    LEFT: Mild ICA stenosis by velocity criteria (less than 50% stenosis) that is similar to prior study.    Mr. Vaughn denies prior stroke, TIA or amaurosis fugax.  He did mention dizzy spells and what appears to be syncopal episodes.  These have been going on for approximately 4 years since his cervical radiculopathy surgery in 2019.  He describes the episodes as variable, the syncope is precipitated by hyperextension of his neck with subsequent loss of consciousness and complete body shaking per wife.  His dizzy spells are throughout the day per patient  \"dozens a day \"and appears to have different precipitating factors, some of them are posttussive.  He has not recently seen a neurologist.  They tell me that he had prior history of brain tumor that was not operable treated with high-dose steroids.    Risk factors:  Uncontrolled type 2 " diabetes mellitus, hemoglobin A1c 11.6% and has been in the range of 11-13%.  Dyslipidemia on rosuvastatin 10 mg.  Last LDL from May 2023 was 159.  Hypertension: Well-controlled on losartan and  Smoker since age 12, 0.5 PPD.  She needs to smoke.  Family history of carotid artery disease with both father and brother had carotid artery endarterectomy in their 50s (both smokers).      REVIEW OF SYSTEMS:    A 12 point ROS was reviewed and is negative except what is mentioned in HPI.       Past medical history, surgical history, medications, family history, social history and allergies were reviewed. Pertinent points mentioned under HPI.        OBJECTIVE:    Vital signs:  BP (!) 144/76 (BP Location: Left arm)   Pulse 104   Wt Readings from Last 1 Encounters:   09/20/23 175 lb (79.4 kg)     There is no height or weight on file to calculate BMI.    Physical exam:  General appearance: Pleasant male in no apparent distress.    HEENT: NC/AT.    Neck: Carotids +2/2 bilaterally.  Right carotid bruit heard..  No jugular venous distension.  No subclavian bruit.  Heart: RRR. Normal S1, S2.   Chest: Clear to auscultation bilaterally.  Abdomen: Soft, nontender, nondistended.  Pulsation felt in the mid abdomen only with very deep palpation.  No bruits.   Extremities: Right radial and ulnar 4/4.  Left radial 3/4.  Right DP and PT 4/4, left PT 4/4, left DP 3/4.  Skin: Slightly cooler toes.  Normal capillary refill.  No wounds.      DIAGNOSTIC STUDIES:   Labs and diagnostics reviewed including outside records. Pertinent points are mentioned under HPI and assessment and plan sections.        ASSESSMENT AND PLAN:    Asymptomatic right moderate ICA stenosis  Long history of smoking/current  Uncontrolled type 2 diabetes mellitus  Uncontrolled dyslipidemia  Hypertension: Controlled  Family history of carotid artery disease and carotid endarterectomy  History of cervical radiculopathy status post surgery in 2019  Dizzy spells:  Unspecified    We discussed carotid artery disease and the results of his carotid ultrasound.  He is asymptomatic.  We discussed his dizzy spells that he has been reporting and this is not consistent with carotid artery disease.  He gets what appears to be positional and syncopal episodes with hyperextension of the neck.  In addition, he describes multiple dizzy spells throughout the day, he would likely benefit from neurology evaluation.  We will put a referral and patient was in agreement.    On the other hand, we discussed his risk factors.  We discussed the importance of smoking cessation.  He did not seem to be very interested in any nicotine cessation referral.  He will start trying to cut down by himself.  On the other hand, we discussed his uncontrolled diabetes.  He was previously only on metformin and glyburide was recently added by PCP.    His LDL was also significantly above range at 159.  He is on rosuvastatin 10 mg.  Interestingly, his LDL was 77, 2 years ago.  He does not recall any change in his occasions.  We will increase rosuvastatin to 20 mg.  He should get a repeat lipid panel in about 2 months through PCP and ensure goal LDL less than 70.  If not reached, will likely add ezetimibe.    His blood pressure seems to be under good control with losartan 25 mg.  He is on aspirin 81 mg.      Recommendations:  Smoking cessation is of utmost importance.  Discussed with the patient.  Declined smoking cessation referral.  Increase rosuvastatin to 20 mg.  Prescription given.  Recommend lipid panel in 2 months through PCP for goal LDL less than 70.  If not, would add ezetimibe 10 mg.  Continue to work on diabetes control and consider endocrinology referral if needed.  Goal blood pressure less than 130/80.  Continue ASA 81 mg indefinitely unless contraindicated.   Repeat ultrasound carotid in 1 year.  Follow-up appointment in 1 year after ultrasound carotid.  Discussed stroke, TIA and amaurosis fugax symptoms  and the need to call 911 and present to the ER should he develop any.  Referral to neurology to address his recurrent dizzy spells with hyperextension.      It was a pleasure meeting Mr. Vaughn and his wife Nehal in our clinic this afternoon.      Annie French MD  Vascular Medicine  September 25, 2023

## 2023-09-27 ENCOUNTER — OFFICE VISIT (OUTPATIENT)
Dept: DERMATOLOGY | Facility: CLINIC | Age: 63
End: 2023-09-27
Payer: COMMERCIAL

## 2023-09-27 DIAGNOSIS — L90.5 FACIAL SCAR: ICD-10-CM

## 2023-09-27 DIAGNOSIS — Z85.828 HISTORY OF NONMELANOMA SKIN CANCER: Primary | ICD-10-CM

## 2023-09-27 PROCEDURE — 99024 POSTOP FOLLOW-UP VISIT: CPT | Mod: GC | Performed by: DERMATOLOGY

## 2023-09-27 ASSESSMENT — PAIN SCALES - GENERAL: PAINLEVEL: NO PAIN (0)

## 2023-09-27 NOTE — Clinical Note
Seb Ling,   My name is Aroldo Sorto, I'm one of the derm surgery staff. I saw a patient today for skin cancer surgery follow up, who's scheduled to see you in Feb 2024 (his impression was the earliest available neurology appointment). He and his wife were describing presyncopal episodes multiple times a day, where he is having dizziness, falls, aphasia, and spastic movements. No post-ictal periods, vomiting, or pain. They were unable to connect a trigger. He's had c-spine fixation surgery and mild-moderate carotid stenosis (though vascular surgery didn't think symptoms were consistent with carotid disease). It sounded pretty disruptive to his daily life. Does an appointment in Feb sound appropriate? Do you all have a list of patients interested in taking cancellation availability? He seemed interested in any earlier appointment. Thank you for consideration.  Aroldo Sorto

## 2023-09-27 NOTE — NURSING NOTE
"Miguel A Vaughn's chief complaint for this visit includes:  Chief Complaint   Patient presents with    Surgical Followup     Nasal wound recheck - still has numbness and feels like nostrils are constricted     PCP: Ferny Koch    Referring Provider:  No referring provider defined for this encounter.    There were no vitals taken for this visit.  No Pain (0)        Allergies   Allergen Reactions    Iodinated Contrast Media Hives    Ciprofloxacin Muscle Pain (Myalgia)     Body aches and pain    No Known Drug Allergy     Tape [Adhesive Tape]      Pt states no  \"Surgical tape\"  IV tegaderm ok         Do you need any medication refills at today's visit? No    Torrie Peters CMA        "

## 2023-09-27 NOTE — LETTER
"    9/27/2023         RE: Miguel A aVughn  [address]  [place] [address]        Dear Colleague,    Thank you for referring your patient, Miguel A Vaughn, to the [facility]. Please see a copy of my visit note below.    Dermatologic Surgery Post-Op Wound Check     CC: Surgical Followup (Nasal wound recheck - still has numbness and feels like nostrils are constricted)      Dermatology Problem List:  FBSE: 9/6/23  1. Hx of NMSC   - BCC, central nasal tip (infiltrating subtype), s/p Mohs and Burow's FTSG 7/5/23  - NMSC -left side of face in the 1990's  2. AKs  - cryo  - efudex/calipotriene initiated 5/31/23 to scalp, forehead and temples - good response  3. ISK - cryo     FBSE 9/6/23     Social:   ___________________________________________________________    Subjective: Miguel A Vaughn is a 62 year old male who presents today for wound check after almost three months.   - Patient notes that the scar is very apparent and feels like it is \"squeezing his nose\" (compressing his nasal airway).   - He notes that pinching the nasal tip compresses the airway, lifting the nasal tip and lifting medial cheeks both improve air movement.   - denies pain but bothersome sensation that his airway feels closed  - denies any wound drainage  - no other concerns today    Objective: An exam of the Nose was performed today   - Well healing scar with minimal erythema  - With nasal tip compression, can reproduce the sensation of airway closing   - Medial nasal ridge with slight depression but without any erythema or tenderness.  - The surgical site noted above is clean, dry, and intact. There is no surrounding erythema, purulence, or significant tenderness to palpation. No clinical evidence of infection noted today.  - depressed sclerotic plaque at site of FTSG, mild standing cone on nasal dorsum superior to linear repair.         Assessment and Plan:     1. BCC, central nasal tip (infiltrating " subtype), s/p Mohs and Burow's FTSG 7/5/23  - FTSG necrosis complicated wound healing.   - Additionally he has the sensation that his nasal airway has been narrowed by the tightness of the repair and scar.     -  To address the surface scar,  we discussed dermabrasion and scalpel scar revision (repeat skin graft vs flap). Also discussed that the scar is fully mature after about a year, and can continue to allow it to heal to see the final progression of the scar. Discussed that the redness and overall appearnce of the scar will continue to improve.   - If not improving, and he is hesitant to undergo revision under local anesthesia can consider ENT consult.   - At this time, the patient would like to give the scar three more months to see if things are improving are worsening. Risks and benefits of interventions for the scar discussed, patient understood, and still would like to re-evaluate in three months time.     - No evidence of infection on examination today.  - The patient should follow up with dermatologic surgery in 3 months as well as continue with regular skin exams in general dermatology clinic.    # Presyncopal episodes with dizziness, aphasia, and spastic movements. No post-ictal periods, patient able to remember the entire episode. Emphasized the importance to communicate with his PCP or Neurologist.   - Patient reports episodes occurring ~12 times a day.   - Earliest neurology evaluation in Feb 2024    Patient was discussed with and evaluated by attending physician Dr. Sorto.    Follow up in three months for scar re-evaluation.     Muna Perez MD  Dermatology Resident, PGY-3  [facility]  Pager: [phone]      Staff Physician Comments:   I saw and evaluated the patient with the resident and I agree with the assessment and plan. I was present for the examination.    Aroldo Sorto DO    Department of Dermatology  [facility]  [facility]: Phone: [phone], Fax:[phone]  [facility]: Phone: [phone], Fax: [phone]            Again, thank you for allowing me to participate in the care of your patient.        Sincerely,        Aroldo Sorto MD

## 2023-09-27 NOTE — PROGRESS NOTES
"Dermatologic Surgery Post-Op Wound Check     CC: Surgical Followup (Nasal wound recheck - still has numbness and feels like nostrils are constricted)      Dermatology Problem List:  FBSE: 9/6/23  1. Hx of NMSC   - BCC, central nasal tip (infiltrating subtype), s/p Mohs and Burow's FTSG 7/5/23  - NMSC -left side of face in the 1990's  2. AKs  - cryo  - efudex/calipotriene initiated 5/31/23 to scalp, forehead and temples - good response  3. ISK - cryo     FBSE 9/6/23     Social:   ___________________________________________________________    Subjective: Miguel A Vaughn is a 62 year old male who presents today for wound check after almost three months.   - Patient notes that the scar is very apparent and feels like it is \"squeezing his nose\" (compressing his nasal airway).   - He notes that pinching the nasal tip compresses the airway, lifting the nasal tip and lifting medial cheeks both improve air movement.   - denies pain but bothersome sensation that his airway feels closed  - denies any wound drainage  - no other concerns today    Objective: An exam of the Nose was performed today   - Well healing scar with minimal erythema  - With nasal tip compression, can reproduce the sensation of airway closing   - Medial nasal ridge with slight depression but without any erythema or tenderness.  - The surgical site noted above is clean, dry, and intact. There is no surrounding erythema, purulence, or significant tenderness to palpation. No clinical evidence of infection noted today.  - depressed sclerotic plaque at site of FTSG, mild standing cone on nasal dorsum superior to linear repair.         Assessment and Plan:     1. BCC, central nasal tip (infiltrating subtype), s/p Mohs and Burow's FTSG 7/5/23  - FTSG necrosis complicated wound healing.   - Additionally he has the sensation that his nasal airway has been narrowed by the tightness of the repair and scar.     -  To address the surface scar,  we discussed " dermabrasion and scalpel scar revision (repeat skin graft vs flap). Also discussed that the scar is fully mature after about a year, and can continue to allow it to heal to see the final progression of the scar. Discussed that the redness and overall appearnce of the scar will continue to improve.   - If not improving, and he is hesitant to undergo revision under local anesthesia can consider ENT consult.   - At this time, the patient would like to give the scar three more months to see if things are improving are worsening. Risks and benefits of interventions for the scar discussed, patient understood, and still would like to re-evaluate in three months time.     - No evidence of infection on examination today.  - The patient should follow up with dermatologic surgery in 3 months as well as continue with regular skin exams in general dermatology clinic.    # Presyncopal episodes with dizziness, aphasia, and spastic movements. No post-ictal periods, patient able to remember the entire episode. Emphasized the importance to communicate with his PCP or Neurologist.   - Patient reports episodes occurring ~12 times a day.   - Earliest neurology evaluation in Feb 2024    Patient was discussed with and evaluated by attending physician Dr. Sorto.    Follow up in three months for scar re-evaluation.     Muna Perez MD  Dermatology Resident, PGY-3  [facility]  Pager: [phone]      Staff Physician Comments:   I saw and evaluated the patient with the resident and I agree with the assessment and plan. I was present for the examination.    Aroldo Sorto DO    Department of Dermatology  [facility]: Phone: [phone], Fax:[phone]  [facility]: Phone: [phone], Fax: [phone]

## 2023-09-29 NOTE — TELEPHONE ENCOUNTER
RECORDS RECEIVED FROM: Internal   REASON FOR VISIT: Dizzy spells    Date of Appt: 11/01/2023   NOTES (FOR ALL VISITS) STATUS DETAILS   OFFICE NOTE from referring provider Internal 09/25/2023 Dr French [facility]    OFFICE NOTE from other specialist Internal 01/13/2022 Dr Rausch [facility]      DISCHARGE SUMMARY from hospital N/A    DISCHARGE REPORT from the ER N/A    OPERATIVE REPORT Internal 11/12/2019 CERVICAL 6-7 ANTERIOR  DECOMPRESSION AND FUSION    HILDA Virus Labs (MS ONLY) N/A    EMG N/A    EEG N/A    MEDICATION LIST N/A    IMAGING  (FOR ALL VISITS)     LUMBAR PUNCTURE N/A    ARIELA SCAN (MOVEMENT) N/A    ULTRASOUND (CAROTID BILAT) *VASCULAR* N/A    MRI (HEAD, NECK, SPINE) Internal 01/19/2022 cervical spine   CT (HEAD, NECK, SPINE) Internal 04/01/2022 cervical spine

## 2023-10-11 ENCOUNTER — TELEPHONE (OUTPATIENT)
Dept: UROLOGY | Facility: CLINIC | Age: 63
End: 2023-10-11
Payer: COMMERCIAL

## 2023-10-11 DIAGNOSIS — Z91.041 CONTRAST MEDIA ALLERGY: Primary | ICD-10-CM

## 2023-10-11 RX ORDER — METHYLPREDNISOLONE 4 MG/1
4 TABLET ORAL DAILY
Qty: 16 TABLET | Refills: 0 | Status: SHIPPED | OUTPATIENT
Start: 2023-10-11 | End: 2023-11-01

## 2023-10-11 NOTE — TELEPHONE ENCOUNTER
[facility] Call Center    Phone Message    May a detailed message be left on voicemail: yes     Reason for Call: Medication Question or concern regarding medication   Prescription Clarification  Name of Medication: methylPREDNISolone (MEDROL) 4 MG tablet  Prescribing Provider: Miesha   Pharmacy:   [facility] [address] [facility]      What on the order needs clarification? Tech from the pharmacy called and stated they need clarification on the directions as they are conflicting - please reach out to pharmacy to further discuss, thanks!      Action Taken: Message routed to:  Other: Uro    Travel Screening: Not Applicable

## 2023-10-11 NOTE — TELEPHONE ENCOUNTER
Spoke with pharmacist and informed that the pre-medicating instruction for the CT scan are the correct set. (Not the 1 tab daily).    No further questions.  Reina Champion RN on 10/11/2023 at 12:49 PM

## 2023-10-17 ENCOUNTER — HOSPITAL ENCOUNTER (OUTPATIENT)
Dept: CT IMAGING | Facility: CLINIC | Age: 63
Discharge: HOME | End: 2023-10-17
Attending: STUDENT IN AN ORGANIZED HEALTH CARE EDUCATION/TRAINING PROGRAM | Admitting: STUDENT IN AN ORGANIZED HEALTH CARE EDUCATION/TRAINING PROGRAM
Payer: COMMERCIAL

## 2023-10-17 DIAGNOSIS — N41.0 ACUTE PROSTATITIS: ICD-10-CM

## 2023-10-17 LAB
CREAT BLD-MCNC: 0.8 MG/DL (ref 0.7–1.3)
EGFRCR SERPLBLD CKD-EPI 2021: >60 ML/MIN/1.73M2

## 2023-10-17 PROCEDURE — 72193 CT PELVIS W/DYE: CPT

## 2023-10-17 PROCEDURE — 82565 ASSAY OF CREATININE: CPT

## 2023-10-17 PROCEDURE — 250N000009 HC RX 250: Performed by: STUDENT IN AN ORGANIZED HEALTH CARE EDUCATION/TRAINING PROGRAM

## 2023-10-17 PROCEDURE — 250N000011 HC RX IP 250 OP 636: Performed by: STUDENT IN AN ORGANIZED HEALTH CARE EDUCATION/TRAINING PROGRAM

## 2023-10-17 RX ORDER — IOPAMIDOL 755 MG/ML
500 INJECTION, SOLUTION INTRAVASCULAR ONCE
Status: COMPLETED | OUTPATIENT
Start: 2023-10-17 | End: 2023-10-17

## 2023-10-17 RX ADMIN — IOPAMIDOL 85 ML: 755 INJECTION, SOLUTION INTRAVENOUS at 11:05

## 2023-10-17 RX ADMIN — SODIUM CHLORIDE 60 ML: 9 INJECTION, SOLUTION INTRAVENOUS at 11:02

## 2023-10-18 ENCOUNTER — LAB (OUTPATIENT)
Dept: LAB | Facility: CLINIC | Age: 63
End: 2023-10-18
Payer: COMMERCIAL

## 2023-10-18 ENCOUNTER — TELEPHONE (OUTPATIENT)
Dept: UROLOGY | Facility: CLINIC | Age: 63
End: 2023-10-18

## 2023-10-18 DIAGNOSIS — N41.2 PROSTATE ABSCESS: ICD-10-CM

## 2023-10-18 DIAGNOSIS — N41.0 ACUTE PROSTATITIS: Primary | ICD-10-CM

## 2023-10-18 DIAGNOSIS — N41.0 ACUTE PROSTATITIS: ICD-10-CM

## 2023-10-18 LAB
ALBUMIN UR-MCNC: NEGATIVE MG/DL
APPEARANCE UR: CLEAR
BACTERIA #/AREA URNS HPF: ABNORMAL /HPF
BILIRUB UR QL STRIP: NEGATIVE
COLOR UR AUTO: YELLOW
GLUCOSE UR STRIP-MCNC: >=1000 MG/DL
HGB UR QL STRIP: NEGATIVE
KETONES UR STRIP-MCNC: NEGATIVE MG/DL
LEUKOCYTE ESTERASE UR QL STRIP: NEGATIVE
MUCOUS THREADS #/AREA URNS LPF: PRESENT /LPF
NITRATE UR QL: NEGATIVE
PH UR STRIP: 5.5 [PH] (ref 5–7)
RBC URINE: 1 /HPF
SP GR UR STRIP: 1.02 (ref 1–1.03)
SQUAMOUS EPITHELIAL: 1 /HPF
UROBILINOGEN UR STRIP-MCNC: NORMAL MG/DL
WBC URINE: 1 /HPF

## 2023-10-18 PROCEDURE — 87086 URINE CULTURE/COLONY COUNT: CPT

## 2023-10-18 PROCEDURE — 81001 URINALYSIS AUTO W/SCOPE: CPT

## 2023-10-18 NOTE — TELEPHONE ENCOUNTER
Called patient to discuss CT results, which showed resolving hypodensity of the prostate. Dr. Webber recommended rechecking UA and urine culture. The patient will do this today. If his urine looks suspicious for infection, would restart Keflex 500 mg TID x 14 days.     If the urine is clear, will plan for PSA and prostate MRI in three months. The patient requested these be done before the end of the year since he is met his deductible. I think this is reasonable and we could plan for late December.

## 2023-10-20 LAB — BACTERIA UR CULT: NO GROWTH

## 2023-10-24 DIAGNOSIS — G25.81 RESTLESS LEGS SYNDROME (RLS): ICD-10-CM

## 2023-10-24 RX ORDER — ROPINIROLE 0.25 MG/1
TABLET, FILM COATED ORAL
Qty: 60 TABLET | Refills: 0 | Status: SHIPPED | OUTPATIENT
Start: 2023-10-24 | End: 2023-12-11

## 2023-11-01 ENCOUNTER — OFFICE VISIT (OUTPATIENT)
Dept: NEUROLOGY | Facility: CLINIC | Age: 63
End: 2023-11-01
Payer: COMMERCIAL

## 2023-11-01 ENCOUNTER — PRE VISIT (OUTPATIENT)
Dept: NEUROLOGY | Facility: CLINIC | Age: 63
End: 2023-11-01

## 2023-11-01 VITALS
HEART RATE: 90 BPM | SYSTOLIC BLOOD PRESSURE: 130 MMHG | WEIGHT: 165.4 LBS | DIASTOLIC BLOOD PRESSURE: 70 MMHG | BODY MASS INDEX: 24.64 KG/M2

## 2023-11-01 DIAGNOSIS — R42 DIZZY SPELLS: Primary | ICD-10-CM

## 2023-11-01 PROCEDURE — 99205 OFFICE O/P NEW HI 60 MIN: CPT | Performed by: INTERNAL MEDICINE

## 2023-11-01 RX ORDER — METHYLPREDNISOLONE 4 MG/1
TABLET ORAL
Qty: 16 TABLET | Refills: 0 | Status: SHIPPED | OUTPATIENT
Start: 2023-11-01 | End: 2023-11-02
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
## 2023-11-02 ENCOUNTER — TELEPHONE (OUTPATIENT)
Dept: NEUROLOGY | Facility: CLINIC | Age: 63
End: 2023-11-02

## 2023-11-02 DIAGNOSIS — R42 DIZZY SPELLS: ICD-10-CM

## 2023-11-02 RX ORDER — METHYLPREDNISOLONE 4 MG/1
TABLET ORAL
Qty: 16 TABLET | Refills: 0 | Status: SHIPPED | OUTPATIENT
Start: 2023-11-02 | End: 2023-11-07

## 2023-11-02 NOTE — TELEPHONE ENCOUNTER
Patient called says one of the orders for   methylPREDNISolone (MEDROL) 4 MG tablet [[id number]] (Order [id number])  Was cancelled, there was only one order and there should have been two.      I called patient and he was told that there needs to be 72 hours between Imaging so he will need another RX for steroids so he can premedicate for both Images. Please send to [facility] [place]. Pt also requesting call when completed so he can  from Pharmacy.

## 2023-11-02 NOTE — TELEPHONE ENCOUNTER
Health Call Center    Phone Message    May a detailed message be left on voicemail: yes     Reason for Call: Medication Refill Request    Has the patient contacted the pharmacy for the refill? Yes   Name of medication being requested: methylPREDNISolone (MEDROL) 4 MG tablet [[id number]] (Order [id number])    Provider who prescribed the medication: David Boucher MD  Pharmacy: [facility] Address: [address] [place] [address]  Date medication is needed: 11/13/2023   Please call Miguel A @ [phone] to discuss further.    Action Taken: Message routed to:  Other: [facility]    Travel Screening: Not Applicable

## 2023-11-07 RX ORDER — METHYLPREDNISOLONE 4 MG/1
TABLET ORAL
Qty: 16 TABLET | Refills: 0 | Status: SHIPPED | OUTPATIENT
Start: 2023-11-07 | End: 2024-02-09

## 2023-11-07 NOTE — CONFIDENTIAL NOTE
The prescription has been signed and sent to the pharmacy. The pt was notified.     Novlia Adams RN Care Coordinator   Neurology/Neurosurgery/PM&R/ Pain Management

## 2023-11-07 NOTE — TELEPHONE ENCOUNTER
[facility] Call Center    Phone Message    May a detailed message be left on voicemail: yes     Reason for Call:       Patient called to speak to care team regarding his CT appointment and questions regarding a medication that was denied. Please call back at [phone]       Action Taken: Message routed to:  Adult Clinics: Neurology p [id number]    Travel Screening: Not Applicable

## 2023-11-07 NOTE — TELEPHONE ENCOUNTER
RN called the pt to gather some more information. He got a call from imaging scheduling telling him that he need to move one of his CT scans because they cannot be completed back to back. His neck CTA is scheduled for 11/10 and his chest CTA got moved to the 11/14. He remembers Dr Boucher telling him that he will need to take 8 tabs of methylprednisolone 12 hours prior to the scan and then repeat 2 hours prior. Now that his scans are on separate days, he figured he would be getting another prescription sent to the pharmacy. When he spoke to the pharmacy he was told the first prescription was cancelled before it was even filled and dispensed. The second prescription that Dr Boucher sent was dispensed and picked up. They need a new prescription sent to the pharmacy so that he can premedicate for his second scan on 11/14.   I did call and confirm with the pharmacy that he only got 1 of the two prescriptions that were sent.    Encounter routed to Dr Boucher to review and sign.    Nolvia Adams RN Care Coordinator   Neurology/Neurosurgery/PM&R/ Pain Management

## 2023-11-10 ENCOUNTER — ANCILLARY PROCEDURE (OUTPATIENT)
Dept: CT IMAGING | Facility: CLINIC | Age: 63
End: 2023-11-10
Attending: INTERNAL MEDICINE
Payer: COMMERCIAL

## 2023-11-10 ENCOUNTER — TELEPHONE (OUTPATIENT)
Dept: NEUROLOGY | Facility: CLINIC | Age: 63
End: 2023-11-10

## 2023-11-10 DIAGNOSIS — R42 DIZZY SPELLS: ICD-10-CM

## 2023-11-10 PROCEDURE — 70498 CT ANGIOGRAPHY NECK: CPT | Mod: GC | Performed by: RADIOLOGY

## 2023-11-10 RX ORDER — IOPAMIDOL 755 MG/ML
70 INJECTION, SOLUTION INTRAVASCULAR ONCE
Status: COMPLETED | OUTPATIENT
Start: 2023-11-10 | End: 2023-11-10

## 2023-11-10 RX ADMIN — IOPAMIDOL 70 ML: 755 INJECTION, SOLUTION INTRAVASCULAR at 09:20

## 2023-11-10 NOTE — TELEPHONE ENCOUNTER
Called and spoke with patient. Let him know that Dr. Boucher doesn't think it is necessary at this point, but he may consider it after the CT chest. Patient stated understanding.

## 2023-11-10 NOTE — TELEPHONE ENCOUNTER
----- Message from David Boucher MD sent at 11/10/2023  1:05 PM CST -----  Team - please let patient know that CT neck didn't show any concerning narrowing of the blood vessels. We'll reach out again when he gets CT chest is completed. Thanks!

## 2023-11-10 NOTE — TELEPHONE ENCOUNTER
Called and spoke with patient. Relayed Dr. Boucher's message. Patient stated understanding. They stated that he was possibly going to order more head imaging due to a past history of a mass. Writer stated she would ask Dr. Boucher as there were currently no orders in regarding more imaging (other than chest). Patient stated understanding.

## 2023-11-14 ENCOUNTER — ANCILLARY PROCEDURE (OUTPATIENT)
Dept: CT IMAGING | Facility: CLINIC | Age: 63
End: 2023-11-14
Attending: INTERNAL MEDICINE
Payer: COMMERCIAL

## 2023-11-14 DIAGNOSIS — R42 DIZZY SPELLS: ICD-10-CM

## 2023-11-14 PROCEDURE — 71275 CT ANGIOGRAPHY CHEST: CPT | Mod: GC | Performed by: RADIOLOGY

## 2023-11-14 RX ORDER — IOPAMIDOL 755 MG/ML
100 INJECTION, SOLUTION INTRAVASCULAR ONCE
Status: COMPLETED | OUTPATIENT
Start: 2023-11-14 | End: 2023-11-14

## 2023-11-14 RX ADMIN — IOPAMIDOL 100 ML: 755 INJECTION, SOLUTION INTRAVASCULAR at 09:24

## 2023-11-15 ENCOUNTER — TELEPHONE (OUTPATIENT)
Dept: NEUROLOGY | Facility: CLINIC | Age: 63
End: 2023-11-15
Payer: COMMERCIAL

## 2023-11-15 DIAGNOSIS — E11.9 TYPE 2 DIABETES MELLITUS WITHOUT COMPLICATION, WITHOUT LONG-TERM CURRENT USE OF INSULIN (H): ICD-10-CM

## 2023-11-15 DIAGNOSIS — R42 DIZZY SPELLS: Primary | ICD-10-CM

## 2023-11-15 DIAGNOSIS — R56.9 CONVULSIONS, UNSPECIFIED CONVULSION TYPE (H): ICD-10-CM

## 2023-11-15 RX ORDER — LOSARTAN POTASSIUM 25 MG/1
25 TABLET ORAL AT BEDTIME
Qty: 90 TABLET | Refills: 0 | Status: SHIPPED | OUTPATIENT
Start: 2023-11-15 | End: 2024-02-09

## 2023-11-15 NOTE — TELEPHONE ENCOUNTER
----- Message from David Boucher MD sent at 11/15/2023  1:44 PM CST -----  Team - can we call patient with the following message?     The CT of the chest does not show any concerning narrowing of the blood vessels. There was a small lung nodule. Usually these are benign nodules, but given your history of smoking I would recommend you discuss with your primary care provider the need for a repeat CT scan of the chest in the future.     I think as next step, it would be reasonable to do an MRI of the brain and an EEG to look for evidence of seizures. Let me know if he would like to do these tests.     Thanks!  David

## 2023-11-15 NOTE — TELEPHONE ENCOUNTER
Called and spoke with patient. Let him know that Dr. Boucher has ordered the MRI and EEG. Dr. Boucher states that if possible, he should try to trigger an episode during the EEG. Also, Dr. Boucher does not recommend any throat imaging at this time, but he could recommend that the patient have some speech therapy for swallowing problems. Patient stated understanding, but would like to know the results of the MRI and EEG before moving forward with the ST. No further questions at this time.

## 2023-11-15 NOTE — TELEPHONE ENCOUNTER
Called and spoke with patient. Read off Dr. Boucher's previous message. Patient states understanding about the lung nodule. He would like to move forward with the brain MRI and EEG. He was wondering if Dr. Boucher could/should order imaging of his neck as well. He states that he has been having problems swallowing and has had a few more spells when he was trying to drink in the last week. Writer stated she would pass on the message.

## 2023-12-05 ENCOUNTER — LAB (OUTPATIENT)
Dept: LAB | Facility: CLINIC | Age: 63
End: 2023-12-05
Payer: COMMERCIAL

## 2023-12-05 DIAGNOSIS — N41.2 PROSTATE ABSCESS: ICD-10-CM

## 2023-12-05 DIAGNOSIS — N41.0 ACUTE PROSTATITIS: ICD-10-CM

## 2023-12-05 LAB — PSA SERPL DL<=0.01 NG/ML-MCNC: 1.85 NG/ML (ref 0–4.5)

## 2023-12-05 PROCEDURE — 84153 ASSAY OF PSA TOTAL: CPT

## 2023-12-05 PROCEDURE — 36415 COLL VENOUS BLD VENIPUNCTURE: CPT

## 2023-12-08 DIAGNOSIS — G25.81 RESTLESS LEGS SYNDROME (RLS): ICD-10-CM

## 2023-12-08 DIAGNOSIS — E11.9 TYPE 2 DIABETES MELLITUS WITHOUT COMPLICATION, WITHOUT LONG-TERM CURRENT USE OF INSULIN (H): ICD-10-CM

## 2023-12-11 RX ORDER — ROPINIROLE 0.25 MG/1
TABLET, FILM COATED ORAL
Qty: 60 TABLET | Refills: 0 | Status: SHIPPED | OUTPATIENT
Start: 2023-12-11 | End: 2024-03-13

## 2023-12-13 ENCOUNTER — OFFICE VISIT (OUTPATIENT)
Dept: DERMATOLOGY | Facility: CLINIC | Age: 63
End: 2023-12-13
Payer: COMMERCIAL

## 2023-12-13 ENCOUNTER — ANCILLARY PROCEDURE (OUTPATIENT)
Dept: MRI IMAGING | Facility: CLINIC | Age: 63
End: 2023-12-13
Attending: INTERNAL MEDICINE
Payer: COMMERCIAL

## 2023-12-13 DIAGNOSIS — R42 DIZZY SPELLS: ICD-10-CM

## 2023-12-13 DIAGNOSIS — L90.5 SCAR OF FACE: ICD-10-CM

## 2023-12-13 DIAGNOSIS — Z85.828 HISTORY OF BASAL CELL CARCINOMA OF SKIN: Primary | ICD-10-CM

## 2023-12-13 PROCEDURE — 99024 POSTOP FOLLOW-UP VISIT: CPT | Performed by: DERMATOLOGY

## 2023-12-13 PROCEDURE — 70551 MRI BRAIN STEM W/O DYE: CPT | Performed by: RADIOLOGY
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
## 2023-12-13 NOTE — NURSING NOTE
Miguel A Vaughn's goals for this visit include:   Chief Complaint   Patient presents with    Scar Management       He requests these members of his care team be copied on today's visit information: n/a    PCP: Ferny Koch    Referring Provider:  No referring provider defined for this encounter.    There were no vitals taken for this visit.    Do you need any medication refills at today's visit? No  Alivia Saha RN
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
## 2023-12-14 ENCOUNTER — TELEPHONE (OUTPATIENT)
Dept: NEUROLOGY | Facility: CLINIC | Age: 63
End: 2023-12-14
Payer: COMMERCIAL

## 2023-12-14 NOTE — TELEPHONE ENCOUNTER
----- Message from David Boucher MD sent at 12/14/2023 12:00 PM CST -----  Team - please call patient with the following message. Thanks!    Your MRI of the brain does not show any concerning findings. There are some mild age related changes. Smoking, high blood pressure, uncontrolled diabetes, and high cholesterol can accelerate these changes. I would recommend continuing to follow these risk factors with your primary care provider. We'll reach out again after the EEG test.

## 2023-12-14 NOTE — TELEPHONE ENCOUNTER
Called and spoke with patient. Read off message from Dr. Boucher. Patient stated understanding and no further questions at this time.

## 2023-12-26 ENCOUNTER — TELEPHONE (OUTPATIENT)
Dept: UROLOGY | Facility: CLINIC | Age: 63
End: 2023-12-26
Payer: COMMERCIAL

## 2023-12-26 DIAGNOSIS — Z91.041 CONTRAST MEDIA ALLERGY: Primary | ICD-10-CM

## 2023-12-26 RX ORDER — METHYLPREDNISOLONE 4 MG/1
TABLET ORAL
Qty: 16 TABLET | Refills: 0 | Status: SHIPPED | OUTPATIENT
Start: 2023-12-26 | End: 2024-02-09

## 2023-12-26 NOTE — TELEPHONE ENCOUNTER
[facility] Call Center    Phone Message    May a detailed message be left on voicemail: no     Reason for Call: Other: Patient called to see if he needed to get a medication due to him being allergic to the dye. Please follow up and prescribe medication if needed before the appointment      Action Taken: Other: [place] [facility]    Travel Screening: Not Applicable

## 2023-12-26 NOTE — TELEPHONE ENCOUNTER
Patient has a contrast allergy and pre-medicates prior to imaging with contrast.    Rx sent for Medrol per specialty scope of practice and per previous orders.    Reina Champion RN on 12/26/2023 at 11:39 AM

## 2023-12-27 ENCOUNTER — ANCILLARY PROCEDURE (OUTPATIENT)
Dept: MRI IMAGING | Facility: CLINIC | Age: 63
End: 2023-12-27
Attending: STUDENT IN AN ORGANIZED HEALTH CARE EDUCATION/TRAINING PROGRAM
Payer: COMMERCIAL

## 2023-12-27 DIAGNOSIS — N41.0 ACUTE PROSTATITIS: ICD-10-CM

## 2023-12-27 DIAGNOSIS — N41.2 PROSTATE ABSCESS: ICD-10-CM

## 2023-12-27 PROCEDURE — 72197 MRI PELVIS W/O & W/DYE: CPT | Mod: GC | Performed by: RADIOLOGY

## 2023-12-27 PROCEDURE — A9585 GADOBUTROL INJECTION: HCPCS | Mod: JZ | Performed by: RADIOLOGY

## 2023-12-27 RX ORDER — GADOBUTROL 604.72 MG/ML
7.5 INJECTION INTRAVENOUS ONCE
Status: COMPLETED | OUTPATIENT
Start: 2023-12-27 | End: 2023-12-27

## 2023-12-27 RX ADMIN — GADOBUTROL 7.5 ML: 604.72 INJECTION INTRAVENOUS at 12:09

## 2023-12-28 ENCOUNTER — TELEPHONE (OUTPATIENT)
Dept: NEUROSURGERY | Facility: CLINIC | Age: 63
End: 2023-12-28
Payer: COMMERCIAL

## 2023-12-28 DIAGNOSIS — Z98.1 S/P CERVICAL SPINAL FUSION: Primary | ICD-10-CM

## 2023-12-28 NOTE — TELEPHONE ENCOUNTER
Patient presented to clinic for spouses appointment with Dr. Wooten. Patient reports a lump in his throat still since ACDF 11/12/19. ENT referral placed per verbal order from Dr. Wooten. Sent to the  to schedule consult at next available.    Tracey Castañeda RN on 12/28/2023 at 2:40 PM

## 2024-01-02 ENCOUNTER — ANCILLARY PROCEDURE (OUTPATIENT)
Dept: NEUROLOGY | Facility: CLINIC | Age: 64
End: 2024-01-02
Attending: INTERNAL MEDICINE
Payer: COMMERCIAL

## 2024-01-02 DIAGNOSIS — R56.9 CONVULSIONS, UNSPECIFIED CONVULSION TYPE (H): ICD-10-CM

## 2024-01-02 DIAGNOSIS — R42 DIZZY SPELLS: ICD-10-CM

## 2024-01-03 ENCOUNTER — TELEPHONE (OUTPATIENT)
Dept: UROLOGY | Facility: CLINIC | Age: 64
End: 2024-01-03
Payer: COMMERCIAL

## 2024-01-03 ENCOUNTER — TELEPHONE (OUTPATIENT)
Dept: NEUROSURGERY | Facility: CLINIC | Age: 64
End: 2024-01-03
Payer: COMMERCIAL

## 2024-01-03 NOTE — TELEPHONE ENCOUNTER
I called patient to read the results below from Dr. Boucher. Pt did not want virtual appt and in clinic appt was booked 02/14/2014 10:30am

## 2024-01-03 NOTE — TELEPHONE ENCOUNTER
Called patient to discuss results of prostate MRI, which showed persistence of a prostate abscess. He reports urinary hesitancy, weak stream, and sensation of incomplete bladder emptying. He denies fevers, dysuria, and gross hematuria. These urinary symptoms are new since his colonoscopy in August 2023. He stopped taking tamsulosin.     I will discuss the patient's case with Dr. Graham and call the patient back with an updated plan.

## 2024-01-03 NOTE — TELEPHONE ENCOUNTER
David Boucher MD  [facility] Neurology [facility] Team - Can we call patient with the following message? His EEG test came out normal and did not show any evidence of seizures. It might make sense to do another visit to talk through test results further and next steps. We can do a virtual visit if patient would prefer this over coming in. We can put him on the wait list if no soon appointments are available at this time. Thanks!

## 2024-01-05 ENCOUNTER — TELEPHONE (OUTPATIENT)
Dept: UROLOGY | Facility: CLINIC | Age: 64
End: 2024-01-05
Payer: COMMERCIAL

## 2024-01-05 DIAGNOSIS — N41.2 PROSTATE ABSCESS: Primary | ICD-10-CM

## 2024-01-05 NOTE — TELEPHONE ENCOUNTER
Called patient to review plans to unresolved prostate abscess. Voicemail left with request to return call.

## 2024-01-05 NOTE — TELEPHONE ENCOUNTER
Discussed recommendation for TURP to surgically address prostate abscess from Dr. Graham. The patient is comfortable proceeding with surgery, tentative plan for February 22 in [place]. He is also scheduled for a virtual visit with Dr. Graham on Feb 8 to discuss this further.     He will leave an updated urine sample in [place]. Lab appointment was scheduled.     Lazara Whiteside PA-C  Department of Urology

## 2024-01-08 ENCOUNTER — PREP FOR PROCEDURE (OUTPATIENT)
Dept: UROLOGY | Facility: CLINIC | Age: 64
End: 2024-01-08
Payer: COMMERCIAL

## 2024-01-08 DIAGNOSIS — N41.2 PROSTATE ABSCESS: Primary | ICD-10-CM

## 2024-01-08 RX ORDER — CEFAZOLIN SODIUM 2 G/50ML
2 SOLUTION INTRAVENOUS SEE ADMIN INSTRUCTIONS
Status: CANCELLED | OUTPATIENT
Start: 2024-01-08

## 2024-01-08 RX ORDER — CEFAZOLIN SODIUM 2 G/50ML
2 SOLUTION INTRAVENOUS
Status: CANCELLED | OUTPATIENT
Start: 2024-01-08

## 2024-01-11 DIAGNOSIS — N41.2 PROSTATE ABSCESS: Primary | ICD-10-CM

## 2024-01-11 RX ORDER — CEPHALEXIN 500 MG/1
500 CAPSULE ORAL 2 TIMES DAILY
Qty: 28 CAPSULE | Refills: 0 | Status: CANCELLED | OUTPATIENT
Start: 2024-01-11

## 2024-01-11 NOTE — TELEPHONE ENCOUNTER
Pended prescription for keflex  Please sign if appropriate    Thank you,   Elsie MANTILLA RN  [facility]

## 2024-01-11 NOTE — TELEPHONE ENCOUNTER
Called patient. Voicemail left with request to return call.     Dr. Graham would like him to try another two week course of antibiotics. Need to confirm pharmacy to send the prescription to.

## 2024-01-11 NOTE — TELEPHONE ENCOUNTER
Reason for Call:  Other returning call and prescription    Detailed comments: Pt returning call. Message read to pt and would like prescription sent to [facility]    Phone Number Patient can be reached at: Cell number on file:    Telephone Information:   Mobile [phone]       Best Time:       Can we leave a detailed message on this number? YES    Call taken on 1/11/2024 at 4:20 PM by Madeline Alvarado MA

## 2024-01-12 RX ORDER — DOXYCYCLINE 100 MG/1
100 CAPSULE ORAL 2 TIMES DAILY
Qty: 28 CAPSULE | Refills: 0 | Status: SHIPPED | OUTPATIENT
Start: 2024-01-12 | End: 2024-01-26

## 2024-01-16 ENCOUNTER — HOSPITAL ENCOUNTER (OUTPATIENT)
Facility: CLINIC | Age: 64
End: 2024-01-16
Attending: UROLOGY | Admitting: UROLOGY
Payer: COMMERCIAL

## 2024-01-16 ENCOUNTER — TELEPHONE (OUTPATIENT)
Dept: UROLOGY | Facility: CLINIC | Age: 64
End: 2024-01-16
Payer: COMMERCIAL

## 2024-01-16 DIAGNOSIS — N39.0 URINARY TRACT INFECTION: Primary | ICD-10-CM

## 2024-01-16 NOTE — TELEPHONE ENCOUNTER
Patient Returning Call    Reason for call:  Pt would like clarification on lab work appts. They were told they need a urine sample done the day of their pre-op on 02/09/2024, but they have a urine sample lab appt scheduled for 01/31/2024 as well. Pt is unsure if they should cancel the one on 01/31/2024 or not    Information relayed to patient:  N/A    Patient has additional questions:  No      Okay to leave a detailed message?: Yes at Cell number on file:    Telephone Information:   [employer] [phone]

## 2024-02-08 ENCOUNTER — VIRTUAL VISIT (OUTPATIENT)
Dept: UROLOGY | Facility: CLINIC | Age: 64
End: 2024-02-08
Payer: COMMERCIAL

## 2024-02-08 DIAGNOSIS — N41.2 PROSTATE ABSCESS: Primary | ICD-10-CM

## 2024-02-08 PROCEDURE — 99215 OFFICE O/P EST HI 40 MIN: CPT | Mod: 95 | Performed by: UROLOGY

## 2024-02-08 NOTE — PROGRESS NOTES
Miguel A is a 63 year old who is being evaluated via a billable video visit.      How would you like to obtain your AVS? MyChart  If the video visit is dropped, the invitation should be resent by: Text to cell phone: [phone]  Will anyone else be joining your video visit? No              Video-Visit Details    Type of service:  Video Visit   Video Start Time:  2:15pm    CC prostatae abscess    HPI: 64 yo male who developed dysuria after a colonoscopy in August 2023.  Developed prostate abscess treated with IV antibiotics.  Abscesses persisted and patient is scheduled for TURP in the near future.  Has repeat imaging scheduled beforehand.  Just completed a month of doxycycline a few days ago and in on tamsulosin.    PMHX: BPH, DM, Diverticulitis, carotid stenosis  PSHx: reviewed in chart  MEDS: ASA, flonase, glyburide, losartan, metformin, requip, rosuvastatin, tamsulosin  ALLERGY: contrast, cipro, tape      OBJECTIVE:   CT scan 9/4/23: bilat prostate abscesses (3.4 cm on right, 1.2 cm om the left)  CT scan 10/17/23: 2cm abscess on the right  MRI 12/27/23: 2.5 cm right mid prostate abscess 2 other smaller abscesses noted as well.    PSA 12/5/23 1.85 ng/mL  HgbA1c: 11.9 9/4/23    Video end time: 2:57pm      ASSESSMENT AND PLAN: Over half of today's 42-minute visit was spent reviewing the chart, results and counseling the patient regarding his prostate abscesses.  Patient obtaining repeat imaging next week.  We will bring him to the OR if the abscesses not resolved.  We discussed the risk and benefits of TURP in this setting including incontinence, ED, retrograde ejaculation, recurrence of disease.  We will confirm with the patient after his imaging next week.      Video End Time:2:59 PM    Originating Location (pt. Location): Home    Distant Location (provider location):  On-site  Platform used for Video Visit: Zo  Signed Electronically by: Amandeep Graham MD      Topical Clindamycin Counseling: Patient counseled that this medication may cause skin irritation or allergic reactions.  In the event of skin irritation, the patient was advised to reduce the amount of the drug applied or use it less frequently.   The patient verbalized understanding of the proper use and possible adverse effects of clindamycin.  All of the patient's questions and concerns were addressed.

## 2024-02-09 ENCOUNTER — LAB (OUTPATIENT)
Dept: LAB | Facility: OTHER | Age: 64
End: 2024-02-09
Payer: COMMERCIAL

## 2024-02-09 ENCOUNTER — OFFICE VISIT (OUTPATIENT)
Dept: FAMILY MEDICINE | Facility: OTHER | Age: 64
End: 2024-02-09
Payer: COMMERCIAL

## 2024-02-09 ENCOUNTER — TELEPHONE (OUTPATIENT)
Dept: UROLOGY | Facility: CLINIC | Age: 64
End: 2024-02-09

## 2024-02-09 VITALS
SYSTOLIC BLOOD PRESSURE: 130 MMHG | HEART RATE: 101 BPM | DIASTOLIC BLOOD PRESSURE: 62 MMHG | BODY MASS INDEX: 29.5 KG/M2 | RESPIRATION RATE: 16 BRPM | OXYGEN SATURATION: 98 % | WEIGHT: 198 LBS | TEMPERATURE: 98 F

## 2024-02-09 DIAGNOSIS — E11.9 TYPE 2 DIABETES MELLITUS WITHOUT COMPLICATION, WITHOUT LONG-TERM CURRENT USE OF INSULIN (H): ICD-10-CM

## 2024-02-09 DIAGNOSIS — G47.33 OSA (OBSTRUCTIVE SLEEP APNEA): ICD-10-CM

## 2024-02-09 DIAGNOSIS — Z01.818 PREOP GENERAL PHYSICAL EXAM: Primary | ICD-10-CM

## 2024-02-09 DIAGNOSIS — I10 HTN, GOAL BELOW 140/80: ICD-10-CM

## 2024-02-09 DIAGNOSIS — K61.1 PERIRECTAL ABSCESS: ICD-10-CM

## 2024-02-09 DIAGNOSIS — K08.9 POOR DENTITION: ICD-10-CM

## 2024-02-09 DIAGNOSIS — R13.11 ORAL PHASE DYSPHAGIA: ICD-10-CM

## 2024-02-09 DIAGNOSIS — I65.21 CAROTID STENOSIS, RIGHT: ICD-10-CM

## 2024-02-09 DIAGNOSIS — N41.2 PROSTATE ABSCESS: ICD-10-CM

## 2024-02-09 DIAGNOSIS — E11.65 UNCONTROLLED TYPE 2 DIABETES MELLITUS WITH HYPERGLYCEMIA (H): ICD-10-CM

## 2024-02-09 DIAGNOSIS — N39.0 URINARY TRACT INFECTION: ICD-10-CM

## 2024-02-09 DIAGNOSIS — R00.0 SINUS TACHYCARDIA: ICD-10-CM

## 2024-02-09 PROBLEM — C44.310 BCC (BASAL CELL CARCINOMA), FACE: Status: RESOLVED | Noted: 2023-05-23 | Resolved: 2024-02-09

## 2024-02-09 PROBLEM — L82.1 SEBORRHEIC KERATOSIS: Status: RESOLVED | Noted: 2019-07-30 | Resolved: 2024-02-09

## 2024-02-09 PROBLEM — K57.30 DIVERTICULAR DISEASE OF COLON: Status: RESOLVED | Noted: 2023-08-15 | Resolved: 2024-02-09

## 2024-02-09 LAB
ALBUMIN SERPL BCG-MCNC: 4.4 G/DL (ref 3.5–5.2)
ALBUMIN UR-MCNC: NEGATIVE MG/DL
ALP SERPL-CCNC: 99 U/L (ref 40–150)
ALT SERPL W P-5'-P-CCNC: 22 U/L (ref 0–70)
ANION GAP SERPL CALCULATED.3IONS-SCNC: 12 MMOL/L (ref 7–15)
APPEARANCE UR: CLEAR
AST SERPL W P-5'-P-CCNC: 20 U/L (ref 0–45)
BILIRUB SERPL-MCNC: 0.3 MG/DL
BILIRUB UR QL STRIP: NEGATIVE
BUN SERPL-MCNC: 16.5 MG/DL (ref 8–23)
CALCIUM SERPL-MCNC: 9.1 MG/DL (ref 8.8–10.2)
CHLORIDE SERPL-SCNC: 100 MMOL/L (ref 98–107)
COLOR UR AUTO: YELLOW
CREAT SERPL-MCNC: 0.9 MG/DL (ref 0.67–1.17)
DEPRECATED HCO3 PLAS-SCNC: 24 MMOL/L (ref 22–29)
EGFRCR SERPLBLD CKD-EPI 2021: >90 ML/MIN/1.73M2
ERYTHROCYTE [DISTWIDTH] IN BLOOD BY AUTOMATED COUNT: 11.9 % (ref 10–15)
GLUCOSE SERPL-MCNC: 303 MG/DL (ref 70–99)
GLUCOSE UR STRIP-MCNC: >=1000 MG/DL
HBA1C MFR BLD: 11.5 % (ref 0–5.6)
HCT VFR BLD AUTO: 43.7 % (ref 40–53)
HGB BLD-MCNC: 14.8 G/DL (ref 13.3–17.7)
HGB UR QL STRIP: NEGATIVE
KETONES UR STRIP-MCNC: NEGATIVE MG/DL
LEUKOCYTE ESTERASE UR QL STRIP: NEGATIVE
MCH RBC QN AUTO: 31.4 PG (ref 26.5–33)
MCHC RBC AUTO-ENTMCNC: 33.9 G/DL (ref 31.5–36.5)
MCV RBC AUTO: 93 FL (ref 78–100)
NITRATE UR QL: NEGATIVE
PH UR STRIP: 6 [PH] (ref 5–7)
PLATELET # BLD AUTO: 202 10E3/UL (ref 150–450)
POTASSIUM SERPL-SCNC: 4.5 MMOL/L (ref 3.4–5.3)
PROT SERPL-MCNC: 7.7 G/DL (ref 6.4–8.3)
RBC # BLD AUTO: 4.72 10E6/UL (ref 4.4–5.9)
RBC #/AREA URNS AUTO: NORMAL /HPF
SODIUM SERPL-SCNC: 136 MMOL/L (ref 135–145)
SP GR UR STRIP: 1.02 (ref 1–1.03)
UROBILINOGEN UR STRIP-ACNC: 0.2 E.U./DL
WBC # BLD AUTO: 9.9 10E3/UL (ref 4–11)
WBC #/AREA URNS AUTO: NORMAL /HPF

## 2024-02-09 PROCEDURE — 83036 HEMOGLOBIN GLYCOSYLATED A1C: CPT | Performed by: PHYSICIAN ASSISTANT

## 2024-02-09 PROCEDURE — 80053 COMPREHEN METABOLIC PANEL: CPT | Performed by: PHYSICIAN ASSISTANT

## 2024-02-09 PROCEDURE — 85027 COMPLETE CBC AUTOMATED: CPT | Performed by: PHYSICIAN ASSISTANT

## 2024-02-09 PROCEDURE — 93000 ELECTROCARDIOGRAM COMPLETE: CPT | Performed by: PHYSICIAN ASSISTANT

## 2024-02-09 PROCEDURE — 87086 URINE CULTURE/COLONY COUNT: CPT

## 2024-02-09 PROCEDURE — 81001 URINALYSIS AUTO W/SCOPE: CPT

## 2024-02-09 PROCEDURE — 36415 COLL VENOUS BLD VENIPUNCTURE: CPT | Performed by: PHYSICIAN ASSISTANT

## 2024-02-09 PROCEDURE — 99214 OFFICE O/P EST MOD 30 MIN: CPT | Mod: 25 | Performed by: PHYSICIAN ASSISTANT

## 2024-02-09 RX ORDER — LOSARTAN POTASSIUM 25 MG/1
25 TABLET ORAL AT BEDTIME
Qty: 90 TABLET | Refills: 3 | Status: SHIPPED | OUTPATIENT
Start: 2024-02-09

## 2024-02-09 RX ORDER — GLYBURIDE 5 MG/1
10 TABLET ORAL 2 TIMES DAILY WITH MEALS
Qty: 360 TABLET | Refills: 1 | Status: SHIPPED | OUTPATIENT
Start: 2024-02-09 | End: 2024-08-09

## 2024-02-09 ASSESSMENT — PAIN SCALES - GENERAL: PAINLEVEL: NO PAIN (0)

## 2024-02-09 NOTE — TELEPHONE ENCOUNTER
"Note on Preop states:  \"Uncontrolled diabetes. Advised that he increase dose of medications and see diabetic education for insulin therapy. Would advise holding off on surgery until his diabetes can come under more appropriate control. I will leave this up to the surgeon to consider risks versus benefits.\"  ______________________________    Huddle msg sent to Dr Graham (who is not in [place] today) for direction.      Bhavana SADLER   Specialty Clinic RN  "

## 2024-02-09 NOTE — TELEPHONE ENCOUNTER
Per ABHIJEET Sparrow LPN who sets up Surgery and pre teaching stated Urine Glucose >1000 indicates surgery should be postponed.    Attempted to reach the Preop providers office to advise and it rang then went to fast busy signal.    Will readdress when return to clinic Monday if patient has not already postponed under the indicated advise by Preop provider that he should.    Bhavana SADLER   Specialty Clinic [facility]
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
## 2024-02-09 NOTE — TELEPHONE ENCOUNTER
"Per Dr Graham:     \"If he is able to get his sugars under control by then, Should be OK.  We can see how it is going the week of his surgery.\"    Bhavana SADLER   [facility] RN  "

## 2024-02-09 NOTE — TELEPHONE ENCOUNTER
[facility] Call Center    Phone Message    May a detailed message be left on voicemail: yes     Reason for Call: Other: . Pt had a pre op today 2/9/24, they are still waiting on some lab results, however pts A1c is elevated, and unsure if surgery can still take place on 2/22/24, Mallory with Ferny Koch PA-C office is hoping for a call back at [phone], thank you!    Action Taken: Message routed to:  Other: uro    Travel Screening: Not Applicable

## 2024-02-09 NOTE — Clinical Note
Uncontrolled diabetes and questionable compliance with medication regimen.  Made adjustments to medications today and advised that he see diabetic education again but uncertain as to his desires to comply.  Risks outweigh benefits in my opinion but we will leave this up to you.

## 2024-02-11 LAB — BACTERIA UR CULT: NO GROWTH

## 2024-02-12 NOTE — TELEPHONE ENCOUNTER
Called patient and he was in the car at the time said he would call back sometime today to discuss his lab results.    MEL Shelton
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
## 2024-02-12 NOTE — TELEPHONE ENCOUNTER
----- Message from Ferny Koch PA-C sent at 2/11/2024  8:47 AM CST -----  Electrolytes, kidney and liver function stable at this time.  Blood sugar consistent with very poor control of diabetes as discussed.  Medications changed as discussed on the phone and recheck in 3 months.  Potentially poor healing from any surgical procedure is a risk.  Would advise getting diabetes under control before any elective surgery.  Electronically signed:    Ferny Gomez PA-C

## 2024-02-12 NOTE — TELEPHONE ENCOUNTER
Patient called back and relayed results from [facility].  No concerns at time.

## 2024-02-13 NOTE — PROGRESS NOTES
ENT Consultation    Miguel A Vaughn who is a 63 year old male seen in consultation at the request of Dr. Memo Wooten.      History of Present Illness - Miguel A Vaughn is a 63 year old male presents with difficulty swallowing causing severe cough laryngospasm hearing loss passes out constant feeling of increasing phlegm in his throat at the level of the sternal notch most problems starting few years ago after a cervical fusion surgery.  He had significant fusion done higher up close to the brainstem given the swallowing issue happens with liquids and solids.  Prior video swallow few years ago when this started did not show any significant pathology and was essentially normal.  It did not show any evidence of aspiration.  And those symptoms are very sporadic and not consistent.  He denies any GERD symptomatology.  He definitely has limited range of motion of his neck.  He also endorses that when he tips his head neck up he almost passes out.  He has had extensive imaging workup recently to look at those areas of the vascular domain of the neck..  Patient also some difficulty breathing through his nose especially after having had serious of basal cell tumors removed around nasal dorsum and nasal tip.        CTA NECK WITH CONTRAST 11/10/2023 9:37 AM     CT angiogram of the neck   Reconstruction on the 3D workstation     Provided History:  evaluate for vertebrobasilar stenosis / perform  scan with head extended; Dizzy spells  ICD-10: Dizzy spells     Comparison:  CT neck 9/26/2019. MRA neck 3/13/2019.       Technique:  NECK CTA: During rapid bolus intravenous injection of nonionic  contrast material, axial images were obtained using thin collimation  multidetector helical technique from the base of the upper aortic arch  through the carotid siphons. This CT angiogram data was reconstructed  at thin intervals with mild overlap. Images were sent to the 3D  workstation, and 3D reconstructions were obtained. The axial  source  images, multiplanar reformations, 3D reconstructions in both maximum  intensity projection display and volume rendered models were reviewed,  with reconstructions performed by the technologist.     Contrast: 70ml isovue 370     Findings:.     Neck CTA demonstrates no stenosis of the major cervical arteries. The  origins of the great vessels from the aortic arch are patent. The  normal distal right internal carotid artery measures 3.8 mm. The  normal distal left internal carotid artery measures 3.6 mm.      No mass within the visualized portions of the cervical soft tissues or  lung apices. Postsurgical changes of anterior fusion of C3-C4 and  C6-C7. Ankylosis of C4-C6. No acute fracture or subluxation of the  cervical spine.                                                                      Impression:    1. Neck CTA demonstrates no severe stenosis of the major cervical  arteries.  2. Stable postsurgical changes of C3-C4 and C6-7 anterior fusion.    CT CERVICAL SPINE WO IV CONTRAST     HISTORY: Neck trauma (Age >= 65y);     COMPARISON: None.     TECHNIQUE: Unenhanced CT of the cervical spine. Multiplanar reformats.     FINDINGS: Anterior fusion discectomy at C3-C4 and C6-C7. There is also interbody fusion at C5-C6. There is borderline congenital narrowing of the cervical spinal canal. Mineralization of the posterior longitudinal ligament at C2, C4, C5 and C6. The prevertebral soft tissues are within normal limits. Straightening of cervical lordosis. There is ankylosis of the right C2-C3 facet joints. Mild multilevel facet spurring. Mucosal thickening in the paranasal sinuses. Small air-fluid level in the left maxillary sinus. Visualized mastoid air cells are clear. There is a bifid spinous process at C2. No acute cervical spine fracture. Atherosclerotic calcification in the aorta and mildly at the carotid bifurcations.     IMPRESSION: Intact cervical spine with postsurgical and degenerative changes.  Paranasal sinus mucosal disease.     VIDEO SWALLOW WITHOUT ESOPHAGRAM 8/15/2019 10:57 AM      HISTORY: Status post cervical spinal fusion.     FINDINGS: Single moderate-sized swallow of thin barium given by cup  was unremarkable. However, a larger swallow followed by consecutive  swallows of thin barium resulted in a small amount of laryngeal  penetration but no aspiration. When the thin barium was given by a  straw with consecutive swallows there was no evidence for penetration  or aspiration.     No abnormalities were seen with pudding, semisolid, solid consistency  barium or a barium pill. Minimal vallecular residue was seen with  solid consistency barium but this rapidly cleared with a second  swallow.  BP Readings from Last 1 Encounters:   02/09/24 130/62       BP noted to be well controlled today in office.     Miguel A IS  a smoker/not uses chewing tobacco.  Miguel A is not ready to quit      Past Medical History -   Past Medical History:   Diagnosis Date    Abrasion 07/06/2011    multiple       Abrasion of buttock 07/06/2011    AK (actinic keratosis) 10/25/2012    BCC (basal cell carcinoma), face - suspected nasal 05/23/2023    Benign neoplasm of brain (H)     Cervicalgia     CSOM (chronic suppurative otitis media) 10/31/2011    Depressive disorder, not elsewhere classified 07/16/2008    Deviated nasal septum     Diabetes (H)     Diverticular disease of colon 08/15/2023    Family history of colonic polyps     Headache(784.0)     Hemorrhoids, internal     History of colonic polyps 08/15/2023    Hyperlipidemia LDL goal <130 07/12/2011    Hypersomnia with sleep apnea, unspecified     BENJAMIN (obstructive sleep apnea) 10/12/2011    BENJAMIN (obstructive sleep apnea)     Other encephalopathy     Perirectal abscess s/p I&D 08/05/2016    Seborrheic keratosis - right mid back 07/30/2019    Sprain of right ankle 07/07/2011    Unspecified disorder of adrenal glands        Current Medications -   Current Outpatient Medications:      acetaminophen (TYLENOL) 500 MG tablet, Take 1,000 mg by mouth every 8 hours as needed for mild pain, Disp: , Rfl:     alcohol swab prep pads, Use to swab area of injection/erwin as directed., Disp: 100 each, Rfl: 3    aspirin (ASA) 81 MG EC tablet, Take 1 tablet (81 mg) by mouth daily, Disp: 90 tablet, Rfl: 3    blood glucose (NO BRAND SPECIFIED) test strip, Use to test blood sugar 1 times daily or as directed. To accompany: Blood Glucose Monitor Brands: per insurance., Disp: 100 strip, Rfl: 6    blood glucose calibration (NO BRAND SPECIFIED) solution, To accompany: Blood Glucose Monitor Brands: per insurance., Disp: 100 each, Rfl: 3    blood glucose monitoring (NO BRAND SPECIFIED) meter device kit, Use to test blood sugar 1 times daily or as directed. Preferred blood glucose meter OR supplies to accompany: Blood Glucose Monitor Brands: per insurance., Disp: 1 kit, Rfl: 0    fluticasone (FLONASE) 50 MCG/ACT spray, INHALE 1-2 SPRAYS IN EACH NOSTRIL ONCE DAILY, Disp: 16 g, Rfl: 5    glyBURIDE (DIABETA /MICRONASE) 5 MG tablet, Take 2 tablets (10 mg) by mouth 2 times daily (with meals), Disp: 360 tablet, Rfl: 1    losartan (COZAAR) 25 MG tablet, Take 1 tablet (25 mg) by mouth at bedtime, Disp: 90 tablet, Rfl: 3    metFORMIN (GLUCOPHAGE) 500 MG tablet, Take 2 tablets (1,000 mg) by mouth 2 times daily (with meals), Disp: 360 tablet, Rfl: 1    order for DME, Equipment ordered: RESMED Auto PAP Mask type: Full face  Settings: 10-15 CM H2O, Disp: , Rfl:     rOPINIRole (REQUIP) 0.25 MG tablet, TAKE 1 TO 2 TABLETS BY MOUTH AT BEDTIME AS NEEDED FOR  RESTLESS  LEGS, Disp: 60 tablet, Rfl: 0    rosuvastatin (CRESTOR) 20 MG tablet, Take 1 tablet (20 mg) by mouth daily, Disp: 30 tablet, Rfl: 11    tamsulosin (FLOMAX) 0.4 MG capsule, Take 1 capsule (0.4 mg) by mouth every evening, Disp: 90 capsule, Rfl: 1    thin (NO BRAND SPECIFIED) lancets, Use with lanceting device. To accompany: Blood Glucose Monitor Brands: per insurance., Disp:  "100 each, Rfl: 1    Allergies -   Allergies   Allergen Reactions    Iodinated Contrast Media Hives    Ciprofloxacin Muscle Pain (Myalgia)     Body aches and pain    No Known Drug Allergy     Tape [Adhesive Tape]      Pt states no  \"Surgical tape\"  IV tegaderm ok       Social History -   Social History     Socioeconomic History    Marital status:    Tobacco Use    Smoking status: Every Day     Packs/day: 0.50     Years: 25.00     Additional pack years: 0.00     Total pack years: 12.50     Types: Cigarettes     Last attempt to quit: 2019     Years since quittin.2    Smokeless tobacco: Former     Types: Snuff   Vaping Use    Vaping Use: Never used   Substance and Sexual Activity    Alcohol use: Yes     Alcohol/week: 0.0 standard drinks of alcohol     Comment: weekends    Drug use: No    Sexual activity: Yes     Partners: Female   Other Topics Concern     Service No    Blood Transfusions No    Caffeine Concern No    Occupational Exposure No    Hobby Hazards No    Sleep Concern Yes     Comment: C-Pap     Stress Concern No    Weight Concern Yes    Special Diet No    Back Care No    Exercise No    Seat Belt Yes    Self-Exams No    Parent/sibling w/ CABG, MI or angioplasty before 65F 55M? Yes     Social Determinants of Health     Interpersonal Safety: Low Risk  (2024)    Interpersonal Safety     Do you feel physically and emotionally safe where you currently live?: Yes     Within the past 12 months, have you been hit, slapped, kicked or otherwise physically hurt by someone?: No     Within the past 12 months, have you been humiliated or emotionally abused in other ways by your partner or ex-partner?: No       Family History -   Family History   Problem Relation Age of Onset    Cancer Father         skin cancer    Cancer Sister         skin, ovarian    Breast Cancer Sister     Cancer Brother         skin    Diabetes No family hx of        Review of Systems - As per HPI and PMHx, otherwise review of " system review of the head and neck negative. Otherwise 10+ review of system is negative    Physical Exam  There were no vitals taken for this visit.  BMI: There is no height or weight on file to calculate BMI.    General - The patient is well nourished and well developed, and appears to have good nutritional status.  Alert and oriented to person and place, answers questions and cooperates with examination appropriately.    SKIN - No suspicious lesions or rashes.  Respiration - No respiratory distress.  Head and Face - Normocephalic and atraumatic, with no gross asymmetry noted of the contour of the facial features.  The facial nerve is intact, with strong symmetric movements.    Voice and Breathing - The patient was breathing comfortably without the use of accessory muscles. The patients voice was clear and strong, and had appropriate pitch and quality.    Ears - Bilateral pinna and EACs with normal appearing overlying skin. Tympanic membrane intact with good mobility on pneumatic otoscopy bilaterally. Bony landmarks of the ossicular chain are normal. The tympanic membranes are normal in appearance. No retraction, perforation, or masses.  No fluid or purulence was seen in the external canal or the middle ear.     Eyes - Extraocular movements intact.  Sclera were not icteric or injected, conjunctiva were pink and moist.    Mouth - Examination of the oral cavity showed pink, healthy oral mucosa. No lesions or ulcerations noted.  The tongue was mobile and midline, and the dentition were in good condition.      Throat - The walls of the oropharynx were smooth, pink, moist, symmetric, and had no lesions or ulcerations.  The tonsillar pillars and soft palate were symmetric.  The uvula was midline on elevation.    Neck - Normal midline excursion of the laryngotracheal complex during swallowing.  Full range of motion on passive movement.  Palpation of the occipital, submental, submandibular, internal jugular chain, and  supraclavicular nodes did not demonstrate any abnormal lymph nodes or masses.  The carotid pulse was palpable bilaterally.  Palpation of the thyroid was soft and smooth, with no nodules or goiter appreciated.  The trachea was mobile and midline.    Nose - External contour is demonstrated some scarring at the tip of the nose from multiple excisions of basal cell carcinoma with local reconstruction.  Nasal mucosa is pink and moist with no abnormal mucus.  The septum was midline and non-obstructive, turbinates of normal size and position.  No polyps, masses, or purulence noted on examination.  Indeed upward rotational nasal tip that shows a little bit of droop and with rotation significantly improves his nasal breathing.  Neuro - Nonfocal neuro exam is normal, CN 2 through 12 intact, normal gait and muscle tone.      Performed in clinic today:  Attempts at mirror laryngoscopy were not possible due to gag reflex.  Therefore I proceeded with a fiberoptic examination.  First I sprayed both sides of the nose with a mixture of lidocaine and neosynephrine.  I then passed the scope through the nasal cavity.  The nasal cavity was unremarkable.  The nasopharynx was mucosally covered and symmetric.  The Eustachian tube openings were unobstructed.  Going further down I had a clear view of the base of tongue which had normal appearing lingual tonsillar tissue.  The base of tongue was free of lesions, and the vallecula was open.  The epiglottis was smooth and mucosally covered.  The supraglottic larynx was then clearly visualized.  The vocal cords moved smoothly and symmetrically, they were pearly white and no lesions were seen.  The pyriform sinuses were open, and the limited view of the postcricoid region did not show any lesions. [id number]      A/P - Miguel A HAN Roderick is a 63 year old male with chronic refractory cough in response to unknown stimuli such as swallowing some liquids or solids or just  spontaneous at times.  Started after his complex cervical fusion.  Probably is related to that.  We discussed different aspects of the cough both positional and neurogenic.  Breathing control also is important.  At this point more conservative approach with the breathing exercises as we send the patient to speech therapy swallowing exercises.  Would like to get functional endoscopic evaluation of swallowing done with patient bringing pudding for thicker foods and juice as we would scope him to see if indeed there is some penetration of the larynx we will set her up for next visit in a couple months.  Initially we will start the therapy.  Also gabapentin will be prescribed to see if some of the neurogenic reflexes can be blunted.  Finally patient already has an appointment for facial plastics ENT surgeon to evaluate his nose in [place].  Will strongly encourage him to keep that visit.      Tomas Whitman MD

## 2024-02-14 ENCOUNTER — OFFICE VISIT (OUTPATIENT)
Dept: NEUROLOGY | Facility: CLINIC | Age: 64
End: 2024-02-14
Payer: COMMERCIAL

## 2024-02-14 VITALS
HEART RATE: 93 BPM | BODY MASS INDEX: 27.4 KG/M2 | DIASTOLIC BLOOD PRESSURE: 73 MMHG | HEIGHT: 69 IN | SYSTOLIC BLOOD PRESSURE: 127 MMHG | WEIGHT: 185 LBS

## 2024-02-14 DIAGNOSIS — R42 DIZZY SPELLS: Primary | ICD-10-CM

## 2024-02-14 DIAGNOSIS — R56.9 CONVULSIONS, UNSPECIFIED CONVULSION TYPE (H): ICD-10-CM

## 2024-02-14 PROCEDURE — 99214 OFFICE O/P EST MOD 30 MIN: CPT | Performed by: INTERNAL MEDICINE

## 2024-02-14 NOTE — NURSING NOTE
"Miguel A Vaughn's goals for this visit include:   Chief Complaint   Patient presents with    RECHECK     Seizures/dizzy  next step       He requests these members of his care team be copied on today's visit information: yes    PCP: Ferny Koch    Referring Provider:  No referring provider defined for this encounter.    /73   Pulse 93   Ht 1.753 m (5' 9\")   Wt 83.9 kg (185 lb)   BMI 27.32 kg/m      Do you need any medication refills at today's visit? No  OPAL Mcdaniel, CMA ([facility])      "
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
## 2024-02-15 ENCOUNTER — HOSPITAL ENCOUNTER (OUTPATIENT)
Dept: MRI IMAGING | Facility: CLINIC | Age: 64
Discharge: HOME | End: 2024-02-15
Attending: UROLOGY | Admitting: UROLOGY
Payer: COMMERCIAL

## 2024-02-15 DIAGNOSIS — N41.2 PROSTATE ABSCESS: ICD-10-CM

## 2024-02-15 PROCEDURE — 72197 MRI PELVIS W/O & W/DYE: CPT

## 2024-02-15 PROCEDURE — 258N000003 HC RX IP 258 OP 636: Performed by: UROLOGY

## 2024-02-15 PROCEDURE — A9585 GADOBUTROL INJECTION: HCPCS | Performed by: UROLOGY

## 2024-02-15 PROCEDURE — 255N000002 HC RX 255 OP 636: Performed by: UROLOGY

## 2024-02-15 RX ORDER — GADOBUTROL 604.72 MG/ML
8 INJECTION INTRAVENOUS ONCE
Status: COMPLETED | OUTPATIENT
Start: 2024-02-15 | End: 2024-02-15

## 2024-02-15 RX ADMIN — SODIUM CHLORIDE 50 ML: 9 INJECTION, SOLUTION INTRAVENOUS at 12:59

## 2024-02-15 RX ADMIN — GADOBUTROL 8 ML: 604.72 INJECTION INTRAVENOUS at 13:00

## 2024-02-18 ENCOUNTER — ANESTHESIA EVENT (OUTPATIENT)
Dept: SURGERY | Facility: CLINIC | Age: 64
End: 2024-02-18
Payer: COMMERCIAL

## 2024-02-18 RX ORDER — ACETAMINOPHEN 325 MG/1
975 TABLET ORAL ONCE
Status: CANCELLED | OUTPATIENT
Start: 2024-02-18 | End: 2024-02-18

## 2024-02-18 RX ORDER — SODIUM CHLORIDE, SODIUM LACTATE, POTASSIUM CHLORIDE, CALCIUM CHLORIDE 600; 310; 30; 20 MG/100ML; MG/100ML; MG/100ML; MG/100ML
INJECTION, SOLUTION INTRAVENOUS CONTINUOUS
Status: CANCELLED | OUTPATIENT
Start: 2024-02-18

## 2024-02-18 RX ORDER — GABAPENTIN 300 MG/1
300 CAPSULE ORAL
Status: CANCELLED | OUTPATIENT
Start: 2024-02-18

## 2024-02-18 RX ORDER — LIDOCAINE 40 MG/G
CREAM TOPICAL
Status: CANCELLED | OUTPATIENT
Start: 2024-02-18

## 2024-02-18 ASSESSMENT — LIFESTYLE VARIABLES: TOBACCO_USE: 1
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
## 2024-02-19 ENCOUNTER — TELEPHONE (OUTPATIENT)
Dept: UROLOGY | Facility: CLINIC | Age: 64
End: 2024-02-19
Payer: COMMERCIAL

## 2024-02-19 NOTE — TELEPHONE ENCOUNTER
Spoke with Ferny Koch PA-C in regards to glucose  Likely months for BG control.   Up to Dr. Graham to determine necessity of surgery.   Concern for healing post-op with uncontrolled DM  Message sent to Dr. Graham to discuss.    Elsie MANTILLA RN  [facility]

## 2024-02-19 NOTE — TELEPHONE ENCOUNTER
[facility]    Phone Message    May a detailed message be left on voicemail: yes     Reason for Call: Requesting Results     Name/type of test: MRI Prostate  Date of test: 2/15/24  Was test done at a location other than [facility] (Please fill in the location if not [facility])?: No    Pt is concerned that he has not heard back about the MRI results from 2/15/24 but was contacted today to schedule a procedure for this Thurs, 2/22. Please review and call pt back to discuss results further as they were to determine if he was to continue with procedure or not. Pt has an appt this afternoon and will not be available until after 3pm, otherwise just for a bit longer this morning here. Thank you!    Sending HP TE since procedure is in just a couple days and pt needs to plan accordingly. Thanks again!    Action Taken: Message routed to:  Other: IRENE BECK SP RN POOL [[id number]]    Travel Screening: Not Applicable

## 2024-02-20 ENCOUNTER — TELEPHONE (OUTPATIENT)
Dept: UROLOGY | Facility: CLINIC | Age: 64
End: 2024-02-20

## 2024-02-20 NOTE — TELEPHONE ENCOUNTER
Per Dr Graham , pts surg has been cancelled, his diabetes is out of control , patient will follow up with his primary in may then have a appt with Dr Graham to move forward [facility] surg, unless he has symptoms, then Dr Graham will decide what to do.  forrest joiner LPN

## 2024-02-21 NOTE — TELEPHONE ENCOUNTER
Surgery cancelled with Dr. Graham  CRNA reviewed. Patient needs better BG control for [facility] OR    Elsie Valderrama RN on 2/21/2024 at 11:19 AM

## 2024-02-22 ENCOUNTER — ANESTHESIA (OUTPATIENT)
Dept: SURGERY | Facility: CLINIC | Age: 64
End: 2024-02-22
Payer: COMMERCIAL

## 2024-02-26 ENCOUNTER — OFFICE VISIT (OUTPATIENT)
Dept: OTOLARYNGOLOGY | Facility: CLINIC | Age: 64
End: 2024-02-26
Payer: COMMERCIAL

## 2024-02-26 VITALS
DIASTOLIC BLOOD PRESSURE: 68 MMHG | TEMPERATURE: 97.3 F | HEART RATE: 95 BPM | HEIGHT: 70 IN | SYSTOLIC BLOOD PRESSURE: 124 MMHG | WEIGHT: 197.8 LBS | BODY MASS INDEX: 28.32 KG/M2 | OXYGEN SATURATION: 97 %

## 2024-02-26 DIAGNOSIS — J34.89 NASAL OBSTRUCTION: ICD-10-CM

## 2024-02-26 DIAGNOSIS — M95.0 ACQUIRED NASAL DEFORMITY: ICD-10-CM

## 2024-02-26 DIAGNOSIS — R05.3 REFRACTORY CHRONIC COUGH: Primary | ICD-10-CM

## 2024-02-26 DIAGNOSIS — Z98.1 S/P CERVICAL SPINAL FUSION: ICD-10-CM

## 2024-02-26 PROCEDURE — 99204 OFFICE O/P NEW MOD 45 MIN: CPT | Mod: 25 | Performed by: OTOLARYNGOLOGY

## 2024-02-26 PROCEDURE — 31575 DIAGNOSTIC LARYNGOSCOPY: CPT | Performed by: OTOLARYNGOLOGY

## 2024-02-26 RX ORDER — GABAPENTIN 100 MG/1
100 CAPSULE ORAL 3 TIMES DAILY PRN
Qty: 90 CAPSULE | Refills: 1 | Status: SHIPPED | OUTPATIENT
Start: 2024-02-26 | End: 2024-03-27

## 2024-02-26 ASSESSMENT — PAIN SCALES - GENERAL: PAINLEVEL: MODERATE PAIN (5)

## 2024-02-26 NOTE — LETTER
2/26/2024         RE: Miguel A Vaughn  [address]  [place] [address]        Dear Colleague,    Thank you for referring your patient, Miguel A Vaughn, to the [facility]. Please see a copy of my visit note below.    ENT Consultation    Miguel A Vaughn who is a 63 year old male seen in consultation at the request of Dr. Memo Wooten.      History of Present Illness - Miguel A Vaughn is a 63 year old male presents with difficulty swallowing causing severe cough laryngospasm hearing loss passes out constant feeling of increasing phlegm in his throat at the level of the sternal notch most problems starting few years ago after a cervical fusion surgery.  He had significant fusion done higher up close to the brainstem given the swallowing issue happens with liquids and solids.  Prior video swallow few years ago when this started did not show any significant pathology and was essentially normal.  It did not show any evidence of aspiration.  And those symptoms are very sporadic and not consistent.  He denies any GERD symptomatology.  He definitely has limited range of motion of his neck.  He also endorses that when he tips his head neck up he almost passes out.  He has had extensive imaging workup recently to look at those areas of the vascular domain of the neck..  Patient also some difficulty breathing through his nose especially after having had serious of basal cell tumors removed around nasal dorsum and nasal tip.        CTA NECK WITH CONTRAST 11/10/2023 9:37 AM     CT angiogram of the neck   Reconstruction on the 3D workstation     Provided History:  evaluate for vertebrobasilar stenosis / perform  scan with head extended; Dizzy spells  ICD-10: Dizzy spells     Comparison:  CT neck 9/26/2019. MRA neck 3/13/2019.       Technique:  NECK CTA: During rapid bolus intravenous injection of nonionic  contrast material, axial images were obtained using thin collimation  multidetector helical  technique from the base of the upper aortic arch  through the carotid siphons. This CT angiogram data was reconstructed  at thin intervals with mild overlap. Images were sent to the 3D  workstation, and 3D reconstructions were obtained. The axial source  images, multiplanar reformations, 3D reconstructions in both maximum  intensity projection display and volume rendered models were reviewed,  with reconstructions performed by the technologist.     Contrast: 70ml isovue 370     Findings:.     Neck CTA demonstrates no stenosis of the major cervical arteries. The  origins of the great vessels from the aortic arch are patent. The  normal distal right internal carotid artery measures 3.8 mm. The  normal distal left internal carotid artery measures 3.6 mm.      No mass within the visualized portions of the cervical soft tissues or  lung apices. Postsurgical changes of anterior fusion of C3-C4 and  C6-C7. Ankylosis of C4-C6. No acute fracture or subluxation of the  cervical spine.                                                                      Impression:    1. Neck CTA demonstrates no severe stenosis of the major cervical  arteries.  2. Stable postsurgical changes of C3-C4 and C6-7 anterior fusion.    CT CERVICAL SPINE WO IV CONTRAST     HISTORY: Neck trauma (Age >= 65y);     COMPARISON: None.     TECHNIQUE: Unenhanced CT of the cervical spine. Multiplanar reformats.     FINDINGS: Anterior fusion discectomy at C3-C4 and C6-C7. There is also interbody fusion at C5-C6. There is borderline congenital narrowing of the cervical spinal canal. Mineralization of the posterior longitudinal ligament at C2, C4, C5 and C6. The prevertebral soft tissues are within normal limits. Straightening of cervical lordosis. There is ankylosis of the right C2-C3 facet joints. Mild multilevel facet spurring. Mucosal thickening in the paranasal sinuses. Small air-fluid level in the left maxillary sinus. Visualized mastoid air cells are  clear. There is a bifid spinous process at C2. No acute cervical spine fracture. Atherosclerotic calcification in the aorta and mildly at the carotid bifurcations.     IMPRESSION: Intact cervical spine with postsurgical and degenerative changes. Paranasal sinus mucosal disease.     VIDEO SWALLOW WITHOUT ESOPHAGRAM 8/15/2019 10:57 AM      HISTORY: Status post cervical spinal fusion.     FINDINGS: Single moderate-sized swallow of thin barium given by cup  was unremarkable. However, a larger swallow followed by consecutive  swallows of thin barium resulted in a small amount of laryngeal  penetration but no aspiration. When the thin barium was given by a  straw with consecutive swallows there was no evidence for penetration  or aspiration.     No abnormalities were seen with pudding, semisolid, solid consistency  barium or a barium pill. Minimal vallecular residue was seen with  solid consistency barium but this rapidly cleared with a second  swallow.  BP Readings from Last 1 Encounters:   02/09/24 130/62       BP noted to be well controlled today in office.     Miguel A IS  a smoker/not uses chewing tobacco.  Miguel A is not ready to quit      Past Medical History -   Past Medical History:   Diagnosis Date     Abrasion 07/06/2011    multiple        Abrasion of buttock 07/06/2011     AK (actinic keratosis) 10/25/2012     BCC (basal cell carcinoma), face - suspected nasal 05/23/2023     Benign neoplasm of brain (H)      Cervicalgia      CSOM (chronic suppurative otitis media) 10/31/2011     Depressive disorder, not elsewhere classified 07/16/2008     Deviated nasal septum      Diabetes (H)      Diverticular disease of colon 08/15/2023     Family history of colonic polyps      Headache(784.0)      Hemorrhoids, internal      History of colonic polyps 08/15/2023     Hyperlipidemia LDL goal <130 07/12/2011     Hypersomnia with sleep apnea, unspecified      BENJAMIN (obstructive sleep apnea) 10/12/2011     BENJAMIN (obstructive sleep apnea)       Other encephalopathy      Perirectal abscess s/p I&D 08/05/2016     Seborrheic keratosis - right mid back 07/30/2019     Sprain of right ankle 07/07/2011     Unspecified disorder of adrenal glands        Current Medications -   Current Outpatient Medications:      acetaminophen (TYLENOL) 500 MG tablet, Take 1,000 mg by mouth every 8 hours as needed for mild pain, Disp: , Rfl:      alcohol swab prep pads, Use to swab area of injection/erwin as directed., Disp: 100 each, Rfl: 3     aspirin (ASA) 81 MG EC tablet, Take 1 tablet (81 mg) by mouth daily, Disp: 90 tablet, Rfl: 3     blood glucose (NO BRAND SPECIFIED) test strip, Use to test blood sugar 1 times daily or as directed. To accompany: Blood Glucose Monitor Brands: per insurance., Disp: 100 strip, Rfl: 6     blood glucose calibration (NO BRAND SPECIFIED) solution, To accompany: Blood Glucose Monitor Brands: per insurance., Disp: 100 each, Rfl: 3     blood glucose monitoring (NO BRAND SPECIFIED) meter device kit, Use to test blood sugar 1 times daily or as directed. Preferred blood glucose meter OR supplies to accompany: Blood Glucose Monitor Brands: per insurance., Disp: 1 kit, Rfl: 0     fluticasone (FLONASE) 50 MCG/ACT spray, INHALE 1-2 SPRAYS IN EACH NOSTRIL ONCE DAILY, Disp: 16 g, Rfl: 5     glyBURIDE (DIABETA /MICRONASE) 5 MG tablet, Take 2 tablets (10 mg) by mouth 2 times daily (with meals), Disp: 360 tablet, Rfl: 1     losartan (COZAAR) 25 MG tablet, Take 1 tablet (25 mg) by mouth at bedtime, Disp: 90 tablet, Rfl: 3     metFORMIN (GLUCOPHAGE) 500 MG tablet, Take 2 tablets (1,000 mg) by mouth 2 times daily (with meals), Disp: 360 tablet, Rfl: 1     order for DME, Equipment ordered: RESMED Auto PAP Mask type: Full face  Settings: 10-15 CM H2O, Disp: , Rfl:      rOPINIRole (REQUIP) 0.25 MG tablet, TAKE 1 TO 2 TABLETS BY MOUTH AT BEDTIME AS NEEDED FOR  RESTLESS  LEGS, Disp: 60 tablet, Rfl: 0     rosuvastatin (CRESTOR) 20 MG tablet, Take 1 tablet (20 mg) by  "mouth daily, Disp: 30 tablet, Rfl: 11     tamsulosin (FLOMAX) 0.4 MG capsule, Take 1 capsule (0.4 mg) by mouth every evening, Disp: 90 capsule, Rfl: 1     thin (NO BRAND SPECIFIED) lancets, Use with lanceting device. To accompany: Blood Glucose Monitor Brands: per insurance., Disp: 100 each, Rfl: 1    Allergies -   Allergies   Allergen Reactions     Iodinated Contrast Media Hives     Ciprofloxacin Muscle Pain (Myalgia)     Body aches and pain     No Known Drug Allergy      Tape [Adhesive Tape]      Pt states no  \"Surgical tape\"  IV tegaderm ok       Social History -   Social History     Socioeconomic History     Marital status:    Tobacco Use     Smoking status: Every Day     Packs/day: 0.50     Years: 25.00     Additional pack years: 0.00     Total pack years: 12.50     Types: Cigarettes     Last attempt to quit: 2019     Years since quittin.2     Smokeless tobacco: Former     Types: Snuff   Vaping Use     Vaping Use: Never used   Substance and Sexual Activity     Alcohol use: Yes     Alcohol/week: 0.0 standard drinks of alcohol     Comment: weekends     Drug use: No     Sexual activity: Yes     Partners: Female   Other Topics Concern      Service No     Blood Transfusions No     Caffeine Concern No     Occupational Exposure No     Hobby Hazards No     Sleep Concern Yes     Comment: C-Pap      Stress Concern No     Weight Concern Yes     Special Diet No     Back Care No     Exercise No     Seat Belt Yes     Self-Exams No     Parent/sibling w/ CABG, MI or angioplasty before 65F 55M? Yes     Social Determinants of Health     Interpersonal Safety: Low Risk  (2024)    Interpersonal Safety      Do you feel physically and emotionally safe where you currently live?: Yes      Within the past 12 months, have you been hit, slapped, kicked or otherwise physically hurt by someone?: No      Within the past 12 months, have you been humiliated or emotionally abused in other ways by your partner or " ex-partner?: No       Family History -   Family History   Problem Relation Age of Onset     Cancer Father         skin cancer     Cancer Sister         skin, ovarian     Breast Cancer Sister      Cancer Brother         skin     Diabetes No family hx of        Review of Systems - As per HPI and PMHx, otherwise review of system review of the head and neck negative. Otherwise 10+ review of system is negative    Physical Exam  There were no vitals taken for this visit.  BMI: There is no height or weight on file to calculate BMI.    General - The patient is well nourished and well developed, and appears to have good nutritional status.  Alert and oriented to person and place, answers questions and cooperates with examination appropriately.    SKIN - No suspicious lesions or rashes.  Respiration - No respiratory distress.  Head and Face - Normocephalic and atraumatic, with no gross asymmetry noted of the contour of the facial features.  The facial nerve is intact, with strong symmetric movements.    Voice and Breathing - The patient was breathing comfortably without the use of accessory muscles. The patients voice was clear and strong, and had appropriate pitch and quality.    Ears - Bilateral pinna and EACs with normal appearing overlying skin. Tympanic membrane intact with good mobility on pneumatic otoscopy bilaterally. Bony landmarks of the ossicular chain are normal. The tympanic membranes are normal in appearance. No retraction, perforation, or masses.  No fluid or purulence was seen in the external canal or the middle ear.     Eyes - Extraocular movements intact.  Sclera were not icteric or injected, conjunctiva were pink and moist.    Mouth - Examination of the oral cavity showed pink, healthy oral mucosa. No lesions or ulcerations noted.  The tongue was mobile and midline, and the dentition were in good condition.      Throat - The walls of the oropharynx were smooth, pink, moist, symmetric, and had no lesions or  ulcerations.  The tonsillar pillars and soft palate were symmetric.  The uvula was midline on elevation.    Neck - Normal midline excursion of the laryngotracheal complex during swallowing.  Full range of motion on passive movement.  Palpation of the occipital, submental, submandibular, internal jugular chain, and supraclavicular nodes did not demonstrate any abnormal lymph nodes or masses.  The carotid pulse was palpable bilaterally.  Palpation of the thyroid was soft and smooth, with no nodules or goiter appreciated.  The trachea was mobile and midline.    Nose - External contour is demonstrated some scarring at the tip of the nose from multiple excisions of basal cell carcinoma with local reconstruction.  Nasal mucosa is pink and moist with no abnormal mucus.  The septum was midline and non-obstructive, turbinates of normal size and position.  No polyps, masses, or purulence noted on examination.  Indeed upward rotational nasal tip that shows a little bit of droop and with rotation significantly improves his nasal breathing.  Neuro - Nonfocal neuro exam is normal, CN 2 through 12 intact, normal gait and muscle tone.      Performed in clinic today:  Attempts at mirror laryngoscopy were not possible due to gag reflex.  Therefore I proceeded with a fiberoptic examination.  First I sprayed both sides of the nose with a mixture of lidocaine and neosynephrine.  I then passed the scope through the nasal cavity.  The nasal cavity was unremarkable.  The nasopharynx was mucosally covered and symmetric.  The Eustachian tube openings were unobstructed.  Going further down I had a clear view of the base of tongue which had normal appearing lingual tonsillar tissue.  The base of tongue was free of lesions, and the vallecula was open.  The epiglottis was smooth and mucosally covered.  The supraglottic larynx was then clearly visualized.  The vocal cords moved smoothly and symmetrically, they were pearly white and no lesions  were seen.  The pyriform sinuses were open, and the limited view of the postcricoid region did not show any lesions. [id number]      A/P - Miguel A Vaughn is a 63 year old male with chronic refractory cough in response to unknown stimuli such as swallowing some liquids or solids or just spontaneous at times.  Started after his complex cervical fusion.  Probably is related to that.  We discussed different aspects of the cough both positional and neurogenic.  Breathing control also is important.  At this point more conservative approach with the breathing exercises as we send the patient to speech therapy swallowing exercises.  Would like to get functional endoscopic evaluation of swallowing done with patient bringing pudding for thicker foods and juice as we would scope him to see if indeed there is some penetration of the larynx we will set her up for next visit in a couple months.  Initially we will start the therapy.  Also gabapentin will be prescribed to see if some of the neurogenic reflexes can be blunted.  Finally patient already has an appointment for facial plastics ENT surgeon to evaluate his nose in [place].  Will strongly encourage him to keep that visit.      Tomas Whitman MD       Again, thank you for allowing me to participate in the care of your patient.        Sincerely,        Tomas Whitman MD, MD

## 2024-03-13 ENCOUNTER — OFFICE VISIT (OUTPATIENT)
Dept: DERMATOLOGY | Facility: CLINIC | Age: 64
End: 2024-03-13
Payer: COMMERCIAL

## 2024-03-13 DIAGNOSIS — Z85.828 HISTORY OF BASAL CELL CARCINOMA OF SKIN: ICD-10-CM

## 2024-03-13 DIAGNOSIS — L82.1 SEBORRHEIC KERATOSES: ICD-10-CM

## 2024-03-13 DIAGNOSIS — D49.2 NEOPLASM OF SKIN: ICD-10-CM

## 2024-03-13 DIAGNOSIS — L57.0 ACTINIC KERATOSIS: ICD-10-CM

## 2024-03-13 DIAGNOSIS — Z98.890 POSTOPERATIVE SCAR: ICD-10-CM

## 2024-03-13 DIAGNOSIS — L30.9 DERMATITIS: ICD-10-CM

## 2024-03-13 DIAGNOSIS — L81.4 LENTIGINES: ICD-10-CM

## 2024-03-13 DIAGNOSIS — L90.5 POSTOPERATIVE SCAR: ICD-10-CM

## 2024-03-13 DIAGNOSIS — G25.81 RESTLESS LEGS SYNDROME (RLS): ICD-10-CM

## 2024-03-13 DIAGNOSIS — D22.9 MULTIPLE BENIGN NEVI: ICD-10-CM

## 2024-03-13 DIAGNOSIS — Z85.828 HISTORY OF NONMELANOMA SKIN CANCER: Primary | ICD-10-CM

## 2024-03-13 PROCEDURE — 88305 TISSUE EXAM BY PATHOLOGIST: CPT | Performed by: DERMATOLOGY

## 2024-03-13 PROCEDURE — 11103 TANGNTL BX SKIN EA SEP/ADDL: CPT | Performed by: PHYSICIAN ASSISTANT

## 2024-03-13 PROCEDURE — 99214 OFFICE O/P EST MOD 30 MIN: CPT | Mod: 25 | Performed by: PHYSICIAN ASSISTANT

## 2024-03-13 PROCEDURE — 11102 TANGNTL BX SKIN SINGLE LES: CPT | Performed by: PHYSICIAN ASSISTANT

## 2024-03-13 RX ORDER — ROPINIROLE 0.25 MG/1
TABLET, FILM COATED ORAL
Qty: 60 TABLET | Refills: 0 | Status: SHIPPED | OUTPATIENT
Start: 2024-03-13 | End: 2024-05-21

## 2024-03-13 NOTE — NURSING NOTE
Miguel A Vaughn's goals for this visit include:   Chief Complaint   Patient presents with    Skin Check     Hx of NMSC, here for a 6 month [facility]. Back of right shoulder is itching. Middle of forehead has a spot that will itch and he can pick it off. Right side cheek of his buttocks is a mole that will itch and is changing in size. Crown of scalp is flaking and itching. His nose is plugged and if he pushes up on it, then he can breathe better- he thinks [facility] was going to do a referral for him but never did. Feels like nose is drippy but it is always dry.       He requests these members of his care team be copied on today's visit information:     PCP: Ferny Koch    Referring Provider:  Yasmeen Roberson PA-C  [id number] [place] [address]    There were no vitals taken for this visit.    Do you need any medication refills at today's visit?       Delia Hawthorne LPN on 3/13/2024 at 1:22 PM

## 2024-03-13 NOTE — PATIENT INSTRUCTIONS
Patient Education       Proper skin care from [facility]:    -Eliminate harsh soaps as they strip the natural oils from the skin, often resulting in dry itchy skin ( i.e. Dial, Zest, Japanese Spring)  -Use mild soaps such as Cetaphil or Dove Sensitive Skin in the shower. You do not need to use soap on arms, legs, and trunk every time you shower unless visibly soiled.   -Avoid hot or cold showers.  -After showering, lightly dry off and apply moisturizing within 2-3 minutes. This will help trap moisture in the skin.   -Aggressive use of a moisturizer at least 1-2 times a day to the entire body (including -Vanicream, Cetaphil, Aquaphor or Cerave) and moisturize hands after every washing.  -We recommend using moisturizers that come in a tub that needs to be scooped out, not a pump. This has more of an oil base. It will hold moisture in your skin much better than a water base moisturizer. The above recommended are non-pore clogging.      Wear a sunscreen with at least SPF 30 on your face, ears, neck and V of the chest daily. Wear sunscreen on other areas of the body if those areas are exposed to the sun throughout the day. Sunscreens can contain physical and/or chemical blockers. Physical blockers are less likely to clog pores, these include zinc oxide and titanium dioxide. Reapply every two hour and after swimming.     Sunscreen examples: https://www.ewg.org/sunscreen/    UV radiation  UVA radiation remains constant throughout the day and throughout the year. It is a longer wavelength than UVB and therefore penetrates deeper into the skin leading to immediate and delayed tanning, photoaging, and skin cancer. 70-80% of UVA and UVB radiation occurs between the hours of 10am-2pm.  UVB radiation  UVB radiation causes the most harmful effects and is more significant during the summer months. However, snow and ice can reflect UVB radiation leading to skin damage during the winter months as well. UVB radiation is  responsible for tanning, burning, inflammation, delayed erythema (pinkness), pigmentation (brown spots), and skin cancer.     I recommend self monthly full body exams and yearly full body exams with a dermatology provider. If you develop a new or changing lesion please follow up for examination. Most skin cancers are pink and scaly or pink and pearly. However, we do see blue/brown/black skin cancers.  Consider the ABCDEs of melanoma when giving yourself your monthly full body exam ( don't forget the groin, buttocks, feet, toes, etc). A-asymmetry, B-borders, C-color, D-diameter, E-elevation or evolving. If you see any of these changes please follow up in clinic. If you cannot see your back I recommend purchasing a hand held mirror to use with a larger wall mirror.       Checking for Skin Cancer  You can find cancer early by checking your skin each month. There are 3 kinds of skin cancer. They are melanoma, basal cell carcinoma, and squamous cell carcinoma. Doing monthly skin checks is the best way to find new marks or skin changes. Follow the instructions below for checking your skin.   The ABCDEs of checking moles for melanoma   Check your moles or growths for signs of melanoma using ABCDE:   Asymmetry: the sides of the mole or growth don t match  Border: the edges are ragged, notched, or blurred  Color: the color within the mole or growth varies  Diameter: the mole or growth is larger than 6 mm (size of a pencil eraser)  Evolving: the size, shape, or color of the mole or growth is changing (evolving is not shown in the images below)    Checking for other types of skin cancer  Basal cell carcinoma or squamous cell carcinoma have symptoms such as:     A spot or mole that looks different from all other marks on your skin  Changes in how an area feels, such as itching, tenderness, or pain  Changes in the skin's surface, such as oozing, bleeding, or scaliness  A sore that does not heal  New swelling or redness beyond  the border of a mole    Who s at risk?  Anyone can get skin cancer. But you are at greater risk if you have:   Fair skin, light-colored hair, or light-colored eyes  Many moles or abnormal moles on your skin  A history of sunburns from sunlight or tanning beds  A family history of skin cancer  A history of exposure to radiation or chemicals  A weakened immune system  If you have had skin cancer in the past, you are at risk for recurring skin cancer.   How to check your skin  Do your monthly skin checkups in front of a full-length mirror. Check all parts of your body, including your:   Head (ears, face, neck, and scalp)  Torso (front, back, and sides)  Arms (tops, undersides, upper, and lower armpits)  Hands (palms, backs, and fingers, including under the nails)  Buttocks and genitals  Legs (front, back, and sides)  Feet (tops, soles, toes, including under the nails, and between toes)  If you have a lot of moles, take digital photos of them each month. Make sure to take photos both up close and from a distance. These can help you see if any moles change over time.   Most skin changes are not cancer. But if you see any changes in your skin, call your doctor right away. Only he or she can diagnose a problem. If you have skin cancer, seeing your doctor can be the first step toward getting the treatment that could save your life.   [employer] last reviewed this educational content on 4/1/2019 2000-2020 [employer]. [address] [place] [address], [place] [address]. All rights reserved. This information is not intended as a substitute for professional medical care. Always follow your healthcare professional's instructions.         Wound Care After a Biopsy    What is a skin biopsy?  A skin biopsy allows the doctor to examine a very small piece of tissue under the microscope to determine the diagnosis and the best treatment for the skin condition. A local anesthetic (numbing medicine)  is injected with a very small  needle into the skin area to be tested. A small piece of skin is taken from the area. Sometimes a suture (stitch) is used.     What are the risks of a skin biopsy?  I will experience scar, bleeding, swelling, pain, crusting and redness. I may experience incomplete removal or recurrence. Risks of this procedure are excessive bleeding, bruising, infection, nerve damage, numbness, thick (hypertrophic or keloidal) scar and non-diagnostic biopsy.    How should I care for my wound for the first 24 hours?  Keep the wound dry and covered for 24 hours  If it bleeds, hold direct pressure on the area for 15 minutes. If bleeding does not stop then go to the emergency room  Avoid strenuous exercise the first 1-2 days or as your doctor instructs you    How should I care for the wound after 24 hours?  After 24 hours, remove the bandage  You may bathe or shower as normal  If you had a scalp biopsy, you can shampoo as usual and can use shower water to clean the biopsy site daily  Clean the wound twice a day with gentle soap and water  Do not scrub, be gentle  Apply white petroleum/Vaseline after cleaning the wound with a cotton swab or a clean finger, and keep the site covered with a Bandaid /bandage. Bandages are not necessary with a scalp biopsy  If you are unable to cover the site with a Bandaid /bandage, re-apply ointment 2-3 times a day to keep the site moist. Moisture will help with healing  Avoid strenuous activity for first 1-2 days  Avoid lakes, rivers, pools, and oceans until the stitches are removed or the site is healed    How do I clean my wound?  Wash hands thoroughly with soap or use hand  before all wound care  Clean the wound with gentle soap and water  Apply white petroleum/Vaseline  to wound after it is clean  Replace the Bandaid /bandage to keep the wound covered for the first few days or as instructed by your doctor  If you had a scalp biopsy, warm shower water to the area on a daily basis should  suffice    What should I use to clean my wound?   Cotton-tipped applicators (Qtips )  White petroleum jelly (Vaseline ). Use a clean new container and use Q-tips to apply.  Bandaids   as needed  Gentle soap     How should I care for my wound long term?  Do not get your wound dirty  Keep up with wound care for one week or until the area is healed.  A small scab will form and fall off by itself when the area is completely healed. The area will be red and will become pink in color as it heals. Sun protection is very important for how your scar will turn out. Sunscreen with an SPF 30 or greater is recommended once the area is healed.  If you have stitches, stitches need to be removed in 10 days. You may return to our clinic for this or you may have it done locally at your doctor s office.  You should have some soreness but it should be mild and slowly go away over several days. Talk to your doctor about using tylenol for pain,    When should I call my doctor?  If you have increased:   Pain or swelling  Pus or drainage (clear or slightly yellow drainage is ok)  Temperature over 100F  Spreading redness or warmth around wound    When will I hear about my results?  The biopsy results can take 2-3 weeks to come back. The clinic will call you with the results, send you a [employer] message, or have you schedule a follow-up clinic or phone time to discuss the results. Contact our clinics if you do not hear from us in 3 weeks.     Who should I call with questions?  [facility]: [phone]   [facility]: [phone]  For urgent needs outside of business hours call the [facility] at [phone] and ask for the dermatology resident on call
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
## 2024-03-13 NOTE — PROGRESS NOTES
The following medication was given:     MEDICATION:  Lidocaine with epinephrine 1% [id number]  ROUTE: SQ  SITE: see procedure note  DOSE: 2mL  LOT #: [id number]  : [employer]  EXPIRATION DATE: 04-30-25  NDC#: 97889-212-92  Was there drug waste? no  Multi-dose vial: Yes    Delia Hawthorne LPN  March 13, 2024
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
## 2024-03-15 LAB
PATH REPORT.COMMENTS IMP SPEC: NORMAL
PATH REPORT.COMMENTS IMP SPEC: NORMAL
PATH REPORT.FINAL DX SPEC: NORMAL
PATH REPORT.GROSS SPEC: NORMAL
PATH REPORT.MICROSCOPIC SPEC OTHER STN: NORMAL
PATH REPORT.RELEVANT HX SPEC: NORMAL

## 2024-03-20 ENCOUNTER — TELEPHONE (OUTPATIENT)
Dept: DERMATOLOGY | Facility: CLINIC | Age: 64
End: 2024-03-20
Payer: COMMERCIAL

## 2024-03-20 NOTE — TELEPHONE ENCOUNTER
"Case Report   Surgical Pathology Report                         Case: [id number]                                   Authorizing Provider:  Yasmeen Roberson PA-C     Collected:           03/13/2024 01:42 PM           Ordering Location:     [facility]   Received:            03/13/2024 03:40 PM                                  [place]                                                                   Pathologist:           Juanpablo Sparrow MD                                                         Specimens:   A) - Skin, R anus                                                                                    B) - Skin, R anus, superior                                                                Final Diagnosis   A. Right anus:  - Consistent with condyloma acuminatum - (see description)     B. Right anus, superior:  - Condyloma acuminatum - (see description)   Electronically signed by Juanpablo Sparrow MD on 3/15/2024 at  1:34 PM   Clinical Information  [facility]   The patient is a 63 year old male.   Gross Description  [facility]   A(1). Skin, R anus:  The specimen is received in formalin with proper patient identification, labeled \"R anus\".  The specimen consists of 2 pale-tan skin shaves, 1.1 x 0.4 cm and 1.6 x 1.5 cm.  Both skin shaves have tan-gray, slightly raised, bosselated lesions that covers the entire skin surface.  The subcutaneous surfaces are inked black.  The specimen is submitted entirely in A1-A3.     A1: Smaller skin shave, serially sectioned  A2-A3: Larger skin shave, serially sectioned  B(2). Skin, R anus, superior:  The specimen is received in formalin with proper patient identification, labeled \"R anus, superior\".  The specimen consists of a 1.0 x 0.7 cm pale-tan skin shave with a 0.7 x 0.6 cm pale-tan,, slightly raised lesion.  The subcutaneous surface is inked black.  The specimen is serially sectioned and submitted entirely in B1.           Microscopic Description  [facility]   A.  " The specimen exhibits parakeratosis, papillomatous epidermal hyperplasia with hypergranulosis, horn cyst formation and increased keratinocyte pigmentation, above papillary dermal fibrosis and telangiectasia.  These changes resemble seborrheic keratosis, though in this anatomic location they are best interpreted as condyloma.  There is no evidence of malignancy.     B.  The specimen exhibits focal ulceration with adjacent papillomatous epidermal hyperplasia, with parakeratosis accentuated in the dells of epidermal papillations, hypergranulosis and increased basal keratinocyte pigmentation, papillary dermal fibrosis and telangiectasia.  There is no evidence of malignancy on level sections.   Performing Labs  [facility]   The technical component of this testing was completed at [facility]              Specimen Collected: 03/13/24  1:42 PM Last Resulted: 03/15/24  1:34 PM         View All Conversations on this Encounter              Result Care Coordination      Result Notes     Torrie Peters CMA  3/20/2024 10:27 AM CDT Back to Top      Patient returned call to clinic. Relayed result message from Yasmeen Roberson.  Patient has not further questions or concerns.    Negin Conde RN  3/20/2024  9:12 AM CDT       Pt called, his wife answered and stated he was sleeping. She will have pt call the clinic back when he is awake. Callback number provided.     Negin Conde RN on 3/20/2024 at 9:11 AM    Negin Conde RN  3/18/2024  8:53 AM CDT       Writer called pt, no answer. Left message for pt to return our call at [phone].        Negin Conde RN on 3/18/2024 at 8:53 AM    Yasmeen Roberson PA-C  3/15/2024  4:04 PM CDT       No my chart - so you will have to call this patient.     The path is consistent with genital warts. Unclear how long he has had this or where he got it. It is caused by a virus, usually spread via contact.  Sometimes the virus can be present a long time. It can be dormant and then reactivate later. If the lesions do not fully resolve after the biopsy site heal we will need to treat the area with LN2, or if he notices additional spots that are similar, those should be treated with LN2 as well.     Brit

## 2024-03-22 NOTE — PROGRESS NOTES
Facial Plastic and Reconstructive Surgery Consultation  Department of Otolaryngology  [facility]    Miguel A Vaughn  : 1960   MRN: [id number]      Dear Doctor Macario and Yasmeen SEGURA,    Thank you for asking me to see your patient, . Miguel A Vaughn, in consultation to evaluate his facial scarring.  Today I had the pleasure of seeing him at the Facial Plastic and Reconstructive Surgery Clinic in the Department of Otolaryngology at [facility].      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   Other pertinent nasal/facial anatomy: nasal skin: sebaceous skin, projection: overprojected, and rotation:ptotic. Septum near midline.   -05: revision septoplasty, turbinate reduction by Dr. Whitman.  Outcomes: NOSE score: 14 on 3/28/24  Medical Decision Making (MDM): (stable chronic problem or illness).   The nasal obstruction results from the anatomic abnormalities described above. I recommend a course of medical therapy using steroid nasal sprays. I have asked Mr. Vaughn to follow up with me in 4-6 weeks to update me on his response to medical therapy. If medical treatment proves ineffective, I would recommend nasal surgery to address the anatomic abnormalities described above. Rhinoplasty is a medically necessary component of the planned surgical treatment plan to address the components of @HIS@ nasal obstruction caused by the external nasal deformity.   Care Checklist:  _Nasal steroid trial, then follow up in 4-6 weeks.  _Wear breathe right strips and find the location that is most beneficial.   _Observe nasal function and nasal cycle.  _Administer NOSE evaluation at every visit.   _Perform nasal endoscopy at next visit to look for a posterior cause of nasal obstruction, if he fails  to improve from medical treatment.  _If medical treatment proves ineffective, operative plan includes: vestibular stenosis repair (CPT 88876) with excision of his dorsal scar, placement of a conchal cartilage dorsal cap graft to reconstruct his nasal valve and turbinate reduction (CPT 36672 turbinate out-fracture). Conchal cartilage harvest and grafting from either or both ears (CPT 74254)  _Possible temporal or mastoid fascia harvest and grafting from either side (CPT 65350) would only be performed if there were unexpected findings during the nasal surgery and additional grafting materials are necessary to rebuild the nose. But preoperatively, the need for temporal or mastoid graft harvest and is unlikely.   _Surgery is expected to only provide a partial benefit in breathing. Nasal steroid spray use and even further allergy treatment may be needed after surgery to obtain an optimal result.   _Expected outcome: we discussed how his nose will become wider if we perform the surgery. This was demonstrated in a mirror and he found this acceptable.     2.) Midline nasal dorsal and tip scar  -7/26/23: Mohs excision of BCCa to tumor free margins with primary closure and FTSG with Dr. Sorto.   Medical Decision Making (stable chronic problem or illness): I recommend surgical treatment of the nasal dorsal scar as outlined in the care checklist because the scar tissue may contribute to the the functional impairment of: nasal obstruction, and the scar is depressed resulting in an abnormal soft tissue contour. This could also provide access to insert a nasal cap graft. Surgical treatment of the scar will correct the soft tissue deformity of this area and more accurately restore his normal nasal anatomy. We may need to use serial closure to address the scar: see below.   Care Checklist:  _Consider nasal dorsal and tip scar excision with complex closure and local flap reconstruction. This may need to be addressed in a staged  "fashion if the dorsal cap graft expands the skin and creates excess closure tension. In that case, we would postpone excising the scar and perform that at a second stage surgery.     3.) Tobacco use  MDM: (stable chronic problem or illness).  the patient currently smokes. I explained that all smoking should stop for 6 weeks prior to elective surgery to decrease the risk of postoperative wound healing complications. Even if smoking cessation is successful, the risk of postoperative wound healing complications is higher because of this prior tobacco use. The patient understands the importance of smoking cessation because of these increased risks and plans to quit. We also discussed smoking cessation resources at our institution and offered to help with arranging this assistance.  Care Checklist:  _Smoking cessation.     4.) Comorbidities that increase the risk of surgery: Diabetes, Tobacco Use, EBNJAMIN wears CPAP, Asprin, Tape Allergy  MDM: (stable chronic problem or illness).   We discussed the patients comorbidities listed above increase the risks associated with any procedure and recommendations to mitigate those risks are listed in the care checklist below.   Care Checklist:  _Anticoagulation plan for ASA.  _Tape allergy: wore tape on arm after surgery for over a week and it \"broke out.\" Has worn tape for shorter periods without an allergy.    _Work with PCP to control blood glucose levels. HbA1c on 2/9/24 was 11.5.    Background/Connection:   Preferred name = Miguel A  What is most important to know about you as a person? (No medical information) Retired, likes to fish and hunt. [employer].    Photographs: [facility] consents signed March 28, 2024      It has been a pleasure to participate in the care of Mr. Vaughn. Thank you for this kind referral.     Sincerely,    Peter Zhou MD  Facial Plastic and Reconstructive Surgeon  Department of Otolaryngology  [facility]" [place]            ___________________________________________________    MA/RN Section    SCAR QUESTIONNAIRE:     Background/Connection:   Preferred name = Miguel A  What is most important to know about you as a person? 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    Where is the scarring located? Nasal Tip.   When did the scars start? After Mohs surgery BCC 7/5/23  What caused the scars BCC Mohs.     No: Have any other surgeries or procedures been performed to correct the scars?  No: Tightness or tethering from the scar(s)?   No: Pain or discomfort from the scar(s)?  YES: Itching?  No: Growth or widening of the scar(s)?   No: History of widened scars or keliods?  No: Family history of widened scars or keliods?  No: Color change of the scar(s)?   No: History of cold sores or sores in your mouth?  No: Ulceration?    No: Purulence?  No: Oozing?  No: Edema?   No: Erythema?  No: History of rupture?  YES: Have you had skin cancers in the past?      For scars near the nose:   Yes: The scar blocks your nasal breathing or causes nasal congestion.      SURGICAL RISK FACTORS  Do you currently have or have you ever had in the past:    YES. Do you currently smoke?  No. Problems with sedation, anesthesia, or surgery.  YES. Use blood thinners. If yes, what blood thinner: ASA   No. Any heart problems.   No. Chest pain.   No. A pacemaker.  No. Problems with excessive bleeding or a bleeding disorder.  No. Problems with blood clots or a clotting disorder.   YES. Sleep apnea or sleep with a CPAP machine.  No. Hepatitis.    No. HIV or AIDS.  No. Family history of excessive bleeding or a bleeding disorder?    No. Family history of blood clots or a clotting disorder?      Signature: Roxanna Camacho CMA      ________________________________________________  Dr. Zhou Section    MA/RN transcription of patient information: I reviewed the information the MA/RN transcribed from the patient that is documented  above.  Signature (type .me): Roxanna Camacho RMA        HISTORY OF PRESENT ILLNESS and SKIN QUESTIONAAIRE:  As you know, Mr. Vaughn is an 63 year old-year-old male who presents with scarring and nasal congestion.     Had septoplasty in 2005 and that greatly improved his breathing. He noticed nasal obstruction again after his Mohs surgery in July 2023. Bilateral obstruction R>L. Has tried flonase on occasion and it has helped. Has worn breathe right strips and they have helped.     Where is the scarring located? Nasal dorsum.   What caused the scars: Mohs surgery.      No: Have any other surgeries or procedures been performed to correct the scars?  No: Tightness or tethering from the scar(s)?   No: Pain or discomfort from the scar(s)?  Some: Itching?  A little: Growth or widening of the scar(s)?   No: History of widened scars or keliods?  No: Color change of the scar(s)?     Other pertinent history: N/A    PAST MEDICAL HISTORY:  Past Medical History:   Diagnosis Date    Abrasion 07/06/2011    multiple       Abrasion of buttock 07/06/2011    AK (actinic keratosis) 10/25/2012    BCC (basal cell carcinoma), face - suspected nasal 05/23/2023    Benign neoplasm of brain (H)     Cervicalgia     CSOM (chronic suppurative otitis media) 10/31/2011    Depressive disorder, not elsewhere classified 07/16/2008    Deviated nasal septum     Diabetes (H)     Diverticular disease of colon 08/15/2023    Family history of colonic polyps     Headache(784.0)     Hemorrhoids, internal     History of colonic polyps 08/15/2023    Hyperlipidemia LDL goal <130 07/12/2011    Hypersomnia with sleep apnea, unspecified     BENJAMIN (obstructive sleep apnea) 10/12/2011    BENJAMIN (obstructive sleep apnea)     Other encephalopathy     Perirectal abscess s/p I&D 08/05/2016    Seborrheic keratosis - right mid back 07/30/2019    Sprain of right ankle 07/07/2011    Unspecified disorder of adrenal glands        PAST SURGICAL HISTORY:  Past Surgical History:    Procedure Laterality Date    COLONOSCOPY  05/12/08    Internal hemorrhoids.  Otherwise normal.    COLONOSCOPY  4/17/2013    Procedure: COLONOSCOPY;  colonoscopy;  Surgeon: Kody Reynolds MD;  Location:  GI    COLONOSCOPY N/A 8/15/2023    Procedure: Colonoscopy with polypectomy;  Surgeon: Jose Lanza MD;  Location:  GI    DECOMPRESSION, FUSION CERVICAL ANTERIOR ONE LEVEL, COMBINED N/A 11/12/2019    Procedure: CERVICAL 6-7 ANTERIOR  DECOMPRESSION AND FUSION;  Surgeon: Memo Wooten MD;  Location: [facility] OR    DISCECTOMY, FUSION CERVICAL ANTERIOR ONE LEVEL, COMBINED N/A 4/2/2019    Procedure: C 3-4 ANTERIOR CERVICAL DISECTOMY AND FUSION;  Surgeon: Memo Wooten MD;  Location:  OR    EXAM UNDER ANESTHESIA RECTUM N/A 8/3/2016    Procedure: EXAM UNDER ANESTHESIA RECTUM;  Surgeon: Sami Zamora MD;  Location:  OR    EXPLORE SPINE, REMOVE HARDWARE, COMBINED N/A 4/2/2019    Procedure: C 4-5 HARDWARE REMOVAL;  Surgeon: Memo Wooten MD;  Location:  OR    HC FLEX SIGMOIDOSCOPY W/WO BRAD SPEC BY BRUSH/WASH  06/10/08    bx intra-anal lesions & electrocoagulation of perianal lesions.    HC REPAIR OF NASAL SEPTUM  12/16/2005    Revision septoplasty, submucosal resection of the inferior turbinates.    INCISION AND DRAINAGE PERINEAL, COMBINED N/A 8/3/2016    Procedure: COMBINED INCISION AND DRAINAGE PERINEAL;  Surgeon: Sami Zamora MD;  Location: [facility] OR    INJECT EPIDURAL CERVICAL N/A 8/16/2019    Procedure: INJECTION, SPINE, CERVICAL 6-7, EPIDURAL;  Surgeon: Bernt Anguiano MD;  Location: [facility] OR    INJECT EPIDURAL LUMBAR N/A 2/24/2022    Procedure: Lumbar 5-Sacral 1 Interlaminar epidural steroid injection using fluoroscopic guidance with contrast dye;  Surgeon: Brent Anguiano MD;  Location: [facility] OR    INJECT FACET JOINT Bilateral 1/17/2020    Procedure: Cervical 6-7 Bilateral facet Injections;  Surgeon: Brent Anguiano MD;  Location: [facility]    LAPAROSCOPIC CHOLECYSTECTOMY N/A  10/17/2019    Procedure: CHOLECYSTECTOMY, LAPAROSCOPIC;  Surgeon: Barry Molina MD;  Location: [facility] OR    SURGICAL HISTORY OF -   2006    Left occiput C1, C1-C2 facet injection.  -[facility] WINTER W/O FACETEC FORAMOT/[facility]  VRT SEG, CERVICAL      [facility] OPEN RX ANKLE DISLOCATN+FIXATN         SOCIAL HISTORY:   Social History     Tobacco Use    Smoking status: Every Day     Packs/day: 0.50     Years: 25.00     Additional pack years: 0.00     Total pack years: 12.50     Types: Cigarettes     Last attempt to quit: 2019     Years since quittin.3    Smokeless tobacco: Former     Types: Snuff   Substance Use Topics    Alcohol use: Yes     Alcohol/week: 0.0 standard drinks of alcohol     Comment: weekends       ALLERGIES:   Iodinated contrast media, Ciprofloxacin, No known drug allergy, and Tape [adhesive tape]    MEDICATIONS:   Current Outpatient Medications   Medication Sig Dispense Refill    acetaminophen (TYLENOL) 500 MG tablet Take 1,000 mg by mouth every 8 hours as needed for mild pain      alcohol swab prep pads Use to swab area of injection/erwin as directed. 100 each 3    aspirin (ASA) 81 MG EC tablet Take 1 tablet (81 mg) by mouth daily 90 tablet 3    blood glucose (NO BRAND SPECIFIED) test strip Use to test blood sugar 1 times daily or as directed. To accompany: Blood Glucose Monitor Brands: per insurance. 100 strip 6    blood glucose calibration (NO BRAND SPECIFIED) solution To accompany: Blood Glucose Monitor Brands: per insurance. 100 each 3    blood glucose monitoring (NO BRAND SPECIFIED) meter device kit Use to test blood sugar 1 times daily or as directed. Preferred blood glucose meter OR supplies to accompany: Blood Glucose Monitor Brands: per insurance. 1 kit 0    fluticasone (FLONASE) 50 MCG/ACT spray INHALE 1-2 SPRAYS IN EACH NOSTRIL ONCE DAILY (Patient not taking: Reported on 2024) 16 g 5    gabapentin (NEURONTIN) 100 MG capsule Take 1 capsule (100 mg) by mouth 3 times daily as  needed for other (cough, neck pain) 90 capsule 1    glyBURIDE (DIABETA /MICRONASE) 5 MG tablet Take 2 tablets (10 mg) by mouth 2 times daily (with meals) 360 tablet 1    losartan (COZAAR) 25 MG tablet Take 1 tablet (25 mg) by mouth at bedtime 90 tablet 3    metFORMIN (GLUCOPHAGE) 500 MG tablet Take 2 tablets (1,000 mg) by mouth 2 times daily (with meals) 360 tablet 1    order for DME Equipment ordered: RESMED Auto PAP Mask type: Full face  Settings: 10-15 CM H2O      rOPINIRole (REQUIP) 0.25 MG tablet TAKE 1 TO 2 TABLETS BY MOUTH AT BEDTIME AS NEEDED FOR RESTLESS LEGS 60 tablet 0    rosuvastatin (CRESTOR) 20 MG tablet Take 1 tablet (20 mg) by mouth daily 30 tablet 11    tamsulosin (FLOMAX) 0.4 MG capsule Take 1 capsule (0.4 mg) by mouth every evening 90 capsule 1    thin (NO BRAND SPECIFIED) lancets Use with lanceting device. To accompany: Blood Glucose Monitor Brands: per insurance. 100 each 1     Nasal Obstruction Symptom Evaluation (NOSE QUESTIONNAIRE):     3 - a fairly bad problem: Nasal congestion or stuffiness?  3 - a fairly bad problem: Nasal blockage or obstruction?  3 - a fairly bad problem: Trouble breathing through his nose?  3 - a fairly bad problem: Trouble sleeping?  2 - a moderate problem: Inability to get enough air through his nose during exercise or exertion?    14 = Total NOSE score      PHYSICAL EXAMINATION:    SKIN:    Scar 1: location: central nasal dorsum and tip: 30mm long. Greatest width: 7mm. Shape: linear. Height: depressed. Color: skin colored. Nearby landmarks: ends at the central nasal tip. Tethering: yes.       Remainder of physical exam:  General: Pleasant affect, normal ability to communicate  Oral cavity/oropharynx: Normal mouth opening. No OC/OP masses.  Respiratory: NAD, no stridor, voice strong  Neck: no LAD, scars from prior neck surgery.     A standard nasal exam was performed. Pertinent findings are documented in the impression and recommendations section above. Pertinent  negative findings on exam include:  No: septal perforation  No: nasal polyps  No: nasal mass  No: bleeding  No: purulence    Standard nasal and facial aesthetics were observed including skin quality, projection, rotation, forehead and chin position with the exceptions noted here or in the impression and recommendations section: asymmetries of the nostrils and face are present.     Modified Adams maneuver: the patient reported significant improvement in breathing with lateralization of the bilateral  internal nasal valve.  _________________________________________      PREPROCEDURE COUNSELING:   An extensive discussion was held regarding the proposed procedure which included the following information:    Information about your scar procedure: Dr. Zhou provided you with information pertaining to the procedure he offered you, the risks and limitations associated with the procedure, the benefits of the procedure, and possible alternative treatment choices including not having the procedure. Please make sure you have carefully discussed the procedure with Dr. Zhou, that all your questions have been answered, and that you completely understand the procedure before undergoing the procedure. If you have any questions after the visit, please call our office or return to see Dr. Zhou in the office.   Alternative treatment: One alternative to the treatment you discussed with Dr. Zhou is no further treatment or other treatments like sun protection, massage of the scars, silicone therapy, laser treatment, or injections.  Make sure you are satisfied with your understanding of the possible outcomes, consequences, and risks if no further treatment or alternative treatments are pursued.     Surgical Treatment Risks: All surgery has risks of complications from the procedure. The risks of your procedure include but are not limited to:   Bleeding  Infection  Damage to surrounding structures  Distortion or pulling of surrounding  facial landmarks including your nostril edges.  Functional problems after surgery: nasal congestion  Facial weakness  Facial numbness  Chronic pain  Unfavorable change in your appearance  Delayed healing  Partial or total skin loss around the surgery site  The incision pulling apart  Excessive scarring, scar widening, or scar growth  Extruded sutures or sutures spitting out of your skin  Discovery of a growth, cancer, or other unexpected condition requiring treatment  Allergic reaction  Unforeseen complications related to the procedure or anesthesia    The procedure has several limitations and expected outcomes including:   There will always be a permanent scar at the surgery sites that will never go away  The procedure may make your scar longer  The scar will be red for many months and will hopefully fade over time but there is a risk of persistent redness.   Additional skin may need to be removed during the procedure  Additional surgeries may be needed to obtain an optimal result  No reconstructive effort will be able to restore you perfectly, you will always be able to tell you had surgery at that site   Facial asymmetries will be present after the reconstruction     Additional risks factors: we discussed how smoking and diabetes places you at higher risk for wound healing problems and infections..  Smoking: if you smoke, all smoking should stop before your procedure to decrease the risk of healing problems after surgery. Even when smoking is successfully stopped prior to surgery, the risk of healing problems after your procedure is higher that someone who has never smokes because of the long term damage caused by smoking. If you feel ready to quit and need help, please talk with your primary care provider or let us know so we can connect you with resources to help you quit.    Want to schedule a procedure? please call our  at [phone] or [phone].  Questions? Please contact us at [phone] if  you have questions or if we can be of service.    The patient indicated an understanding of the risks, benefits, alternatives and limitations of the procedure and all questions were answered to their satisfaction.     Peter Zhou MD

## 2024-03-28 ENCOUNTER — OFFICE VISIT (OUTPATIENT)
Dept: OTOLARYNGOLOGY | Facility: CLINIC | Age: 64
End: 2024-03-28
Attending: PHYSICIAN ASSISTANT
Payer: COMMERCIAL

## 2024-03-28 DIAGNOSIS — L90.5 POSTOPERATIVE SCAR: ICD-10-CM

## 2024-03-28 DIAGNOSIS — Z98.890 POSTOPERATIVE SCAR: ICD-10-CM

## 2024-03-28 PROCEDURE — 99214 OFFICE O/P EST MOD 30 MIN: CPT | Performed by: OTOLARYNGOLOGY

## 2024-03-28 NOTE — LETTER
3/28/2024         RE: Miguel A Vaughn  [address]  [place] [address]        Dear Colleague,    Thank you for referring your patient, Miguel A Vaughn, to the [facility]. Please see a copy of my visit note below.    Facial Plastic and Reconstructive Surgery Consultation  Department of Otolaryngology  [facility]    Miguel A Vaughn  : 1960   MRN: [id number]      Dear Doctor Macario and Yasmeen SEGURA,    Thank you for asking me to see your patient, Mr. Miguel A Vaughn, in consultation to evaluate his facial scarring.  Today I had the pleasure of seeing him at the Facial Plastic and Reconstructive Surgery Clinic in the Department of Otolaryngology at [facility].      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   Other pertinent nasal/facial anatomy: nasal skin: sebaceous skin, projection: overprojected, and rotation:ptotic. Septum near midline.   -05: revision septoplasty, turbinate reduction by Dr. Whitman.  Outcomes: NOSE score: 14 on 3/28/24  Medical Decision Making (MDM): (stable chronic problem or illness).   The nasal obstruction results from the anatomic abnormalities described above. I recommend a course of medical therapy using steroid nasal sprays. I have asked Mr. Vaughn to follow up with me in 4-6 weeks to update me on his response to medical therapy. If medical treatment proves ineffective, I would recommend nasal surgery to address the anatomic abnormalities described above. Rhinoplasty is a medically necessary component of the planned surgical treatment plan to address the components of @HIS@ nasal obstruction caused by the external nasal deformity.   Care Checklist:  _Nasal steroid trial, then follow up in 4-6 weeks.  _Wear  breathe right strips and find the location that is most beneficial.   _Observe nasal function and nasal cycle.  _Administer NOSE evaluation at every visit.   _Perform nasal endoscopy at next visit to look for a posterior cause of nasal obstruction, if he fails to improve from medical treatment.  _If medical treatment proves ineffective, operative plan includes: vestibular stenosis repair (CPT 49262) with excision of his dorsal scar, placement of a conchal cartilage dorsal cap graft to reconstruct his nasal valve and turbinate reduction (CPT 53775 turbinate out-fracture). Conchal cartilage harvest and grafting from either or both ears (CPT 42682)  _Possible temporal or mastoid fascia harvest and grafting from either side (CPT 60552) would only be performed if there were unexpected findings during the nasal surgery and additional grafting materials are necessary to rebuild the nose. But preoperatively, the need for temporal or mastoid graft harvest and is unlikely.   _Surgery is expected to only provide a partial benefit in breathing. Nasal steroid spray use and even further allergy treatment may be needed after surgery to obtain an optimal result.   _Expected outcome: we discussed how his nose will become wider if we perform the surgery. This was demonstrated in a mirror and he found this acceptable.     2.) Midline nasal dorsal and tip scar  -7/26/23: Mohs excision of BCCa to tumor free margins with primary closure and FTSG with Dr. Sorto.   Medical Decision Making (stable chronic problem or illness): I recommend surgical treatment of the nasal dorsal scar as outlined in the care checklist because the scar tissue may contribute to the the functional impairment of: nasal obstruction, and the scar is depressed resulting in an abnormal soft tissue contour. This could also provide access to insert a nasal cap graft. Surgical treatment of the scar will correct the soft tissue deformity of this area and more accurately  "restore his normal nasal anatomy. We may need to use serial closure to address the scar: see below.   Care Checklist:  _Consider nasal dorsal and tip scar excision with complex closure and local flap reconstruction. This may need to be addressed in a staged fashion if the dorsal cap graft expands the skin and creates excess closure tension. In that case, we would postpone excising the scar and perform that at a second stage surgery.     3.) Tobacco use  MDM: (stable chronic problem or illness).  the patient currently smokes. I explained that all smoking should stop for 6 weeks prior to elective surgery to decrease the risk of postoperative wound healing complications. Even if smoking cessation is successful, the risk of postoperative wound healing complications is higher because of this prior tobacco use. The patient understands the importance of smoking cessation because of these increased risks and plans to quit. We also discussed smoking cessation resources at our institution and offered to help with arranging this assistance.  Care Checklist:  _Smoking cessation.     4.) Comorbidities that increase the risk of surgery: Diabetes, Tobacco Use, BENJAMIN wears CPAP, Asprin, Tape Allergy  MDM: (stable chronic problem or illness).   We discussed the patients comorbidities listed above increase the risks associated with any procedure and recommendations to mitigate those risks are listed in the care checklist below.   Care Checklist:  _Anticoagulation plan for ASA.  _Tape allergy: wore tape on arm after surgery for over a week and it \"broke out.\" Has worn tape for shorter periods without an allergy.    _Work with PCP to control blood glucose levels. HbA1c on 2/9/24 was 11.5.    Background/Connection:   Preferred name = Miguel A  What is most important to know about you as a person? (No medical information) Retired, likes to fish and hunt. Makes [employer].    Photographs: [facility] consents signed March 28, 2024      It has been a " pleasure to participate in the care of Mr. Vaughn. Thank you for this kind referral.     Sincerely,    Peter Zhou MD  Facial Plastic and Reconstructive Surgeon  Department of Otolaryngology  [facility]            ___________________________________________________    MA/RN Section    SCAR QUESTIONNAIRE:     Background/Connection:   Preferred name = Miguel A  What is most important to know about you as a person? 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    Where is the scarring located? Nasal Tip.   When did the scars start? After Mohs surgery BCC 7/5/23  What caused the scars BCC Mohs.     No: Have any other surgeries or procedures been performed to correct the scars?  No: Tightness or tethering from the scar(s)?   No: Pain or discomfort from the scar(s)?  YES: Itching?  No: Growth or widening of the scar(s)?   No: History of widened scars or keliods?  No: Family history of widened scars or keliods?  No: Color change of the scar(s)?   No: History of cold sores or sores in your mouth?  No: Ulceration?    No: Purulence?  No: Oozing?  No: Edema?   No: Erythema?  No: History of rupture?  YES: Have you had skin cancers in the past?      For scars near the nose:   Yes: The scar blocks your nasal breathing or causes nasal congestion.      SURGICAL RISK FACTORS  Do you currently have or have you ever had in the past:    YES. Do you currently smoke?  No. Problems with sedation, anesthesia, or surgery.  YES. Use blood thinners. If yes, what blood thinner: ASA   No. Any heart problems.   No. Chest pain.   No. A pacemaker.  No. Problems with excessive bleeding or a bleeding disorder.  No. Problems with blood clots or a clotting disorder.   YES. Sleep apnea or sleep with a CPAP machine.  No. Hepatitis.    No. HIV or AIDS.  No. Family history of excessive bleeding or a bleeding disorder?    No. Family history of blood clots or a clotting disorder?      Signature: Roxanna POSEY  Janice, [facility]      ________________________________________________  Dr. Zhou Section    MA/RN transcription of patient information: I reviewed the information the MA/RN transcribed from the patient that is documented above.  Signature (type .me): Roxanna Janice RMA        HISTORY OF PRESENT ILLNESS and SKIN QUESTIONAAIRE:  As you know, Mr. Vaughn is an 63 year old-year-old male who presents with scarring and nasal congestion.     Had septoplasty in 2005 and that greatly improved his breathing. He noticed nasal obstruction again after his Mohs surgery in July 2023. Bilateral obstruction R>L. Has tried flonase on occasion and it has helped. Has worn breathe right strips and they have helped.     Where is the scarring located? Nasal dorsum.   What caused the scars: Mohs surgery.      No: Have any other surgeries or procedures been performed to correct the scars?  No: Tightness or tethering from the scar(s)?   No: Pain or discomfort from the scar(s)?  Some: Itching?  A little: Growth or widening of the scar(s)?   No: History of widened scars or keliods?  No: Color change of the scar(s)?     Other pertinent history: N/A    PAST MEDICAL HISTORY:  Past Medical History:   Diagnosis Date     Abrasion 07/06/2011    multiple        Abrasion of buttock 07/06/2011     AK (actinic keratosis) 10/25/2012     BCC (basal cell carcinoma), face - suspected nasal 05/23/2023     Benign neoplasm of brain (H)      Cervicalgia      CSOM (chronic suppurative otitis media) 10/31/2011     Depressive disorder, not elsewhere classified 07/16/2008     Deviated nasal septum      Diabetes (H)      Diverticular disease of colon 08/15/2023     Family history of colonic polyps      Headache(784.0)      Hemorrhoids, internal      History of colonic polyps 08/15/2023     Hyperlipidemia LDL goal <130 07/12/2011     Hypersomnia with sleep apnea, unspecified      BENJAMIN (obstructive sleep apnea) 10/12/2011     BENJAMIN (obstructive sleep apnea)      Other  encephalopathy      Perirectal abscess s/p I&D 08/05/2016     Seborrheic keratosis - right mid back 07/30/2019     Sprain of right ankle 07/07/2011     Unspecified disorder of adrenal glands        PAST SURGICAL HISTORY:  Past Surgical History:   Procedure Laterality Date     COLONOSCOPY  05/12/08    Internal hemorrhoids.  Otherwise normal.     COLONOSCOPY  4/17/2013    Procedure: COLONOSCOPY;  colonoscopy;  Surgeon: Kody Reynolds MD;  Location: [facility]     COLONOSCOPY N/A 8/15/2023    Procedure: Colonoscopy with polypectomy;  Surgeon: Jose Lanza MD;  Location: [facility]     DECOMPRESSION, FUSION CERVICAL ANTERIOR ONE LEVEL, COMBINED N/A 11/12/2019    Procedure: CERVICAL 6-7 ANTERIOR  DECOMPRESSION AND FUSION;  Surgeon: Memo Wooten MD;  Location: [facility]     DISCECTOMY, FUSION CERVICAL ANTERIOR ONE LEVEL, COMBINED N/A 4/2/2019    Procedure: C 3-4 ANTERIOR CERVICAL DISECTOMY AND FUSION;  Surgeon: Memo Wooten MD;  Location:  OR     EXAM UNDER ANESTHESIA RECTUM N/A 8/3/2016    Procedure: EXAM UNDER ANESTHESIA RECTUM;  Surgeon: Sami Zamora MD;  Location: [facility]     EXPLORE SPINE, REMOVE HARDWARE, COMBINED N/A 4/2/2019    Procedure: C 4-5 HARDWARE REMOVAL;  Surgeon: Memo Wooten MD;  Location: [facility]     HC FLEX SIGMOIDOSCOPY W/WO BRAD SPEC BY BRUSH/WASH  06/10/08    bx intra-anal lesions & electrocoagulation of perianal lesions.      REPAIR OF NASAL SEPTUM  12/16/2005    Revision septoplasty, submucosal resection of the inferior turbinates.     INCISION AND DRAINAGE PERINEAL, COMBINED N/A 8/3/2016    Procedure: COMBINED INCISION AND DRAINAGE PERINEAL;  Surgeon: Sami Zamora MD;  Location: [facility]     INJECT EPIDURAL CERVICAL N/A 8/16/2019    Procedure: INJECTION, SPINE, CERVICAL 6-7, EPIDURAL;  Surgeon: Brent Anguiano MD;  [facility]: [facility]     INJECT EPIDURAL LUMBAR N/A 2/24/2022    Procedure: Lumbar 5-Sacral 1 Interlaminar epidural steroid injection using fluoroscopic  guidance with contrast dye;  Surgeon: Brent Anguiano MD;  Location: [facility]     INJECT FACET JOINT Bilateral 2020    Procedure: Cervical 6-7 Bilateral facet Injections;  Surgeon: Brent Anguiano MD;  [facility]: [facility]     LAPAROSCOPIC CHOLECYSTECTOMY N/A 10/17/2019    Procedure: CHOLECYSTECTOMY, LAPAROSCOPIC;  Surgeon: Barry Molina MD;  Location: [facility]     SURGICAL HISTORY OF -   2006    Left occiput C1, C1-C2 facet injection.  F-[facility]     Z WINTER W/O FACETEC FORAMOT/DSKC  VRT SEG, CERVICAL       Z OPEN RX ANKLE DISLOCATN+FIXATN         SOCIAL HISTORY:   Social History     Tobacco Use     Smoking status: Every Day     Packs/day: 0.50     Years: 25.00     Additional pack years: 0.00     Total pack years: 12.50     Types: Cigarettes     Last attempt to quit: 2019     Years since quittin.3     Smokeless tobacco: Former     Types: Snuff   Substance Use Topics     Alcohol use: Yes     Alcohol/week: 0.0 standard drinks of alcohol     Comment: weekends       ALLERGIES:   Iodinated contrast media, Ciprofloxacin, No known drug allergy, and Tape [adhesive tape]    MEDICATIONS:   Current Outpatient Medications   Medication Sig Dispense Refill     acetaminophen (TYLENOL) 500 MG tablet Take 1,000 mg by mouth every 8 hours as needed for mild pain       alcohol swab prep pads Use to swab area of injection/erwin as directed. 100 each 3     aspirin (ASA) 81 MG EC tablet Take 1 tablet (81 mg) by mouth daily 90 tablet 3     blood glucose (NO BRAND SPECIFIED) test strip Use to test blood sugar 1 times daily or as directed. To accompany: Blood Glucose Monitor Brands: per insurance. 100 strip 6     blood glucose calibration (NO BRAND SPECIFIED) solution To accompany: Blood Glucose Monitor Brands: per insurance. 100 each 3     blood glucose monitoring (NO BRAND SPECIFIED) meter device kit Use to test blood sugar 1 times daily or as directed. Preferred blood glucose meter OR supplies to accompany: Blood  Glucose Monitor Brands: per insurance. 1 kit 0     fluticasone (FLONASE) 50 MCG/ACT spray INHALE 1-2 SPRAYS IN EACH NOSTRIL ONCE DAILY (Patient not taking: Reported on 2/26/2024) 16 g 5     gabapentin (NEURONTIN) 100 MG capsule Take 1 capsule (100 mg) by mouth 3 times daily as needed for other (cough, neck pain) 90 capsule 1     glyBURIDE (DIABETA /MICRONASE) 5 MG tablet Take 2 tablets (10 mg) by mouth 2 times daily (with meals) 360 tablet 1     losartan (COZAAR) 25 MG tablet Take 1 tablet (25 mg) by mouth at bedtime 90 tablet 3     metFORMIN (GLUCOPHAGE) 500 MG tablet Take 2 tablets (1,000 mg) by mouth 2 times daily (with meals) 360 tablet 1     order for DME Equipment ordered: RESMED Auto PAP Mask type: Full face  Settings: 10-15 CM H2O       rOPINIRole (REQUIP) 0.25 MG tablet TAKE 1 TO 2 TABLETS BY MOUTH AT BEDTIME AS NEEDED FOR RESTLESS LEGS 60 tablet 0     rosuvastatin (CRESTOR) 20 MG tablet Take 1 tablet (20 mg) by mouth daily 30 tablet 11     tamsulosin (FLOMAX) 0.4 MG capsule Take 1 capsule (0.4 mg) by mouth every evening 90 capsule 1     thin (NO BRAND SPECIFIED) lancets Use with lanceting device. To accompany: Blood Glucose Monitor Brands: per insurance. 100 each 1     Nasal Obstruction Symptom Evaluation (NOSE QUESTIONNAIRE):     3 - a fairly bad problem: Nasal congestion or stuffiness?  3 - a fairly bad problem: Nasal blockage or obstruction?  3 - a fairly bad problem: Trouble breathing through his nose?  3 - a fairly bad problem: Trouble sleeping?  2 - a moderate problem: Inability to get enough air through his nose during exercise or exertion?    14 = Total NOSE score      PHYSICAL EXAMINATION:    SKIN:    Scar 1: location: central nasal dorsum and tip: 30mm long. Greatest width: 7mm. Shape: linear. Height: depressed. Color: skin colored. Nearby landmarks: ends at the central nasal tip. Tethering: yes.       Remainder of physical exam:  General: Pleasant affect, normal ability to communicate  Oral  cavity/oropharynx: Normal mouth opening. No OC/OP masses.  Respiratory: NAD, no stridor, voice strong  Neck: no LAD, scars from prior neck surgery.     A standard nasal exam was performed. Pertinent findings are documented in the impression and recommendations section above. Pertinent negative findings on exam include:  No: septal perforation  No: nasal polyps  No: nasal mass  No: bleeding  No: purulence    Standard nasal and facial aesthetics were observed including skin quality, projection, rotation, forehead and chin position with the exceptions noted here or in the impression and recommendations section: asymmetries of the nostrils and face are present.     Modified Nance maneuver: the patient reported significant improvement in breathing with lateralization of the bilateral  internal nasal valve.  _________________________________________      PREPROCEDURE COUNSELING:   An extensive discussion was held regarding the proposed procedure which included the following information:    Information about your scar procedure: Dr. Zhou provided you with information pertaining to the procedure he offered you, the risks and limitations associated with the procedure, the benefits of the procedure, and possible alternative treatment choices including not having the procedure. Please make sure you have carefully discussed the procedure with Dr. Zhou, that all your questions have been answered, and that you completely understand the procedure before undergoing the procedure. If you have any questions after the visit, please call our office or return to see Dr. Zhou in the office.   Alternative treatment: One alternative to the treatment you discussed with Dr. Zhou is no further treatment or other treatments like sun protection, massage of the scars, silicone therapy, laser treatment, or injections.  Make sure you are satisfied with your understanding of the possible outcomes, consequences, and risks if no further  treatment or alternative treatments are pursued.     Surgical Treatment Risks: All surgery has risks of complications from the procedure. The risks of your procedure include but are not limited to:   Bleeding  Infection  Damage to surrounding structures  Distortion or pulling of surrounding facial landmarks including your nostril edges.  Functional problems after surgery: nasal congestion  Facial weakness  Facial numbness  Chronic pain  Unfavorable change in your appearance  Delayed healing  Partial or total skin loss around the surgery site  The incision pulling apart  Excessive scarring, scar widening, or scar growth  Extruded sutures or sutures spitting out of your skin  Discovery of a growth, cancer, or other unexpected condition requiring treatment  Allergic reaction  Unforeseen complications related to the procedure or anesthesia    The procedure has several limitations and expected outcomes including:   There will always be a permanent scar at the surgery sites that will never go away  The procedure may make your scar longer  The scar will be red for many months and will hopefully fade over time but there is a risk of persistent redness.   Additional skin may need to be removed during the procedure  Additional surgeries may be needed to obtain an optimal result  No reconstructive effort will be able to restore you perfectly, you will always be able to tell you had surgery at that site   Facial asymmetries will be present after the reconstruction     Additional risks factors: we discussed how smoking and diabetes places you at higher risk for wound healing problems and infections..  Smoking: if you smoke, all smoking should stop before your procedure to decrease the risk of healing problems after surgery. Even when smoking is successfully stopped prior to surgery, the risk of healing problems after your procedure is higher that someone who has never smokes because of the long term damage caused by smoking. If  you feel ready to quit and need help, please talk with your primary care provider or let us know so we can connect you with resources to help you quit.    Want to schedule a procedure? please call our  at [phone] or [phone].  Questions? Please contact us at [phone] if you have questions or if we can be of service.    The patient indicated an understanding of the risks, benefits, alternatives and limitations of the procedure and all questions were answered to their satisfaction.     Peter Zhou MD       Again, thank you for allowing me to participate in the care of your patient.        Sincerely,        Peter Zhou MD

## 2024-03-28 NOTE — PATIENT INSTRUCTIONS
Tobacco Use: all smoking should stop for 6 weeks before any surgical treatment of your scars to decrease the risk of healing problems after surgery. Even if smoking cessation is successful, the risk of healing problems after surgery is higher because of this prior tobacco use. Ask your primary care provider for help with quitting smoking. Our health system also has resources to help quit. Feel free to ask us for those resources when you are ready.    Information about your scar procedure: Dr. Zhou provided you with information pertaining to the procedure he offered you, the risks and limitations associated with the procedure, the benefits of the procedure, and possible alternative treatment choices including not having the procedure. Please make sure you have carefully discussed the procedure with Dr. Zhou, that all your questions have been answered, and that you completely understand the procedure before undergoing the procedure. If you have any questions after the visit, please call our office or return to see Dr. Zhou in the office.   Alternative treatment: One alternative to the treatment you discussed with Dr. Zhou is no further treatment or other treatments like sun protection, massage of the scars, silicone therapy, laser treatment, or injections.  Make sure you are satisfied with your understanding of the possible outcomes, consequences, and risks if no further treatment or alternative treatments are pursued.     Surgical Treatment Risks: All surgery has risks of complications from the procedure. The risks of your procedure include but are not limited to:   Bleeding  Infection  Damage to surrounding structures  Distortion or pulling of surrounding facial landmarks including your nostril edges.  Functional problems after surgery: nasal congestion  Facial weakness  Facial numbness  Chronic pain  Unfavorable change in your appearance  Delayed healing  Partial or total skin loss around the surgery site  The  incision pulling apart  Excessive scarring, scar widening, or scar growth  Extruded sutures or sutures spitting out of your skin  Discovery of a growth, cancer, or other unexpected condition requiring treatment  Allergic reaction  Unforeseen complications related to the procedure or anesthesia    The procedure has several limitations and expected outcomes including:   There will always be a permanent scar at the surgery sites that will never go away  The procedure may make your scar longer  The scar will be red for many months and will hopefully fade over time but there is a risk of persistent redness.   Additional skin may need to be removed during the procedure  Additional surgeries may be needed to obtain an optimal result  No reconstructive effort will be able to restore you perfectly, you will always be able to tell you had surgery at that site   Facial asymmetries will be present after the reconstruction     Additional risks factors: we discussed how smoking and diabetes places you at higher risk for wound healing problems and infections..  Smoking: if you smoke, all smoking should stop before your procedure to decrease the risk of healing problems after surgery. Even when smoking is successfully stopped prior to surgery, the risk of healing problems after your procedure is higher that someone who has never smokes because of the long term damage caused by smoking. If you feel ready to quit and need help, please talk with your primary care provider or let us know so we can connect you with resources to help you quit.

## 2024-03-28 NOTE — NURSING NOTE
Miguel A Vaughn's chief complaint for this visit includes:  Chief Complaint   Patient presents with    Consult     Postoperative scar, nasal tip, Ref by Yasmeen Roberson     PCP: Ferny Koch    Referring Provider:  Yasmeen Roberson PA-C  [address] [place] [id number]    There were no vitals taken for this visit.

## 2024-03-29 ENCOUNTER — TELEPHONE (OUTPATIENT)
Dept: OTOLARYNGOLOGY | Facility: CLINIC | Age: 64
End: 2024-03-29
Payer: COMMERCIAL

## 2024-03-29 NOTE — TELEPHONE ENCOUNTER
Left Voicemail (1st Attempt) for the patient to call back and schedule the following:    Appointment type: Return Ear Cosmetic  Provider: Dr. Zhou  Return date: 4/25/2024  Specialty phone number: [phone]  Additional appointment(s) needed:   Additonal Notes:     Spoke with patient on 3/28/2024, he stated he needed to review his calendar and requested a call back. Called and left a  requesting a call back to schedule a 6 week follow up with Dr. Zhou.    Patient does not have MyChart.    Melody R/Complex Procedure    [facility]   Neurology, NeuroSurgery, NeuroPsychology, Pain Management and Cardiology Specialties  Medical/Surgical Adult Specialties

## 2024-04-01 ENCOUNTER — TELEPHONE (OUTPATIENT)
Dept: OTOLARYNGOLOGY | Facility: CLINIC | Age: 64
End: 2024-04-01
Payer: COMMERCIAL

## 2024-04-01 DIAGNOSIS — J34.89 NASAL OBSTRUCTION: Primary | ICD-10-CM

## 2024-04-01 RX ORDER — FLUTICASONE PROPIONATE 50 MCG
1 SPRAY, SUSPENSION (ML) NASAL DAILY
Qty: 16 G | Refills: 3 | Status: SHIPPED | OUTPATIENT
Start: 2024-04-01 | End: 2024-05-21

## 2024-04-01 NOTE — TELEPHONE ENCOUNTER
[facility] Call Center    Phone Message    May a detailed message be left on voicemail: yes     Reason for Call: Other: Pt called in to schedule appointment and said he was supposed to get a prescription for Flonase, should be sent to [facility] in [place], please send this over and/or call him with questions, thanks     Action Taken: Other: ENT    Travel Screening: Not Applicable

## 2024-04-02 NOTE — PROGRESS NOTES
History of Present Illness - Miguel A Vaughn is a 63 year old male presenting in clinic today for a recheck on Patient presents with:  Follow Up: throat    Patient with chronic refractory cough but also episodes of almost loss of consciousness with certain loss of his neck.  She has had some neck issues in the past.  He has had cervical fusion done in the past as well.  Main problems now are dysphagia without any symptoms of aspiration.  We wanted to discuss doing functional endoscopic evaluation of swallowing with him bringing some bleeding and juice but he would like to proceed with video swallow study first.  His cough is still present.  It is quite disabling in spite of trying different preparations different medications gabapentin was not helpful and Tessalon was not helpful.  It is dry tickling neurogenic cough.            BP Readings from Last 1 Encounters:   04/10/24 128/62       BP noted to be well controlled today in office.     Miguel A IS a smoker/uses chewing tobacco.  Miguel A is ready to quit      Past Medical History -   Past Medical History:   Diagnosis Date    Abrasion 07/06/2011    multiple       Abrasion of buttock 07/06/2011    AK (actinic keratosis) 10/25/2012    BCC (basal cell carcinoma), face - suspected nasal 05/23/2023    Benign neoplasm of brain (H)     Cervicalgia     CSOM (chronic suppurative otitis media) 10/31/2011    Depressive disorder, not elsewhere classified 07/16/2008    Deviated nasal septum     Diabetes (H)     Diverticular disease of colon 08/15/2023    Family history of colonic polyps     Headache(784.0)     Hemorrhoids, internal     History of colonic polyps 08/15/2023    Hyperlipidemia LDL goal <130 07/12/2011    Hypersomnia with sleep apnea, unspecified     BENJAMIN (obstructive sleep apnea) 10/12/2011    BENJAMIN (obstructive sleep apnea)     Other encephalopathy     Perirectal abscess s/p I&D 08/05/2016    Seborrheic keratosis - right mid back 07/30/2019    Sprain of right ankle  07/07/2011    Unspecified disorder of adrenal glands        Current Medications -   Current Outpatient Medications:     acetaminophen (TYLENOL) 500 MG tablet, Take 1,000 mg by mouth every 8 hours as needed for mild pain, Disp: , Rfl:     alcohol swab prep pads, Use to swab area of injection/erwin as directed., Disp: 100 each, Rfl: 3    aspirin (ASA) 81 MG EC tablet, Take 1 tablet (81 mg) by mouth daily, Disp: 90 tablet, Rfl: 3    blood glucose (NO BRAND SPECIFIED) test strip, Use to test blood sugar 1 times daily or as directed. To accompany: Blood Glucose Monitor Brands: per insurance., Disp: 100 strip, Rfl: 6    blood glucose calibration (NO BRAND SPECIFIED) solution, To accompany: Blood Glucose Monitor Brands: per insurance., Disp: 100 each, Rfl: 3    blood glucose monitoring (NO BRAND SPECIFIED) meter device kit, Use to test blood sugar 1 times daily or as directed. Preferred blood glucose meter OR supplies to accompany: Blood Glucose Monitor Brands: per insurance., Disp: 1 kit, Rfl: 0    fluticasone (FLONASE) 50 MCG/ACT nasal spray, Spray 1 spray into both nostrils daily, Disp: 16 g, Rfl: 3    fluticasone (FLONASE) 50 MCG/ACT spray, INHALE 1-2 SPRAYS IN EACH NOSTRIL ONCE DAILY, Disp: 16 g, Rfl: 5    glyBURIDE (DIABETA /MICRONASE) 5 MG tablet, Take 2 tablets (10 mg) by mouth 2 times daily (with meals), Disp: 360 tablet, Rfl: 1    losartan (COZAAR) 25 MG tablet, Take 1 tablet (25 mg) by mouth at bedtime, Disp: 90 tablet, Rfl: 3    metFORMIN (GLUCOPHAGE) 500 MG tablet, Take 2 tablets (1,000 mg) by mouth 2 times daily (with meals), Disp: 360 tablet, Rfl: 1    order for DME, Equipment ordered: RESMED Auto PAP Mask type: Full face  Settings: 10-15 CM H2O, Disp: , Rfl:     rOPINIRole (REQUIP) 0.25 MG tablet, TAKE 1 TO 2 TABLETS BY MOUTH AT BEDTIME AS NEEDED FOR RESTLESS LEGS, Disp: 60 tablet, Rfl: 0    rosuvastatin (CRESTOR) 20 MG tablet, Take 1 tablet (20 mg) by mouth daily, Disp: 30 tablet, Rfl: 11    tamsulosin  "(FLOMAX) 0.4 MG capsule, Take 1 capsule (0.4 mg) by mouth every evening, Disp: 90 capsule, Rfl: 1    thin (NO BRAND SPECIFIED) lancets, Use with lanceting device. To accompany: Blood Glucose Monitor Brands: per insurance., Disp: 100 each, Rfl: 1    gabapentin (NEURONTIN) 100 MG capsule, Take 1 capsule (100 mg) by mouth 3 times daily as needed for other (cough, neck pain), Disp: 90 capsule, Rfl: 1    Allergies -   Allergies   Allergen Reactions    Iodinated Contrast Media Hives    Ciprofloxacin Muscle Pain (Myalgia)     Body aches and pain    No Known Drug Allergy     Tape [Adhesive Tape]      Pt states no  \"Surgical tape\"  IV tegaderm ok       Social History -   Social History     Socioeconomic History    Marital status:    Tobacco Use    Smoking status: Every Day     Packs/day: 0.50     Years: 25.00     Additional pack years: 0.00     Total pack years: 12.50     Types: Cigarettes     Last attempt to quit: 2019     Years since quittin.4    Smokeless tobacco: Former     Types: Snuff   Vaping Use    Vaping Use: Never used   Substance and Sexual Activity    Alcohol use: Yes     Alcohol/week: 0.0 standard drinks of alcohol     Comment: weekends    Drug use: No    Sexual activity: Yes     Partners: Female   Other Topics Concern     Service No    Blood Transfusions No    Caffeine Concern No    Occupational Exposure No    Hobby Hazards No    Sleep Concern Yes     Comment: C-Pap     Stress Concern No    Weight Concern Yes    Special Diet No    Back Care No    Exercise No    Seat Belt Yes    Self-Exams No    Parent/sibling w/ CABG, MI or angioplasty before 65F 55M? Yes     Social Determinants of Health     Interpersonal Safety: Low Risk  (2024)    Interpersonal Safety     Do you feel physically and emotionally safe where you currently live?: Yes     Within the past 12 months, have you been hit, slapped, kicked or otherwise physically hurt by someone?: No     Within the past 12 months, have you " "been humiliated or emotionally abused in other ways by your partner or ex-partner?: No       Family History -   Family History   Problem Relation Age of Onset    Cancer Father         skin cancer    Cancer Sister         skin, ovarian    Breast Cancer Sister     Cancer Brother         skin    Diabetes No family hx of        Review of Systems - As per HPI and PMHx, otherwise review of system review of the head and neck negative. Otherwise 10+ review of system is negative    Physical Exam  /62   Temp 97.8  F (36.6  C) (Temporal)   Ht 1.778 m (5' 10\")   Wt 87.2 kg (192 lb 4.8 oz)   BMI 27.59 kg/m    BMI: Body mass index is 27.59 kg/m .    General - The patient is well nourished and well developed, and appears to have good nutritional status.  Alert and oriented to person and place, answers questions and cooperates with examination appropriately.    SKIN - No suspicious lesions or rashes.  Respiration - No respiratory distress.  Head and Face - Normocephalic and atraumatic, with no gross asymmetry noted of the contour of the facial features.  The facial nerve is intact, with strong symmetric movements.    Voice and Breathing - The patient was breathing comfortably without the use of accessory muscles. The patients voice was clear and strong, and had appropriate pitch and quality.    Ears - Bilateral pinna and EACs with normal appearing overlying skin. Tympanic membrane intact with good mobility on pneumatic otoscopy bilaterally. Bony landmarks of the ossicular chain are normal. The tympanic membranes are normal in appearance. No retraction, perforation, or masses.  No fluid or purulence was seen in the external canal or the middle ear.     Eyes - Extraocular movements intact.  Sclera were not icteric or injected, conjunctiva were pink and moist.    Mouth - Examination of the oral cavity showed pink, healthy oral mucosa. No lesions or ulcerations noted.  The tongue was mobile and midline, and the dentition were " in good condition.      Throat - The walls of the oropharynx were smooth, pink, moist, symmetric, and had no lesions or ulcerations.  The tonsillar pillars and soft palate were symmetric. Tonsils are symmetric. The uvula was midline on elevation.    Neck - Normal midline excursion of the laryngotracheal complex during swallowing.  Full range of motion on passive movement.  Palpation of the occipital, submental, submandibular, internal jugular chain, and supraclavicular nodes did not demonstrate any abnormal lymph nodes or masses.  The carotid pulse was palpable bilaterally.  Palpation of the thyroid was soft and smooth, with no nodules or goiter appreciated.  The trachea was mobile and midline.    Nose - External contour is asymmetric, with some gross deflection or scars being currently addressed by plastic surgery would like to use a rib graft to reconstruct..  Nasal mucosa is pink and moist with no abnormal mucus.  The septum was midline and non-obstructive, turbinates of normal size and position.  No polyps, masses, or purulence noted on examination.    Neuro - Nonfocal neuro exam is normal, CN 2 through 12 intact, normal gait and muscle tone.      Performed in clinic today:  No procedures preformed in clinic today      A/P - Miguel A GUILLE Vaughn is a 63 year old male Patient presents with:  Follow Up: throat    Patient with chronic refractory neurogenic cough possibly even related to his cervical disc issue.  Will refer him to the spine clinic for further evaluation.  In the meantime considering the refractory nature of cough and disabling nature of cough we discussed a trial of tramadol low-dose which can be used as the full medication for chronic cough.  Certainly use of opiates is not advisable for prolonged period of time.  I hope that spine clinic will help us deal with some of spine/neck related issues that also be contributing to his dizziness and disequilibrium and almost passing out.  Video swallow study  would also be obtained and discussed with the patient.  Would like to see the patient afterwards.      Tomas Whitman MD

## 2024-04-10 ENCOUNTER — OFFICE VISIT (OUTPATIENT)
Dept: OTOLARYNGOLOGY | Facility: OTHER | Age: 64
End: 2024-04-10
Payer: COMMERCIAL

## 2024-04-10 VITALS
BODY MASS INDEX: 27.53 KG/M2 | WEIGHT: 192.3 LBS | TEMPERATURE: 97.8 F | DIASTOLIC BLOOD PRESSURE: 62 MMHG | HEIGHT: 70 IN | SYSTOLIC BLOOD PRESSURE: 128 MMHG

## 2024-04-10 DIAGNOSIS — R05.3 REFRACTORY CHRONIC COUGH: Primary | ICD-10-CM

## 2024-04-10 DIAGNOSIS — Z91.041 CONTRAST MEDIA ALLERGY: ICD-10-CM

## 2024-04-10 DIAGNOSIS — R13.12 OROPHARYNGEAL DYSPHAGIA: ICD-10-CM

## 2024-04-10 DIAGNOSIS — M99.01 SOMATIC DYSFUNCTION OF SPINE, CERVICAL: ICD-10-CM

## 2024-04-10 PROCEDURE — 99214 OFFICE O/P EST MOD 30 MIN: CPT | Performed by: OTOLARYNGOLOGY

## 2024-04-10 RX ORDER — DIPHENHYDRAMINE HCL 50 MG
50 CAPSULE ORAL EVERY 6 HOURS PRN
Qty: 2 CAPSULE | Refills: 0 | Status: SHIPPED | OUTPATIENT
Start: 2024-04-10

## 2024-04-10 RX ORDER — TRAMADOL HYDROCHLORIDE 25 MG/1
25 TABLET, COATED ORAL 2 TIMES DAILY PRN
Qty: 40 TABLET | Refills: 0 | Status: SHIPPED | OUTPATIENT
Start: 2024-04-10 | End: 2024-04-30

## 2024-04-10 RX ORDER — DIPHENHYDRAMINE HCL 50 MG
50 CAPSULE ORAL EVERY 6 HOURS PRN
Qty: 1 CAPSULE | Refills: 0 | Status: SHIPPED | OUTPATIENT
Start: 2024-04-10 | End: 2024-05-21

## 2024-04-10 RX ORDER — METHYLPREDNISOLONE 32 MG/1
32 TABLET ORAL DAILY
Qty: 1 TABLET | Refills: 0 | Status: SHIPPED | OUTPATIENT
Start: 2024-04-10 | End: 2024-04-29

## 2024-04-10 NOTE — LETTER
4/10/2024         RE: Miguel A Vaughn  [address]  [place] [address]        Dear Colleague,    Thank you for referring your patient, Miguel A Vaughn, to the [facility]. Please see a copy of my visit note below.    History of Present Illness - Miguel A Vaughn is a 63 year old male presenting in clinic today for a recheck on Patient presents with:  Follow Up: throat    Patient with chronic refractory cough but also episodes of almost loss of consciousness with certain loss of his neck.  She has had some neck issues in the past.  He has had cervical fusion done in the past as well.  Main problems now are dysphagia without any symptoms of aspiration.  We wanted to discuss doing functional endoscopic evaluation of swallowing with him bringing some bleeding and juice but he would like to proceed with video swallow study first.  His cough is still present.  It is quite disabling in spite of trying different preparations different medications gabapentin was not helpful and Tessalon was not helpful.  It is dry tickling neurogenic cough.            BP Readings from Last 1 Encounters:   04/10/24 128/62       BP noted to be well controlled today in office.     Miguel A IS a smoker/uses chewing tobacco.  Miguel A is ready to quit      Past Medical History -   Past Medical History:   Diagnosis Date     Abrasion 07/06/2011    multiple        Abrasion of buttock 07/06/2011     AK (actinic keratosis) 10/25/2012     BCC (basal cell carcinoma), face - suspected nasal 05/23/2023     Benign neoplasm of brain (H)      Cervicalgia      CSOM (chronic suppurative otitis media) 10/31/2011     Depressive disorder, not elsewhere classified 07/16/2008     Deviated nasal septum      Diabetes (H)      Diverticular disease of colon 08/15/2023     Family history of colonic polyps      Headache(784.0)      Hemorrhoids, internal      History of colonic polyps 08/15/2023     Hyperlipidemia LDL goal <130 07/12/2011      Hypersomnia with sleep apnea, unspecified      BENJAMIN (obstructive sleep apnea) 10/12/2011     BENJAMIN (obstructive sleep apnea)      Other encephalopathy      Perirectal abscess s/p I&D 08/05/2016     Seborrheic keratosis - right mid back 07/30/2019     Sprain of right ankle 07/07/2011     Unspecified disorder of adrenal glands        Current Medications -   Current Outpatient Medications:      acetaminophen (TYLENOL) 500 MG tablet, Take 1,000 mg by mouth every 8 hours as needed for mild pain, Disp: , Rfl:      alcohol swab prep pads, Use to swab area of injection/erwin as directed., Disp: 100 each, Rfl: 3     aspirin (ASA) 81 MG EC tablet, Take 1 tablet (81 mg) by mouth daily, Disp: 90 tablet, Rfl: 3     blood glucose (NO BRAND SPECIFIED) test strip, Use to test blood sugar 1 times daily or as directed. To accompany: Blood Glucose Monitor Brands: per insurance., Disp: 100 strip, Rfl: 6     blood glucose calibration (NO BRAND SPECIFIED) solution, To accompany: Blood Glucose Monitor Brands: per insurance., Disp: 100 each, Rfl: 3     blood glucose monitoring (NO BRAND SPECIFIED) meter device kit, Use to test blood sugar 1 times daily or as directed. Preferred blood glucose meter OR supplies to accompany: Blood Glucose Monitor Brands: per insurance., Disp: 1 kit, Rfl: 0     fluticasone (FLONASE) 50 MCG/ACT nasal spray, Spray 1 spray into both nostrils daily, Disp: 16 g, Rfl: 3     fluticasone (FLONASE) 50 MCG/ACT spray, INHALE 1-2 SPRAYS IN EACH NOSTRIL ONCE DAILY, Disp: 16 g, Rfl: 5     glyBURIDE (DIABETA /MICRONASE) 5 MG tablet, Take 2 tablets (10 mg) by mouth 2 times daily (with meals), Disp: 360 tablet, Rfl: 1     losartan (COZAAR) 25 MG tablet, Take 1 tablet (25 mg) by mouth at bedtime, Disp: 90 tablet, Rfl: 3     metFORMIN (GLUCOPHAGE) 500 MG tablet, Take 2 tablets (1,000 mg) by mouth 2 times daily (with meals), Disp: 360 tablet, Rfl: 1     order for DME, Equipment ordered: RESMED Auto PAP Mask type: Full face   "Settings: 10-15 CM H2O, Disp: , Rfl:      rOPINIRole (REQUIP) 0.25 MG tablet, TAKE 1 TO 2 TABLETS BY MOUTH AT BEDTIME AS NEEDED FOR RESTLESS LEGS, Disp: 60 tablet, Rfl: 0     rosuvastatin (CRESTOR) 20 MG tablet, Take 1 tablet (20 mg) by mouth daily, Disp: 30 tablet, Rfl: 11     tamsulosin (FLOMAX) 0.4 MG capsule, Take 1 capsule (0.4 mg) by mouth every evening, Disp: 90 capsule, Rfl: 1     thin (NO BRAND SPECIFIED) lancets, Use with lanceting device. To accompany: Blood Glucose Monitor Brands: per insurance., Disp: 100 each, Rfl: 1     gabapentin (NEURONTIN) 100 MG capsule, Take 1 capsule (100 mg) by mouth 3 times daily as needed for other (cough, neck pain), Disp: 90 capsule, Rfl: 1    Allergies -   Allergies   Allergen Reactions     Iodinated Contrast Media Hives     Ciprofloxacin Muscle Pain (Myalgia)     Body aches and pain     No Known Drug Allergy      Tape [Adhesive Tape]      Pt states no  \"Surgical tape\"  IV tegaderm ok       Social History -   Social History     Socioeconomic History     Marital status:    Tobacco Use     Smoking status: Every Day     Packs/day: 0.50     Years: 25.00     Additional pack years: 0.00     Total pack years: 12.50     Types: Cigarettes     Last attempt to quit: 2019     Years since quittin.4     Smokeless tobacco: Former     Types: Snuff   Vaping Use     Vaping Use: Never used   Substance and Sexual Activity     Alcohol use: Yes     Alcohol/week: 0.0 standard drinks of alcohol     Comment: weekends     Drug use: No     Sexual activity: Yes     Partners: Female   Other Topics Concern      Service No     Blood Transfusions No     Caffeine Concern No     Occupational Exposure No     Hobby Hazards No     Sleep Concern Yes     Comment: C-Pap      Stress Concern No     Weight Concern Yes     Special Diet No     Back Care No     Exercise No     Seat Belt Yes     Self-Exams No     Parent/sibling w/ CABG, MI or angioplasty before 65F 55M? Yes     Social " "Determinants of Health     Interpersonal Safety: Low Risk  (2/9/2024)    Interpersonal Safety      Do you feel physically and emotionally safe where you currently live?: Yes      Within the past 12 months, have you been hit, slapped, kicked or otherwise physically hurt by someone?: No      Within the past 12 months, have you been humiliated or emotionally abused in other ways by your partner or ex-partner?: No       Family History -   Family History   Problem Relation Age of Onset     Cancer Father         skin cancer     Cancer Sister         skin, ovarian     Breast Cancer Sister      Cancer Brother         skin     Diabetes No family hx of        Review of Systems - As per HPI and PMHx, otherwise review of system review of the head and neck negative. Otherwise 10+ review of system is negative    Physical Exam  /62   Temp 97.8  F (36.6  C) (Temporal)   Ht 1.778 m (5' 10\")   Wt 87.2 kg (192 lb 4.8 oz)   BMI 27.59 kg/m    BMI: Body mass index is 27.59 kg/m .    General - The patient is well nourished and well developed, and appears to have good nutritional status.  Alert and oriented to person and place, answers questions and cooperates with examination appropriately.    SKIN - No suspicious lesions or rashes.  Respiration - No respiratory distress.  Head and Face - Normocephalic and atraumatic, with no gross asymmetry noted of the contour of the facial features.  The facial nerve is intact, with strong symmetric movements.    Voice and Breathing - The patient was breathing comfortably without the use of accessory muscles. The patients voice was clear and strong, and had appropriate pitch and quality.    Ears - Bilateral pinna and EACs with normal appearing overlying skin. Tympanic membrane intact with good mobility on pneumatic otoscopy bilaterally. Bony landmarks of the ossicular chain are normal. The tympanic membranes are normal in appearance. No retraction, perforation, or masses.  No fluid or " purulence was seen in the external canal or the middle ear.     Eyes - Extraocular movements intact.  Sclera were not icteric or injected, conjunctiva were pink and moist.    Mouth - Examination of the oral cavity showed pink, healthy oral mucosa. No lesions or ulcerations noted.  The tongue was mobile and midline, and the dentition were in good condition.      Throat - The walls of the oropharynx were smooth, pink, moist, symmetric, and had no lesions or ulcerations.  The tonsillar pillars and soft palate were symmetric. Tonsils are symmetric. The uvula was midline on elevation.    Neck - Normal midline excursion of the laryngotracheal complex during swallowing.  Full range of motion on passive movement.  Palpation of the occipital, submental, submandibular, internal jugular chain, and supraclavicular nodes did not demonstrate any abnormal lymph nodes or masses.  The carotid pulse was palpable bilaterally.  Palpation of the thyroid was soft and smooth, with no nodules or goiter appreciated.  The trachea was mobile and midline.    Nose - External contour is asymmetric, with some gross deflection or scars being currently addressed by plastic surgery would like to use a rib graft to reconstruct..  Nasal mucosa is pink and moist with no abnormal mucus.  The septum was midline and non-obstructive, turbinates of normal size and position.  No polyps, masses, or purulence noted on examination.    Neuro - Nonfocal neuro exam is normal, CN 2 through 12 intact, normal gait and muscle tone.      Performed in clinic today:  No procedures preformed in clinic today      A/P - Miguel A GUILLE Vaughn is a 63 year old male Patient presents with:  Follow Up: throat    Patient with chronic refractory neurogenic cough possibly even related to his cervical disc issue.  Will refer him to the spine clinic for further evaluation.  In the meantime considering the refractory nature of cough and disabling nature of cough we discussed a trial of  tramadol low-dose which can be used as the full medication for chronic cough.  Certainly use of opiates is not advisable for prolonged period of time.  I hope that spine clinic will help us deal with some of spine/neck related issues that also be contributing to his dizziness and disequilibrium and almost passing out.  Video swallow study would also be obtained and discussed with the patient.  Would like to see the patient afterwards.      Tomas Whitman MD           Again, thank you for allowing me to participate in the care of your patient.        Sincerely,        Tomas Whitman MD, MD

## 2024-04-22 NOTE — CONFIDENTIAL NOTE
NEUROSURGERY- NEW PREVISIT PLANNING       Record Status/Location     Referring Provider Referral   Tomas Whitman MD      Diagnosis Referral M99.01 (ICD-10-CM) - Somatic dysfunction of spine, cervical    MRI (HEAD, NECK, SPINE) Pacs Cervical 1/19/22 [facility]    CT Pacs Cervical 11/10/23 [facility]    [facility]    INJECTION Encounters 1/17/20- Cervical 6-7 Bilateral facet Injections    PHYSICAL THERAPY Encounters 2020   SURGERY Encounters          CE 11/12/19- CERVICAL 6-7 ANTERIOR  DECOMPRESSION AND FUSION   4/2/19- C 3-4 ANTERIOR CERVICAL DISECTOMY AND FUSION  C 4-5 HARDWARE REMOVAL    10/8/07- C4-5 anterior discectomy and fusion with allograft and plate

## 2024-04-29 ENCOUNTER — OFFICE VISIT (OUTPATIENT)
Dept: NEUROSURGERY | Facility: CLINIC | Age: 64
End: 2024-04-29
Attending: OTOLARYNGOLOGY
Payer: COMMERCIAL

## 2024-04-29 ENCOUNTER — PRE VISIT (OUTPATIENT)
Dept: NEUROSURGERY | Facility: CLINIC | Age: 64
End: 2024-04-29

## 2024-04-29 VITALS
HEART RATE: 94 BPM | BODY MASS INDEX: 27.41 KG/M2 | SYSTOLIC BLOOD PRESSURE: 150 MMHG | DIASTOLIC BLOOD PRESSURE: 90 MMHG | TEMPERATURE: 97.2 F | RESPIRATION RATE: 14 BRPM | WEIGHT: 191 LBS

## 2024-04-29 DIAGNOSIS — Z98.1 S/P CERVICAL SPINAL FUSION: Primary | ICD-10-CM

## 2024-04-29 DIAGNOSIS — Z91.041 CONTRAST MEDIA ALLERGY: ICD-10-CM

## 2024-04-29 PROCEDURE — 99213 OFFICE O/P EST LOW 20 MIN: CPT | Performed by: NURSE PRACTITIONER

## 2024-04-29 RX ORDER — METHYLPREDNISOLONE 32 MG/1
32 TABLET ORAL DAILY
Qty: 1 TABLET | Refills: 0 | Status: SHIPPED | OUTPATIENT
Start: 2024-04-29

## 2024-04-29 ASSESSMENT — PAIN SCALES - GENERAL: PAINLEVEL: MODERATE PAIN (5)

## 2024-04-29 NOTE — PATIENT INSTRUCTIONS
Continue with swallow study as scheduled.    Cervical spine MRI, CT, and flexion-extension x-ray ordered.  Care team will call you to schedule imaging and then a follow-up appointment with Dr. Wooten to review results.    Please call our clinic with any questions.

## 2024-04-29 NOTE — LETTER
4/29/2024         RE: Miguel A Vaughn  [address]  [place] [address]        Dear Colleague,    Thank you for referring your patient, Miguel A Vaughn, to the [facility] [place]. Please see a copy of my visit note below.    [facility]  Follow Up Visit     HPI: 63-year-old male with history of C3-4 ACDF and C4-5 hardware removal on 4/2/2019, and C6-7 ACDF on 11/12/2019 with Dr. Wooten.  Patient was recently evaluated by ENT Dr. Whitman for chronic neurogenic cough, dizziness, syncope, and dysphagia.  Patient is scheduled for a video swallow study on Friday.  Patient was advised to follow-up with our neurosurgery clinic.    Today, patient describes intermittent neck soreness, pain and paresthesias in left > right arms, weakness in arms and dexterity issues in hands.  He also reports syncopal falls, dysphagia, dizziness, and coughing.  Patient states these symptoms have been present for about 4 years, increased over the last 1 year, and he now experiences dizziness 2-3 times per week.  Symptoms are typically triggered by coughing or flexion extension of the neck.    Exam:  Constitutional:  Alert, well nourished, NAD.  HEENT: Normocephalic, atraumatic.   Pulm:  Without shortness of breath   CV:  No pitting edema of BLE.     Vital Signs: BP (!) 150/90   Pulse 94   Temp 97.2  F (36.2  C) (Temporal)   Resp 14   Wt 86.6 kg (191 lb)   BMI 27.41 kg/m      Neurological:  Awake  Alert  Oriented x 3  Motor exam: BUE 5/5 and BLE 5/5  Able to spontaneously move all extremities equally  Sensation intact throughout BUE and BLE  Negative Hoffmans  Negative clonus   Cervical spine examination reveals pain with range of motion  Normal gait   Incision: Well healed     Imaging:   CTA NECK WITH CONTRAST 11/10/2023 9:37 AM   Impression:    1. Neck CTA demonstrates no severe stenosis of the major cervical  arteries.  2. Stable postsurgical changes of C3-C4 and C6-7  anterior fusion.    MRI CERVICAL SPINE WITHOUT AND WITH CONTRAST January 19, 2022 2:28 PM   IMPRESSION:    1. Postoperative changes of C3-C7 fusion.    2. Degenerative change and stenoses as detailed.    Assessment:  63-year-old male with history of C3-4 ACDF and C4-5 hardware removal on 4/2/2019, and C6-7 ACDF on 11/12/2019 with Dr. Wooten. Patient presents for evaluation of chronic neck soreness, pain and paresthesias in left > right arms, syncopal falls, dysphagia, dizziness, and coughing.     Plan:   -Video swallow study scheduled for Friday  -Cervical spine CT, MRI, and flexion-extension x-ray ordered  -Follow-up with Dr. Wooten after imaging has been completed.  Care team will call patient to schedule appointments.    Advised patient to call our clinic with any questions or concerns.  Patient voiced understanding and agreement.      Prerna Rodriguez CNP  [facility] Neurosurgery  Tel [phone]  Pager [phone]      Again, thank you for allowing me to participate in the care of your patient.        Sincerely,        Prerna Rodriguez, MORENO

## 2024-04-29 NOTE — Clinical Note
Scheduling team - please call patient to schedule cervical spine CT, MRI, flexion-extension x-ray, and a follow-up appointment with Dr. Wooten.  Patient is hoping to see Dr. Wooten on 5/9/2024 if possible.  Video swallow study is scheduled for Friday.  CTA neck was completed in November.  Thanks

## 2024-04-29 NOTE — NURSING NOTE
"Miguel A Vaughn is a 63 year old male who presents for:  Chief Complaint   Patient presents with    Neurologic Problem     Somatic dysfunction of spine, cervical        Initial Vitals:  BP (!) 150/90   Pulse 94   Temp 97.2  F (36.2  C) (Temporal)   Resp 14   Wt 191 lb (86.6 kg)   BMI 27.41 kg/m   Estimated body mass index is 27.41 kg/m  as calculated from the following:    Height as of 4/10/24: 5' 10\" (1.778 m).    Weight as of this encounter: 191 lb (86.6 kg).. Body surface area is 2.07 meters squared. BP completed using cuff size: regular  Moderate Pain (5)    Nursing Comments:     Zenaida Alexandre    "

## 2024-04-29 NOTE — PROGRESS NOTES
[facility] Neurosurgery Clinic  Follow Up Visit     HPI: 63-year-old male with history of C3-4 ACDF and C4-5 hardware removal on 4/2/2019, and C6-7 ACDF on 11/12/2019 with Dr. Wooten.  Patient was recently evaluated by ENT Dr. Whitman for chronic neurogenic cough, dizziness, syncope, and dysphagia.  Patient is scheduled for a video swallow study on Friday.  Patient was advised to follow-up with our neurosurgery clinic.    Today, patient describes intermittent neck soreness, pain and paresthesias in left > right arms, weakness in arms and dexterity issues in hands.  He also reports syncopal falls, dysphagia, dizziness, and coughing.  Patient states these symptoms have been present for about 4 years, increased over the last 1 year, and he now experiences dizziness 2-3 times per week.  Symptoms are typically triggered by coughing or flexion extension of the neck.    Exam:  Constitutional:  Alert, well nourished, NAD.  HEENT: Normocephalic, atraumatic.   Pulm:  Without shortness of breath   CV:  No pitting edema of BLE.     Vital Signs: BP (!) 150/90   Pulse 94   Temp 97.2  F (36.2  C) (Temporal)   Resp 14   Wt 86.6 kg (191 lb)   BMI 27.41 kg/m      Neurological:  Awake  Alert  Oriented x 3  Motor exam: BUE 5/5 and BLE 5/5  Able to spontaneously move all extremities equally  Sensation intact throughout BUE and BLE  Negative Hoffmans  Negative clonus   Cervical spine examination reveals pain with range of motion  Normal gait   Incision: Well healed     Imaging:   CTA NECK WITH CONTRAST 11/10/2023 9:37 AM   Impression:    1. Neck CTA demonstrates no severe stenosis of the major cervical  arteries.  2. Stable postsurgical changes of C3-C4 and C6-7 anterior fusion.    MRI CERVICAL SPINE WITHOUT AND WITH CONTRAST January 19, 2022 2:28 PM   IMPRESSION:    1. Postoperative changes of C3-C7 fusion.    2. Degenerative change and stenoses as detailed.    Assessment:  63-year-old male with history of C3-4 ACDF and  C4-5 hardware removal on 4/2/2019, and C6-7 ACDF on 11/12/2019 with Dr. Wooten. Patient presents for evaluation of chronic neck soreness, pain and paresthesias in left > right arms, syncopal falls, dysphagia, dizziness, and coughing.     Plan:   -Video swallow study scheduled for Friday  -Cervical spine CT, MRI, and flexion-extension x-ray ordered  -Follow-up with Dr. Wooten after imaging has been completed.  Care team will call patient to schedule appointments.    Advised patient to call our clinic with any questions or concerns.  Patient voiced understanding and agreement.      Prerna Rodriguez [facility] Neurosurgery  Tel [phone]  Pager [phone]

## 2024-04-30 ENCOUNTER — TELEPHONE (OUTPATIENT)
Dept: NEUROSURGERY | Facility: CLINIC | Age: 64
End: 2024-04-30
Payer: COMMERCIAL

## 2024-04-30 NOTE — TELEPHONE ENCOUNTER
Message was left for patient to call and schedule with Dr. Wooten with a series of imaging prior.     Patient goal is to see Dr. Wooten on May 9th following the images. Patient advised to call Central scheduling for assistance with clinic/imaging appointments.

## 2024-04-30 NOTE — TELEPHONE ENCOUNTER
[facility] Call Center    Phone Message    May a detailed message be left on voicemail: yes     Reason for Call: Other: Patient is needing the imaging orders changed to stat in order to get them completed before his appointment. Please call back to schedule after orders have been changed.      Action Taken: Other: Neurosurgery    Travel Screening: Not Applicable

## 2024-04-30 NOTE — TELEPHONE ENCOUNTER
[facility] Call Center    Phone Message    May a detailed message be left on voicemail: yes     Reason for Call: Other: Patient calling back to try and reschedule his appt with Dr. Wooten on 5/9.  The next available is May 23rd, but he has another appt.  It then goes to June 13th.  He is very upset this is taking so long to get in.  Is there anyway he can get in sooner.  Please call back to advise.      Action Taken: Message routed to:  Other: [place] [facility]    Travel Screening: Not Applicable

## 2024-05-03 ENCOUNTER — HOSPITAL ENCOUNTER (OUTPATIENT)
Dept: GENERAL RADIOLOGY | Facility: CLINIC | Age: 64
Discharge: HOME | End: 2024-05-03
Attending: OTOLARYNGOLOGY | Admitting: OTOLARYNGOLOGY
Payer: COMMERCIAL

## 2024-05-03 ENCOUNTER — THERAPY VISIT (OUTPATIENT)
Dept: SPEECH THERAPY | Facility: CLINIC | Age: 64
End: 2024-05-03
Attending: OTOLARYNGOLOGY
Payer: COMMERCIAL

## 2024-05-03 DIAGNOSIS — R13.12 OROPHARYNGEAL DYSPHAGIA: ICD-10-CM

## 2024-05-03 DIAGNOSIS — R13.12 OROPHARYNGEAL DYSPHAGIA: Primary | ICD-10-CM

## 2024-05-03 PROCEDURE — 74230 X-RAY XM SWLNG FUNCJ C+: CPT

## 2024-05-03 PROCEDURE — 92611 MOTION FLUOROSCOPY/SWALLOW: CPT | Mod: GN

## 2024-05-03 RX ORDER — BARIUM SULFATE 400 MG/ML
SUSPENSION ORAL ONCE
Status: COMPLETED | OUTPATIENT
Start: 2024-05-03 | End: 2024-05-03

## 2024-05-03 RX ADMIN — BARIUM SULFATE: 400 SUSPENSION ORAL at 10:30

## 2024-05-03 RX ADMIN — BARIUM SULFATE 1 ML: 400 SUSPENSION ORAL at 10:30

## 2024-05-03 NOTE — PROGRESS NOTES
SPEECH LANGUAGE PATHOLOGY EVALUATION    See electronic medical record for Abuse and Falls Screening details.    Subjective      Presenting condition or subjective complaint:    Date of onset: 04/10/24    Relevant medical history:     Dates & types of surgery:      Prior diagnostic imaging/testing results:       Prior therapy history for the same diagnosis, illness or injury:        Patient goals for therapy:         Objective     SWALLOW EVALUTION  Dysphagia history: Pt is a 63-year-old male with a history of dysphagia. On 8/15/2019 pt had a VFSS which revealed penetration during consecutive swallows of thin liquids and pt completed one treatment session. Pt reported that he continues to have difficulties eating and that he is being fitted for dentures. Pt described difficulties with solids and occasionally with liquids. Pt reported that he currently avoids tough meats.  Current Diet/Method of Nutritional Intake: oral diet, thin liquids (level 0), soft & bite-sized (level 6)        VIDEOFLUOROSCOPIC SWALLOW STUDY  Radiologist: Dr. Ordoñez  Views Taken: left lateral   Physical location of procedure: [facility]  Patient sitting upright on chair/stool     VFSS textures trialed:   VFSS Eval: Thin Liquids  Mode of Presentation: cup, straw, self-fed   Order of Presentation: 1,2,3,4,5  Preparatory Phase: WFL  Oral Phase: WFL, residue in oral cavity  Bolus Location When Swallow Initiated: posterior angle of ramus, valleculae  Pharyngeal Phase: WFL, residue in valleculae, residue in pyriform sinus  Rosenbeck's Penetration Aspiration Scale: 1 - no aspiration, contrast does not enter airway  Response to Aspiration:  N/a  Strategies and Compensations: not applicable  Diagnostic Statement: Pt swallow is WFL. No aspiration or penetration noted. Pt swallow was initiated in the timely manner. During first trial pt pause during swallow and half the bolus remained in the oral cavity. Pt initiated second swallow and  "there was trace amounts of residue in the oral cavity, valleculae, and pyriform sinus, considered WFL. Pt reported that he \"forgot that he had to hold for three seconds before swallowing, so he paused for a little bit\". Across all trials there was trace amounts of residue in the oral cavity, vallaculae, and pyriform sinus, considered WFL.  Initial swallows were initiated at the posterior angle of the ramus. One clearing swallow noted at the level of vallaculae, no aspiration of penetration noted WFL.     VFSS Eval: Mildly Thick Liquids  Mode of Presentation: cup, self-fed   Order of Presentation: 6  Preparatory Phase: WFL  Oral Phase: WFL  Bolus Location When Swallow Initiated: posterior angle of ramus  Pharyngeal Phase: WFL, residue in valleculae, residue in pyriform sinus  Rosenbeck's Penetration Aspiration Scale: 1 - no aspiration, contrast does not enter airway  Response to Aspiration:  N/a  Strategies and Compensations: not applicable  Diagnostic Statement: Pt swallow WFL. No aspiration or penetration noted.     VFSS Eval: Purees  Mode of Presentation: spoon, self-fed   Order of Presentation: 7,8  Preparatory Phase: WFL  Oral Phase: WFL, residue in oral cavity  Bolus Location When Swallow Initiated: valleculae  Pharyngeal Phase: WFL, residue in valleculae, residue in pyriform sinus  Rosenbeck s Penetration Aspiration Scale: 1 - no aspiration, contrast does not enter airway  Response to Aspiration:  N/a  Strategies and Compensations: not applicable  Diagnostic Statement: Patient swallow WFL. No aspiration or penetration. Swallow initiated at the level of the valleculae WFL. Trace amounts of residue in the oral cavity, vallaculae, and pyriform sinus, considered WFL.     VFSS Eval: Soft & Bite Sized  Mode of Presentation: spoon, self-fed   Order of Presentation: 9,10  Preparatory Phase: WFL  Oral Phase: WFL, residue in oral cavity  Bolus Location When Swallow Initiated: posterior angle of ramus  Pharyngeal Phase: " WFL, residue in valleculae  Rosenbeck s Penetration Aspiration Scale: 1 - no aspiration, contrast does not enter airway  Response to Aspiration:  N/a  Strategies and Compensations: not applicable  Diagnostic Statement:  Patient swallow WFL. No aspiration or penetration. Trace amounts of residue in the oral cavity and valleculae,  considered WFL.     VFSS Eval: Solids  Mode of Presentation: spoon, self-fed   Order of Presentation: 11,12  Preparatory Phase: WFL  Oral Phase: WFL, residue in oral cavity  Bolus Location When Swallow Initiated: posterior angle of ramus  Pharyngeal Phase: WFL, residue in valleculae   Rosenbeck s Penetration Aspiration Scale: 1 - no aspiration, contrast does not enter airway  Response to Aspiration:  N/a  Strategies and Compensations: not applicable  Diagnostic Statement: Patient swallow WFL. No aspiration or penetration. Trace amounts of residue in the oral cavity and valleculae,  considered WFL. With a larger bolus the amount of residue in the oral cavity and valleculae increased, pt independently initiated second swallow to manage residue in oral cavity. Residue in oral cavity decreased after clearing swallow and the residue in the valleculae slightly decreased, considered WFL.    VFSS Eval: Barium Tablet  Mode of Presentation: cup, self-fed   Order of Presentation: 13  Preparatory Phase: WFL  Oral Phase: WFL  Bolus Location When Swallow Initiated: posterior angle of ramus  Pharyngeal Phase: WFL  Rosenbeck s Penetration Aspiration Scale: 1 - no aspiration, contrast does not enter airway  Response to Aspiration:  N/a  Strategies and Compensations: not applicable  Diagnostic Statement: Patient swallow is WFL. No penetration or aspiration noted. Remaining residue from solid trail was cleared.    ESOPHAGEAL PHASE OF SWALLOW  please refer to radiologist's report for details     SWALLOW ASSESSMENT CLINICAL IMPRESSIONS AND RATIONALE  Diet Consistency Recommendations: thin liquids (level 0),  regular diet    Recommended Feeding/Eating Techniques: small bolus size, no straws, slow rate of intake   Medication Administration Recommendations: liquid or food carrier.  Instrumental Assessment Recommendations:      Assessment & Plan   CLINICAL IMPRESSIONS   Medical Diagnosis: Oropharyngeal dysphagia    Treatment Diagnosis: Functional swallow   Impression/Assessment: Completed VFSS with patient in upright position and left lateral views completed. Patient fed himself and followed directions without difficulties. Swallow is within functional limits and no aspiration or penetration noted.  Overall, trace amounts of residue were noted in the oral cavity and valleculae. Residue was also noted in the pyriform sinus during thin liquid, mildly thick liquid, puree, and soft and bite size solids. Residue noted was WFL. The amount of residue increased as the volume of the bolus increased. With tablet and water trial, patient had significant difficulty  Recommend a diet consistency of regular foods and thin liquids, avoiding known choking hazards. Swallowing strategies recommended include single sips, single bites, allowing time for second swallow as needed, and using a liquid wash to help clear globus feelings.  Reviewed video and results with physician and with patient. Medication to be taken whole with thin liquids and use apple sauce to help clear if medications feels stuck. Patient indicated that he understood results and recommendations well via discussion and question/answer.  No further intervention necessary.  Thank you for this appropriate referral.       PLAN OF CARE  Treatment Interventions:  Eval only         Frequency of Treatment: Eval only  Duration of Treatment: Eval only     Recommended Referrals to Other Professionals:   Education Assessment:        Risks and benefits of evaluation/treatment have been explained.   Patient/Family/caregiver agrees with Plan of Care.     Evaluation Time:    SLP Eval:  VideoFluoroscopic Swallow function Minutes (25027): 50         Signing Clinician: FELIPE ESPOSITO    [facility]                                                                                   OUTPATIENT SPEECH LANGUAGE PATHOLOGY      PLAN OF TREATMENT FOR OUTPATIENT REHABILITATION   Patient's Last Name, First Name, KIMBERLY VaughnMiguel A  GUILLE YOB: 1960   Provider's Name   [facility]   Medical Record No.  [id number]     Onset Date: 04/10/24 Start of Care Date: 05/03/24     Medical Diagnosis:  Oropharyngeal dysphagia      SLP Treatment Diagnosis: Functional swallow  Plan of Treatment  Frequency/Duration: Eval only  / Eval only     Certification date from 05/03/24   To 05/03/24          See note for plan of treatment details and functional goals     FELIPE ESPOSITO                         I CERTIFY THE NEED FOR THESE SERVICES FURNISHED UNDER        THIS PLAN OF TREATMENT AND WHILE UNDER MY CARE     (Physician attestation of this document indicates review and certification of the therapy plan).              Referring Provider:  Tomas Whitman    Initial Assessment  See Epic Evaluation- 05/03/24

## 2024-05-14 ENCOUNTER — TELEPHONE (OUTPATIENT)
Dept: NEUROSURGERY | Facility: CLINIC | Age: 64
End: 2024-05-14
Payer: COMMERCIAL

## 2024-05-14 NOTE — TELEPHONE ENCOUNTER
Per Prerna Grandfalls, CNP patient's MRI has been denied by insurance. Continue with xr and CT as scheduled.    Called patient to update. Spoke with patient's wife as patient is asleep, consent to communicate on file. Wife voiced understanding. MRI appointment cancelled.

## 2024-05-14 NOTE — TELEPHONE ENCOUNTER
[facility] Call Center    Phone Message    May a detailed message be left on voicemail: yes     Reason for Call: Patient requests a call back to discuss prior authorization of MRI  Scheduled for tomorrow.      Alanna - prior authorization from [employer] BS faxed to Dr. Wooten's office additional information needed to complete authorization    Patient requests a call back as the appointment is tommorrow    Action Taken: [facility]    Travel Screening: Not Applicable

## 2024-05-14 NOTE — TELEPHONE ENCOUNTER
Called patient to update ok per Prerna Rodriguez CNP to keep appointment with Dr. Wooten with xr and CT.    Advised we will watch for anything to come through fax from [facility] regarding MRI.     Patient voiced agreement and understanding.

## 2024-05-14 NOTE — TELEPHONE ENCOUNTER
MRI was cancelled per Prerna Rodriguez CNP as insurance denied.     Attempted to reach out to patient, no answer. Left voice message for them to call clinic back to further discuss.      Per Prerna Rodriguez CNP ok to keep appointment with Dr. Wooten without MRI as patient specifically requested to follow-up with Dr. Wooten.

## 2024-05-15 ENCOUNTER — HOSPITAL ENCOUNTER (OUTPATIENT)
Dept: GENERAL RADIOLOGY | Facility: CLINIC | Age: 64
Discharge: HOME | End: 2024-05-15
Attending: NURSE PRACTITIONER
Payer: COMMERCIAL

## 2024-05-15 ENCOUNTER — HOSPITAL ENCOUNTER (OUTPATIENT)
Dept: CT IMAGING | Facility: CLINIC | Age: 64
Discharge: HOME | End: 2024-05-15
Attending: NURSE PRACTITIONER
Payer: COMMERCIAL

## 2024-05-15 DIAGNOSIS — Z98.1 S/P CERVICAL SPINAL FUSION: ICD-10-CM

## 2024-05-15 PROCEDURE — 72125 CT NECK SPINE W/O DYE: CPT

## 2024-05-15 PROCEDURE — 72050 X-RAY EXAM NECK SPINE 4/5VWS: CPT

## 2024-05-16 ENCOUNTER — OFFICE VISIT (OUTPATIENT)
Dept: NEUROSURGERY | Facility: CLINIC | Age: 64
End: 2024-05-16
Payer: COMMERCIAL

## 2024-05-16 VITALS — DIASTOLIC BLOOD PRESSURE: 75 MMHG | HEART RATE: 87 BPM | OXYGEN SATURATION: 98 % | SYSTOLIC BLOOD PRESSURE: 128 MMHG

## 2024-05-16 DIAGNOSIS — Z98.1 S/P CERVICAL SPINAL FUSION: Primary | ICD-10-CM

## 2024-05-16 DIAGNOSIS — M47.812 ARTHROPATHY OF CERVICAL FACET JOINT: ICD-10-CM

## 2024-05-16 PROCEDURE — 99213 OFFICE O/P EST LOW 20 MIN: CPT | Performed by: NEUROLOGICAL SURGERY

## 2024-05-16 ASSESSMENT — PAIN SCALES - GENERAL: PAINLEVEL: SEVERE PAIN (6)

## 2024-05-16 NOTE — PATIENT INSTRUCTIONS
Orders placed for a C3-4 Medial Branch Block/Radiofrequency Ablation in [place]. [facility] will call you within 48 hours to schedule this. If you don't here from them, please schedule by calling Operating Room scheduling team s phone number: [phone], option 2 (or [phone]). This injection order consists of a series of up to 3 injections. The first two injections are medial branch blocks. They are diagnostic, meaning they are not intended to provide lasting pain relief. After the first MBB injection, you will be sent home with a pain log. When complete, you will follow the instructions to send back to the clinic. We will read the results and call you to let you know if results are positive and if you will move forward with the next injection (MBB #2). If the 2nd MBB is also positive, you will move on to the 3rd injection which is the Radiofrequency Ablation. The RFA is the therapeutic injection intended to provide lasting pain relief. RFA uses an electrical current created by radio waves. The radio waves make heat that destroys nerves along the spine that are causing pain.    Order placed for cervical physical therapy with traction with [facility] They will call you to schedule within a few days. If you have not heard from them, please call [phone].  Please call our clinic if symptoms persist after your course of physical therapy (approximately 4 weeks).     Smoking cessation printout provided along with insert with this packet.    [facility]  [facility] - [address] [address]  Telephone:  [phone]        Fax:  [phone]  Patient Education   Ways to Quit Smoking     It's not too late to quit smoking.   Quitting smoking helps your circulation, your stamina, your skin, and your general health. Your risk for coronary heart disease, a common cause of death and disability, is halved  "after only a year without smoking. Quitting smoking also reduces the likelihood of your getting respiratory problems and lung cancer.   Studies have shown that your smoke affects others as well as yourself. Children of parents who smoke around the house are more prone to respiratory infections than children from nonsmoking homes.   Smoking is an addictive habit. Most former smokers make several attempts to quit before they are finally successful. So, never say, \"I can't.\" Just keep trying.   Set a quit date.   Set a date for when you will stop smoking. Don't buy cigarettes to carry you beyond your last day. Tell your family and friends you plan to quit, and ask for their support and encouragement. Ask them not to offer you cigarettes.   Throw your cigarettes away.   If you keep cigarettes around, sooner or later you'll break down and smoke one, then another, then another, and so on. Throw them away. Make it less easy to start again.   Try chewing gum is as a substitute for cigarettes.   Spend time with nonsmokers rather than with smokers.   Think of yourself and identify yourself as a nonsmoker (for example, in restaurants). Stay away from \"smokers' beverley,\" such as bars. Avoid spending time with smokers. You can't tell others not to smoke, but you don't have to sit with them while they do. Old habits die hard and one of your old smoking buddies is sure to offer you a cigarette. Plan on walking away from cigarette smoke. Spend time with nonsmokers and sit in the nonsmoking section of restaurants.   Be prepared for relapse or difficult situations.   Most relapses occur within the first 3 months after quitting. Many people try 5 or more times before they successfully quit. Avoid drinking alcohol, because it lowers your chances of success. Don't be distracted by weight gain, which is usually less than 10 pounds. Learn new ways to improve your mood and overcome depression.   Start an exercise program.   As you become " more fit, you will not want the nicotine effects in your body. Regular exercise will also help keep you from gaining weight when you quit smoking.   Keep your hands busy.   You may not know what to do with your hands for a while.  a book or a magazine. Try knitting, needlework, pottery, drawing, making a plastic model, or doing a jigsaw puzzle. Join special interest groups that keep you involved in your hobby.   Take on new activities.   Take on new activities that don't include smoking. Join an exercise group and work out regularly. Sign up for an evening class or a join a study group at your place of [employer]. Go on more outings with your family or friends. Learn ways to relax and manage stress.   Join quit-smoking programs if it helps.   Some people do better in groups, or with a set of instructions to follow. That's fine, too. Remember, the aim is to quit smoking. It doesn't matter how you do it.   Consider using nicotine replacement therapy.   Nicotine is the drug that is in tobacco. You can use nicotine patches or gum, available without a prescription at your local pharmacy, to quit smoking. It is a two-step process. First you learn to live without smoking, but not without nicotine. Then, as you graduate to patches with less nicotine, or chew less of the nicotine gum, you wean yourself off the nicotine.   Your health care provider can prescribe nicotine substitutes that can almost double your chances of quitting for good. They are:   Zyban (bupropion HCL)   nicotine inhaler   nicotine lozenge   nicotine nasal spray   nicotine patch.   You may prefer to be involved in an organized quit-smoking program while you are using nicotine patches or gum or other medicine to help you quit. None of these treatments is a miracle cure. You still need to learn to live without cigarettes in your daily life.     Developed by Tiffanie Tsang MD, for [employer].   Published by [employer].    This content is reviewed periodically and is subject to change as new health information becomes available. The information is intended to inform and educate and is not a replacement for medical evaluation, advice, diagnosis or treatment by a healthcare professional.   Adult Health Advisor 2003.2 Index  Adult Health Advisor 2003.2 Credits   Copyright   2003 [employer]. All rights reserved.     More information can also be obtained from the [employer] [place]:  Telephone: 0-567-9-CANCER (1-802.514.6349)   TTY: 1-369.666.7185   (for deaf or hard of hearing callers)

## 2024-05-16 NOTE — PROGRESS NOTES
[facility]  Follow Up Visit     HPI: 63-year-old male with history of C3-4 ACDF and C4-5 hardware removal on 4/2/2019, and C6-7 ACDF on 11/12/2019 with Dr. Wooten.  Patient was recently evaluated by ENT Dr. Whitman for chronic neurogenic cough, dizziness, syncope, and dysphagia.  Patient is scheduled for a video swallow study on Friday.  Patient was advised to follow-up with our neurosurgery clinic.      Returns for follow up.  Continued neck pain radiating to the shoulders.  Worse in the last 2 years, with severe aching, worse with neck extension.  Occasional dizzy/syncopal spells as well.  Chronic cough and dysphagia.   Underwent Swallow study which showed normal function and no impingement from ACDF hardware.  Patient admitted that he is smoking a half pack per day.  CT Cervical, personally reviewed, with stable hardware position, C3-4 disc space without clear fusion.    Exam:  Constitutional:  Alert, well nourished, NAD.  HEENT: Normocephalic, atraumatic.   Pulm:  Without shortness of breath   CV:  No pitting edema of BLE.     Vital Signs: /75   Pulse 87   SpO2 98%     Neurological:  Awake  Alert  Oriented x 3  Motor exam: BUE 5/5 and BLE 5/5  Able to spontaneously move all extremities equally  Sensation intact throughout BUE and BLE  Negative Hoffmans  Negative clonus   Cervical spine examination reveals pain with range of motion  Normal gait   Incision: Well healed     Imaging:   CTA NECK WITH CONTRAST 11/10/2023 9:37 AM   Impression:    1. Neck CTA demonstrates no severe stenosis of the major cervical  arteries.  2. Stable postsurgical changes of C3-C4 and C6-7 anterior fusion.    MRI CERVICAL SPINE WITHOUT AND WITH CONTRAST January 19, 2022 2:28 PM   IMPRESSION:    1. Postoperative changes of C3-C7 fusion.    2. Degenerative change and stenoses as detailed.    Assessment:  63-year-old male with history of C3-4 ACDF and C4-5 hardware removal on 4/2/2019, and C6-7 ACDF on  [facility]  General Inpatient Hospice Progress Note    Date: 1/1/2024  Name: Courtney Adorno  MRN: [id number]  YOB: 1965   Patient's PCP: No primary care provider on file.  Admit Date: 12/30/2023 to General Inpatient Hospice  Acute care admit date: 12/26/2023 (third admission since 11/4/23)  Consultants during acute care: pulmonology, critical care, cardiology, nephrology  Intubation: 12/27/23  Withdrawal of life sustaining treatment (mechanical ventilation and vasopressors):12/28/2023    Subjective:   The patient is comfortable, drowsy but arousable. She said \"good morning\", then went back to sleep. One family member sleeping soundly at bedside. Did not arouse awake. Collaborated with pt's RN and hospice RN. No acute issues overnight.    Objective:   Pain is managed with as needed morphine with 3 doses in the past 24 hours with routine morphine 2 mg every 8 hours, Glycopyrrolate IV is available for secretions, and Lorazepam is available for anxiety with 5 doses in the past 24 hours.      Physical Exam:   /81   Pulse 90   Temp 97.5 °F (36.4 °C) (Oral)   Resp 16   SpO2 91%   General: Comfortable, lethargic, in no distress, sleeping, but arousable. Did say \"good morning\", but went back to sleep. Seems weaker than yesterday.  HEENT: Mucous membranes with decreased saliva  Heart: distant tones, IRRR, S1S2, no murmurs  Lungs:  Distant, shallow breath sounds bilaterally, diminished at the bases, without rales, or rhonchi  Abdomen: soft, hypoactive bowel sounds, no tenderness, nondistended  Extremities:  No mottling, or edema  Neurologic: lethargic, alert, but drowsy, falling back asleep quickly    Assessment/Plan:     Heart failure with reduced ejection fraction with nonischemic cardiomyopathy and recurrent decompensation with hypoxic respiratory failure. There was withdrawal of life sustaining treatment (mechanical ventilation and vasopressors) on 12/28/23 and the patient and  11/12/2019 with Dr. Wooten. Patient presents for evaluation of chronic neck soreness, syncopal falls, dysphagia, dizziness, and coughing.     Plan:     C3-4 disc space not fused through  Will order PT/traction  C3-4 MBB/RFA  We discussed need for smoking cessation  Discussed that if pain persists and he quits smoking, could consider C3-4 PSF in the future

## 2024-05-16 NOTE — NURSING NOTE
"Miguel A Vaughn is a 63 year old male who presents for:  Chief Complaint   Patient presents with    Consult     Pain on left side of neck down into the shoulder - MRI was cancelled, gets really dizzy when his head is tipped back lvl 6 pain        Initial Vitals:  /75   Pulse 87   SpO2 98%  Estimated body mass index is 27.41 kg/m  as calculated from the following:    Height as of 4/10/24: 5' 10\" (1.778 m).    Weight as of 4/29/24: 191 lb (86.6 kg).. There is no height or weight on file to calculate BSA. BP completed using cuff size: regular  Severe Pain (6)    Nursing Comments:     Eris Villanueva    "

## 2024-05-16 NOTE — LETTER
5/16/2024         RE: Miguel A Vaughn  [address]  [place] [address]        Dear Colleague,    Thank you for referring your patient, Miguel A Vaughn, to the [facility]. Please see a copy of my visit note below.    [facility]  Follow Up Visit     HPI: 63-year-old male with history of C3-4 ACDF and C4-5 hardware removal on 4/2/2019, and C6-7 ACDF on 11/12/2019 with Dr. Wooten.  Patient was recently evaluated by ENT Dr. Whitman for chronic neurogenic cough, dizziness, syncope, and dysphagia.  Patient is scheduled for a video swallow study on Friday.  Patient was advised to follow-up with our neurosurgery clinic.      Returns for follow up.  Continued neck pain radiating to the shoulders.  Worse in the last 2 years, with severe aching, worse with neck extension.  Occasional dizzy/syncopal spells as well.  Chronic cough and dysphagia.   Underwent Swallow study which showed normal function and no impingement from ACDF hardware.  Patient admitted that he is smoking a half pack per day.  CT Cervical, personally reviewed, with stable hardware position, C3-4 disc space without clear fusion.    Exam:  Constitutional:  Alert, well nourished, NAD.  HEENT: Normocephalic, atraumatic.   Pulm:  Without shortness of breath   CV:  No pitting edema of BLE.     Vital Signs: /75   Pulse 87   SpO2 98%     Neurological:  Awake  Alert  Oriented x 3  Motor exam: BUE 5/5 and BLE 5/5  Able to spontaneously move all extremities equally  Sensation intact throughout BUE and BLE  Negative Hoffmans  Negative clonus   Cervical spine examination reveals pain with range of motion  Normal gait   Incision: Well healed     Imaging:   CTA NECK WITH CONTRAST 11/10/2023 9:37 AM   Impression:    1. Neck CTA demonstrates no severe stenosis of the major cervical  arteries.  2. Stable postsurgical changes of C3-C4 and C6-7 anterior fusion.    MRI CERVICAL SPINE WITHOUT AND  WITH CONTRAST January 19, 2022 2:28 PM   IMPRESSION:    1. Postoperative changes of C3-C7 fusion.    2. Degenerative change and stenoses as detailed.    Assessment:  63-year-old male with history of C3-4 ACDF and C4-5 hardware removal on 4/2/2019, and C6-7 ACDF on 11/12/2019 with Dr. Wooten. Patient presents for evaluation of chronic neck soreness, syncopal falls, dysphagia, dizziness, and coughing.     Plan:     C3-4 disc space not fused through  Will order PT/traction  C3-4 MBB/RFA  We discussed need for smoking cessation  Discussed that if pain persists and he quits smoking, could consider C3-4 PSF in the future      Again, thank you for allowing me to participate in the care of your patient.        Sincerely,        Memo Wooten MD

## 2024-05-20 ENCOUNTER — TELEPHONE (OUTPATIENT)
Dept: PALLIATIVE MEDICINE | Facility: CLINIC | Age: 64
End: 2024-05-20
Payer: COMMERCIAL

## 2024-05-20 ENCOUNTER — HOSPITAL ENCOUNTER (OUTPATIENT)
Facility: CLINIC | Age: 64
End: 2024-05-20
Attending: ANESTHESIOLOGY | Admitting: ANESTHESIOLOGY
Payer: COMMERCIAL

## 2024-05-21 ENCOUNTER — THERAPY VISIT (OUTPATIENT)
Dept: PHYSICAL THERAPY | Facility: CLINIC | Age: 64
End: 2024-05-21
Attending: NEUROLOGICAL SURGERY
Payer: COMMERCIAL

## 2024-05-21 ENCOUNTER — OFFICE VISIT (OUTPATIENT)
Dept: FAMILY MEDICINE | Facility: OTHER | Age: 64
End: 2024-05-21
Payer: COMMERCIAL

## 2024-05-21 VITALS
WEIGHT: 187 LBS | TEMPERATURE: 97.4 F | HEIGHT: 70 IN | HEART RATE: 82 BPM | DIASTOLIC BLOOD PRESSURE: 62 MMHG | BODY MASS INDEX: 26.77 KG/M2 | SYSTOLIC BLOOD PRESSURE: 116 MMHG | OXYGEN SATURATION: 98 % | RESPIRATION RATE: 16 BRPM

## 2024-05-21 DIAGNOSIS — G25.81 RESTLESS LEGS SYNDROME (RLS): ICD-10-CM

## 2024-05-21 DIAGNOSIS — I10 HTN, GOAL BELOW 140/80: ICD-10-CM

## 2024-05-21 DIAGNOSIS — G63 POLYNEUROPATHY ASSOCIATED WITH UNDERLYING DISEASE (H): ICD-10-CM

## 2024-05-21 DIAGNOSIS — Z98.1 S/P CERVICAL SPINAL FUSION: ICD-10-CM

## 2024-05-21 DIAGNOSIS — E11.65 UNCONTROLLED TYPE 2 DIABETES MELLITUS WITH HYPERGLYCEMIA (H): Primary | ICD-10-CM

## 2024-05-21 DIAGNOSIS — E78.5 HYPERLIPIDEMIA LDL GOAL <100: ICD-10-CM

## 2024-05-21 DIAGNOSIS — F17.200 NICOTINE DEPENDENCE, UNCOMPLICATED, UNSPECIFIED NICOTINE PRODUCT TYPE: ICD-10-CM

## 2024-05-21 LAB
CHOLEST SERPL-MCNC: 93 MG/DL
CREAT UR-MCNC: 149.3 MG/DL
FASTING STATUS PATIENT QL REPORTED: YES
HBA1C MFR BLD: 8.8 % (ref 0–5.6)
HDLC SERPL-MCNC: 35 MG/DL
LDLC SERPL CALC-MCNC: 46 MG/DL
MICROALBUMIN UR-MCNC: 30.3 MG/L
MICROALBUMIN/CREAT UR: 20.29 MG/G CR (ref 0–17)
NONHDLC SERPL-MCNC: 58 MG/DL
TRIGL SERPL-MCNC: 59 MG/DL

## 2024-05-21 PROCEDURE — 36415 COLL VENOUS BLD VENIPUNCTURE: CPT | Performed by: PHYSICIAN ASSISTANT

## 2024-05-21 PROCEDURE — 99214 OFFICE O/P EST MOD 30 MIN: CPT | Performed by: PHYSICIAN ASSISTANT

## 2024-05-21 PROCEDURE — G2211 COMPLEX E/M VISIT ADD ON: HCPCS | Performed by: PHYSICIAN ASSISTANT

## 2024-05-21 PROCEDURE — 99207 PR FOOT EXAM NO CHARGE: CPT | Performed by: PHYSICIAN ASSISTANT

## 2024-05-21 PROCEDURE — 97161 PT EVAL LOW COMPLEX 20 MIN: CPT | Mod: GP | Performed by: PHYSICAL THERAPIST

## 2024-05-21 PROCEDURE — 83036 HEMOGLOBIN GLYCOSYLATED A1C: CPT | Performed by: PHYSICIAN ASSISTANT

## 2024-05-21 PROCEDURE — 82570 ASSAY OF URINE CREATININE: CPT | Performed by: PHYSICIAN ASSISTANT

## 2024-05-21 PROCEDURE — 82043 UR ALBUMIN QUANTITATIVE: CPT | Performed by: PHYSICIAN ASSISTANT

## 2024-05-21 PROCEDURE — 97140 MANUAL THERAPY 1/> REGIONS: CPT | Mod: GP | Performed by: PHYSICAL THERAPIST

## 2024-05-21 PROCEDURE — 97530 THERAPEUTIC ACTIVITIES: CPT | Mod: GP | Performed by: PHYSICAL THERAPIST

## 2024-05-21 PROCEDURE — 80061 LIPID PANEL: CPT | Performed by: PHYSICIAN ASSISTANT

## 2024-05-21 RX ORDER — ROPINIROLE 0.25 MG/1
TABLET, FILM COATED ORAL
Qty: 60 TABLET | Refills: 0 | Status: SHIPPED | OUTPATIENT
Start: 2024-05-21 | End: 2024-08-02

## 2024-05-21 ASSESSMENT — PAIN SCALES - GENERAL: PAINLEVEL: NO PAIN (0)

## 2024-05-21 NOTE — PROGRESS NOTES
Assessment & Plan     The longitudinal plan of care for the diagnosis(es)/condition(s) as documented were addressed during this visit. Due to the added complexity in care, I will continue to support Miguel A in the subsequent management and with ongoing continuity of care.    Uncontrolled type 2 diabetes mellitus with hyperglycemia (H)  Labs pending at time of dictation.  He has not been checking his blood sugars on a regular basis.  - Adult Eye  Referral; Future  - Albumin Random Urine Quantitative with Creat Ratio; Future  - Hemoglobin A1c; Future  - FOOT EXAM  - Lipid panel reflex to direct LDL Non-fasting; Future    Restless legs syndrome (RLS)  Refilled medications as requested.  Does not use it every single day but does need to use it on a fairly regular basis.  It works well when he does use it.  Follow-up as needed.  - rOPINIRole (REQUIP) 0.25 MG tablet; TAKE 1 TO 2 TABLETS BY MOUTH AT BEDTIME AS NEEDED FOR  RESTLESS  LEGS    Nicotine dependence, uncomplicated, unspecified nicotine product type  Strongly recommended cessation.  - [place] Quit Partner Referral; Future    Hyperlipidemia LDL goal <100  Goal established based on diabetes.  Labs pending.  Follow-up based on results.  - Hemoglobin A1c; Future  - FOOT EXAM  - Lipid panel reflex to direct LDL Non-fasting; Future    HTN, goal below 140/80  Good control of blood pressure at this point in time with no new concerns.  Follow-up in 6 months.  - Hemoglobin A1c; Future  - FOOT EXAM  - Lipid panel reflex to direct LDL Non-fasting; Future    Polyneuropathy associated with underlying disease (H24)  Has struggled with numbness to his feet for quite some time.  Foot exam confirms this today.  Follow-up in 6 months with diabetes.  - Hemoglobin A1c; Future  - FOOT EXAM  - Lipid panel reflex to direct LDL Non-fasting; Future    Subjective   Miguel A is a 63 year old, presenting for the following health issues:  Diabetes      5/21/2024     8:12 AM   Additional  "Questions   Roomed by marlo   Accompanied by alone         5/21/2024     8:12 AM   Patient Reported Additional Medications   Patient reports taking the following new medications none     History of Present Illness       Diabetes:   He presents for follow up of diabetes.  He is checking home blood glucose a few times a month.   He checks blood glucose at bedtime.  Blood glucose is never over 200 and never under 70. He is aware of hypoglycemia symptoms including dizziness and weakness.    He has no concerns regarding his diabetes at this time.  He is having numbness in feet and blurry vision.  The patient has not had a diabetic eye exam in the last 12 months.          He eats 2-3 servings of fruits and vegetables daily.He consumes 1 sweetened beverage(s) daily.He exercises with enough effort to increase his heart rate 10 to 19 minutes per day.  He exercises with enough effort to increase his heart rate 4 days per week.   He is taking medications regularly.         Review of Systems  Constitutional, HEENT, cardiovascular, pulmonary, GI, , musculoskeletal, neuro, skin, endocrine and psych systems are negative, except as otherwise noted.      Objective    /62   Pulse 82   Temp 97.4  F (36.3  C) (Temporal)   Resp 16   Ht 1.778 m (5' 10\")   Wt 84.8 kg (187 lb)   SpO2 98%   BMI 26.83 kg/m    Body mass index is 26.83 kg/m .  Physical Exam   GENERAL: alert and no distress  RESP: lungs clear to auscultation - no rales, rhonchi or wheezes  CV: regular rate and rhythm, normal S1 S2, no S3 or S4, no murmur, click or rub, no peripheral edema  MS: no gross musculoskeletal defects noted, no edema  Diabetic foot exam: reduced sensation at distal foot bilaterally consistent with his report.    No results found for this or any previous visit (from the past 24 hour(s)).        Signed Electronically by: Ferny Koch PA-C    "

## 2024-05-21 NOTE — PROGRESS NOTES
PHYSICAL THERAPY EVALUATION  Type of Visit: Evaluation    See electronic medical record for Abuse and Falls Screening details.    Subjective   63-year-old male with history of C3-4 ACDF and C4-5 hardware removal on 4/2/2019, and C6-7 ACDF on 11/12/2019 with Dr. Wooten.  Patient was recently evaluated by ENT Dr. Whitman for chronic neurogenic cough, dizziness, syncope, and dysphagia.  Pt reports passing out when tipping head backward or forward, this happens 6-12x/months, this started after surgery in 2019. Patient is scheduled for a video swallow which showed normal function and no impingement from ACDF hardware.  Continued neck pain radiating to the shoulders into lumbar spine; happens daily, with OTC will decrease radiating symptoms to 2/10, neck pain 6/10.  Pt reports poor sleep, waking from neck and back, waking 3-4x/night.  Pt reports daily headaches in temple area, throbbing pain 2-8/10, 0/10 for 30% of the day           Presenting condition or subjective complaint: neck pain passing out  Date of onset: 11/12/19    Relevant medical history: Bladder or bowel problems; Cancer; Diabetes; Dizziness; Hearing problems; High blood pressure; Migraines or headaches; Neck injury; Pain at night or rest; Severe dizziness; Severe headaches; Significant weakness; Sleep disorder like apnea; Smoking; Vision problems   Dates & types of surgery: to many past 10 years    Prior diagnostic imaging/testing results: MRI; CT scan; X-ray; Video fluoroscopic swallow study     Prior therapy history for the same diagnosis, illness or injury: No        Living Environment  Social support: With a significant other or spouse   Type of home: House   Stairs to enter the home: Yes 3 Is there a railing: No   Ramp: No   Stairs inside the home: Yes 12 Is there a railing: Yes   Help at home: None  Equipment owned:       Employment: No    Hobbies/Interests:      Patient goals for therapy: put my neck back and foreward with out passing out and no  neck pain and no head aches    Pain assessment:  See above     Objective   CERVICAL SPINE EVALUATION  PAIN:   INTEGUMENTARY (edema, incisions):   POSTURE:  Rounded shoulderd    ROM:   (Degrees) Left AROM Right AROM    Cervical Flexion     Cervical Extension     Cervical Side bend <3deg <3deg    Cervical Rotation 22 23    Cervical Protrusion     Cervical Retraction     Thoracic Flexion     Thoracic Extension     Thoracic Rotation       Left AROM Left PROM Right AROM Right PROM   Shoulder Flexion       Shoulder Extension       Shoulder Abduction       Shoulder Adduction       Shoulder IR       Shoulder ER       Shoulder Horiz Abduction       Shoulder Horiz Adduction       Pain:     PALPATION:  Significant banding and TTP to global cervical spine  SPINAL SEGMENTAL CONCLUSIONS:       Assessment & Plan   CLINICAL IMPRESSIONS  Medical Diagnosis: S/P cervical spinal fusion    Treatment Diagnosis: S/P cervical spinal fusion   Impression/Assessment: Patient is a 63 year old male with neck pain complaints.  The following significant findings have been identified: Pain, Decreased ROM/flexibility, Decreased joint mobility, Decreased strength, Impaired muscle performance, Decreased activity tolerance, and Impaired posture. These impairments interfere with their ability to perform self care tasks, recreational activities, and household chores as compared to previous level of function.     Clinical Decision Making (Complexity):  Clinical Presentation: Evolving/Changing  Clinical Presentation Rationale: based on medical and personal factors listed in PT evaluation  Clinical Decision Making (Complexity): Moderate complexity    PLAN OF CARE  Treatment Interventions:  Modalities:  Modalities as required  Interventions: Manual Therapy, Neuromuscular Re-education, Therapeutic Activity, Therapeutic Exercise    Long Term Goals     PT Goal 1  Goal Identifier: 1  Goal Description: The patient will report 50% decrease in radiating symptoms  in order to improve tolerance of ADLS  Target Date: 08/18/24  PT Goal 2  Goal Identifier: 2  Goal Description: The patient will report 50% improvement in headache symptoms in order to improve tolerance of daily activities  Target Date: 08/18/24      Frequency of Treatment: 8 visits  Duration of Treatment: 90 days    Recommended Referrals to Other Professionals:  None indicated  Education Assessment:   Learner/Method: Patient;Listening;Demonstration;No Barriers to Learning;Pictures/Video    Risks and benefits of evaluation/treatment have been explained.   Patient/Family/caregiver agrees with Plan of Care.     Evaluation Time:     PT Eval, Low Complexity Minutes (08399): 20       Signing Clinician: Avelina Contreras, PT      [facility]                                                                                   OUTPATIENT PHYSICAL THERAPY      PLAN OF TREATMENT FOR OUTPATIENT REHABILITATION   Patient's Last Name, First Name, M.ITom  RoderickMiguel A  D YOB: 1960   Provider's Name   [facility]   Medical Record No.  [id number]     Onset Date: 11/12/19  Start of Care Date: 05/21/24     Medical Diagnosis:  S/P cervical spinal fusion      PT Treatment Diagnosis:  S/P cervical spinal fusion Plan of Treatment  Frequency/Duration: 8 visits/ 90 days    Certification date from 05/21/24 to 08/18/24         See note for plan of treatment details and functional goals     Avelina Contreras, PT                         I CERTIFY THE NEED FOR THESE SERVICES FURNISHED UNDER        THIS PLAN OF TREATMENT AND WHILE UNDER MY CARE     (Physician attestation of this document indicates review and certification of the therapy plan).              Referring Provider:  Memo Wooten    Initial Assessment  See Epic Evaluation- Start of Care Date: 05/21/24

## 2024-05-21 NOTE — LETTER
May 22, 2024      Miguel Asahil [place]  [address] [place] [address]        Dear ,    Your microalbumin is slightly elevated and should be checked in 6 months.  Plenty of clear healthy fluids in the interim will help with this.     Your HDL cholesterol is still lower than it should be.  Increase physical exercise as best you are able is the best way to raise this above 50 as a long-term goal for yourself.  That being said your LDL cholesterol is excellent at 46.  No change in medications here.     If you have any questions or concerns, please call the clinic at the number listed above.       Sincerely,      Ferny Koch PA-C    Resulted Orders   Albumin Random Urine Quantitative with Creat Ratio   Result Value Ref Range    Creatinine Urine mg/dL 149.3 mg/dL      Comment:      The reference ranges have not been established in urine creatinine. The results should be integrated into the clinical context for interpretation.    Albumin Urine mg/L 30.3 mg/L      Comment:      The reference ranges have not been established in urine albumin. The results should be integrated into the clinical context for interpretation.    Albumin Urine mg/g Cr 20.29 (H) 0.00 - 17.00 mg/g Cr      Comment:      Microalbuminuria is defined as an albumin:creatinine ratio of 17 to 299 for males and 25 to 299 for females. A ratio of albumin:creatinine of 300 or higher is indicative of overt proteinuria.  Due to biologic variability, positive results should be confirmed by a second, first-morning random or 24-hour timed urine specimen. If there is discrepancy, a third specimen is recommended. When 2 out of 3 results are in the microalbuminuria range, this is evidence for incipient nephropathy and warrants increased efforts at glucose control, blood pressure control, and institution of therapy with an angiotensin-converting-enzyme (ACE) inhibitor (if the patient can tolerate it).     Hemoglobin A1c   Result Value Ref Range     Hemoglobin A1C 8.8 (H) 0.0 - 5.6 %      Comment:      Normal <5.7%   Prediabetes 5.7-6.4%    Diabetes 6.5% or higher     Note: Adopted from ADA consensus guidelines.    Narrative    Results confirmed by repeat test.     Lipid panel reflex to direct LDL Non-fasting   Result Value Ref Range    Cholesterol 93 <200 mg/dL    Triglycerides 59 <150 mg/dL    Direct Measure HDL 35 (L) >=40 mg/dL    LDL Cholesterol Calculated 46 <=100 mg/dL    Non HDL Cholesterol 58 <130 mg/dL    Patient Fasting > 8hrs? Yes     Narrative    Cholesterol  Desirable:  <200 mg/dL    Triglycerides  Normal:  Less than 150 mg/dL  Borderline High:  150-199 mg/dL  High:  200-499 mg/dL  Very High:  Greater than or equal to 500 mg/dL    Direct Measure HDL  Female:  Greater than or equal to 50 mg/dL   Male:  Greater than or equal to 40 mg/dL    LDL Cholesterol  Desirable:  <100mg/dL  Above Desirable:  100-129 mg/dL   Borderline High:  130-159 mg/dL   High:  160-189 mg/dL   Very High:  >= 190 mg/dL    Non HDL Cholesterol  Desirable:  130 mg/dL  Above Desirable:  130-159 mg/dL  Borderline High:  160-189 mg/dL  High:  190-219 mg/dL  Very High:  Greater than or equal to 220 mg/dL

## 2024-05-23 ENCOUNTER — OFFICE VISIT (OUTPATIENT)
Dept: UROLOGY | Facility: CLINIC | Age: 64
End: 2024-05-23
Payer: COMMERCIAL

## 2024-05-23 VITALS — SYSTOLIC BLOOD PRESSURE: 128 MMHG | DIASTOLIC BLOOD PRESSURE: 73 MMHG

## 2024-05-23 DIAGNOSIS — N41.2 PROSTATE ABSCESS: ICD-10-CM

## 2024-05-23 DIAGNOSIS — N40.1 BENIGN LOCALIZED PROSTATIC HYPERPLASIA WITH LOWER URINARY TRACT SYMPTOMS (LUTS): Primary | ICD-10-CM

## 2024-05-23 PROCEDURE — 99214 OFFICE O/P EST MOD 30 MIN: CPT | Performed by: UROLOGY

## 2024-05-23 RX ORDER — TAMSULOSIN HYDROCHLORIDE 0.4 MG/1
0.4 CAPSULE ORAL DAILY
Qty: 90 CAPSULE | Refills: 3 | Status: SHIPPED | OUTPATIENT
Start: 2024-05-23

## 2024-05-23 NOTE — NURSING NOTE
"Initial /73  Estimated body mass index is 26.83 kg/m  as calculated from the following:    Height as of 5/21/24: 1.778 m (5' 10\").    Weight as of 5/21/24: 84.8 kg (187 lb). .  Patient is here for a recheck.  forrest joiner LPN    "

## 2024-05-23 NOTE — PROGRESS NOTES
CC: prostate abscess    HPI: 64 yo male with prostate abscess without symptoms.  Was considering surgical debridement but A1c was very high, so was treated with antibiotics and observation.  Doing well    OBJECTIVE: A1c 8.8 2/9/24    ASSESSMENT AND PLAN:  Over half of today's 25-minute visit was spent reviewing the chart, results and counseling the patient regarding his prostate abscess.  Will repeat the MRI to see where we are at and make further decisions after that.

## 2024-05-28 ENCOUNTER — TELEPHONE (OUTPATIENT)
Dept: PALLIATIVE MEDICINE | Facility: CLINIC | Age: 64
End: 2024-05-28
Payer: COMMERCIAL

## 2024-05-29 ENCOUNTER — TELEPHONE (OUTPATIENT)
Dept: NEUROSURGERY | Facility: CLINIC | Age: 64
End: 2024-05-29

## 2024-05-29 ENCOUNTER — THERAPY VISIT (OUTPATIENT)
Dept: PHYSICAL THERAPY | Facility: CLINIC | Age: 64
End: 2024-05-29
Attending: NEUROLOGICAL SURGERY
Payer: COMMERCIAL

## 2024-05-29 DIAGNOSIS — Z98.1 S/P CERVICAL SPINAL FUSION: Primary | ICD-10-CM

## 2024-05-29 PROCEDURE — 97140 MANUAL THERAPY 1/> REGIONS: CPT | Mod: GP | Performed by: PHYSICAL THERAPIST

## 2024-05-29 NOTE — TELEPHONE ENCOUNTER
Follow up call placed to patient to check in on him about his smoking cessation progress. He has NOT quit smoking as of yet.     He has started PT and is scheduled for his MBB in July. He reports PT with traction is helping with his symptoms. Patient will call our team if/when ready to do surgery. He will need nicotine lab test 2 weeks after cessation.    He will need another appointment to discuss surgery if it comes to it.    Tracey Castañeda RN on 5/29/2024 at 8:56 AM

## 2024-06-05 ENCOUNTER — OFFICE VISIT (OUTPATIENT)
Dept: OPTOMETRY | Facility: CLINIC | Age: 64
End: 2024-06-05
Payer: COMMERCIAL

## 2024-06-05 ENCOUNTER — THERAPY VISIT (OUTPATIENT)
Dept: PHYSICAL THERAPY | Facility: CLINIC | Age: 64
End: 2024-06-05
Attending: NEUROLOGICAL SURGERY
Payer: COMMERCIAL

## 2024-06-05 DIAGNOSIS — E11.65 UNCONTROLLED TYPE 2 DIABETES MELLITUS WITH HYPERGLYCEMIA (H): ICD-10-CM

## 2024-06-05 DIAGNOSIS — Z98.1 S/P CERVICAL SPINAL FUSION: Primary | ICD-10-CM

## 2024-06-05 DIAGNOSIS — H52.223 REGULAR ASTIGMATISM OF BOTH EYES: ICD-10-CM

## 2024-06-05 DIAGNOSIS — G43.109 MIGRAINE AURA WITHOUT HEADACHE (MIGRAINE EQUIVALENTS): ICD-10-CM

## 2024-06-05 DIAGNOSIS — H52.03 HYPEROPIA, BILATERAL: ICD-10-CM

## 2024-06-05 DIAGNOSIS — Z01.01 VISION EXAM WITH ABNORMAL FINDINGS: Primary | ICD-10-CM

## 2024-06-05 DIAGNOSIS — H52.4 PRESBYOPIA: ICD-10-CM

## 2024-06-05 PROCEDURE — 92015 DETERMINE REFRACTIVE STATE: CPT | Performed by: OPTOMETRIST

## 2024-06-05 PROCEDURE — 92004 COMPRE OPH EXAM NEW PT 1/>: CPT | Performed by: OPTOMETRIST

## 2024-06-05 PROCEDURE — 97140 MANUAL THERAPY 1/> REGIONS: CPT | Mod: GP | Performed by: PHYSICAL THERAPIST

## 2024-06-05 ASSESSMENT — REFRACTION_MANIFEST
OD_AXIS: 066
OD_AXIS: 030
OD_CYLINDER: +0.75
OS_AXIS: 122
OD_CYLINDER: +0.50
OS_CYLINDER: +0.50
OD_SPHERE: +0.50
METHOD_AUTOREFRACTION: 1
OD_SPHERE: +0.50
OD_ADD: +2.50
OS_AXIS: 140
OS_SPHERE: +1.50
OS_CYLINDER: +0.75
OS_ADD: +2.50
OS_SPHERE: +1.50

## 2024-06-05 ASSESSMENT — CONF VISUAL FIELD
OS_SUPERIOR_TEMPORAL_RESTRICTION: 0
OS_INFERIOR_TEMPORAL_RESTRICTION: 0
OS_NORMAL: 1
OD_SUPERIOR_TEMPORAL_RESTRICTION: 0
OD_SUPERIOR_NASAL_RESTRICTION: 0
OS_SUPERIOR_NASAL_RESTRICTION: 0
OD_INFERIOR_TEMPORAL_RESTRICTION: 0
OS_INFERIOR_NASAL_RESTRICTION: 0
OD_NORMAL: 1
OD_INFERIOR_NASAL_RESTRICTION: 0

## 2024-06-05 ASSESSMENT — CUP TO DISC RATIO
OS_RATIO: 0.2
OD_RATIO: 0.2

## 2024-06-05 ASSESSMENT — VISUAL ACUITY
METHOD: SNELLEN - LINEAR
OD_CC: 20/20
OS_CC: 20/20
OS_SC+: -1
OD_SC: 20/50
CORRECTION_TYPE: GLASSES
OD_SC+: +1
OS_CC+: -2
OS_SC: 20/50
OD_CC: 20/20
OS_CC: 20/20
OD_CC+: -2

## 2024-06-05 ASSESSMENT — REFRACTION_WEARINGRX
OS_AXIS: 150
OD_SPHERE: +0.50
SPECS_TYPE: PAL
OD_CYLINDER: +0.50
OS_SPHERE: +1.25
OD_AXIS: 030
OD_ADD: +2.50
OS_ADD: +2.50
OS_CYLINDER: +0.50

## 2024-06-05 ASSESSMENT — SLIT LAMP EXAM - LIDS
COMMENTS: NORMAL
COMMENTS: NORMAL

## 2024-06-05 ASSESSMENT — TONOMETRY
OD_IOP_MMHG: 14
OS_IOP_MMHG: 14
IOP_METHOD: APPLANATION

## 2024-06-05 NOTE — PATIENT INSTRUCTIONS
Optional to fill new glasses prescription, minimal change  Ocular migraines discussed.  See primary care provider if they worsen or increase in severity.  Work on improving blood sugar control  Return in 1 year for diabetic eye exam      Blood sugar and blood pressure control are very important in the prevention of ocular complications from diabetes.       Brandi Griffiths, OD  [phone]

## 2024-06-05 NOTE — PROGRESS NOTES
Chief Complaint   Patient presents with    Diabetic Eye Exam     Accompanied by wife  Chief Complaint(s) and History of Present Illness(es)       Diabetic Eye Exam        Diabetes 2 since 2018, takes  Metformin              Lab Results   Component Value Date    A1C 8.8 05/21/2024    A1C 11.5 02/09/2024    A1C 11.9 09/04/2023    A1C 13.8 05/23/2023    A1C 13.4 12/21/2021    A1C 9.5 01/07/2020    A1C 9.8 11/08/2019    A1C 8.0 07/30/2019    A1C 11.9 03/29/2019    A1C 9.3 11/19/2018            Last Eye Exam: about a year ago  Dilated Previously: Yes    What are you currently using to see?  glasses    Distance Vision Acuity: Noticed gradual change in both eyes    Near Vision Acuity: Not satisfied     Eye Comfort: starting about 6 or 7 months ago he began having a visual disturbance where everything will get very bright. This happens at least 4 or 5 times a week. This disturbance will sometimes last for minutes and sometimes last for an hour. Sometimes he can see better in the periphery than straight ahead.  Sees moving colorful lights.  History of bad frontal headaches when he had brain lesion 2005    His eyes are very watery in the morning. He does use a C-PAP  Do you use eye drops? : No  Occupation or Hobbies: small engine repair    Tiffanie Dumont  Optometric Assistant, [facility]       Medical, surgical and family histories reviewed and updated 6/5/2024.      No history eye surgery    Recent fainting spells after head tipping backward or forward   Had several spine surgeries  History brain lesion 2004, treaded with steroids   2022 Basal cell on face     Mother macular degeneration      OBJECTIVE: See Ophthalmology exam    ASSESSMENT:    ICD-10-CM    1. Vision exam with abnormal findings  Z01.01       2. Uncontrolled type 2 diabetes mellitus with hyperglycemia (H)  E11.65 Adult Eye  Referral    no diabetic retinopathy found at dilated eye exam 6-5-24      3. Migraine aura without headache (migraine equivalents)   G43.109       4. Regular astigmatism of both eyes  H52.223       5. Hyperopia, bilateral  H52.03       6. Presbyopia  H52.4           PLAN:    Miguel A Vaughn aware  eye exam results will be sent to Ferny Koch  Patient Instructions   Optional to fill new glasses prescription, minimal change  Ocular migraines discussed.  See primary care provider if they worsen or increase in severity.  Work on improving blood sugar control  Return in 1 year for diabetic eye exam      Blood sugar and blood pressure control are very important in the prevention of ocular complications from diabetes.       Brandi Griffiths, OD  [phone]

## 2024-06-05 NOTE — LETTER
6/5/2024      Miguel A Vaughn  [address]  [place] [address]      Dear Colleague,    Thank you for referring your patient, Miguel A Vaughn, to the [facility]. No diabetic retinopathy was found at eye exam. Yearly diabetic eye exam advised.  Bright kaleidoscope vision episodes likely ocular migraine .    Please see a copy of my visit note below.    Chief Complaint   Patient presents with     Diabetic Eye Exam     Accompanied by wife  Chief Complaint(s) and History of Present Illness(es)       Diabetic Eye Exam        Diabetes 2 since 2018, takes  Metformin              Lab Results   Component Value Date    A1C 8.8 05/21/2024    A1C 11.5 02/09/2024    A1C 11.9 09/04/2023    A1C 13.8 05/23/2023    A1C 13.4 12/21/2021    A1C 9.5 01/07/2020    A1C 9.8 11/08/2019    A1C 8.0 07/30/2019    A1C 11.9 03/29/2019    A1C 9.3 11/19/2018            Last Eye Exam: about a year ago  Dilated Previously: Yes    What are you currently using to see?  glasses    Distance Vision Acuity: Noticed gradual change in both eyes    Near Vision Acuity: Not satisfied     Eye Comfort: starting about 6 or 7 months ago he began having a visual disturbance where everything will get very bright. This happens at least 4 or 5 times a week. This disturbance will sometimes last for minutes and sometimes last for an hour. Sometimes he can see better in the periphery than straight ahead.  Sees moving colorful lights.  History of bad frontal headaches when he had brain lesion 2005    His eyes are very watery in the morning. He does use a C-PAP  Do you use eye drops? : No  Occupation or Hobbies: small engine repair    Tiffanie Dumont  Optometric Assistant, [employer]       Medical, surgical and family histories reviewed and updated 6/5/2024.      No history eye surgery    Recent fainting spells after head tipping backward or forward   Had several spine surgeries  History brain lesion 2004, treaded with steroids   2022 Basal cell on  face     Mother macular degeneration      OBJECTIVE: See Ophthalmology exam    ASSESSMENT:    ICD-10-CM    1. Vision exam with abnormal findings  Z01.01       2. Uncontrolled type 2 diabetes mellitus with hyperglycemia (H)  E11.65 Adult Eye  Referral    no diabetic retinopathy found at dilated eye exam 6-5-24      3. Migraine aura without headache (migraine equivalents)  G43.109       4. Regular astigmatism of both eyes  H52.223       5. Hyperopia, bilateral  H52.03       6. Presbyopia  H52.4           PLAN:    Miguel A Vaughn aware  eye exam results will be sent to Ferny Koch  Patient Instructions   Optional to fill new glasses prescription, minimal change  Ocular migraines discussed.  See primary care provider if they worsen or increase in severity.  Work on improving blood sugar control  Return in 1 year for diabetic eye exam      Blood sugar and blood pressure control are very important in the prevention of ocular complications from diabetes.       Brandi Griffiths, OD  [phone]                      Again, thank you for allowing me to participate in the care of your patient.        Sincerely,        Brandi Griffiths, OD

## 2024-06-11 ENCOUNTER — HOSPITAL ENCOUNTER (OUTPATIENT)
Dept: MRI IMAGING | Facility: CLINIC | Age: 64
Discharge: HOME | End: 2024-06-11
Attending: UROLOGY | Admitting: UROLOGY
Payer: COMMERCIAL

## 2024-06-11 DIAGNOSIS — N41.2 PROSTATE ABSCESS: ICD-10-CM

## 2024-06-11 PROCEDURE — 72197 MRI PELVIS W/O & W/DYE: CPT | Mod: 26 | Performed by: STUDENT IN AN ORGANIZED HEALTH CARE EDUCATION/TRAINING PROGRAM

## 2024-06-11 PROCEDURE — 255N000002 HC RX 255 OP 636: Performed by: UROLOGY

## 2024-06-11 PROCEDURE — A9585 GADOBUTROL INJECTION: HCPCS | Performed by: UROLOGY

## 2024-06-11 PROCEDURE — 72197 MRI PELVIS W/O & W/DYE: CPT

## 2024-06-11 PROCEDURE — 258N000003 HC RX IP 258 OP 636: Mod: JZ | Performed by: UROLOGY

## 2024-06-11 RX ORDER — GADOBUTROL 604.72 MG/ML
8.5 INJECTION INTRAVENOUS ONCE
Status: COMPLETED | OUTPATIENT
Start: 2024-06-11 | End: 2024-06-11

## 2024-06-11 RX ADMIN — GADOBUTROL 8.5 ML: 604.72 INJECTION INTRAVENOUS at 08:02

## 2024-06-11 RX ADMIN — SODIUM CHLORIDE 50 ML: 9 INJECTION, SOLUTION INTRAVENOUS at 08:09

## 2024-06-20 ENCOUNTER — TELEPHONE (OUTPATIENT)
Dept: NEUROSURGERY | Facility: CLINIC | Age: 64
End: 2024-06-20
Payer: COMMERCIAL

## 2024-06-20 NOTE — TELEPHONE ENCOUNTER
Pt's wife is calling to cancel the procedure scheduled with  Dr. Eldridge on 07/11.  Please contact Pt to discuss.     Thank you.     [phone]

## 2024-06-20 NOTE — TELEPHONE ENCOUNTER
Patient last saw Dr. Wooten on 5/16/24. PT/traction was ordered along with C3-4 MBB/RFA. Per OV note it was discussed that if pain persists and he quits smoking, could consider C3-4 PSF in the future    Patient is scheduled for MBB #1 with Dr. Anguiano on 7/11.     Called patient to discuss. Patient states he is going out of town for about a month and will need to reschedule injection. Advised a message will be sent to Dr. Anguiano's scheduling team requesting they contact him to reschedule. Advised to contact clinic when he is back in town for rescheduling. Patient voiced agreement and understanding.    Staff message sent to Dr. Anguiano's scheduling team.

## 2024-06-25 ENCOUNTER — TELEPHONE (OUTPATIENT)
Dept: VASCULAR SURGERY | Facility: CLINIC | Age: 64
End: 2024-06-25
Payer: COMMERCIAL

## 2024-06-25 NOTE — TELEPHONE ENCOUNTER
LMTCB schedule 1 year follow up due in Sept with AA + study        Received: 9 months ago  Mayela Reese, RN  [facility]  Needs 1 year follow up with Dr. French with repeat carotid ultrasound. Order is in.   1 year follow up, bilateral carotid stenosis

## 2024-06-27 NOTE — TELEPHONE ENCOUNTER
LVMTCB #2 to schedule 1 year follow up visit in September with Dr. French with ultrasound. [phone]

## 2024-07-01 NOTE — TELEPHONE ENCOUNTER
Spoke with wife. She states they are waiting for test results and instruction from Urology before they schedule any more appointments. Wife states she/patient will call back when they are ready to schedule vascular follow up.

## 2024-07-05 NOTE — PROGRESS NOTES
DISCHARGE  Reason for Discharge: Patients main concern is dizziness, patient reports neck symptoms are tolerable in comparison.  Discussed concerns that dizziness is not vestibular in nature    Equipment Issued:     Discharge Plan: Continue with POC as tolerated    Referring Provider:  Memo Wooten         06/05/24 0500   Appointment Info   Signing clinician's name / credentials Avelina Contreras, PT, DPT, OCS   Visits Used 3   Medical Diagnosis S/P cervical spinal fusion   PT Tx Diagnosis S/P cervical spinal fusion   Quick Adds Certification   Progress Note/Certification   Start of Care Date 05/21/24   Onset of illness/injury or Date of Surgery 11/12/19   Therapy Frequency 8 visits   Predicted Duration 90 days   Certification date from 05/21/24   Certification date to 08/18/24   Progress Note Completed Date 05/21/24   GOALS   PT Goals 2   PT Goal 1   Goal Identifier 1   Goal Description The patient will report 50% decrease in radiating symptoms in order to improve tolerance of ADLS   Target Date 08/18/24   PT Goal 2   Goal Identifier 2   Goal Description The patient will report 50% improvement in headache symptoms in order to improve tolerance of daily activities   Target Date 08/18/24   Subjective Report   Subjective Report Pt rpeorts current neck pain 7-8/10, reports very poor sleep last night due to neck pain.  Pt reports most concerns are dizziness (10-12x/day) and headaches (currently 6-7/10).   Patient reports when body locks up- follows a dizzy spell, happening 3-4x/month so less often than before (3-4x/wk). Thinkls this may be from not bending or extending his neck as much   Treatment Interventions (PT)   Interventions Manual Therapy;Therapeutic Activity   Therapeutic Activity   Ther Act 1 - Details significant education on applicable anatomy use of models and images   Skilled Intervention education to improve postural awareness   Patient Response/Progress Pt tolerated well and enrique nd wife report  understanding   Manual Therapy   Manual Therapy: Mobilization, MFR, MLD, friction massage minutes ([id number]) 30   Manual Therapy 1 - Details STM and TPR to (B) scalene, SCM, UT , cervical paraspinals, splenius capitus and cervicis;   Skilled Intervention manual techniques to improve pain and ROM   Patient Response/Progress Pt reports improvement in headache to 5/10, reports relief from  muscle release specific to right side   Education   Learner/Method Patient;Listening;Demonstration;No Barriers to Learning;Pictures/Video   Plan   Plan for next session if patient does not have improvement  with cancel remaining appts   Comments   Comments highly recommend imaging to check vascular integrity- vestibular artery   Total Session Time   Timed Code Treatment Minutes 30   Total Treatment Time (sum of timed and untimed services) 30

## 2024-08-01 DIAGNOSIS — E11.9 TYPE 2 DIABETES MELLITUS WITHOUT COMPLICATION, WITHOUT LONG-TERM CURRENT USE OF INSULIN (H): ICD-10-CM

## 2024-08-02 DIAGNOSIS — G25.81 RESTLESS LEGS SYNDROME (RLS): ICD-10-CM

## 2024-08-02 RX ORDER — ROPINIROLE 0.25 MG/1
TABLET, FILM COATED ORAL
Qty: 60 TABLET | Refills: 0 | Status: SHIPPED | OUTPATIENT
Start: 2024-08-02

## 2024-08-09 DIAGNOSIS — E11.9 TYPE 2 DIABETES MELLITUS WITHOUT COMPLICATION, WITHOUT LONG-TERM CURRENT USE OF INSULIN (H): ICD-10-CM

## 2024-08-09 RX ORDER — GLYBURIDE 5 MG/1
10 TABLET ORAL 2 TIMES DAILY WITH MEALS
Qty: 360 TABLET | Refills: 0 | Status: SHIPPED | OUTPATIENT
Start: 2024-08-09

## 2024-08-27 DIAGNOSIS — I65.21 CAROTID STENOSIS, RIGHT: ICD-10-CM

## 2024-08-27 RX ORDER — ROSUVASTATIN CALCIUM 20 MG/1
20 TABLET, COATED ORAL DAILY
Qty: 30 TABLET | Refills: 11 | Status: SHIPPED | OUTPATIENT
Start: 2024-08-27

## 2024-10-25 ENCOUNTER — PATIENT OUTREACH (OUTPATIENT)
Dept: CARE COORDINATION | Facility: CLINIC | Age: 64
End: 2024-10-25
Payer: COMMERCIAL

## 2024-10-26 DIAGNOSIS — E11.9 TYPE 2 DIABETES MELLITUS WITHOUT COMPLICATION, WITHOUT LONG-TERM CURRENT USE OF INSULIN (H): ICD-10-CM

## 2025-03-12 ENCOUNTER — OFFICE VISIT (OUTPATIENT)
Dept: DERMATOLOGY | Facility: CLINIC | Age: 65
End: 2025-03-12
Payer: COMMERCIAL

## 2025-03-12 DIAGNOSIS — L81.4 LENTIGINES: ICD-10-CM

## 2025-03-12 DIAGNOSIS — L57.0 AK (ACTINIC KERATOSIS): ICD-10-CM

## 2025-03-12 DIAGNOSIS — L82.0 INFLAMED SEBORRHEIC KERATOSIS: ICD-10-CM

## 2025-03-12 DIAGNOSIS — L82.1 SEBORRHEIC KERATOSES: ICD-10-CM

## 2025-03-12 DIAGNOSIS — Z85.828 HISTORY OF NONMELANOMA SKIN CANCER: ICD-10-CM

## 2025-03-12 DIAGNOSIS — D18.01 CHERRY ANGIOMA: ICD-10-CM

## 2025-03-12 DIAGNOSIS — C44.311 BASAL CELL CARCINOMA (BCC) OF NASAL TIP: ICD-10-CM

## 2025-03-12 DIAGNOSIS — D49.2 NEOPLASM OF SKIN: ICD-10-CM

## 2025-03-12 DIAGNOSIS — D22.9 MULTIPLE BENIGN NEVI: Primary | ICD-10-CM
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
## 2025-03-12 NOTE — Clinical Note
Good morning,  This patient notified me this morning at his dermatology appointment that he is no longer taking ANY of the medications that he has been prescribed. He is no longer checking his glucose levels either. He also needs a new CPAP mask and is having trouble obtaining one that is not without high out of pocket costs, so if there is something that can be done on your end, that would be appreciated.  Thank you, Delia MONTEZ

## 2025-03-12 NOTE — NURSING NOTE
Miguel A Vaughn's goals for this visit include:   Chief Complaint   Patient presents with    Skin Check     Pt is here for a FBSC. Scaley scalp on the scalp and a spot growing on the forehead, and brown spot on head getting better.        He requests these members of his care team be copied on today's visit information:     PCP: Ferny Koch    Referring Provider:  Referred Self, MD  No address on file    There were no vitals taken for this visit.    Do you need any medication refills at today's visit?     Delia Hawthorne LPN on 3/12/2025 at 10:41 AM      Pt disclosed that he is not taking any medications. Told pt he needs to notify his PCP that he is no longer taking medication.    Delia Hawthorne LPN on 3/12/2025 at 10:42 AM
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
## 2025-03-12 NOTE — PATIENT INSTRUCTIONS
Wound Care After a Biopsy    What is a skin biopsy?  A skin biopsy allows the doctor to examine a very small piece of tissue under the microscope to determine the diagnosis and the best treatment for the skin condition. A local anesthetic (numbing medicine)  is injected with a very small needle into the skin area to be tested. A small piece of skin is taken from the area. Sometimes a suture (stitch) is used.     What are the risks of a skin biopsy?  I will experience scar, bleeding, swelling, pain, crusting and redness. I may experience incomplete removal or recurrence. Risks of this procedure are excessive bleeding, bruising, infection, nerve damage, numbness, thick (hypertrophic or keloidal) scar and non-diagnostic biopsy.    How should I care for my wound for the first 24 hours?  Keep the wound dry and covered for 24 hours  If it bleeds, hold direct pressure on the area for 15 minutes. If bleeding does not stop then go to the emergency room  Avoid strenuous exercise the first 1-2 days or as your doctor instructs you    How should I care for the wound after 24 hours?  After 24 hours, remove the bandage  You may bathe or shower as normal  If you had a scalp biopsy, you can shampoo as usual and can use shower water to clean the biopsy site daily  Clean the wound twice a day with gentle soap and water  Do not scrub, be gentle  Apply white petroleum/Vaseline after cleaning the wound with a cotton swab or a clean finger, and keep the site covered with a Bandaid /bandage. Bandages are not necessary with a scalp biopsy  If you are unable to cover the site with a Bandaid /bandage, re-apply ointment 2-3 times a day to keep the site moist. Moisture will help with healing  Avoid strenuous activity for first 1-2 days  Avoid lakes, rivers, pools, and oceans until the stitches are removed or the site is healed    How do I clean my wound?  Wash hands thoroughly with soap or use hand  before all wound care  Clean the  wound with gentle soap and water  Apply white petroleum/Vaseline  to wound after it is clean  Replace the Bandaid /bandage to keep the wound covered for the first few days or as instructed by your doctor  If you had a scalp biopsy, warm shower water to the area on a daily basis should suffice    What should I use to clean my wound?   Cotton-tipped applicators (Qtips )  White petroleum jelly (Vaseline ). Use a clean new container and use Q-tips to apply.  Bandaids   as needed  Gentle soap     How should I care for my wound long term?  Do not get your wound dirty  Keep up with wound care for one week or until the area is healed.  A small scab will form and fall off by itself when the area is completely healed. The area will be red and will become pink in color as it heals. Sun protection is very important for how your scar will turn out. Sunscreen with an SPF 30 or greater is recommended once the area is healed.  If you have stitches, stitches need to be removed in 10 days. You may return to our clinic for this or you may have it done locally at your doctor s office.  You should have some soreness but it should be mild and slowly go away over several days. Talk to your doctor about using tylenol for pain,    When should I call my doctor?  If you have increased:   Pain or swelling  Pus or drainage (clear or slightly yellow drainage is ok)  Temperature over 100F  Spreading redness or warmth around wound    When will I hear about my results?  The biopsy results can take 2-3 weeks to come back. The clinic will call you with the results, send you a [facility] message, or have you schedule a follow-up clinic or phone time to discuss the results. Contact our clinics if you do not hear from us in 3 weeks.     Who should I call with questions?  [facility]: [phone]   [facility]: [phone]  For urgent needs outside of business hours call the [facility]  at [phone] and ask for the dermatology resident on call      Cryotherapy    What is it?  Use of a very cold liquid, such as liquid nitrogen, to freeze and destroy abnormal skin cells that need to be removed    What should I expect?  Tenderness and redness  A small blister that might grow and fill with dark purple blood. There may be crusting.  More than one treatment may be needed if the lesions do not go away.    How do I care for the treated area?  Gently wash the area with your hands when bathing.  Use a thin layer of Vaseline to help with healing. You may use a Band-Aid.   The area should heal within 7-10 days and may leave behind a pink or lighter color.   Do not use an antibiotic or Neosporin ointment.   You may take acetaminophen (Tylenol) for pain.     Call your Doctor if you have:  Severe pain  Signs of infection (warmth, redness, cloudy yellow drainage, and or a bad smell)  Questions or concerns    Who should I call with questions?      [facility]: [phone]      [facility]: [phone]      For urgent needs outside of business hours call the [facility] at [phone]        and ask for the dermatology resident on call      Patient Education       Proper skin care from [facility]:    -Eliminate harsh soaps as they strip the natural oils from the skin, often resulting in dry itchy skin ( i.e. Dial, Zest, Stephanie Spring)  -Use mild soaps such as Cetaphil or Dove Sensitive Skin in the shower. You do not need to use soap on arms, legs, and trunk every time you shower unless visibly soiled.   -Avoid hot or cold showers.  -After showering, lightly dry off and apply moisturizing within 2-3 minutes. This will help trap moisture in the skin.   -Aggressive use of a moisturizer at least 1-2 times a day to the entire body (including -Vanicream, Cetaphil, Aquaphor or Cerave) and moisturize hands after every washing.  -We recommend  using moisturizers that come in a tub that needs to be scooped out, not a pump. This has more of an oil base. It will hold moisture in your skin much better than a water base moisturizer. The above recommended are non-pore clogging.      Wear a sunscreen with at least SPF 30 on your face, ears, neck and V of the chest daily. Wear sunscreen on other areas of the body if those areas are exposed to the sun throughout the day. Sunscreens can contain physical and/or chemical blockers. Physical blockers are less likely to clog pores, these include zinc oxide and titanium dioxide. Reapply every two hour and after swimming.     Sunscreen examples: https://www.ewg.org/sunscreen/    UV radiation  UVA radiation remains constant throughout the day and throughout the year. It is a longer wavelength than UVB and therefore penetrates deeper into the skin leading to immediate and delayed tanning, photoaging, and skin cancer. 70-80% of UVA and UVB radiation occurs between the hours of 10am-2pm.  UVB radiation  UVB radiation causes the most harmful effects and is more significant during the summer months. However, snow and ice can reflect UVB radiation leading to skin damage during the winter months as well. UVB radiation is responsible for tanning, burning, inflammation, delayed erythema (pinkness), pigmentation (brown spots), and skin cancer.     I recommend self monthly full body exams and yearly full body exams with a dermatology provider. If you develop a new or changing lesion please follow up for examination. Most skin cancers are pink and scaly or pink and pearly. However, we do see blue/brown/black skin cancers.  Consider the ABCDEs of melanoma when giving yourself your monthly full body exam ( don't forget the groin, buttocks, feet, toes, etc). A-asymmetry, B-borders, C-color, D-diameter, E-elevation or evolving. If you see any of these changes please follow up in clinic. If you cannot see your back I recommend purchasing a  hand held mirror to use with a larger wall mirror.       Checking for Skin Cancer  You can find cancer early by checking your skin each month. There are 3 kinds of skin cancer. They are melanoma, basal cell carcinoma, and squamous cell carcinoma. Doing monthly skin checks is the best way to find new marks or skin changes. Follow the instructions below for checking your skin.   The ABCDEs of checking moles for melanoma   Check your moles or growths for signs of melanoma using ABCDE:   Asymmetry: the sides of the mole or growth don t match  Border: the edges are ragged, notched, or blurred  Color: the color within the mole or growth varies  Diameter: the mole or growth is larger than 6 mm (size of a pencil eraser)  Evolving: the size, shape, or color of the mole or growth is changing (evolving is not shown in the images below)    Checking for other types of skin cancer  Basal cell carcinoma or squamous cell carcinoma have symptoms such as:     A spot or mole that looks different from all other marks on your skin  Changes in how an area feels, such as itching, tenderness, or pain  Changes in the skin's surface, such as oozing, bleeding, or scaliness  A sore that does not heal  New swelling or redness beyond the border of a mole    Who s at risk?  Anyone can get skin cancer. But you are at greater risk if you have:   Fair skin, light-colored hair, or light-colored eyes  Many moles or abnormal moles on your skin  A history of sunburns from sunlight or tanning beds  A family history of skin cancer  A history of exposure to radiation or chemicals  A weakened immune system  If you have had skin cancer in the past, you are at risk for recurring skin cancer.   How to check your skin  Do your monthly skin checkups in front of a full-length mirror. Check all parts of your body, including your:   Head (ears, face, neck, and scalp)  Torso (front, back, and sides)  Arms (tops, undersides, upper, and lower armpits)  Hands (palms,  backs, and fingers, including under the nails)  Buttocks and genitals  Legs (front, back, and sides)  Feet (tops, soles, toes, including under the nails, and between toes)  If you have a lot of moles, take digital photos of them each month. Make sure to take photos both up close and from a distance. These can help you see if any moles change over time.   Most skin changes are not cancer. But if you see any changes in your skin, call your doctor right away. Only he or she can diagnose a problem. If you have skin cancer, seeing your doctor can be the first step toward getting the treatment that could save your life.   [employer] last reviewed this educational content on 4/1/2019 2000-2020 [employer]. [address] [place] [address], [place] [address]. All rights reserved. This information is not intended as a substitute for professional medical care. Always follow your healthcare professional's instructions.

## 2025-03-13 PROBLEM — Z91.199 NON-COMPLIANT PATIENT: Status: ACTIVE | Noted: 2025-03-13

## 2025-03-27 ENCOUNTER — TELEPHONE (OUTPATIENT)
Dept: DERMATOLOGY | Facility: CLINIC | Age: 65
End: 2025-03-27
Payer: COMMERCIAL

## 2025-03-27 NOTE — TELEPHONE ENCOUNTER
"Excision/Mohs previsit information                                                    Diagnosis: squamous cell carcinoma in situ  Site(s): Mid forehead    Is the surgical site below the waist?  NO  If yes, instruct the patient to purchase over the counter chlorhexidine surgical soap and wash all skin below the belly button twice before surgery    Allergies   Allergen Reactions    Iodinated Contrast Media Hives    Ciprofloxacin Muscle Pain (Myalgia)     Body aches and pain    No Known Drug Allergy     Tape [Adhesive Tape]      Pt states no  \"Surgical tape\"  IV tegaderm ok    Wound Dressing Adhesive Rash       Review and update allergy and medication list    Do you take the following medications:  Coumadin, Eliquis, Pradaxa, Xarelto:  NO.  If on Coumadin, INR should be checked within 7 days of surgery.  Range should be 3.5 or less or within therapeutic range.    Do you currently or have you previously had any of the following conditions:  Hepatitis:  NO  HIV/AIDS:  NO  Prolonged bleeding or bleeding disorder:  NO  Pacemaker: NO  Defibrillator:  NO  History of artificial or heart valve replacement:  NO  Endocarditis (inflammation of the inner lining of the heart's chambers and valves):  NO  Have you ever had a prosthetic joint infection:  NO  Pregnant or Breastfeeding:  N/A  Mobility device (wheelchair, transfer difficulty): NO    Important Reminders:                                                      -For Mohs, notify patient they may be here through the lunch hour.  Be prepared to have down time and we recommend they bring a book or something to do.  -Ok to take all of their medications as prescribed  -Notify patient to eat prior to appointment.  The medication is more likely to cause you to feel jittery if you have an empty stomach.  -If face is being treated, please come with a make-up free face  -Avoid wearing earrings and necklaces  -If scalp is being treated, please come with clean hair free from " product  -Patient will not be able to get the site wet for 48 hrs  -No submerging wound in standing water (lake, pool, bathtub, hot tub) for 2 weeks  -No physical activity for 48 hrs (further restrictions will be discussed by MD at time of visit)    If any positives, send to RN for further review  Negin Conde RN         Writer called pt to discuss pathology results. Pt scheduled for mohs procedure. Excision checklist complete and all questions were negative. Pt denied having any questions or concerns at this time.    Negin Conde RN on 3/27/2025 at 12:06 PM  Yasmeen Roberson, SHARON  P [facility] Dermatology [facility] Team - somehow this path report got missed and never saw my inbox. This will need MMS - please send to derm surg. Thanks

## 2025-04-16 ENCOUNTER — TELEPHONE (OUTPATIENT)
Dept: FAMILY MEDICINE | Facility: OTHER | Age: 65
End: 2025-04-16
Payer: COMMERCIAL

## 2025-04-16 NOTE — TELEPHONE ENCOUNTER
Call attempt 1/3.    Spoke with patient to schedule annual wellness exam due Now. Patient declined AWV.    Ricco Mclean

## 2025-05-07 ENCOUNTER — PATIENT OUTREACH (OUTPATIENT)
Dept: CARE COORDINATION | Facility: CLINIC | Age: 65
End: 2025-05-07

## 2025-05-07 ENCOUNTER — OFFICE VISIT (OUTPATIENT)
Dept: DERMATOLOGY | Facility: CLINIC | Age: 65
End: 2025-05-07
Payer: COMMERCIAL

## 2025-05-07 VITALS — DIASTOLIC BLOOD PRESSURE: 80 MMHG | SYSTOLIC BLOOD PRESSURE: 163 MMHG

## 2025-05-07 DIAGNOSIS — J34.89 OBSTRUCTION OF NASAL VALVE: ICD-10-CM

## 2025-05-07 DIAGNOSIS — D04.39 SQUAMOUS CELL CARCINOMA IN SITU (SCCIS) OF SKIN OF FOREHEAD: Primary | ICD-10-CM

## 2025-05-07 PROCEDURE — 3079F DIAST BP 80-89 MM HG: CPT | Performed by: DERMATOLOGY

## 2025-05-07 PROCEDURE — 3077F SYST BP >= 140 MM HG: CPT | Performed by: DERMATOLOGY

## 2025-05-07 PROCEDURE — 17311 MOHS 1 STAGE H/N/HF/G: CPT | Mod: GC | Performed by: DERMATOLOGY

## 2025-05-07 PROCEDURE — 12052 INTMD RPR FACE/MM 2.6-5.0 CM: CPT | Mod: GC | Performed by: DERMATOLOGY

## 2025-05-07 RX ORDER — FLUTICASONE PROPIONATE 50 MCG
1 SPRAY, SUSPENSION (ML) NASAL DAILY
COMMUNITY

## 2025-05-07 RX ORDER — MULTIVITAMIN WITH IRON
1 TABLET ORAL DAILY
COMMUNITY

## 2025-05-07 NOTE — PATIENT INSTRUCTIONS
Dr. Zhou's team will call you to set up a consultation.  You can also call them to schedule at [phone].     Caring for your skin after surgery    For the first 48 hours after your surgery:  Leave the pressure dressing on and keep it dry. If it should come loose, you may re-tape it, but do not take it off.  Relax and take it easy.  Do not do any vigorous exercise, heavy lifting or bending forward. This could cause the wound to bleed.  If the wound is on your head, sleeping with your head elevated for the first few nights will help with swelling and bleeding. (Use linens/pillow cases that would be ok to get blood on in the event there is some oozing from the bandage).  Post-operative pain is usually mild.  You may alternate between 1000 mg of Tylenol (acetaminophen) and 400 mg of Ibuprofen every 4 hours.  This means, for example, that you could take the followin,000 mg of Tylenol, followed 4 hours later by 400 mg Ibuprofen, followed 4 hours later with 1,000 mg of Tylenol, and so forth.  Do not take more than 4,000 mg of acetaminophen in a 24-hour period or 3200 mg of Ibuprofen in a 24-hr period.    Avoid alcohol and vitamin E as these may increase your tendency to bleed.  Apply an ice pack for 20 min every 2-3 hours while awake.  This may help reduce swelling, bruising, and pain.  Make sure the ice pack is waterproof so that the pressure bandage doesn't get wet.  You may see a small amount of drainage or blood on your pressure bandage. This is normal. However:  If drainage or bleeding continues or saturates the bandage, you will need to apply firm pressure over the bandage with a clean washcloth for 15 minutes.    Remove the saturated bandage.  If bleeding has stopped, apply Vaseline over the suture line and cover with a non-stick bandage.  To add some pressure over the wound, fold a piece of gauze and tape over the area.  If bleeding continues after applying pressure for 15 minutes, apply an ice pack with  gentle pressure to the bandaged area for another 15 minutes.  If bleeding still continues, call our office or go to the nearest emergency room.    48 Hours After Surgery:  Remove the bandage.  If it seems sticky or too difficult to get off, you may need to soak it off in the shower.  Wash wound with a mild soap and water.  Use caution when washing the wound, be gentle and do not let the forceful shower stream hit the wound directly.  Pat dry.  Apply Vaseline or Aquaphor ointment (from a new container or tube) over the suture line with a Q-tip.  Cover the site with a bandage.  Do this daily until the sutures have dissolved.    What to expect:  The first couple of days your wound may be tender and may bleed slightly when doing wound care.  There may be swelling and bruising around the wound, especially if it is near the eyes. For your comfort, you may apply ice or cold compresses to the area.  The area around your wound may be numb for several weeks or even months.  You may experience periodic sharp pain or mild itching around the wound as it heals.    Call Us If:  You have bleeding that will not stop after applying pressure and ice.  You have pain that is not controlled with Tylenol and Ibuprofen.  You have signs or symptoms of an infection such as fever over 100 degrees Fahrenheit, redness, swelling, or warmth spreading from the wound, increasing pain after the first 48 hours, or white/yellow/green drainage from the wound (may or may not have a foul odor).    [facility], Dermatology [phone].  Available - 8.  For urgent needs outside of business hours, call the [facility] at [phone] and ask to speak with/page the dermatology resident on call.

## 2025-05-07 NOTE — NURSING NOTE
The following medication was given:     Medication:  Lidocaine with epinephrine 1% 1:[id number]  Route: SQ  Site: see procedure note  Dose: 6.5 mL  LOT #: [id number]  : Pfizer  Expiration date: 11/30/25  NDC#: 1090-2826-94  Was there drug waste? No  Multi-dose vial: Yes    Vaseline and pressure dressing applied to Mohs site on mid forehead.  Wound care instructions reviewed with patient and AVS provided.  Patient verbalized understanding.  Post op appointment scheduled: Patient declined. Patient will follow up for suture removal: N/A  No further questions or concerns at this time.    Alivia Saha RN  May 7, 2025

## 2025-05-07 NOTE — PROGRESS NOTES
[facility] Dermatologic Surgery Clinic [place] Procedure Note    Dermatology Problem List:  FBSE: 3/12/25    1. Hx of NMSC   - SCCis, mid forehead, s/p bx 3/12/25, s/p Mohs 5/7/25  - BCC, central nasal tip (infiltrating subtype), s/p Mohs and Burow's FTSG 7/5/23  - NMSC -left side of face in the 1990's  2. AKs  - cryo  - efudex/calcipotriene - repeat to scalp initiated 3/12/25  - efudex/calipotriene initiated 5/31/23 to scalp, forehead and temples - good response  3. ISK - cryo  4. Genital warts  - R anus, condyloma acuminatum, S/p Biopsy 3/13/24  - R anus superior, condyloma acuminatum, S/p Biopsy 3/13/24     SHx:   PMHx: Extensive. Of note patient elected to discontinue ALL systemic medications in spring of 2024 - PCP notified  ________________________________    Date of Service:  May 7, 2025  Surgery: Mohs micrographic surgery    Case 1  Repair Type: intermediate  Repair Size: 4.0 cm  Suture Material: 4-0 monocryl, Express Gut 5-0  Tumor Type: SCCIS - Squamous cell carcinoma in situ  Location: mid forehead  Derm-Path Accession #: [id number]  PreOp Size: 0.6x0.6 cm  PostOp Size: 1.8x1.4 cm  Mohs Accession #: [id number]  Level of Defect: fascia      Procedure:  We discussed the principles of treatment and most likely complications including scarring, bleeding, infection, swelling, pain, crusting, nerve damage, large wound,  incomplete excision, wound dehiscence,  nerve damage, recurrence, and a second procedure may be recommended to obtain the best cosmetic or functional result.    Informed consent was obtained and the patient underwent the procedure as follows:  The patient was placed supine on the operating table.  The cancer was identified, outlined with a marker, and verified by the patient.  The entire surgical field was prepped with ChoraPrep.  The surgical site was anesthetized using Lidocaine 1% with epi 1:100,000.      The area of clinically apparent tumor was debulked. The layer of tissue  was then surgically excised using a #15 blade and was then transferred onto a specimen sheet maintaining the orientation of the specimen. Hemostasis was obtained using bipolar electrocoagulation. The wound site was then covered with a dressing while the tissue samples were processed for examination.    The excised tissue was transported to the Mohs histology laboratory maintaining the tissue orientation.  The tissue specimen was relaxed so that the entire surgical margin was in a a single horizontal plane for sectioning and inked for precise mapping.  A precise reference map was drawn to reflect the sectioning of the specimen, colored inking of the margins, and orientation on the patient. The tissue was processed using horizontal sectioning of the base and continuous peripheral margins.  The histopathologic sections were reviewed in conjunction with the reference map.    Total blocks: 1    Total slides:  2    There were no cancer cells visualized on examination, therefore Mohs surgery was complete.    Mohs repair type: Reconstruction: Intermediate Linear Closure      The patient was taken to the operative suite and placed supine on the operating room table. The defect was identified. Appropriate markings were made with a marking pen to plan the repair. The area was infiltrated with Lidocaine 1% with epi 1:100,000 and prepped with ChoraPrep and draped with sterile towels.     The wound was debeveled and undermined widely. Cones were excised within relaxed skin tension lines on both sides of the defect. Hemostasis was obtained using bipolar electrocoagulation. The deeper layers of subcutaneous and superficial (nonmuscle) fascia tissues were then approximated using monocryl 4-0 buried vertical mattress sutures (deep layer suturing). The wound edges were then approximated; additional buried sutures were placed in a similar fashion where needed. Express Gut 5-0 simple running (superficial layer suturing) were carefully  placed for maximum eversion and meticulous approximation.    Estimated blood loss was less than 10 ml for all surgical sites. The wound was cleansed with saline and ointment was applied along the wound surface. A sterile pressure dressing was applied and wound care instructions were given verbally and in writing. Patient was informed that additional refinement of the resulting surgical scar may be used as a second stage of this reconstruction. The patient was discharged from the Dermatologic Surgery Center alert and ambulatory.    Repair Size: 4.0 cm  Sutures Used:  Deep: monocryl 4-0 Superficial: Express Gut 5-0     Dr. Aroldo Sorto MD was physically present  for the entire procedure and always immediately available.     Other care provided today:  Patient reports in the past, Dr. Zhou of ENT treated him for bilateral nasal obstruction. However, recently he has noticed recurrence of obstruction in his right nasal valve. We recommended that he follow-up with Dr. Zhou's team regarding this issue, and placed a referral order to Dr. Zhou's team to help facilitate scheduling an appointment.    Staff Involved:  Scribe/Staff     Scribe Disclosure:   I, Carina Dailey, am serving as a scribe; to document services personally performed by Dr. Aroldo Sorto - -based on data collection and the provider's statements to me.     Staff attestation:  The documentation recorded by the scribe accurately reflects the services I personally performed and the decisions I personally made. I have made edits where needed.    Electronically signed by:     Carina Dickson MD  Mohs Micrographic Surgery and Dermatologic Oncology Fellow  [facility]    Staff Physician Comments:   I saw and evaluated the patient with the Mohs Surgery Fellow (Dr. Carina Dickson) and I agree with the assessment and plan and the above description of the procedure as documented by the scribe. I was present for the entire procedure and entire exam.     Aroldo  DO Macario    Department of Dermatology  [facility]: Phone: [phone], Fax:[phone]  [facility] [place]: Phone: [phone], Fax: [phone]    Care and Laboratory Testing Performed at:  [facility]   Dermatology Clinic  [address] [place] [address]

## 2025-05-07 NOTE — LETTER
5/7/2025      Miguel A Vaughn  [address]  [place] [address]      Dear Colleague,    Thank you for referring your patient, Miguel A Vaughn, to the [facility]. Please see a copy of my visit note below.    [facility] Dermatologic Surgery [facility] [place] Procedure Note    Dermatology Problem List:  FBSE: 3/12/25    1. Hx of NMSC   - SCCis, mid forehead, s/p bx 3/12/25, s/p Mohs 5/7/25  - BCC, central nasal tip (infiltrating subtype), s/p Mohs and Burow's FTSG 7/5/23  - NMSC -left side of face in the 1990's  2. AKs  - cryo  - efudex/calcipotriene - repeat to scalp initiated 3/12/25  - efudex/calipotriene initiated 5/31/23 to scalp, forehead and temples - good response  3. ISK - cryo  4. Genital warts  - R anus, condyloma acuminatum, S/p Biopsy 3/13/24  - R anus superior, condyloma acuminatum, S/p Biopsy 3/13/24     SHx:   PMHx: Extensive. Of note patient elected to discontinue ALL systemic medications in spring of 2024 - PCP notified  ________________________________    Date of Service:  May 7, 2025  Surgery: Mohs micrographic surgery    Case 1  Repair Type: intermediate  Repair Size: 4.0 cm  Suture Material: 4-0 monocryl, Express Gut 5-0  Tumor Type: SCCIS - Squamous cell carcinoma in situ  Location: mid forehead  Derm-Path Accession #: [id number]  PreOp Size: 0.6x0.6 cm  PostOp Size: 1.8x1.4 cm  Mohs Accession #: [id number]  Level of Defect: fascia      Procedure:  We discussed the principles of treatment and most likely complications including scarring, bleeding, infection, swelling, pain, crusting, nerve damage, large wound,  incomplete excision, wound dehiscence,  nerve damage, recurrence, and a second procedure may be recommended to obtain the best cosmetic or functional result.    Informed consent was obtained and the patient underwent the procedure as follows:  The patient was placed supine on the operating table.  The cancer was identified, outlined with a  marker, and verified by the patient.  The entire surgical field was prepped with ChoraPrep.  The surgical site was anesthetized using Lidocaine 1% with epi 1:100,000.      The area of clinically apparent tumor was debulked. The layer of tissue was then surgically excised using a #15 blade and was then transferred onto a specimen sheet maintaining the orientation of the specimen. Hemostasis was obtained using bipolar electrocoagulation. The wound site was then covered with a dressing while the tissue samples were processed for examination.    The excised tissue was transported to the Mohs histology laboratory maintaining the tissue orientation.  The tissue specimen was relaxed so that the entire surgical margin was in a a single horizontal plane for sectioning and inked for precise mapping.  A precise reference map was drawn to reflect the sectioning of the specimen, colored inking of the margins, and orientation on the patient. The tissue was processed using horizontal sectioning of the base and continuous peripheral margins.  The histopathologic sections were reviewed in conjunction with the reference map.    Total blocks: 1    Total slides:  2    There were no cancer cells visualized on examination, therefore Mohs surgery was complete.    Mohs repair type: Reconstruction: Intermediate Linear Closure      The patient was taken to the operative suite and placed supine on the operating room table. The defect was identified. Appropriate markings were made with a marking pen to plan the repair. The area was infiltrated with Lidocaine 1% with epi 1:100,000 and prepped with ChoraPrep and draped with sterile towels.     The wound was debeveled and undermined widely. Cones were excised within relaxed skin tension lines on both sides of the defect. Hemostasis was obtained using bipolar electrocoagulation. The deeper layers of subcutaneous and superficial (nonmuscle) fascia tissues were then approximated using monocryl 4-0  buried vertical mattress sutures (deep layer suturing). The wound edges were then approximated; additional buried sutures were placed in a similar fashion where needed. Express Gut 5-0 simple running (superficial layer suturing) were carefully placed for maximum eversion and meticulous approximation.    Estimated blood loss was less than 10 ml for all surgical sites. The wound was cleansed with saline and ointment was applied along the wound surface. A sterile pressure dressing was applied and wound care instructions were given verbally and in writing. Patient was informed that additional refinement of the resulting surgical scar may be used as a second stage of this reconstruction. The patient was discharged from the Dermatologic Surgery Center alert and ambulatory.    Repair Size: 4.0 cm  Sutures Used:  Deep: monocryl 4-0 Superficial: Express Gut 5-0     Dr. Aroldo Sorto MD was physically present  for the entire procedure and always immediately available.     Other care provided today:  Patient reports in the past, Dr. Zhou of ENT treated him for bilateral nasal obstruction. However, recently he has noticed recurrence of obstruction in his right nasal valve. We recommended that he follow-up with Dr. Zhou's team regarding this issue, and placed a referral order to Dr. Zhou's team to help facilitate scheduling an appointment.    Staff Involved:  Scribe/Staff     Scribe Disclosure:   I, Carina Dailey, am serving as a scribe; to document services personally performed by Dr. Aroldo Sorto - -based on data collection and the provider's statements to me.     Staff attestation:  The documentation recorded by the scribe accurately reflects the services I personally performed and the decisions I personally made. I have made edits where needed.    Electronically signed by:     Carina Dickson MD  Mohs Micrographic Surgery and Dermatologic Oncology Fellow  [facility]    Staff Physician Comments:   I saw and  evaluated the patient with the Mohs Surgery Fellow (Dr. Carina Dickson) and I agree with the assessment and plan and the above description of the procedure as documented by the scribe. I was present for the entire procedure and entire exam.     Aroldo Sorto DO    Department of Dermatology  [facility] Clin[facility]: Phone: [phone], Fax:[phone]  [facility]: Phone: [phone], Fax: [phone]    Care and Laboratory Testing Performed at:  [facility]   Dermatology Clinic  [address] [place] [address]    Again, thank you for allowing me to participate in the care of your patient.        Sincerely,        Aroldo Sorto MD    Electronically signed

## 2025-05-07 NOTE — NURSING NOTE
Miguel A Vaughn's goals for this visit include:   Chief Complaint   Patient presents with    Procedure     SCCIs mid forehead       He requests these members of his care team be copied on today's visit information: n/a    PCP: Ferny Koch    Referring Provider:  Yasmeen Roberson PA-C  [id number] [place] [address]    BP (!) 163/80     Do you need any medication refills at today's visit? No  Alivia Saha RN

## 2025-05-08 ENCOUNTER — PATIENT OUTREACH (OUTPATIENT)
Dept: CARE COORDINATION | Facility: CLINIC | Age: 65
End: 2025-05-08
Payer: COMMERCIAL

## 2025-05-08 ENCOUNTER — TELEPHONE (OUTPATIENT)
Dept: DERMATOLOGY | Facility: CLINIC | Age: 65
End: 2025-05-08
Payer: COMMERCIAL

## 2025-05-08 NOTE — TELEPHONE ENCOUNTER
Miguel A is 1 day s/p Mohs on central forehead.    What are you doing to manage your pain?  He is taking Tylenol.  He reports a headache.    Are you applying ice? No.  I recommended he apply ice for 10 min every hour or for 20 min every 2-3 hours.      Have you had any noticeable bleeding through the bandage?  No, dressing is dry and intact.    Do you have any concerns?  No     Wound care directions reviewed.  Patient declined a post op appointment.  Next skin check has been scheduled.     Alivia Saha RN

## 2025-05-12 ENCOUNTER — PATIENT OUTREACH (OUTPATIENT)
Dept: CARE COORDINATION | Facility: CLINIC | Age: 65
End: 2025-05-12
Payer: COMMERCIAL

## 2025-05-19 NOTE — PROGRESS NOTES
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   Other pertinent nasal/facial anatomy: nasal skin: sebaceous skin, projection: overprojected, and rotation:ptotic. Septum near midline.   -12/16/05: revision septoplasty, turbinate reduction by Dr. Whitman.  -05/22/25: Nasal steroid spray treatment for many years resulted a minimal but unsatisfactory benefit.  Outcomes: NOSE score: 14 on 3/28/24, 15 on 5/22/25.  Medical Decision Making (MDM): (stable chronic problem or illness).   The nasal obstruction results from the anatomic abnormalities described above. He has undergone a course of medical treatment with steroid nasal spray and felt a minimal but unsatisfactory benefit from this therapy. I recommend the nasal surgery outlined in the care checklist to address his anatomic abnormalities. Vestibular stenosis repair is a medically necessary component of the planned surgical treatment plan to address the components of nasal obstruction caused by the external nasal deformity. The patient wanted to explore adjusting his CPAP settings before considering surgery further. I invited him to return at anytime if he wants to consider nasal reconstruction further.   Care Checklist:  _Wear breathe right strips and find the location that is most beneficial. Update 5/22/25: he tried breathe right strips but did not find them helpful.   _Observe nasal function and nasal cycle.  _Administer NOSE evaluation at every visit.   _Perform nasal endoscopy at next visit to look for a posterior cause of nasal obstruction, if he fails to improve from medical treatment. Declined at this time.   _If CPAP adjustment does not work out for him, operative plan includes: vestibular stenosis repair (CPT 48401) with excision of his dorsal and nasal tip scar,  possible local flaps to reconstruct the resulting dorsal and tip cutaneous defect, placement of a conchal cartilage dorsal cap graft to reconstruct his nasal valve and turbinate reduction (CPT 03097 turbinate out-fracture). Conchal cartilage harvest and grafting from either or both ears (CPT 00904)  _Possible temporal or mastoid fascia harvest and grafting from either side (CPT 03802) would only be performed if there were unexpected findings during the nasal surgery and additional grafting materials are necessary to rebuild the nose. But preoperatively, the need for temporal or mastoid graft harvest and is unlikely.   _Surgery is expected to only provide a partial benefit in breathing. Nasal steroid spray use and even further allergy treatment may be needed after surgery to obtain an optimal result.   _Expected outcome: we reviewed how his nose will become wider if we perform the surgery. This was demonstrated in a mirror and he was uncertain about whether he finds this acceptable.   _The patient wanted to explore adjusting his CPAP settings before considering surgery further (see below). I invited him to return at anytime if he wants to consider nasal reconstruction further.      2.) Midline nasal dorsal and tip scar  -7/26/23: Mohs excision of BCCa to tumor free margins with primary closure and FTSG with Dr. Sorto.   Medical Decision Making (stable chronic problem or illness): I recommend surgical treatment of the nasal dorsal scar as outlined in the care checklist because the scar tissue may contribute to the the functional impairment of: nasal obstruction, and the scar is depressed resulting in an abnormal soft tissue contour. This could also provide access to insert a nasal cap graft. Surgical treatment of the scar will correct the soft tissue deformity of this area and more accurately restore his normal nasal anatomy. We may need to use serial closure to address the scar: see below.   Care  "Checklist:  _Consider nasal dorsal and tip scar excision with complex closure and local flap reconstruction. This may need to be addressed in a staged fashion if the dorsal cap graft expands the skin and creates excess closure tension. In that case, we would postpone excising the scar and perform that at a second stage surgery.      3.) Tobacco use  MDM: (stable chronic problem or illness).  the patient currently smokes. I explained that all smoking should stop for 6 weeks prior to elective surgery to decrease the risk of postoperative wound healing complications. Even if smoking cessation is successful, the risk of postoperative wound healing complications is higher because of this prior tobacco use. The patient understands the importance of smoking cessation because of these increased risks and plans to quit. We also discussed smoking cessation resources at our institution and offered to help with arranging this assistance.  Care Checklist:  _Smoking cessation.      4.) Comorbidities that increase the risk of surgery: Diabetes, Tobacco Use, BENJAMIN wears CPAP, Asprin, Tape Allergy  MDM: (stable chronic problem or illness).   We discussed the patients comorbidities listed above increase the risks associated with any procedure and recommendations to mitigate those risks are listed in the care checklist below.   Care Checklist:  _Anticoagulation plan for ASA.  _Tape allergy: wore tape on arm after surgery for over a week and it \"broke out.\" Has worn tape for shorter periods without an allergy.    _Work with PCP to control blood glucose levels. HbA1c on 2/9/24 was 11.5.  _Unexpected weight loss and loss of appetite. Recommended he talk with his PCP about this change.     5.) Severe Obstructive Sleep Apnea (BENJAMIN)  -9/25/11: sleep study: severe BENJAMIN, AHI 57.6, lowest O2 83% on RA.   Medical Decision Making: Has significant sleep difficulty without CPAP. Uses full face CPAP mask. Having BENJAMIN places you at higher risk of heart " or lung problems during or after any surgery. If you wear a CPAP mask, the pressure from the mask or straps can injure the surgery site.   Care Checklist:  _Observe postoperatively because of sleep apnea,   _Expected outcome after any nasal surgery: Nasal surgery can help the airflow with CPAP use; however, nasal surgery will not cure sleep apnea. CPAP may be needed after nasal surgery.  _We discussed how he needs to wear his CPAP after surgery because of his severe BENJAMIN, but pressure from the mask and the nasal airflow could create complications at the surgery site.   _His highest priority goal is to have CPAP work better at night. He was wondering if increasing the pressure would be helpful and I encouraged him to call his sleep medicine clinic to ask if that is possible. The patient wanted to explore adjusting his CPAP settings before considering surgery further.     Background/Connection:   Preferred name = Miguel A  What is most important to know about you as a person? (No medical information) SO = Nehal. Retired, likes to fish and hunt. Makes [employer].     Photographs: [facility] consents signed March 28, 2024      Peter Zhou MD           HPI: Mr. Vaughn returns for further evaluation of their nasal function.   Do you continue to have nasal congestion or obstruction? YES.  Which nostril is the worse breathing side? right.  Were you able to use the steroid nasal spray? YES. Flonase for over 7 years.   How much improvement did you feel when using the nasal spray? a moderate but unsatisfactory benefit.   He is bothered because his nasal obstruction interferes with his CPAP use.   YES. Has your overall health changed since our last visit? Had very significant weight loss and loss of appetite.     Nasal Obstruction Symptom Evaluation (NOSE QUESTIONNAIRE):   [Administer the paper survey to the patient called the Nasal Obstruction Symptom Evaluation (NOSE QUESTIONNAIRE), and record the results below:]    3 - a fairly bad  problem: Nasal congestion or stuffiness?  3 - a fairly bad problem: Nasal blockage or obstruction?  3 - a fairly bad problem: Trouble breathing through his nose?  3 - a fairly bad problem: Trouble sleeping?  3 - a fairly bad problem: Inability to get enough air through his nose during exercise or exertion?    Add up the NOSE Score:  15 = Total NOSE score  Nasal Exam  A nasal exam was repeated today. The previously observed nasal exam findings documented in the impression and recommendations section above were confirmed on exam today. Any additions or modifications to the exam are documented in the impression and recommendations section above.   Modified Tarrant maneuver: the patient reported significant improvement in breathing with lateralization of the right  internal nasal valve.  He is in no apparent distress. Pleasant affect. Normal ability to communicate. Normal respiratory effort. No stridor. Voice strong.    Signature: Peter Zhou MD

## 2025-05-22 ENCOUNTER — OFFICE VISIT (OUTPATIENT)
Dept: OTOLARYNGOLOGY | Facility: CLINIC | Age: 65
End: 2025-05-22
Payer: COMMERCIAL

## 2025-05-22 DIAGNOSIS — J34.89 OBSTRUCTION OF NASAL VALVE: ICD-10-CM

## 2025-05-22 NOTE — NURSING NOTE
Miguel A Vaughn's chief complaint for this visit includes:  Chief Complaint   Patient presents with    Follow Up     right nasal obstruction after nasal mohs     PCP: Ferny Koch    Referring Provider:  Peter Zhou MD  [address] [place] [address]    There were no vitals taken for this visit.

## 2025-05-22 NOTE — Clinical Note
Aroldo, thanks for sending Mr. Vaughn to me. Attached you will find my note from our visit. I also sent you a staff message about him. All the best, Rosalio

## 2025-05-22 NOTE — LETTER
5/22/2025      Miguel A Vaughn  [address]  [place] [address]      Dear Colleague,    Thank you for referring your patient, Miguel A Vaughn, to the [facility]. Please see a copy of my visit note below.    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   Other pertinent nasal/facial anatomy: nasal skin: sebaceous skin, projection: overprojected, and rotation:ptotic. Septum near midline.   -12/16/05: revision septoplasty, turbinate reduction by Dr. Whitman.  -05/22/25: Nasal steroid spray treatment for many years resulted a minimal but unsatisfactory benefit.  Outcomes: NOSE score: 14 on 3/28/24, 15 on 5/22/25.  Medical Decision Making (MDM): (stable chronic problem or illness).   The nasal obstruction results from the anatomic abnormalities described above. He has undergone a course of medical treatment with steroid nasal spray and felt a minimal but unsatisfactory benefit from this therapy. I recommend the nasal surgery outlined in the care checklist to address his anatomic abnormalities. Vestibular stenosis repair is a medically necessary component of the planned surgical treatment plan to address the components of nasal obstruction caused by the external nasal deformity. The patient wanted to explore adjusting his CPAP settings before considering surgery further. I invited him to return at anytime if he wants to consider nasal reconstruction further.   Care Checklist:  _Wear breathe right strips and find the location that is most beneficial. Update 5/22/25: he tried breathe right strips but did not find them helpful.   _Observe nasal function and nasal cycle.  _Administer NOSE evaluation at every visit.   _Perform nasal endoscopy at next visit to look for a posterior cause of nasal  obstruction, if he fails to improve from medical treatment. Declined at this time.   _If CPAP adjustment does not work out for him, operative plan includes: vestibular stenosis repair (CPT 18840) with excision of his dorsal and nasal tip scar, possible local flaps to reconstruct the resulting dorsal and tip cutaneous defect, placement of a conchal cartilage dorsal cap graft to reconstruct his nasal valve and turbinate reduction (CPT 80413 turbinate out-fracture). Conchal cartilage harvest and grafting from either or both ears (CPT 16177)  _Possible temporal or mastoid fascia harvest and grafting from either side (CPT 59742) would only be performed if there were unexpected findings during the nasal surgery and additional grafting materials are necessary to rebuild the nose. But preoperatively, the need for temporal or mastoid graft harvest and is unlikely.   _Surgery is expected to only provide a partial benefit in breathing. Nasal steroid spray use and even further allergy treatment may be needed after surgery to obtain an optimal result.   _Expected outcome: we reviewed how his nose will become wider if we perform the surgery. This was demonstrated in a mirror and he was uncertain about whether he finds this acceptable.   _The patient wanted to explore adjusting his CPAP settings before considering surgery further (see below). I invited him to return at anytime if he wants to consider nasal reconstruction further.      2.) Midline nasal dorsal and tip scar  -7/26/23: Mohs excision of BCCa to tumor free margins with primary closure and FTSG with Dr. Sorto.   Medical Decision Making (stable chronic problem or illness): I recommend surgical treatment of the nasal dorsal scar as outlined in the care checklist because the scar tissue may contribute to the the functional impairment of: nasal obstruction, and the scar is depressed resulting in an abnormal soft tissue contour. This could also provide access to insert a  "nasal cap graft. Surgical treatment of the scar will correct the soft tissue deformity of this area and more accurately restore his normal nasal anatomy. We may need to use serial closure to address the scar: see below.   Care Checklist:  _Consider nasal dorsal and tip scar excision with complex closure and local flap reconstruction. This may need to be addressed in a staged fashion if the dorsal cap graft expands the skin and creates excess closure tension. In that case, we would postpone excising the scar and perform that at a second stage surgery.      3.) Tobacco use  MDM: (stable chronic problem or illness).  the patient currently smokes. I explained that all smoking should stop for 6 weeks prior to elective surgery to decrease the risk of postoperative wound healing complications. Even if smoking cessation is successful, the risk of postoperative wound healing complications is higher because of this prior tobacco use. The patient understands the importance of smoking cessation because of these increased risks and plans to quit. We also discussed smoking cessation resources at our institution and offered to help with arranging this assistance.  Care Checklist:  _Smoking cessation.      4.) Comorbidities that increase the risk of surgery: Diabetes, Tobacco Use, BENJAMIN wears CPAP, Asprin, Tape Allergy  MDM: (stable chronic problem or illness).   We discussed the patients comorbidities listed above increase the risks associated with any procedure and recommendations to mitigate those risks are listed in the care checklist below.   Care Checklist:  _Anticoagulation plan for ASA.  _Tape allergy: wore tape on arm after surgery for over a week and it \"broke out.\" Has worn tape for shorter periods without an allergy.    _Work with PCP to control blood glucose levels. HbA1c on 2/9/24 was 11.5.  _Unexpected weight loss and loss of appetite. Recommended he talk with his PCP about this change.     5.) Severe Obstructive Sleep " Apnea (BENJAMIN)  -9/25/11: sleep study: severe BENJAMIN, AHI 57.6, lowest O2 83% on RA.   Medical Decision Making: Has significant sleep difficulty without CPAP. Uses full face CPAP mask. Having BENJAMIN places you at higher risk of heart or lung problems during or after any surgery. If you wear a CPAP mask, the pressure from the mask or straps can injure the surgery site.   Care Checklist:  _Observe postoperatively because of sleep apnea,   _Expected outcome after any nasal surgery: Nasal surgery can help the airflow with CPAP use; however, nasal surgery will not cure sleep apnea. CPAP may be needed after nasal surgery.  _We discussed how he needs to wear his CPAP after surgery because of his severe BENJAMIN, but pressure from the mask and the nasal airflow could create complications at the surgery site.   _His highest priority goal is to have CPAP work better at night. He was wondering if increasing the pressure would be helpful and I encouraged him to call his sleep medicine clinic to ask if that is possible. The patient wanted to explore adjusting his CPAP settings before considering surgery further.     Background/Connection:   Preferred name = Miguel A  What is most important to know about you as a person? (No medical information) SO = Nehal. Retired, likes to fish and hunt. Makes [employer].     Photographs: [facility] consents signed March 28, 2024      Peter Zhou MD           HPI: Mr. Vaughn returns for further evaluation of their nasal function.   Do you continue to have nasal congestion or obstruction? YES.  Which nostril is the worse breathing side? right.  Were you able to use the steroid nasal spray? YES. Flonase for over 7 years.   How much improvement did you feel when using the nasal spray? a moderate but unsatisfactory benefit.   He is bothered because his nasal obstruction interferes with his CPAP use.   YES. Has your overall health changed since our last visit? Had very significant weight loss and loss of appetite.      Nasal Obstruction Symptom Evaluation (NOSE QUESTIONNAIRE):   [Administer the paper survey to the patient called the Nasal Obstruction Symptom Evaluation (NOSE QUESTIONNAIRE), and record the results below:]    3 - a fairly bad problem: Nasal congestion or stuffiness?  3 - a fairly bad problem: Nasal blockage or obstruction?  3 - a fairly bad problem: Trouble breathing through his nose?  3 - a fairly bad problem: Trouble sleeping?  3 - a fairly bad problem: Inability to get enough air through his nose during exercise or exertion?    Add up the NOSE Score:  15 = Total NOSE score  Nasal Exam  A nasal exam was repeated today. The previously observed nasal exam findings documented in the impression and recommendations section above were confirmed on exam today. Any additions or modifications to the exam are documented in the impression and recommendations section above.   Modified Violeta maneuver: the patient reported significant improvement in breathing with lateralization of the right  internal nasal valve.  He is in no apparent distress. Pleasant affect. Normal ability to communicate. Normal respiratory effort. No stridor. Voice strong.    Signature: Peter Zhou MD       Again, thank you for allowing me to participate in the care of your patient.        Sincerely,        Peter Zhou MD    Electronically signed

## 2025-08-08 ENCOUNTER — TELEPHONE (OUTPATIENT)
Dept: SLEEP MEDICINE | Facility: CLINIC | Age: 65
End: 2025-08-08
Payer: COMMERCIAL

## (undated) DEVICE — SOL WATER IRRIG 1000ML BOTTLE 2F7114

## (undated) DEVICE — PREP CHLORAPREP 26ML TINTED ORANGE  260815

## (undated) DEVICE — SPONGE KITTNER 31001010

## (undated) DEVICE — GLOVE PROTEXIS BLUE W/NEU-THERA 6.5  2D73EB65

## (undated) DEVICE — DRAPE SHEET REV FOLD 3/4 9349

## (undated) DEVICE — GLOVE PROTEXIS W/NEU-THERA 7.5  2D73TE75

## (undated) DEVICE — DRAIN JACKSON PRATT 07MM FLAT SU130-1310

## (undated) DEVICE — MANIFOLD NEPTUNE 4 PORT 700-20

## (undated) DEVICE — SU MONOCRYL 4-0 PS-2 18" UND Y496G

## (undated) DEVICE — ESU GROUND PAD UNIVERSAL W/O CORD

## (undated) DEVICE — GOWN XLG DISP 9545

## (undated) DEVICE — DRAPE POUCH INSTRUMENT 3 POCKET 1018L

## (undated) DEVICE — PACK SPINE SM CUSTOM SNE15SSFSK

## (undated) DEVICE — DRILL BIT MEDT 13MM SGL USE 7080513

## (undated) DEVICE — SYR 10ML PERFIX LL 332152

## (undated) DEVICE — ENDO POUCH UNIV RETRIEVAL SYSTEM INZII 10MM CD001

## (undated) DEVICE — TUBING IV EXTENSION SET 34"

## (undated) DEVICE — SUCTION FRAZIER 12FR W/OBTURATOR 33120

## (undated) DEVICE — SYR 10ML LL W/O NDL

## (undated) DEVICE — NDL SPINAL 18GA 3.5" 405184

## (undated) DEVICE — SYR 05ML LL W/O NDL

## (undated) DEVICE — SURGICEL ABSORBABLE HEMOSTAT SNOW 2"X4" 2082

## (undated) DEVICE — DRAPE MAYO STAND 23X54 8337

## (undated) DEVICE — TUBING EXTENSION SET MICROBORE 21CM LL 6N8374

## (undated) DEVICE — ESU ENDO SCISSORS 5MM CVD 5DCS

## (undated) DEVICE — MIDAS REX DISSECTING TOOL  14MH30

## (undated) DEVICE — ADH SKIN CLOSURE PREMIERPRO EXOFIN 1.0ML 3470

## (undated) DEVICE — NDL COUNTER 10CT

## (undated) DEVICE — GLOVE PROTEXIS W/NEU-THERA 8.5  2D73TE85

## (undated) DEVICE — GLOVE ESTEEM BLUE W/NEU-THERA 6.0  2D73PB60

## (undated) DEVICE — TRAY SINGLE DOSE EPIDURAL ANESTHESIA

## (undated) DEVICE — ENDO TROCAR FIRST ENTRY KII FIOS Z-THRD 05X100MM CTF03

## (undated) DEVICE — SYR EAR BULB 3OZ 0035830

## (undated) DEVICE — SYR 03ML LL W/O NDL

## (undated) DEVICE — DEVICE SUTURE GRASPER TROCAR CLOSURE 14GA PMITCSG

## (undated) DEVICE — SPONGE SURGIFOAM 100 1974

## (undated) DEVICE — SOL NACL 0.9% INJ 1000ML BAG 07983-09

## (undated) DEVICE — ENDO TROCAR FIRST ENTRY KII FIOS Z-THRD 11X100MM CTF33

## (undated) DEVICE — LINEN TOWEL PACK X5 5464

## (undated) DEVICE — PREP CHLORAPREP ORANGE 3ML  260415

## (undated) DEVICE — TUBING SUCTION SOFT 20'X3/16" 0036570

## (undated) DEVICE — SU VICRYL 3-0 SH CR 8X18" J774

## (undated) DEVICE — ESU ELEC BLADE 2.75" COATED/INSULATED E1455

## (undated) DEVICE — GLOVE PROTEXIS W/NEU-THERA 6.5  2D73TE65

## (undated) DEVICE — PIN DISTRACTION ANCHOR FOR SCR 14MM MDS9091414

## (undated) DEVICE — NDL COUNTER 20CT 31142493

## (undated) DEVICE — NDL ECLIPSE 18GA 1.5"

## (undated) DEVICE — KIT ENDO TURNOVER/PROCEDURE CARRY-ON 101822

## (undated) DEVICE — TUBING SUCTION 6"X3/16" N56A

## (undated) DEVICE — DRAPE MICROSCOPE EQUIP 48X120" 6130VL2

## (undated) DEVICE — DRAPE COVER C-ARM SEAMLESS SNAP-KAP 03-KP26 LATEX FREE

## (undated) DEVICE — PREP DURAPREP 06ML APL 8635

## (undated) DEVICE — CLIP APPLIER ENDO 5MM M/L LIGAMAX EL5ML

## (undated) DEVICE — TRAY PROCEDURE SUPPORT PAIN MANAGEMENT 332114

## (undated) DEVICE — GLOVE PROTEXIS BLUE W/NEU-THERA 8.5  2D73EB85

## (undated) DEVICE — GLOVE PROTEXIS BLUE W/NEU-THERA 8.0  2D73EB80

## (undated) DEVICE — RX SURGIFLO HEMOSTATIC MATRIX W/THROMBIN 8ML 2994

## (undated) DEVICE — BLADE CLIPPER 4412A

## (undated) DEVICE — ENDO TROCAR SLEEVE KII Z-THREADED 05X100MM CTS02

## (undated) DEVICE — SOL ADH LIQUID BENZOIN SWAB 0.6ML C1544

## (undated) DEVICE — PACK GENERAL LAPAOSCOPY

## (undated) DEVICE — ADH LIQUID MASTISOL TOPICAL VIAL 2-3ML 0523-48

## (undated) DEVICE — BLADE KNIFE SURG 15 371115

## (undated) DEVICE — GLOVE ESTEEM POWDER FREE 5.5  2D72PL55

## (undated) RX ORDER — HYDROMORPHONE HYDROCHLORIDE 1 MG/ML
INJECTION, SOLUTION INTRAMUSCULAR; INTRAVENOUS; SUBCUTANEOUS
Status: DISPENSED
Start: 2019-11-12

## (undated) RX ORDER — ONDANSETRON 2 MG/ML
INJECTION INTRAMUSCULAR; INTRAVENOUS
Status: DISPENSED
Start: 2019-04-02

## (undated) RX ORDER — FENTANYL CITRATE 50 UG/ML
INJECTION, SOLUTION INTRAMUSCULAR; INTRAVENOUS
Status: DISPENSED
Start: 2019-08-16

## (undated) RX ORDER — BUPIVACAINE HYDROCHLORIDE 5 MG/ML
INJECTION, SOLUTION EPIDURAL; INTRACAUDAL
Status: DISPENSED
Start: 2019-11-12

## (undated) RX ORDER — FENTANYL CITRATE 0.05 MG/ML
INJECTION, SOLUTION INTRAMUSCULAR; INTRAVENOUS
Status: DISPENSED
Start: 2019-11-12

## (undated) RX ORDER — ACETAMINOPHEN 325 MG/1
TABLET ORAL
Status: DISPENSED
Start: 2019-11-12

## (undated) RX ORDER — HYDROXYZINE HYDROCHLORIDE 50 MG/ML
INJECTION, SOLUTION INTRAMUSCULAR
Status: DISPENSED
Start: 2019-11-12

## (undated) RX ORDER — HYDROMORPHONE HYDROCHLORIDE 1 MG/ML
INJECTION, SOLUTION INTRAMUSCULAR; INTRAVENOUS; SUBCUTANEOUS
Status: DISPENSED
Start: 2019-04-02

## (undated) RX ORDER — LIDOCAINE HYDROCHLORIDE 20 MG/ML
INJECTION, SOLUTION EPIDURAL; INFILTRATION; INTRACAUDAL; PERINEURAL
Status: DISPENSED
Start: 2019-04-02

## (undated) RX ORDER — PROPOFOL 10 MG/ML
INJECTION, EMULSION INTRAVENOUS
Status: DISPENSED
Start: 2019-11-12

## (undated) RX ORDER — ACETAMINOPHEN 325 MG/1
TABLET ORAL
Status: DISPENSED
Start: 2019-04-02

## (undated) RX ORDER — GINSENG 100 MG
CAPSULE ORAL
Status: DISPENSED
Start: 2019-04-02

## (undated) RX ORDER — FENTANYL CITRATE 50 UG/ML
INJECTION, SOLUTION INTRAMUSCULAR; INTRAVENOUS
Status: DISPENSED
Start: 2019-04-02

## (undated) RX ORDER — LIDOCAINE HYDROCHLORIDE 20 MG/ML
INJECTION, SOLUTION EPIDURAL; INFILTRATION; INTRACAUDAL; PERINEURAL
Status: DISPENSED
Start: 2019-11-12

## (undated) RX ORDER — MEPERIDINE HYDROCHLORIDE 25 MG/ML
INJECTION INTRAMUSCULAR; INTRAVENOUS; SUBCUTANEOUS
Status: DISPENSED
Start: 2019-11-12

## (undated) RX ORDER — OXYCODONE HCL 10 MG/1
TABLET, FILM COATED, EXTENDED RELEASE ORAL
Status: DISPENSED
Start: 2019-04-02

## (undated) RX ORDER — BUPIVACAINE HYDROCHLORIDE AND EPINEPHRINE 5; 5 MG/ML; UG/ML
INJECTION, SOLUTION EPIDURAL; INTRACAUDAL; PERINEURAL
Status: DISPENSED
Start: 2019-04-02

## (undated) RX ORDER — PROPOFOL 10 MG/ML
INJECTION, EMULSION INTRAVENOUS
Status: DISPENSED
Start: 2019-10-17

## (undated) RX ORDER — CEFAZOLIN SODIUM 2 G/100ML
INJECTION, SOLUTION INTRAVENOUS
Status: DISPENSED
Start: 2019-04-02

## (undated) RX ORDER — BUPIVACAINE HYDROCHLORIDE AND EPINEPHRINE 2.5; 5 MG/ML; UG/ML
INJECTION, SOLUTION EPIDURAL; INFILTRATION; INTRACAUDAL; PERINEURAL
Status: DISPENSED
Start: 2019-10-17

## (undated) RX ORDER — FENTANYL CITRATE 50 UG/ML
INJECTION, SOLUTION INTRAMUSCULAR; INTRAVENOUS
Status: DISPENSED
Start: 2019-10-17

## (undated) RX ORDER — GINSENG 100 MG
CAPSULE ORAL
Status: DISPENSED
Start: 2019-11-12

## (undated) RX ORDER — KETAMINE HCL IN NACL, ISO-OSM 100MG/10ML
SYRINGE (ML) INJECTION
Status: DISPENSED
Start: 2019-11-12

## (undated) RX ORDER — HYDROXYZINE HYDROCHLORIDE 25 MG/1
TABLET, FILM COATED ORAL
Status: DISPENSED
Start: 2019-04-02

## (undated) RX ORDER — EPINEPHRINE 1 MG/ML
INJECTION, SOLUTION INTRAMUSCULAR; SUBCUTANEOUS
Status: DISPENSED
Start: 2019-11-12

## (undated) RX ORDER — GABAPENTIN 300 MG/1
CAPSULE ORAL
Status: DISPENSED
Start: 2019-04-02

## (undated) RX ORDER — KETAMINE HCL IN NACL, ISO-OSM 100MG/10ML
SYRINGE (ML) INJECTION
Status: DISPENSED
Start: 2019-04-02

## (undated) RX ORDER — LIDOCAINE HYDROCHLORIDE 20 MG/ML
INJECTION, SOLUTION EPIDURAL; INFILTRATION; INTRACAUDAL; PERINEURAL
Status: DISPENSED
Start: 2019-10-17

## (undated) RX ORDER — FENTANYL CITRATE 50 UG/ML
INJECTION, SOLUTION INTRAMUSCULAR; INTRAVENOUS
Status: DISPENSED
Start: 2019-11-12

## (undated) RX ORDER — CEFAZOLIN SODIUM 2 G/100ML
INJECTION, SOLUTION INTRAVENOUS
Status: DISPENSED
Start: 2019-11-12